# Patient Record
Sex: MALE | Race: BLACK OR AFRICAN AMERICAN | NOT HISPANIC OR LATINO | URBAN - METROPOLITAN AREA
[De-identification: names, ages, dates, MRNs, and addresses within clinical notes are randomized per-mention and may not be internally consistent; named-entity substitution may affect disease eponyms.]

---

## 2017-03-27 ENCOUNTER — INPATIENT (INPATIENT)
Facility: HOSPITAL | Age: 42
LOS: 3 days | Discharge: HOME HEALTH | End: 2017-03-31
Attending: INTERNAL MEDICINE | Admitting: INTERNAL MEDICINE
Payer: MEDICAID

## 2017-03-27 VITALS
HEART RATE: 63 BPM | OXYGEN SATURATION: 100 % | RESPIRATION RATE: 17 BRPM | WEIGHT: 190.04 LBS | HEIGHT: 74 IN | TEMPERATURE: 98 F | DIASTOLIC BLOOD PRESSURE: 85 MMHG | SYSTOLIC BLOOD PRESSURE: 120 MMHG

## 2017-03-27 DIAGNOSIS — S11.90XS UNSPECIFIED OPEN WOUND OF UNSPECIFIED PART OF NECK, SEQUELA: Chronic | ICD-10-CM

## 2017-03-27 DIAGNOSIS — S21.309A UNSPECIFIED OPEN WOUND OF UNSPECIFIED FRONT WALL OF THORAX WITH PENETRATION INTO THORACIC CAVITY, INITIAL ENCOUNTER: ICD-10-CM

## 2017-03-27 DIAGNOSIS — G82.20 PARAPLEGIA, UNSPECIFIED: ICD-10-CM

## 2017-03-27 DIAGNOSIS — N39.0 URINARY TRACT INFECTION, SITE NOT SPECIFIED: ICD-10-CM

## 2017-03-27 LAB
ALBUMIN SERPL ELPH-MCNC: 3.3 G/DL — SIGNIFICANT CHANGE UP (ref 3.3–5)
ALP SERPL-CCNC: 92 U/L — SIGNIFICANT CHANGE UP (ref 40–120)
ALT FLD-CCNC: 14 U/L — SIGNIFICANT CHANGE UP (ref 12–78)
ANION GAP SERPL CALC-SCNC: 9 MMOL/L — SIGNIFICANT CHANGE UP (ref 5–17)
APPEARANCE UR: ABNORMAL
AST SERPL-CCNC: 10 U/L — LOW (ref 15–37)
BACTERIA # UR AUTO: ABNORMAL
BASOPHILS # BLD AUTO: 0.1 K/UL — SIGNIFICANT CHANGE UP (ref 0–0.2)
BASOPHILS NFR BLD AUTO: 1.7 % — SIGNIFICANT CHANGE UP (ref 0–2)
BILIRUB SERPL-MCNC: 0.3 MG/DL — SIGNIFICANT CHANGE UP (ref 0.2–1.2)
BILIRUB UR-MCNC: NEGATIVE — SIGNIFICANT CHANGE UP
BUN SERPL-MCNC: 15 MG/DL — SIGNIFICANT CHANGE UP (ref 7–23)
CALCIUM SERPL-MCNC: 9.8 MG/DL — SIGNIFICANT CHANGE UP (ref 8.5–10.1)
CHLORIDE SERPL-SCNC: 105 MMOL/L — SIGNIFICANT CHANGE UP (ref 96–108)
CO2 SERPL-SCNC: 31 MMOL/L — SIGNIFICANT CHANGE UP (ref 22–31)
COLOR SPEC: ABNORMAL
CREAT SERPL-MCNC: 0.56 MG/DL — SIGNIFICANT CHANGE UP (ref 0.5–1.3)
DIFF PNL FLD: ABNORMAL
EOSINOPHIL # BLD AUTO: 0.1 K/UL — SIGNIFICANT CHANGE UP (ref 0–0.5)
EOSINOPHIL NFR BLD AUTO: 1.9 % — SIGNIFICANT CHANGE UP (ref 0–6)
EPI CELLS # UR: SIGNIFICANT CHANGE UP
GLUCOSE SERPL-MCNC: 95 MG/DL — SIGNIFICANT CHANGE UP (ref 70–99)
GLUCOSE UR QL: NEGATIVE MG/DL — SIGNIFICANT CHANGE UP
HCT VFR BLD CALC: 36 % — LOW (ref 39–50)
HGB BLD-MCNC: 12.2 G/DL — LOW (ref 13–17)
KETONES UR-MCNC: NEGATIVE — SIGNIFICANT CHANGE UP
LACTATE SERPL-SCNC: 0.9 MMOL/L — SIGNIFICANT CHANGE UP (ref 0.7–2)
LEUKOCYTE ESTERASE UR-ACNC: ABNORMAL
LYMPHOCYTES # BLD AUTO: 2.7 K/UL — SIGNIFICANT CHANGE UP (ref 1–3.3)
LYMPHOCYTES # BLD AUTO: 40.5 % — SIGNIFICANT CHANGE UP (ref 13–44)
MCHC RBC-ENTMCNC: 26.7 PG — LOW (ref 27–34)
MCHC RBC-ENTMCNC: 33.8 GM/DL — SIGNIFICANT CHANGE UP (ref 32–36)
MCV RBC AUTO: 78.9 FL — LOW (ref 80–100)
MONOCYTES # BLD AUTO: 0.9 K/UL — SIGNIFICANT CHANGE UP (ref 0–0.9)
MONOCYTES NFR BLD AUTO: 13.4 % — SIGNIFICANT CHANGE UP (ref 2–14)
NEUTROPHILS # BLD AUTO: 2.8 K/UL — SIGNIFICANT CHANGE UP (ref 1.8–7.4)
NEUTROPHILS NFR BLD AUTO: 42.4 % — LOW (ref 43–77)
NITRITE UR-MCNC: NEGATIVE — SIGNIFICANT CHANGE UP
PH UR: 7 — SIGNIFICANT CHANGE UP (ref 4.8–8)
PLATELET # BLD AUTO: 306 K/UL — SIGNIFICANT CHANGE UP (ref 150–400)
POTASSIUM SERPL-MCNC: 3.6 MMOL/L — SIGNIFICANT CHANGE UP (ref 3.5–5.3)
POTASSIUM SERPL-SCNC: 3.6 MMOL/L — SIGNIFICANT CHANGE UP (ref 3.5–5.3)
PROT SERPL-MCNC: 9 GM/DL — HIGH (ref 6–8.3)
PROT UR-MCNC: 500 MG/DL
RBC # BLD: 4.57 M/UL — SIGNIFICANT CHANGE UP (ref 4.2–5.8)
RBC # FLD: 16.7 % — HIGH (ref 11–15)
RBC CASTS # UR COMP ASSIST: >50 /HPF (ref 0–4)
SODIUM SERPL-SCNC: 145 MMOL/L — SIGNIFICANT CHANGE UP (ref 135–145)
SP GR SPEC: 1.01 — SIGNIFICANT CHANGE UP (ref 1.01–1.02)
UROBILINOGEN FLD QL: NEGATIVE MG/DL — SIGNIFICANT CHANGE UP
WBC # BLD: 6.6 K/UL — SIGNIFICANT CHANGE UP (ref 3.8–10.5)
WBC # FLD AUTO: 6.6 K/UL — SIGNIFICANT CHANGE UP (ref 3.8–10.5)
WBC UR QL: ABNORMAL

## 2017-03-27 PROCEDURE — 99285 EMERGENCY DEPT VISIT HI MDM: CPT

## 2017-03-27 PROCEDURE — 99223 1ST HOSP IP/OBS HIGH 75: CPT

## 2017-03-27 RX ORDER — SODIUM CHLORIDE 9 MG/ML
2000 INJECTION INTRAMUSCULAR; INTRAVENOUS; SUBCUTANEOUS ONCE
Qty: 0 | Refills: 0 | Status: COMPLETED | OUTPATIENT
Start: 2017-03-27 | End: 2017-03-27

## 2017-03-27 RX ORDER — TAMSULOSIN HYDROCHLORIDE 0.4 MG/1
0.4 CAPSULE ORAL AT BEDTIME
Qty: 0 | Refills: 0 | Status: DISCONTINUED | OUTPATIENT
Start: 2017-03-27 | End: 2017-03-31

## 2017-03-27 RX ORDER — SODIUM CHLORIDE 9 MG/ML
1000 INJECTION INTRAMUSCULAR; INTRAVENOUS; SUBCUTANEOUS
Qty: 0 | Refills: 0 | Status: DISCONTINUED | OUTPATIENT
Start: 2017-03-27 | End: 2017-03-30

## 2017-03-27 RX ORDER — SENNA PLUS 8.6 MG/1
2 TABLET ORAL AT BEDTIME
Qty: 0 | Refills: 0 | Status: DISCONTINUED | OUTPATIENT
Start: 2017-03-27 | End: 2017-03-31

## 2017-03-27 RX ORDER — ENOXAPARIN SODIUM 100 MG/ML
40 INJECTION SUBCUTANEOUS DAILY
Qty: 0 | Refills: 0 | Status: DISCONTINUED | OUTPATIENT
Start: 2017-03-27 | End: 2017-03-31

## 2017-03-27 RX ORDER — PIPERACILLIN AND TAZOBACTAM 4; .5 G/20ML; G/20ML
3.38 INJECTION, POWDER, LYOPHILIZED, FOR SOLUTION INTRAVENOUS ONCE
Qty: 0 | Refills: 0 | Status: COMPLETED | OUTPATIENT
Start: 2017-03-27 | End: 2017-03-27

## 2017-03-27 RX ORDER — PIPERACILLIN AND TAZOBACTAM 4; .5 G/20ML; G/20ML
3.38 INJECTION, POWDER, LYOPHILIZED, FOR SOLUTION INTRAVENOUS EVERY 8 HOURS
Qty: 0 | Refills: 0 | Status: DISCONTINUED | OUTPATIENT
Start: 2017-03-27 | End: 2017-03-31

## 2017-03-27 RX ORDER — FAMOTIDINE 10 MG/ML
20 INJECTION INTRAVENOUS DAILY
Qty: 0 | Refills: 0 | Status: DISCONTINUED | OUTPATIENT
Start: 2017-03-27 | End: 2017-03-31

## 2017-03-27 RX ADMIN — ENOXAPARIN SODIUM 40 MILLIGRAM(S): 100 INJECTION SUBCUTANEOUS at 20:29

## 2017-03-27 RX ADMIN — SODIUM CHLORIDE 2000 MILLILITER(S): 9 INJECTION INTRAMUSCULAR; INTRAVENOUS; SUBCUTANEOUS at 17:44

## 2017-03-27 RX ADMIN — PIPERACILLIN AND TAZOBACTAM 200 GRAM(S): 4; .5 INJECTION, POWDER, LYOPHILIZED, FOR SOLUTION INTRAVENOUS at 17:44

## 2017-03-27 NOTE — ED ADULT NURSE NOTE - OBJECTIVE STATEMENT
sent by pmd to r/o UTI pt with [place] cath in place sent by pmd to r/o UTI pt with [place] cath in place, nhi for 2 weeks , went to PMD got antibiotic no releif

## 2017-03-27 NOTE — ED PROVIDER NOTE - OBJECTIVE STATEMENT
Pertinent PMH/PSH/FHx/SHx and Review of Systems contained within:  41m hx of quadriplegia complicated by recurrent urinary tract infecitons pw urinary trace infection. patients' symp[toms include chills, diaphoresis and nausea. no pain. he has had symptoms for the past 3 weeks. having completed a course of macrobid since 3/9 for 14 days. Patient has resistant infeciton  Fh and Sh not otherwise contributory  ROS otherwise negative

## 2017-03-27 NOTE — ED ADULT TRIAGE NOTE - CHIEF COMPLAINT QUOTE
as per ems " He is from home and he was treated for UTI and he is still having the symptoms, he is quadriplegic"
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
## 2017-03-27 NOTE — H&P ADULT. - RS GEN PE MLT RESP DETAILS PC
respirations non-labored/no chest wall tenderness/good air movement/airway patent/clear to auscultation bilaterally/breath sounds equal

## 2017-03-27 NOTE — ED PROVIDER NOTE - PHYSICAL EXAMINATION
Gen: Alert, NAD  Head: NC, AT   Eyes: PERRL, EOMI, normal lids/conjunctiva  ENT: normal hearing, patent oropharynx without erythema/exudate, uvula midline  Neck: supple, no tenderness, Trachea midline  Pulm: Bilateral BS, normal resp effort, no wheeze/stridor/retractions  CV: RRR, no M/R/G, 2+ radial and dp pulses bl, no edema  Abd: soft, NT/ND, +BS, no hepatosplenomegaly  Mskel: extremities x4 with normal ROM and no joint effusions. no ctl spine ttp.   Skin: no rash, no bruising   Neuro: AAOx3, quadriplegic. no sensation below lower neck. spasticity of the hands.
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
## 2017-03-27 NOTE — ED PROVIDER NOTE - MEDICAL DECISION MAKING DETAILS
patient pw uti and has failed outpatient therapy x2. will admit for iv abx until culture develops. zosyn. no juana sepsis with hemodynamic instability.

## 2017-03-28 DIAGNOSIS — N30.01 ACUTE CYSTITIS WITH HEMATURIA: ICD-10-CM

## 2017-03-28 DIAGNOSIS — G82.50 QUADRIPLEGIA, UNSPECIFIED: ICD-10-CM

## 2017-03-28 LAB
ANION GAP SERPL CALC-SCNC: 8 MMOL/L — SIGNIFICANT CHANGE UP (ref 5–17)
BASOPHILS # BLD AUTO: 0 K/UL — SIGNIFICANT CHANGE UP (ref 0–0.2)
BASOPHILS NFR BLD AUTO: 0.6 % — SIGNIFICANT CHANGE UP (ref 0–2)
BUN SERPL-MCNC: 13 MG/DL — SIGNIFICANT CHANGE UP (ref 7–23)
CALCIUM SERPL-MCNC: 8.3 MG/DL — LOW (ref 8.5–10.1)
CHLORIDE SERPL-SCNC: 108 MMOL/L — SIGNIFICANT CHANGE UP (ref 96–108)
CO2 SERPL-SCNC: 27 MMOL/L — SIGNIFICANT CHANGE UP (ref 22–31)
CREAT SERPL-MCNC: 0.58 MG/DL — SIGNIFICANT CHANGE UP (ref 0.5–1.3)
CULTURE RESULTS: SIGNIFICANT CHANGE UP
EOSINOPHIL # BLD AUTO: 0.3 K/UL — SIGNIFICANT CHANGE UP (ref 0–0.5)
EOSINOPHIL NFR BLD AUTO: 3.7 % — SIGNIFICANT CHANGE UP (ref 0–6)
GLUCOSE SERPL-MCNC: 126 MG/DL — HIGH (ref 70–99)
HCT VFR BLD CALC: 30.8 % — LOW (ref 39–50)
HGB BLD-MCNC: 10.3 G/DL — LOW (ref 13–17)
LYMPHOCYTES # BLD AUTO: 2.4 K/UL — SIGNIFICANT CHANGE UP (ref 1–3.3)
LYMPHOCYTES # BLD AUTO: 34.3 % — SIGNIFICANT CHANGE UP (ref 13–44)
MCHC RBC-ENTMCNC: 26.3 PG — LOW (ref 27–34)
MCHC RBC-ENTMCNC: 33.3 GM/DL — SIGNIFICANT CHANGE UP (ref 32–36)
MCV RBC AUTO: 79.1 FL — LOW (ref 80–100)
MONOCYTES # BLD AUTO: 0.6 K/UL — SIGNIFICANT CHANGE UP (ref 0–0.9)
MONOCYTES NFR BLD AUTO: 9.1 % — SIGNIFICANT CHANGE UP (ref 2–14)
NEUTROPHILS # BLD AUTO: 3.6 K/UL — SIGNIFICANT CHANGE UP (ref 1.8–7.4)
NEUTROPHILS NFR BLD AUTO: 52.2 % — SIGNIFICANT CHANGE UP (ref 43–77)
PLATELET # BLD AUTO: 252 K/UL — SIGNIFICANT CHANGE UP (ref 150–400)
POTASSIUM SERPL-MCNC: 3.5 MMOL/L — SIGNIFICANT CHANGE UP (ref 3.5–5.3)
POTASSIUM SERPL-SCNC: 3.5 MMOL/L — SIGNIFICANT CHANGE UP (ref 3.5–5.3)
RBC # BLD: 3.9 M/UL — LOW (ref 4.2–5.8)
RBC # FLD: 16.4 % — HIGH (ref 11–15)
SODIUM SERPL-SCNC: 143 MMOL/L — SIGNIFICANT CHANGE UP (ref 135–145)
SPECIMEN SOURCE: SIGNIFICANT CHANGE UP
WBC # BLD: 6.9 K/UL — SIGNIFICANT CHANGE UP (ref 3.8–10.5)
WBC # FLD AUTO: 6.9 K/UL — SIGNIFICANT CHANGE UP (ref 3.8–10.5)

## 2017-03-28 PROCEDURE — 99233 SBSQ HOSP IP/OBS HIGH 50: CPT

## 2017-03-28 PROCEDURE — 99223 1ST HOSP IP/OBS HIGH 75: CPT

## 2017-03-28 RX ORDER — ACETAMINOPHEN 500 MG
650 TABLET ORAL EVERY 6 HOURS
Qty: 0 | Refills: 0 | Status: DISCONTINUED | OUTPATIENT
Start: 2017-03-28 | End: 2017-03-31

## 2017-03-28 RX ADMIN — PIPERACILLIN AND TAZOBACTAM 25 GRAM(S): 4; .5 INJECTION, POWDER, LYOPHILIZED, FOR SOLUTION INTRAVENOUS at 00:09

## 2017-03-28 RX ADMIN — FAMOTIDINE 20 MILLIGRAM(S): 10 INJECTION INTRAVENOUS at 11:03

## 2017-03-28 RX ADMIN — SENNA PLUS 2 TABLET(S): 8.6 TABLET ORAL at 00:09

## 2017-03-28 RX ADMIN — SENNA PLUS 2 TABLET(S): 8.6 TABLET ORAL at 21:53

## 2017-03-28 RX ADMIN — TAMSULOSIN HYDROCHLORIDE 0.4 MILLIGRAM(S): 0.4 CAPSULE ORAL at 21:53

## 2017-03-28 RX ADMIN — PIPERACILLIN AND TAZOBACTAM 25 GRAM(S): 4; .5 INJECTION, POWDER, LYOPHILIZED, FOR SOLUTION INTRAVENOUS at 15:30

## 2017-03-28 RX ADMIN — ENOXAPARIN SODIUM 40 MILLIGRAM(S): 100 INJECTION SUBCUTANEOUS at 11:03

## 2017-03-28 RX ADMIN — PIPERACILLIN AND TAZOBACTAM 25 GRAM(S): 4; .5 INJECTION, POWDER, LYOPHILIZED, FOR SOLUTION INTRAVENOUS at 07:38

## 2017-03-28 RX ADMIN — TAMSULOSIN HYDROCHLORIDE 0.4 MILLIGRAM(S): 0.4 CAPSULE ORAL at 00:09

## 2017-03-28 NOTE — PROGRESS NOTE ADULT - SUBJECTIVE AND OBJECTIVE BOX
Patient is a 41y old  Male who presents with a chief complaint of fever/chills, diaphoresis (27 Mar 2017 18:43)       OVERNIGHT EVENTS:  +chills    MEDICATIONS  (STANDING):  enoxaparin Injectable 40milliGRAM(s) SubCutaneous daily  piperacillin/tazobactam IVPB. 3.375Gram(s) IV Intermittent every 8 hours  sodium chloride 0.9%. 1000milliLiter(s) IV Continuous <Continuous>  tamsulosin 0.4milliGRAM(s) Oral at bedtime  famotidine    Tablet 20milliGRAM(s) Oral daily  senna 2Tablet(s) Oral at bedtime    MEDICATIONS  (PRN):       Vital Signs Last 24 Hrs  T(C): 37, Max: 37 ( @ 13:12)  T(F): 98.6, Max: 98.6 ( @ 13:12)  HR: 66 (66 - 92)  BP: 125/86 (82/50 - 177/87)  BP(mean): --  RR: 18 (18 - 18)  SpO2: 99% (98% - 100%)    PHYSICAL EXAM:  GENERAL: NAD, well-groomed,AAOX3,  +quadriplegic  HEAD:  Atraumatic, Normocephalic  EYES: EOMI, PERRLA, conjunctiva and sclera clear  ENMT: No tonsillar erythema, exudates, or enlargement; Moist mucous membranes   CHEST/LUNG: Clear to percussion bilaterally; No rales, rhonchi, wheezing, or rubs  HEART: Regular rate and rhythm; No murmurs, rubs, or gallops  ABDOMEN: Soft, Nontender, Nondistended; Bowel sounds present  EXTREMITIES:  2+ Peripheral Pulses, No clubbing, cyanosis, or edema    LABS:                        10.3   6.9   )-----------( 252      ( 28 Mar 2017 10:03 )             30.8     28 Mar 2017 10:03    143    |  108    |  13     ----------------------------<  126    3.5     |  27     |  0.58     Ca    8.3        28 Mar 2017 10:03    TPro  9.0    /  Alb  3.3    /  TBili  0.3    /  DBili  x      /  AST  10     /  ALT  14     /  AlkPhos  92     27 Mar 2017 18:03       cardiac markers   Urinalysis Basic - ( 27 Mar 2017 14:52 )    Color: Brown / Appearance: very cloudy / S.010 / pH: x  Gluc: x / Ketone: Negative  / Bili: Negative / Urobili: Negative mg/dL   Blood: x / Protein: 500 mg/dL / Nitrite: Negative   Leuk Esterase: Moderate / RBC: >50 /HPF / WBC 6-10   Sq Epi: x / Non Sq Epi: Few / Bacteria: Few      CAPILLARY BLOOD GLUCOSE    Cultures: pending    RADIOLOGY & ADDITIONAL TESTS:    Imaging Personally Reviewed:  [x ] YES  [ ] NO    Consultant(s) Notes Reviewed:  [ x] YES  [ ] NO    Care Discussed with Consultants/Other Providers [x ] YES  [ ] NO
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
## 2017-03-28 NOTE — CONSULT NOTE ADULT - PROBLEM SELECTOR RECOMMENDATION 9
continue Zoysn  f/u on urine culture  switch to oral soon to finish a 14 day course as ot getting on and off uti
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
## 2017-03-29 PROCEDURE — 99233 SBSQ HOSP IP/OBS HIGH 50: CPT

## 2017-03-29 RX ADMIN — PIPERACILLIN AND TAZOBACTAM 25 GRAM(S): 4; .5 INJECTION, POWDER, LYOPHILIZED, FOR SOLUTION INTRAVENOUS at 18:12

## 2017-03-29 RX ADMIN — FAMOTIDINE 20 MILLIGRAM(S): 10 INJECTION INTRAVENOUS at 12:41

## 2017-03-29 RX ADMIN — TAMSULOSIN HYDROCHLORIDE 0.4 MILLIGRAM(S): 0.4 CAPSULE ORAL at 21:56

## 2017-03-29 RX ADMIN — SODIUM CHLORIDE 100 MILLILITER(S): 9 INJECTION INTRAMUSCULAR; INTRAVENOUS; SUBCUTANEOUS at 12:41

## 2017-03-29 RX ADMIN — ENOXAPARIN SODIUM 40 MILLIGRAM(S): 100 INJECTION SUBCUTANEOUS at 12:41

## 2017-03-29 RX ADMIN — SODIUM CHLORIDE 100 MILLILITER(S): 9 INJECTION INTRAMUSCULAR; INTRAVENOUS; SUBCUTANEOUS at 21:55

## 2017-03-29 RX ADMIN — Medication 650 MILLIGRAM(S): at 00:05

## 2017-03-29 RX ADMIN — Medication 650 MILLIGRAM(S): at 01:05

## 2017-03-29 RX ADMIN — PIPERACILLIN AND TAZOBACTAM 25 GRAM(S): 4; .5 INJECTION, POWDER, LYOPHILIZED, FOR SOLUTION INTRAVENOUS at 09:13

## 2017-03-29 RX ADMIN — PIPERACILLIN AND TAZOBACTAM 25 GRAM(S): 4; .5 INJECTION, POWDER, LYOPHILIZED, FOR SOLUTION INTRAVENOUS at 00:07

## 2017-03-29 RX ADMIN — SENNA PLUS 2 TABLET(S): 8.6 TABLET ORAL at 21:56

## 2017-03-29 NOTE — PROGRESS NOTE ADULT - SUBJECTIVE AND OBJECTIVE BOX
Patient is a 41y old  Male who presents with a chief complaint of fever/chills, diaphoresis (27 Mar 2017 18:43)      INTERVAL HPI / OVERNIGHT EVENTS: no fever    MEDICATIONS  (STANDING):  enoxaparin Injectable 40milliGRAM(s) SubCutaneous daily  piperacillin/tazobactam IVPB. 3.375Gram(s) IV Intermittent every 8 hours  sodium chloride 0.9%. 1000milliLiter(s) IV Continuous <Continuous>  tamsulosin 0.4milliGRAM(s) Oral at bedtime  famotidine    Tablet 20milliGRAM(s) Oral daily  senna 2Tablet(s) Oral at bedtime    MEDICATIONS  (PRN):  acetaminophen   Tablet. 650milliGRAM(s) Oral every 6 hours PRN Mild Pain (1 - 3)      Vital Signs Last 24 Hrs  T(C): 37, Max: 37 (03-29 @ 12:00)  T(F): 98.6, Max: 98.6 (03-29 @ 12:00)  HR: 65 (64 - 68)  BP: 119/78 (103/60 - 136/78)  BP(mean): --  RR: 16 (16 - 17)  SpO2: 98% (98% - 98%)    PHYSICAL EXAM:    Constitutional: NAD, well-groomed, well-developed  Respiratory: CTAB/L  Cardiovascular: S1 and S2, RRR, no M/G/R  Gastrointestinal: BS+, soft, NT/ND  Extremities: paraplegia  Vascular: 2+ peripheral pulses  Skin: No rashes      LABS:                        10.3   6.9   )-----------( 252      ( 28 Mar 2017 10:03 )             30.8     28 Mar 2017 10:03    143    |  108    |  13     ----------------------------<  126    3.5     |  27     |  0.58     Ca    8.3        28 Mar 2017 10:03    TPro  9.0    /  Alb  3.3    /  TBili  0.3    /  DBili  x      /  AST  10     /  ALT  14     /  AlkPhos  92     27 Mar 2017 18:03            MICROBIOLOGY:  RECENT CULTURES:  03-27 .Blood Blood XXXX XXXX   No growth to date.    03-27 .Urine Catheterized XXXX XXXX   Culture grew 3 or more types of organisms which indicate  collection contamination; consider recollection only if clinically  indicated.          RADIOLOGY & ADDITIONAL STUDIES:

## 2017-03-29 NOTE — DISCHARGE NOTE ADULT - PLAN OF CARE
Dysuria is resolving Complete cefdinir  for 2 weeks. Follow up with Urology and ID. Drink lots of fluids. supportive care

## 2017-03-29 NOTE — DISCHARGE NOTE ADULT - PATIENT PORTAL LINK FT
“You can access the [facility] Patient Portal, offered by [facility], by registering with the following website: [email or web address]”
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
## 2017-03-29 NOTE — PROGRESS NOTE ADULT - SUBJECTIVE AND OBJECTIVE BOX
Patient is a 41y old  Male who presents with a chief complaint of fever/chills, diaphoresis (27 Mar 2017 18:43)       OVERNIGHT EVENTS: no reported events    MEDICATIONS  (STANDING):  enoxaparin Injectable 40milliGRAM(s) SubCutaneous daily  piperacillin/tazobactam IVPB. 3.375Gram(s) IV Intermittent every 8 hours  sodium chloride 0.9%. 1000milliLiter(s) IV Continuous <Continuous>  tamsulosin 0.4milliGRAM(s) Oral at bedtime  famotidine    Tablet 20milliGRAM(s) Oral daily  senna 2Tablet(s) Oral at bedtime    MEDICATIONS  (PRN):  acetaminophen   Tablet. 650milliGRAM(s) Oral every 6 hours PRN Mild Pain (1 - 3)         Vital Signs Last 24 Hrs  T(C): 37, Max: 37 (03-29 @ 12:00)  T(F): 98.6, Max: 98.6 (03-29 @ 12:00)  HR: 65 (64 - 68)  BP: 119/78 (103/60 - 136/78)  BP(mean): --  RR: 16 (16 - 17)  SpO2: 98% (98% - 98%)     PHYSICAL EXAM:  GENERAL: NAD, well-groomed,AAOX3,  +quadriplegic  HEAD:  Atraumatic, Normocephalic  EYES: EOMI, PERRLA, conjunctiva and sclera clear  ENMT: No tonsillar erythema, exudates, or enlargement; Moist mucous membranes   CHEST/LUNG: Clear to percussion bilaterally; No rales, rhonchi, wheezing, or rubs  HEART: Regular rate and rhythm; No murmurs, rubs, or gallops  ABDOMEN: Soft, Nontender, Nondistended; Bowel sounds present  EXTREMITIES:  2+ Peripheral Pulses, No clubbing, cyanosis, or edema  LABS:                        10.3   6.9   )-----------( 252      ( 28 Mar 2017 10:03 )             30.8     28 Mar 2017 10:03    143    |  108    |  13     ----------------------------<  126    3.5     |  27     |  0.58     Ca    8.3        28 Mar 2017 10:03    TPro  9.0    /  Alb  3.3    /  TBili  0.3    /  DBili  x      /  AST  10     /  ALT  14     /  AlkPhos  92     27 Mar 2017 18:03       cardiac markers     CAPILLARY BLOOD GLUCOSE    Cultures    RADIOLOGY & ADDITIONAL TESTS:    Imaging Personally Reviewed:  [ x] YES  [ ] NO    Consultant(s) Notes Reviewed:  [x ] YES  [ ] NO    Care Discussed with Consultants/Other Providers [x ] YES  [ ] NO

## 2017-03-29 NOTE — DISCHARGE NOTE ADULT - MEDICATION SUMMARY - MEDICATIONS TO TAKE
I will START or STAY ON the medications listed below when I get home from the hospital:    Flomax 0.4 mg oral capsule  -- 1 cap(s) by mouth once a day  -- Indication: For BPH    famotidine 20 mg oral tablet, disintegrating  --  by mouth   -- Indication: For GERD    senna 8.6 mg oral tablet  -- 1 tab(s) by mouth once a day (at bedtime)  -- Indication: For PPX    tiZANidine 4 mg oral tablet  -- 2 tab(s) by mouth every 8 hours  -- Indication: For Muscle spasm I will START or STAY ON the medications listed below when I get home from the hospital:    Flomax 0.4 mg oral capsule  -- 1 cap(s) by mouth once a day  -- Indication: For BPH    cefdinir 300 mg oral capsule  -- 1 cap(s) by mouth every 12 hours  -- Finish all this medication unless otherwise directed by prescriber.    -- Indication: For ACUTE CYSTITIS WITH HEMATURIA    famotidine 20 mg oral tablet, disintegrating  --  by mouth   -- Indication: For GERD    senna 8.6 mg oral tablet  -- 1 tab(s) by mouth once a day (at bedtime)  -- Indication: For PPX    tiZANidine 4 mg oral tablet  -- 2 tab(s) by mouth every 8 hours  -- Indication: For Muscle spasm

## 2017-03-29 NOTE — DISCHARGE NOTE ADULT - CARE PROVIDER_API CALL
Sruthi Lyons ([facility]), Infectious Disease; Internal Medicine  [address] [place] [address]  Phone: [phone]  Fax: [phone]    James Bates), Urology  [address] [place] [address]  Phone: [phone]  Fax: [phone]
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
## 2017-03-30 LAB
HCT VFR BLD CALC: 29.1 % — LOW (ref 39–50)
HGB BLD-MCNC: 9.7 G/DL — LOW (ref 13–17)
MCHC RBC-ENTMCNC: 26 PG — LOW (ref 27–34)
MCHC RBC-ENTMCNC: 33.2 GM/DL — SIGNIFICANT CHANGE UP (ref 32–36)
MCV RBC AUTO: 78.4 FL — LOW (ref 80–100)
PLATELET # BLD AUTO: 254 K/UL — SIGNIFICANT CHANGE UP (ref 150–400)
RBC # BLD: 3.72 M/UL — LOW (ref 4.2–5.8)
RBC # FLD: 16.2 % — HIGH (ref 11–15)
WBC # BLD: 5.9 K/UL — SIGNIFICANT CHANGE UP (ref 3.8–10.5)
WBC # FLD AUTO: 5.9 K/UL — SIGNIFICANT CHANGE UP (ref 3.8–10.5)

## 2017-03-30 PROCEDURE — 99239 HOSP IP/OBS DSCHRG MGMT >30: CPT

## 2017-03-30 PROCEDURE — 99233 SBSQ HOSP IP/OBS HIGH 50: CPT

## 2017-03-30 RX ORDER — CEFDINIR 250 MG/5ML
1 POWDER, FOR SUSPENSION ORAL
Qty: 28 | Refills: 0 | OUTPATIENT
Start: 2017-03-30 | End: 2017-04-13

## 2017-03-30 RX ADMIN — PIPERACILLIN AND TAZOBACTAM 25 GRAM(S): 4; .5 INJECTION, POWDER, LYOPHILIZED, FOR SOLUTION INTRAVENOUS at 23:32

## 2017-03-30 RX ADMIN — PIPERACILLIN AND TAZOBACTAM 25 GRAM(S): 4; .5 INJECTION, POWDER, LYOPHILIZED, FOR SOLUTION INTRAVENOUS at 17:42

## 2017-03-30 RX ADMIN — ENOXAPARIN SODIUM 40 MILLIGRAM(S): 100 INJECTION SUBCUTANEOUS at 17:43

## 2017-03-30 RX ADMIN — TAMSULOSIN HYDROCHLORIDE 0.4 MILLIGRAM(S): 0.4 CAPSULE ORAL at 22:15

## 2017-03-30 RX ADMIN — SENNA PLUS 2 TABLET(S): 8.6 TABLET ORAL at 22:15

## 2017-03-30 RX ADMIN — FAMOTIDINE 20 MILLIGRAM(S): 10 INJECTION INTRAVENOUS at 17:43

## 2017-03-30 RX ADMIN — PIPERACILLIN AND TAZOBACTAM 25 GRAM(S): 4; .5 INJECTION, POWDER, LYOPHILIZED, FOR SOLUTION INTRAVENOUS at 08:38

## 2017-03-30 RX ADMIN — PIPERACILLIN AND TAZOBACTAM 25 GRAM(S): 4; .5 INJECTION, POWDER, LYOPHILIZED, FOR SOLUTION INTRAVENOUS at 00:31

## 2017-03-30 RX ADMIN — SODIUM CHLORIDE 100 MILLILITER(S): 9 INJECTION INTRAMUSCULAR; INTRAVENOUS; SUBCUTANEOUS at 08:41

## 2017-03-30 NOTE — PROGRESS NOTE ADULT - SUBJECTIVE AND OBJECTIVE BOX
Patient is a 41y old  Male who presents with a chief complaint of fever/chills, diaphoresis (29 Mar 2017 15:57)      OVERNIGHT EVENTS: has some dysuria and feels diaphoretic while urinating.     REVIEW OF SYSTEMS: denies chest pain/SOB, diaphoresis, no F/C, cough, dizziness, headache, blurry vision, nausea, vomiting, abdominal pain. Rest unremarkable     MEDICATIONS  (STANDING):  enoxaparin Injectable 40milliGRAM(s) SubCutaneous daily  piperacillin/tazobactam IVPB. 3.375Gram(s) IV Intermittent every 8 hours  sodium chloride 0.9%. 1000milliLiter(s) IV Continuous <Continuous>  tamsulosin 0.4milliGRAM(s) Oral at bedtime  famotidine    Tablet 20milliGRAM(s) Oral daily  senna 2Tablet(s) Oral at bedtime    MEDICATIONS  (PRN):  acetaminophen   Tablet. 650milliGRAM(s) Oral every 6 hours PRN Mild Pain (1 - 3)      Allergies    No Known Allergies    Intolerances        T(F): 97.4, Max: 99 (03-29 @ 17:15)  HR: 63 (63 - 76)  BP: 127/82 (116/74 - 145/95)  RR: 17 (17 - 20)  SpO2: 98% (97% - 100%)  Wt(kg): --    PHYSICAL EXAM:  GENERAL: NAD, well-groomed, well-developed  HEAD:  Atraumatic, Normocephalic  EYES: EOMI, PERRLA, conjunctiva and sclera clear  ENMT: No tonsillar erythema, exudates, or enlargement; Moist mucous membranes, Good dentition, No lesions  NECK: Supple, No JVD, Normal thyroid  NERVOUS SYSTEM:  Alert & Oriented X3, Good concentration; +quadriplegic  CHEST/LUNG: Clear to percussion bilaterally; No rales, rhonchi, wheezing, or rubs BL  HEART: Regular rate and rhythm; No murmurs, rubs, or gallops  ABDOMEN: Soft, Nontender, Nondistended; Bowel sounds present  EXTREMITIES:  2+ Peripheral Pulses, No clubbing, cyanosis, or edema BL LE  LYMPH: No lymphadenopathy noted      LABS:                        9.7    5.9   )-----------( 254      ( 30 Mar 2017 07:07 )             29.1               Cultures;   CAPILLARY BLOOD GLUCOSE    Lipid panel:           RADIOLOGY & ADDITIONAL TESTS:    Imaging Personally Reviewed:  [x ] YES      Consultant(s) Notes Reviewed:  [x ] YES     Care Discussed with [x ] Consultants [X ] Patient [ ] Family  [x ]    [x ]  Other; RN

## 2017-03-30 NOTE — PROGRESS NOTE ADULT - SUBJECTIVE AND OBJECTIVE BOX
Patient is a 41y old  Male who presents with a chief complaint of fever/chills, diaphoresis (29 Mar 2017 15:57)      INTERVAL HPI / OVERNIGHT EVENTS:c/o sweating and chills, no documented fever or hypothermia  MEDICATIONS  (STANDING):  enoxaparin Injectable 40milliGRAM(s) SubCutaneous daily  piperacillin/tazobactam IVPB. 3.375Gram(s) IV Intermittent every 8 hours  tamsulosin 0.4milliGRAM(s) Oral at bedtime  famotidine    Tablet 20milliGRAM(s) Oral daily  senna 2Tablet(s) Oral at bedtime    MEDICATIONS  (PRN):  acetaminophen   Tablet. 650milliGRAM(s) Oral every 6 hours PRN Mild Pain (1 - 3)      Vital Signs Last 24 Hrs  Tmax: afebrile  HR: 74 (63 - 77)  BP: 126/75 (126/75 - 135/87)  BP(mean): --  RR: 17 (17 - 18)  SpO2: 98% (98% - 99%)    PHYSICAL EXAM:    Constitutional: NAD, well-groomed, well-developed  Respiratory: CTAB/L  Cardiovascular: S1 and S2, RRR, no M/G/R  Gastrointestinal: BS+, soft, NT/ND  Extremities: No peripheral edema  Vascular: 2+ peripheral pulses  Skin: No rashes      LABS:                        9.7    5.9   )-----------( 254      ( 30 Mar 2017 07:07 )             29.1                   MICROBIOLOGY:  RECENT CULTURES:  03-27 .Blood Blood XXXX XXXX   No growth to date.    03-27 .Urine Catheterized XXXX XXXX   Culture grew 3 or more types of organisms which indicate  collection contamination; consider recollection only if clinically  indicated.          RADIOLOGY & ADDITIONAL STUDIES:

## 2017-03-31 VITALS
TEMPERATURE: 98 F | SYSTOLIC BLOOD PRESSURE: 111 MMHG | DIASTOLIC BLOOD PRESSURE: 78 MMHG | HEART RATE: 76 BPM | OXYGEN SATURATION: 98 % | RESPIRATION RATE: 17 BRPM

## 2017-03-31 PROCEDURE — 99239 HOSP IP/OBS DSCHRG MGMT >30: CPT

## 2017-03-31 RX ORDER — CEFDINIR 250 MG/5ML
300 POWDER, FOR SUSPENSION ORAL
Qty: 0 | Refills: 0 | Status: DISCONTINUED | OUTPATIENT
Start: 2017-03-31 | End: 2017-03-31

## 2017-03-31 RX ADMIN — ENOXAPARIN SODIUM 40 MILLIGRAM(S): 100 INJECTION SUBCUTANEOUS at 12:18

## 2017-03-31 RX ADMIN — FAMOTIDINE 20 MILLIGRAM(S): 10 INJECTION INTRAVENOUS at 12:18

## 2017-03-31 RX ADMIN — PIPERACILLIN AND TAZOBACTAM 25 GRAM(S): 4; .5 INJECTION, POWDER, LYOPHILIZED, FOR SOLUTION INTRAVENOUS at 08:56
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
## 2017-03-31 NOTE — PROGRESS NOTE ADULT - PROBLEM SELECTOR PROBLEM 1
Acute cystitis with hematuria
Complicated UTI (urinary tract infection)
Acute cystitis with hematuria

## 2017-03-31 NOTE — PROGRESS NOTE ADULT - PROBLEM SELECTOR PLAN 1
- on Zosyn IV  -  urine cultures>3 organisms, contamination  c/w IV zosyn as pt has recent h/o failed outpt PO abx   - IVF   - ID   f/u
-IV zosyn per ID  -follow up bcx/ucx
c/s grew More than 3 species   ok to d/c on cefdinir to finish a 2 week course (day 5 today )  D/C zosyn in am   no signs of sepsis, fever or leukocytosis
change to oral antibiotics as per infectious disease  stable for discharge
- on Zosyn IV  - f/u urine cultures  - IVF   - ID consulted 
c/s grew More than 3 species   Has been on macrobid and cipro in past   will likely d/c on cefdinir  for 2 weeks

## 2017-04-01 LAB
CULTURE RESULTS: SIGNIFICANT CHANGE UP
CULTURE RESULTS: SIGNIFICANT CHANGE UP
SPECIMEN SOURCE: SIGNIFICANT CHANGE UP
SPECIMEN SOURCE: SIGNIFICANT CHANGE UP

## 2017-04-04 DIAGNOSIS — G82.50 QUADRIPLEGIA, UNSPECIFIED: ICD-10-CM

## 2017-04-04 DIAGNOSIS — N39.0 URINARY TRACT INFECTION, SITE NOT SPECIFIED: ICD-10-CM

## 2017-04-04 DIAGNOSIS — Z87.828 PERSONAL HISTORY OF OTHER (HEALED) PHYSICAL INJURY AND TRAUMA: ICD-10-CM

## 2019-01-07 ENCOUNTER — EMERGENCY (EMERGENCY)
Facility: HOSPITAL | Age: 44
LOS: 0 days | Discharge: HOME | End: 2019-01-07
Attending: STUDENT IN AN ORGANIZED HEALTH CARE EDUCATION/TRAINING PROGRAM
Payer: MEDICAID

## 2019-01-07 VITALS
RESPIRATION RATE: 20 BRPM | OXYGEN SATURATION: 100 % | TEMPERATURE: 98 F | HEART RATE: 78 BPM | SYSTOLIC BLOOD PRESSURE: 124 MMHG | DIASTOLIC BLOOD PRESSURE: 74 MMHG

## 2019-01-07 VITALS
OXYGEN SATURATION: 98 % | HEIGHT: 74 IN | SYSTOLIC BLOOD PRESSURE: 124 MMHG | TEMPERATURE: 98 F | WEIGHT: 175.05 LBS | HEART RATE: 63 BPM | DIASTOLIC BLOOD PRESSURE: 86 MMHG | RESPIRATION RATE: 20 BRPM

## 2019-01-07 DIAGNOSIS — Z88.0 ALLERGY STATUS TO PENICILLIN: ICD-10-CM

## 2019-01-07 DIAGNOSIS — R10.30 LOWER ABDOMINAL PAIN, UNSPECIFIED: ICD-10-CM

## 2019-01-07 DIAGNOSIS — S11.90XS UNSPECIFIED OPEN WOUND OF UNSPECIFIED PART OF NECK, SEQUELA: Chronic | ICD-10-CM

## 2019-01-07 DIAGNOSIS — N39.0 URINARY TRACT INFECTION, SITE NOT SPECIFIED: ICD-10-CM

## 2019-01-07 DIAGNOSIS — G82.20 PARAPLEGIA, UNSPECIFIED: ICD-10-CM

## 2019-01-07 LAB
ALBUMIN SERPL ELPH-MCNC: 3.4 G/DL — SIGNIFICANT CHANGE UP (ref 3.3–5)
ALP SERPL-CCNC: 92 U/L — SIGNIFICANT CHANGE UP (ref 40–120)
ALT FLD-CCNC: 21 U/L — SIGNIFICANT CHANGE UP (ref 12–78)
AMYLASE P1 CFR SERPL: 53 U/L — SIGNIFICANT CHANGE UP (ref 25–115)
ANION GAP SERPL CALC-SCNC: 6 MMOL/L — SIGNIFICANT CHANGE UP (ref 5–17)
APPEARANCE UR: ABNORMAL
AST SERPL-CCNC: 16 U/L — SIGNIFICANT CHANGE UP (ref 15–37)
BACTERIA # UR AUTO: ABNORMAL
BASOPHILS # BLD AUTO: 0.02 K/UL — SIGNIFICANT CHANGE UP (ref 0–0.2)
BASOPHILS NFR BLD AUTO: 0.3 % — SIGNIFICANT CHANGE UP (ref 0–2)
BILIRUB DIRECT SERPL-MCNC: 0.08 MG/DL — SIGNIFICANT CHANGE UP (ref 0.05–0.2)
BILIRUB INDIRECT FLD-MCNC: 0.2 MG/DL — SIGNIFICANT CHANGE UP (ref 0.2–1)
BILIRUB SERPL-MCNC: 0.3 MG/DL — SIGNIFICANT CHANGE UP (ref 0.2–1.2)
BILIRUB UR-MCNC: NEGATIVE — SIGNIFICANT CHANGE UP
BUN SERPL-MCNC: 14 MG/DL — SIGNIFICANT CHANGE UP (ref 7–23)
CALCIUM SERPL-MCNC: 8.9 MG/DL — SIGNIFICANT CHANGE UP (ref 8.5–10.1)
CHLORIDE SERPL-SCNC: 110 MMOL/L — HIGH (ref 96–108)
CO2 SERPL-SCNC: 28 MMOL/L — SIGNIFICANT CHANGE UP (ref 22–31)
COLOR SPEC: YELLOW — SIGNIFICANT CHANGE UP
CREAT SERPL-MCNC: 0.54 MG/DL — SIGNIFICANT CHANGE UP (ref 0.5–1.3)
DIFF PNL FLD: ABNORMAL
EOSINOPHIL # BLD AUTO: 0.23 K/UL — SIGNIFICANT CHANGE UP (ref 0–0.5)
EOSINOPHIL NFR BLD AUTO: 3.9 % — SIGNIFICANT CHANGE UP (ref 0–6)
EPI CELLS # UR: SIGNIFICANT CHANGE UP
GLUCOSE SERPL-MCNC: 92 MG/DL — SIGNIFICANT CHANGE UP (ref 70–99)
GLUCOSE UR QL: NEGATIVE MG/DL — SIGNIFICANT CHANGE UP
HCT VFR BLD CALC: 45.9 % — SIGNIFICANT CHANGE UP (ref 39–50)
HGB BLD-MCNC: 14.7 G/DL — SIGNIFICANT CHANGE UP (ref 13–17)
IMM GRANULOCYTES NFR BLD AUTO: 0.2 % — SIGNIFICANT CHANGE UP (ref 0–1.5)
KETONES UR-MCNC: NEGATIVE — SIGNIFICANT CHANGE UP
LEUKOCYTE ESTERASE UR-ACNC: ABNORMAL
LIDOCAIN IGE QN: 113 U/L — SIGNIFICANT CHANGE UP (ref 73–393)
LYMPHOCYTES # BLD AUTO: 2.37 K/UL — SIGNIFICANT CHANGE UP (ref 1–3.3)
LYMPHOCYTES # BLD AUTO: 39.8 % — SIGNIFICANT CHANGE UP (ref 13–44)
MCHC RBC-ENTMCNC: 27.6 PG — SIGNIFICANT CHANGE UP (ref 27–34)
MCHC RBC-ENTMCNC: 32 GM/DL — SIGNIFICANT CHANGE UP (ref 32–36)
MCV RBC AUTO: 86.1 FL — SIGNIFICANT CHANGE UP (ref 80–100)
MONOCYTES # BLD AUTO: 0.52 K/UL — SIGNIFICANT CHANGE UP (ref 0–0.9)
MONOCYTES NFR BLD AUTO: 8.7 % — SIGNIFICANT CHANGE UP (ref 2–14)
NEUTROPHILS # BLD AUTO: 2.81 K/UL — SIGNIFICANT CHANGE UP (ref 1.8–7.4)
NEUTROPHILS NFR BLD AUTO: 47.1 % — SIGNIFICANT CHANGE UP (ref 43–77)
NITRITE UR-MCNC: POSITIVE
NRBC # BLD: 0 /100 WBCS — SIGNIFICANT CHANGE UP (ref 0–0)
PH UR: 7 — SIGNIFICANT CHANGE UP (ref 5–8)
PLATELET # BLD AUTO: 217 K/UL — SIGNIFICANT CHANGE UP (ref 150–400)
POTASSIUM SERPL-MCNC: 3.8 MMOL/L — SIGNIFICANT CHANGE UP (ref 3.5–5.3)
POTASSIUM SERPL-SCNC: 3.8 MMOL/L — SIGNIFICANT CHANGE UP (ref 3.5–5.3)
PROT SERPL-MCNC: 8.3 GM/DL — SIGNIFICANT CHANGE UP (ref 6–8.3)
PROT UR-MCNC: 100 MG/DL
RBC # BLD: 5.33 M/UL — SIGNIFICANT CHANGE UP (ref 4.2–5.8)
RBC # FLD: 16.2 % — HIGH (ref 10.3–14.5)
RBC CASTS # UR COMP ASSIST: ABNORMAL /HPF (ref 0–4)
SODIUM SERPL-SCNC: 144 MMOL/L — SIGNIFICANT CHANGE UP (ref 135–145)
SP GR SPEC: 1.01 — SIGNIFICANT CHANGE UP (ref 1.01–1.02)
UROBILINOGEN FLD QL: NEGATIVE MG/DL — SIGNIFICANT CHANGE UP
WBC # BLD: 5.96 K/UL — SIGNIFICANT CHANGE UP (ref 3.8–10.5)
WBC # FLD AUTO: 5.96 K/UL — SIGNIFICANT CHANGE UP (ref 3.8–10.5)
WBC UR QL: >50

## 2019-01-07 PROCEDURE — 99284 EMERGENCY DEPT VISIT MOD MDM: CPT

## 2019-01-07 RX ORDER — KETOROLAC TROMETHAMINE 30 MG/ML
30 SYRINGE (ML) INJECTION ONCE
Qty: 0 | Refills: 0 | Status: DISCONTINUED | OUTPATIENT
Start: 2019-01-07 | End: 2019-01-07

## 2019-01-07 RX ORDER — SODIUM CHLORIDE 9 MG/ML
1000 INJECTION INTRAMUSCULAR; INTRAVENOUS; SUBCUTANEOUS ONCE
Qty: 0 | Refills: 0 | Status: COMPLETED | OUTPATIENT
Start: 2019-01-07 | End: 2019-01-07

## 2019-01-07 RX ORDER — CIPROFLOXACIN LACTATE 400MG/40ML
1 VIAL (ML) INTRAVENOUS
Qty: 7 | Refills: 0 | OUTPATIENT
Start: 2019-01-07 | End: 2019-01-13

## 2019-01-07 RX ORDER — SODIUM CHLORIDE 9 MG/ML
3 INJECTION INTRAMUSCULAR; INTRAVENOUS; SUBCUTANEOUS ONCE
Qty: 0 | Refills: 0 | Status: COMPLETED | OUTPATIENT
Start: 2019-01-07 | End: 2019-01-07

## 2019-01-07 RX ORDER — PHENAZOPYRIDINE HCL 100 MG
200 TABLET ORAL ONCE
Qty: 0 | Refills: 0 | Status: COMPLETED | OUTPATIENT
Start: 2019-01-07 | End: 2019-01-07

## 2019-01-07 RX ORDER — ACETAMINOPHEN 500 MG
650 TABLET ORAL ONCE
Qty: 0 | Refills: 0 | Status: COMPLETED | OUTPATIENT
Start: 2019-01-07 | End: 2019-01-07

## 2019-01-07 RX ADMIN — SODIUM CHLORIDE 1000 MILLILITER(S): 9 INJECTION INTRAMUSCULAR; INTRAVENOUS; SUBCUTANEOUS at 11:16

## 2019-01-07 RX ADMIN — Medication 200 MILLIGRAM(S): at 18:07

## 2019-01-07 RX ADMIN — SODIUM CHLORIDE 3 MILLILITER(S): 9 INJECTION INTRAMUSCULAR; INTRAVENOUS; SUBCUTANEOUS at 11:16

## 2019-01-07 RX ADMIN — Medication 650 MILLIGRAM(S): at 18:07

## 2019-01-07 RX ADMIN — Medication 30 MILLIGRAM(S): at 15:42

## 2019-01-07 RX ADMIN — SODIUM CHLORIDE 1000 MILLILITER(S): 9 INJECTION INTRAMUSCULAR; INTRAVENOUS; SUBCUTANEOUS at 15:43

## 2019-01-07 NOTE — ED PROVIDER NOTE - CONSTITUTIONAL, MLM
normal... Well appearing, well nourished, awake, alert, oriented to person, place, time/situation, pt is paraplegic

## 2019-01-07 NOTE — ED PROVIDER NOTE - OBJECTIVE STATEMENT
43 year old male presents today c/o suprapubic tenderness x 2 days,  pt has a h/o uti and feels like he has another one today (-) fevers (-) chills (-) nausea or vomiting

## 2019-01-07 NOTE — ED ADULT NURSE NOTE - NSIMPLEMENTINTERV_GEN_ALL_ED
Implemented All Fall Risk Interventions:  [place] to call system. Call bell, personal items and telephone within reach. Instruct patient to call for assistance. Room bathroom lighting operational. Non-slip footwear when patient is off stretcher. Physically safe environment: no spills, clutter or unnecessary equipment. Stretcher in lowest position, wheels locked, appropriate side rails in place. Provide visual cue, wrist band, yellow gown, etc. Monitor gait and stability. Monitor for mental status changes and reorient to person, place, and time. Review medications for side effects contributing to fall risk. Reinforce activity limits and safety measures with patient and family.

## 2019-01-08 LAB
CULTURE RESULTS: SIGNIFICANT CHANGE UP
SPECIMEN SOURCE: SIGNIFICANT CHANGE UP

## 2019-01-12 ENCOUNTER — INPATIENT (INPATIENT)
Facility: HOSPITAL | Age: 44
LOS: 5 days | Discharge: HOME HEALTH | End: 2019-01-18
Attending: HOSPITALIST | Admitting: HOSPITALIST
Payer: MEDICAID

## 2019-01-12 VITALS
SYSTOLIC BLOOD PRESSURE: 97 MMHG | DIASTOLIC BLOOD PRESSURE: 58 MMHG | RESPIRATION RATE: 28 BRPM | OXYGEN SATURATION: 95 % | HEART RATE: 85 BPM | TEMPERATURE: 98 F

## 2019-01-12 DIAGNOSIS — S11.90XS UNSPECIFIED OPEN WOUND OF UNSPECIFIED PART OF NECK, SEQUELA: Chronic | ICD-10-CM

## 2019-01-12 LAB
ALBUMIN SERPL ELPH-MCNC: 4 G/DL — SIGNIFICANT CHANGE UP (ref 3.3–5)
ALP SERPL-CCNC: 80 U/L — SIGNIFICANT CHANGE UP (ref 40–120)
ALT FLD-CCNC: 17 U/L — SIGNIFICANT CHANGE UP (ref 4–41)
ANION GAP SERPL CALC-SCNC: 13 MEQ/L — SIGNIFICANT CHANGE UP (ref 7–14)
AST SERPL-CCNC: 31 U/L — SIGNIFICANT CHANGE UP (ref 4–40)
BASE EXCESS BLDV CALC-SCNC: 2.6 MMOL/L — SIGNIFICANT CHANGE UP
BASOPHILS # BLD AUTO: 0.03 K/UL — SIGNIFICANT CHANGE UP (ref 0–0.2)
BASOPHILS NFR BLD AUTO: 0.2 % — SIGNIFICANT CHANGE UP (ref 0–2)
BILIRUB SERPL-MCNC: 0.5 MG/DL — SIGNIFICANT CHANGE UP (ref 0.2–1.2)
BLOOD GAS VENOUS - CREATININE: 0.58 MG/DL — SIGNIFICANT CHANGE UP (ref 0.5–1.3)
BUN SERPL-MCNC: 14 MG/DL — SIGNIFICANT CHANGE UP (ref 7–23)
CALCIUM SERPL-MCNC: 9.6 MG/DL — SIGNIFICANT CHANGE UP (ref 8.4–10.5)
CHLORIDE BLDV-SCNC: 106 MMOL/L — SIGNIFICANT CHANGE UP (ref 96–108)
CHLORIDE SERPL-SCNC: 100 MMOL/L — SIGNIFICANT CHANGE UP (ref 98–107)
CO2 SERPL-SCNC: 22 MMOL/L — SIGNIFICANT CHANGE UP (ref 22–31)
CREAT SERPL-MCNC: 0.71 MG/DL — SIGNIFICANT CHANGE UP (ref 0.5–1.3)
EOSINOPHIL # BLD AUTO: 0.13 K/UL — SIGNIFICANT CHANGE UP (ref 0–0.5)
EOSINOPHIL NFR BLD AUTO: 1.1 % — SIGNIFICANT CHANGE UP (ref 0–6)
GAS PNL BLDV: 132 MMOL/L — LOW (ref 136–146)
GLUCOSE BLDV-MCNC: 98 — SIGNIFICANT CHANGE UP (ref 70–99)
GLUCOSE SERPL-MCNC: 96 MG/DL — SIGNIFICANT CHANGE UP (ref 70–99)
HCO3 BLDV-SCNC: 27 MMOL/L — SIGNIFICANT CHANGE UP (ref 20–27)
HCT VFR BLD CALC: 44.3 % — SIGNIFICANT CHANGE UP (ref 39–50)
HCT VFR BLDV CALC: 43 % — SIGNIFICANT CHANGE UP (ref 39–51)
HGB BLD-MCNC: 14.8 G/DL — SIGNIFICANT CHANGE UP (ref 13–17)
HGB BLDV-MCNC: 14 G/DL — SIGNIFICANT CHANGE UP (ref 13–17)
IMM GRANULOCYTES NFR BLD AUTO: 0.3 % — SIGNIFICANT CHANGE UP (ref 0–1.5)
LACTATE BLDV-MCNC: 2.3 MMOL/L — HIGH (ref 0.5–2)
LIDOCAIN IGE QN: 17.5 U/L — SIGNIFICANT CHANGE UP (ref 7–60)
LYMPHOCYTES # BLD AUTO: 2.61 K/UL — SIGNIFICANT CHANGE UP (ref 1–3.3)
LYMPHOCYTES # BLD AUTO: 21.7 % — SIGNIFICANT CHANGE UP (ref 13–44)
MCHC RBC-ENTMCNC: 28.4 PG — SIGNIFICANT CHANGE UP (ref 27–34)
MCHC RBC-ENTMCNC: 33.4 % — SIGNIFICANT CHANGE UP (ref 32–36)
MCV RBC AUTO: 84.9 FL — SIGNIFICANT CHANGE UP (ref 80–100)
MONOCYTES # BLD AUTO: 0.88 K/UL — SIGNIFICANT CHANGE UP (ref 0–0.9)
MONOCYTES NFR BLD AUTO: 7.3 % — SIGNIFICANT CHANGE UP (ref 2–14)
NEUTROPHILS # BLD AUTO: 8.36 K/UL — HIGH (ref 1.8–7.4)
NEUTROPHILS NFR BLD AUTO: 69.4 % — SIGNIFICANT CHANGE UP (ref 43–77)
NRBC # FLD: 0 K/UL — LOW (ref 25–125)
PCO2 BLDV: 37 MMHG — LOW (ref 41–51)
PH BLDV: 7.46 PH — HIGH (ref 7.32–7.43)
PLATELET # BLD AUTO: 242 K/UL — SIGNIFICANT CHANGE UP (ref 150–400)
PMV BLD: 10.3 FL — SIGNIFICANT CHANGE UP (ref 7–13)
PO2 BLDV: 50 MMHG — HIGH (ref 35–40)
POTASSIUM BLDV-SCNC: 4.9 MMOL/L — HIGH (ref 3.4–4.5)
POTASSIUM SERPL-MCNC: 4.9 MMOL/L — SIGNIFICANT CHANGE UP (ref 3.5–5.3)
POTASSIUM SERPL-SCNC: 4.9 MMOL/L — SIGNIFICANT CHANGE UP (ref 3.5–5.3)
PROT SERPL-MCNC: 8.5 G/DL — HIGH (ref 6–8.3)
RBC # BLD: 5.22 M/UL — SIGNIFICANT CHANGE UP (ref 4.2–5.8)
RBC # FLD: 16.3 % — HIGH (ref 10.3–14.5)
SAO2 % BLDV: 86.3 % — HIGH (ref 60–85)
SODIUM SERPL-SCNC: 135 MMOL/L — SIGNIFICANT CHANGE UP (ref 135–145)
WBC # BLD: 12.05 K/UL — HIGH (ref 3.8–10.5)
WBC # FLD AUTO: 12.05 K/UL — HIGH (ref 3.8–10.5)

## 2019-01-12 PROCEDURE — 71045 X-RAY EXAM CHEST 1 VIEW: CPT | Mod: 26

## 2019-01-12 RX ORDER — PHENYLEPHRINE HCL 0.25 %
1 AEROSOL, SPRAY WITH PUMP (ML) NASAL ONCE
Qty: 0 | Refills: 0 | Status: COMPLETED | OUTPATIENT
Start: 2019-01-12 | End: 2019-01-12

## 2019-01-12 RX ORDER — MORPHINE SULFATE 50 MG/1
4 CAPSULE, EXTENDED RELEASE ORAL ONCE
Qty: 0 | Refills: 0 | Status: DISCONTINUED | OUTPATIENT
Start: 2019-01-12 | End: 2019-01-12

## 2019-01-12 RX ORDER — PHENYLEPHRINE HCL 0.25 %
1 AEROSOL, SPRAY WITH PUMP (ML) NASAL ONCE
Qty: 0 | Refills: 0 | Status: DISCONTINUED | OUTPATIENT
Start: 2019-01-12 | End: 2019-01-12

## 2019-01-12 RX ORDER — CEFTRIAXONE 500 MG/1
1 INJECTION, POWDER, FOR SOLUTION INTRAMUSCULAR; INTRAVENOUS ONCE
Qty: 0 | Refills: 0 | Status: COMPLETED | OUTPATIENT
Start: 2019-01-12 | End: 2019-01-12

## 2019-01-12 RX ORDER — SODIUM CHLORIDE 9 MG/ML
1000 INJECTION, SOLUTION INTRAVENOUS ONCE
Qty: 0 | Refills: 0 | Status: COMPLETED | OUTPATIENT
Start: 2019-01-12 | End: 2019-01-12

## 2019-01-12 RX ORDER — ACETAMINOPHEN 500 MG
1000 TABLET ORAL ONCE
Qty: 0 | Refills: 0 | Status: COMPLETED | OUTPATIENT
Start: 2019-01-12 | End: 2019-01-12

## 2019-01-12 RX ADMIN — SODIUM CHLORIDE 1000 MILLILITER(S): 9 INJECTION, SOLUTION INTRAVENOUS at 20:51

## 2019-01-12 RX ADMIN — Medication 400 MILLIGRAM(S): at 21:40

## 2019-01-12 RX ADMIN — MORPHINE SULFATE 4 MILLIGRAM(S): 50 CAPSULE, EXTENDED RELEASE ORAL at 21:58

## 2019-01-12 RX ADMIN — CEFTRIAXONE 100 GRAM(S): 500 INJECTION, POWDER, FOR SOLUTION INTRAMUSCULAR; INTRAVENOUS at 22:00

## 2019-01-12 RX ADMIN — SODIUM CHLORIDE 1000 MILLILITER(S): 9 INJECTION, SOLUTION INTRAVENOUS at 22:51

## 2019-01-12 NOTE — ED ADULT NURSE NOTE - NSIMPLEMENTINTERV_GEN_ALL_ED
Implemented All Fall with Harm Risk Interventions:  [place] to call system. Call bell, personal items and telephone within reach. Instruct patient to call for assistance. Room bathroom lighting operational. Non-slip footwear when patient is off stretcher. Physically safe environment: no spills, clutter or unnecessary equipment. Stretcher in lowest position, wheels locked, appropriate side rails in place. Provide visual cue, wrist band, yellow gown, etc. Monitor gait and stability. Monitor for mental status changes and reorient to person, place, and time. Review medications for side effects contributing to fall risk. Reinforce activity limits and safety measures with patient and family. Provide visual clues: red socks.
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
## 2019-01-12 NOTE — ED PROVIDER NOTE - PHYSICAL EXAMINATION
Gen: Well appearing, thin, A&Ox4, NAD.  ENMT: Airway patent. Moist mucous membranes.  Cardiac: Normal rate, regular rhythm.  Heart sounds S1, S2.  Respiratory: Breath sounds clear and equal bilaterally. No wheezes/rales/rhonchi.  Abdomen: Abdomen soft, moderately distended. Diffusely tender in lower abdomen.  Musculoskeletal: Poor muscle tone with contracted limbs. Atraumatic. No vascular compromise.  Neuro: Alert, follows commands. Speech is clear, fluent, and appropriate. Normal sensation b/l upper/lower extremities. No voluntary movement below neck.  Skin: Skin normal color for race, warm, dry and intact. No evidence of rash. No sacral decubitus.

## 2019-01-12 NOTE — ED PROVIDER NOTE - ATTENDING CONTRIBUTION TO CARE
DR. BLOCH, ATTENDING MD-  I performed a face to face bedside interview with patient regarding history of present illness, review of symptoms and past medical history. I completed an independent physical exam.  I have discussed patient's plan of care with the resident.   Patient examined, congested nares, throat neg, lungs clear, abd distended, tender, lower abd,spastic quadroplegia. sacrum. healed decub. pulses present.
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
## 2019-01-12 NOTE — ED PROVIDER NOTE - MEDICAL DECISION MAKING DETAILS
Pt with abd pain, cough, and malaise. Concern for UTI, PNA, intra-abdominal infection. Plan: labs, UA, cultures, CTAP, CXR, fluids, abx, reassess.

## 2019-01-12 NOTE — ED PROVIDER NOTE - CARE PLAN
Principal Discharge DX:	UTI (urinary tract infection)  Secondary Diagnosis:	URI (upper respiratory infection)  Secondary Diagnosis:	Spastic quadriplegia
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
## 2019-01-13 DIAGNOSIS — N39.0 URINARY TRACT INFECTION, SITE NOT SPECIFIED: ICD-10-CM

## 2019-01-13 DIAGNOSIS — K52.9 NONINFECTIVE GASTROENTERITIS AND COLITIS, UNSPECIFIED: ICD-10-CM

## 2019-01-13 DIAGNOSIS — K59.00 CONSTIPATION, UNSPECIFIED: ICD-10-CM

## 2019-01-13 DIAGNOSIS — B34.1 ENTEROVIRUS INFECTION, UNSPECIFIED: ICD-10-CM

## 2019-01-13 DIAGNOSIS — Z29.9 ENCOUNTER FOR PROPHYLACTIC MEASURES, UNSPECIFIED: ICD-10-CM

## 2019-01-13 DIAGNOSIS — G82.50 QUADRIPLEGIA, UNSPECIFIED: ICD-10-CM

## 2019-01-13 DIAGNOSIS — N10 ACUTE PYELONEPHRITIS: ICD-10-CM

## 2019-01-13 DIAGNOSIS — R52 PAIN, UNSPECIFIED: ICD-10-CM

## 2019-01-13 DIAGNOSIS — N13.2 HYDRONEPHROSIS WITH RENAL AND URETERAL CALCULOUS OBSTRUCTION: ICD-10-CM

## 2019-01-13 LAB
APPEARANCE UR: SIGNIFICANT CHANGE UP
B PERT DNA SPEC QL NAA+PROBE: NOT DETECTED — SIGNIFICANT CHANGE UP
BACTERIA # UR AUTO: HIGH
BASE EXCESS BLDV CALC-SCNC: 3.6 MMOL/L — SIGNIFICANT CHANGE UP
BILIRUB UR-MCNC: NEGATIVE — SIGNIFICANT CHANGE UP
BLOOD GAS VENOUS - CREATININE: 0.64 MG/DL — SIGNIFICANT CHANGE UP (ref 0.5–1.3)
BLOOD UR QL VISUAL: HIGH
C PNEUM DNA SPEC QL NAA+PROBE: NOT DETECTED — SIGNIFICANT CHANGE UP
CHLORIDE BLDV-SCNC: 104 MMOL/L — SIGNIFICANT CHANGE UP (ref 96–108)
COLOR SPEC: SIGNIFICANT CHANGE UP
EPI CELLS # UR: SIGNIFICANT CHANGE UP
FLUAV H1 2009 PAND RNA SPEC QL NAA+PROBE: NOT DETECTED — SIGNIFICANT CHANGE UP
FLUAV H1 RNA SPEC QL NAA+PROBE: NOT DETECTED — SIGNIFICANT CHANGE UP
FLUAV H3 RNA SPEC QL NAA+PROBE: NOT DETECTED — SIGNIFICANT CHANGE UP
FLUAV SUBTYP SPEC NAA+PROBE: NOT DETECTED — SIGNIFICANT CHANGE UP
FLUBV RNA SPEC QL NAA+PROBE: NOT DETECTED — SIGNIFICANT CHANGE UP
GAS PNL BLDV: 135 MMOL/L — LOW (ref 136–146)
GLUCOSE BLDV-MCNC: 82 — SIGNIFICANT CHANGE UP (ref 70–99)
GLUCOSE UR-MCNC: NEGATIVE — SIGNIFICANT CHANGE UP
HADV DNA SPEC QL NAA+PROBE: NOT DETECTED — SIGNIFICANT CHANGE UP
HCO3 BLDV-SCNC: 27 MMOL/L — SIGNIFICANT CHANGE UP (ref 20–27)
HCOV PNL SPEC NAA+PROBE: SIGNIFICANT CHANGE UP
HCT VFR BLDV CALC: 48.4 % — SIGNIFICANT CHANGE UP (ref 39–51)
HGB BLDV-MCNC: 15.8 G/DL — SIGNIFICANT CHANGE UP (ref 13–17)
HMPV RNA SPEC QL NAA+PROBE: NOT DETECTED — SIGNIFICANT CHANGE UP
HPIV1 RNA SPEC QL NAA+PROBE: NOT DETECTED — SIGNIFICANT CHANGE UP
HPIV2 RNA SPEC QL NAA+PROBE: NOT DETECTED — SIGNIFICANT CHANGE UP
HPIV3 RNA SPEC QL NAA+PROBE: NOT DETECTED — SIGNIFICANT CHANGE UP
HPIV4 RNA SPEC QL NAA+PROBE: NOT DETECTED — SIGNIFICANT CHANGE UP
KETONES UR-MCNC: NEGATIVE — SIGNIFICANT CHANGE UP
LACTATE BLDV-MCNC: 1.2 MMOL/L — SIGNIFICANT CHANGE UP (ref 0.5–2)
LEUKOCYTE ESTERASE UR-ACNC: SIGNIFICANT CHANGE UP
NITRITE UR-MCNC: POSITIVE — HIGH
PCO2 BLDV: 43 MMHG — SIGNIFICANT CHANGE UP (ref 41–51)
PH BLDV: 7.42 PH — SIGNIFICANT CHANGE UP (ref 7.32–7.43)
PH UR: 8 — SIGNIFICANT CHANGE UP (ref 5–8)
PO2 BLDV: 53 MMHG — HIGH (ref 35–40)
POTASSIUM BLDV-SCNC: 3.8 MMOL/L — SIGNIFICANT CHANGE UP (ref 3.4–4.5)
PROT UR-MCNC: 70 — SIGNIFICANT CHANGE UP
RBC CASTS # UR COMP ASSIST: SIGNIFICANT CHANGE UP (ref 0–?)
RSV RNA SPEC QL NAA+PROBE: NOT DETECTED — SIGNIFICANT CHANGE UP
RV+EV RNA SPEC QL NAA+PROBE: DETECTED — HIGH
SAO2 % BLDV: 86.7 % — HIGH (ref 60–85)
SP GR SPEC: 1.02 — SIGNIFICANT CHANGE UP (ref 1–1.04)
SPECIMEN SOURCE: SIGNIFICANT CHANGE UP
SPECIMEN SOURCE: SIGNIFICANT CHANGE UP
UROBILINOGEN FLD QL: NORMAL — SIGNIFICANT CHANGE UP
WBC UR QL: >50 — HIGH (ref 0–?)

## 2019-01-13 PROCEDURE — 99223 1ST HOSP IP/OBS HIGH 75: CPT | Mod: AI

## 2019-01-13 PROCEDURE — 74177 CT ABD & PELVIS W/CONTRAST: CPT | Mod: 26

## 2019-01-13 PROCEDURE — 99222 1ST HOSP IP/OBS MODERATE 55: CPT

## 2019-01-13 RX ORDER — HYDROMORPHONE HYDROCHLORIDE 2 MG/ML
0.25 INJECTION INTRAMUSCULAR; INTRAVENOUS; SUBCUTANEOUS EVERY 4 HOURS
Qty: 0 | Refills: 0 | Status: DISCONTINUED | OUTPATIENT
Start: 2019-01-13 | End: 2019-01-15

## 2019-01-13 RX ORDER — GLYCERIN ADULT
1 SUPPOSITORY, RECTAL RECTAL
Qty: 0 | Refills: 0 | Status: DISCONTINUED | OUTPATIENT
Start: 2019-01-13 | End: 2019-01-13

## 2019-01-13 RX ORDER — POLYETHYLENE GLYCOL 3350 17 G/17G
17 POWDER, FOR SOLUTION ORAL
Qty: 0 | Refills: 0 | Status: DISCONTINUED | OUTPATIENT
Start: 2019-01-13 | End: 2019-01-18

## 2019-01-13 RX ORDER — FLUTICASONE PROPIONATE 50 MCG
1 SPRAY, SUSPENSION NASAL DAILY
Qty: 0 | Refills: 0 | Status: DISCONTINUED | OUTPATIENT
Start: 2019-01-13 | End: 2019-01-13

## 2019-01-13 RX ORDER — MORPHINE SULFATE 50 MG/1
2 CAPSULE, EXTENDED RELEASE ORAL EVERY 4 HOURS
Qty: 0 | Refills: 0 | Status: DISCONTINUED | OUTPATIENT
Start: 2019-01-13 | End: 2019-01-13

## 2019-01-13 RX ORDER — SODIUM CHLORIDE 9 MG/ML
1000 INJECTION INTRAMUSCULAR; INTRAVENOUS; SUBCUTANEOUS
Qty: 0 | Refills: 0 | Status: DISCONTINUED | OUTPATIENT
Start: 2019-01-13 | End: 2019-01-16

## 2019-01-13 RX ORDER — HEPARIN SODIUM 5000 [USP'U]/ML
5000 INJECTION INTRAVENOUS; SUBCUTANEOUS EVERY 8 HOURS
Qty: 0 | Refills: 0 | Status: COMPLETED | OUTPATIENT
Start: 2019-01-13 | End: 2019-01-13

## 2019-01-13 RX ORDER — FLUTICASONE PROPIONATE 50 MCG
1 SPRAY, SUSPENSION NASAL DAILY
Qty: 0 | Refills: 0 | Status: COMPLETED | OUTPATIENT
Start: 2019-01-13 | End: 2019-01-17

## 2019-01-13 RX ORDER — MORPHINE SULFATE 50 MG/1
4 CAPSULE, EXTENDED RELEASE ORAL ONCE
Qty: 0 | Refills: 0 | Status: DISCONTINUED | OUTPATIENT
Start: 2019-01-13 | End: 2019-01-13

## 2019-01-13 RX ORDER — SENNA PLUS 8.6 MG/1
2 TABLET ORAL AT BEDTIME
Qty: 0 | Refills: 0 | Status: DISCONTINUED | OUTPATIENT
Start: 2019-01-13 | End: 2019-01-18

## 2019-01-13 RX ORDER — GLYCERIN ADULT
1 SUPPOSITORY, RECTAL RECTAL
Qty: 0 | Refills: 0 | Status: COMPLETED | OUTPATIENT
Start: 2019-01-13 | End: 2019-01-14

## 2019-01-13 RX ORDER — ACETAMINOPHEN 500 MG
650 TABLET ORAL EVERY 6 HOURS
Qty: 0 | Refills: 0 | Status: DISCONTINUED | OUTPATIENT
Start: 2019-01-13 | End: 2019-01-18

## 2019-01-13 RX ORDER — BACLOFEN 100 %
10 POWDER (GRAM) MISCELLANEOUS EVERY 12 HOURS
Qty: 0 | Refills: 0 | Status: DISCONTINUED | OUTPATIENT
Start: 2019-01-13 | End: 2019-01-18

## 2019-01-13 RX ORDER — DOCUSATE SODIUM 100 MG
100 CAPSULE ORAL THREE TIMES A DAY
Qty: 0 | Refills: 0 | Status: DISCONTINUED | OUTPATIENT
Start: 2019-01-13 | End: 2019-01-13

## 2019-01-13 RX ORDER — DOCUSATE SODIUM 100 MG
100 CAPSULE ORAL THREE TIMES A DAY
Qty: 0 | Refills: 0 | Status: DISCONTINUED | OUTPATIENT
Start: 2019-01-13 | End: 2019-01-18

## 2019-01-13 RX ORDER — AZTREONAM 2 G
1000 VIAL (EA) INJECTION EVERY 8 HOURS
Qty: 0 | Refills: 0 | Status: DISCONTINUED | OUTPATIENT
Start: 2019-01-13 | End: 2019-01-17

## 2019-01-13 RX ADMIN — MORPHINE SULFATE 4 MILLIGRAM(S): 50 CAPSULE, EXTENDED RELEASE ORAL at 01:46

## 2019-01-13 RX ADMIN — Medication 1 SUPPOSITORY(S): at 19:00

## 2019-01-13 RX ADMIN — MORPHINE SULFATE 4 MILLIGRAM(S): 50 CAPSULE, EXTENDED RELEASE ORAL at 02:00

## 2019-01-13 RX ADMIN — Medication 100 MILLIGRAM(S): at 22:23

## 2019-01-13 RX ADMIN — Medication 5 MILLIGRAM(S): at 22:23

## 2019-01-13 RX ADMIN — SENNA PLUS 2 TABLET(S): 8.6 TABLET ORAL at 22:25

## 2019-01-13 RX ADMIN — SODIUM CHLORIDE 80 MILLILITER(S): 9 INJECTION INTRAMUSCULAR; INTRAVENOUS; SUBCUTANEOUS at 23:33

## 2019-01-13 RX ADMIN — HEPARIN SODIUM 5000 UNIT(S): 5000 INJECTION INTRAVENOUS; SUBCUTANEOUS at 15:57

## 2019-01-13 RX ADMIN — HEPARIN SODIUM 5000 UNIT(S): 5000 INJECTION INTRAVENOUS; SUBCUTANEOUS at 22:23

## 2019-01-13 RX ADMIN — Medication 50 MILLIGRAM(S): at 22:23

## 2019-01-13 RX ADMIN — HYDROMORPHONE HYDROCHLORIDE 0.25 MILLIGRAM(S): 2 INJECTION INTRAMUSCULAR; INTRAVENOUS; SUBCUTANEOUS at 19:58

## 2019-01-13 RX ADMIN — POLYETHYLENE GLYCOL 3350 17 GRAM(S): 17 POWDER, FOR SOLUTION ORAL at 18:59

## 2019-01-13 RX ADMIN — SODIUM CHLORIDE 80 MILLILITER(S): 9 INJECTION INTRAMUSCULAR; INTRAVENOUS; SUBCUTANEOUS at 17:45

## 2019-01-13 RX ADMIN — HYDROMORPHONE HYDROCHLORIDE 0.25 MILLIGRAM(S): 2 INJECTION INTRAMUSCULAR; INTRAVENOUS; SUBCUTANEOUS at 20:15

## 2019-01-13 RX ADMIN — Medication 600 MILLIGRAM(S): at 00:52

## 2019-01-13 RX ADMIN — MORPHINE SULFATE 4 MILLIGRAM(S): 50 CAPSULE, EXTENDED RELEASE ORAL at 10:15

## 2019-01-13 RX ADMIN — Medication 10 MILLIGRAM(S): at 19:00

## 2019-01-13 RX ADMIN — SODIUM CHLORIDE 80 MILLILITER(S): 9 INJECTION INTRAMUSCULAR; INTRAVENOUS; SUBCUTANEOUS at 12:06

## 2019-01-13 RX ADMIN — MORPHINE SULFATE 4 MILLIGRAM(S): 50 CAPSULE, EXTENDED RELEASE ORAL at 09:53

## 2019-01-13 RX ADMIN — Medication 1 SPRAY(S): at 00:52

## 2019-01-13 RX ADMIN — Medication 1 TABLET(S): at 18:59

## 2019-01-13 RX ADMIN — Medication 50 MILLIGRAM(S): at 15:57

## 2019-01-13 NOTE — H&P ADULT - NSHPPHYSICALEXAM_GEN_ALL_CORE
Vital Signs Last 24 Hrs  T(C): 36.7 (13 Jan 2019 09:18), Max: 37.1 (12 Jan 2019 22:51)  T(F): 98 (13 Jan 2019 09:18), Max: 98.8 (12 Jan 2019 22:51)  HR: 79 (13 Jan 2019 09:49) (78 - 89)  BP: 164/102 (13 Jan 2019 09:49) (97/58 - 164/102)  BP(mean): --  RR: 16 (13 Jan 2019 09:49) (16 - 28)  SpO2: 95% (13 Jan 2019 09:49) (95% - 98%)      PHYSICAL EXAM:  GENERAL: NAD, well-developed  HEAD:  Atraumatic, Normocephalic  EYES: EOMI, PERRLA, conjunctiva and sclera clear  NECK: Supple, no JVD  CHEST/LUNG: Clear to auscultation bilaterally; no wheeze  HEART: Regular rate and rhythm; no murmurs, rubs, or gallops  ABDOMEN: Soft, Bowel sounds present, lower abdominal pain on palpation- No  guarding or rebound.  EXTREMITIES:  warm and well perfused, no clubbing, cyanosis, or edema  PSYCH: AAOx3  NEUROLOGY: contracted hands.  No  strength to arms or legs. 0/5 b/l

## 2019-01-13 NOTE — H&P ADULT - NSHPREVIEWOFSYSTEMS_GEN_ALL_CORE
CONSTITUTIONAL: No fever, weight loss, or fatigue  EYES: No eye pain, visual disturbances, or discharge  ENMT:  No difficulty hearing, tinnitus, vertigo; No sinus or throat pain  NECK: No pain or stiffness  BREASTS: No pain, masses, or nipple discharge  RESPIRATORY: Positive cough,  No wheezing, chills or hemoptysis; No shortness of breath  CARDIOVASCULAR: No chest pain, palpitations, dizziness, or leg swelling  GASTROINTESTINAL: POSITIVE  abdominal to lower abdomen.   No nausea, vomiting, or hematemesis; No diarrhea or constipation. No melena or hematochezia.  GENITOURINARY: No dysuria, frequency, hematuria, or incontinence  NEUROLOGICAL: Not able to move legs or arms.- paralysed from waist down.  SKIN: No itching, burning, rashes, or lesions   LYMPH NODES: No enlarged glands  ENDOCRINE: No heat or cold intolerance; No hair loss  MUSCULOSKELETAL: No muscle or back pain.  PSYCHIATRIC: No depression, anxiety, mood swings, or difficulty sleeping  HEME/LYMPH: No easy bruising, or bleeding gums  ALLERGY AND IMMUNOLOGIC: No hives or eczema

## 2019-01-13 NOTE — H&P ADULT - ATTENDING COMMENTS
Urology consult:-  42 yo male with 3.8 cm left ureteral stone.  Cr is normal.  Suspect chronic hydronephrosis and bacterial colonization.  -Continue abx as per primary team  -Trend WBC and Cr  -F/U Urine culture  -If pt spikes fevers or becomes toxic --> Recommend IR for nephrostomy tube emergently, otherwise would still consult IR for NT placement given large stone burden  Attending rec reff to stone specialist Urology Called contact listed Avani moore at [phone] x3-- disconnected  Pharmacy will help with home meds Called contact listed Avani moore at [phone] [phone]-- disconnected    Addendum 5 pm  Patient seen on medical floor 7 N  Clinical had No success with medications  He stated he took- colace/ MVI/ Dulcolax/ baclofen- the exact dose he is unsure- he will get dose from his AID juliana phone and tell use- patient decline to share telephone number of sister or AID.  Explain to patient who is now comfortable an more cooperative it is important to get the doses of his medications- he expresses understanding- stated he will call from his phone and inform us.

## 2019-01-13 NOTE — CONSULT NOTE ADULT - ATTENDING COMMENTS
Pt seen/examined.  Case discussed with housestaff/PA team.  Agree with above note history, physical and assessment/plan.  IR NT consult for perc drainage  Will require definitive management of stone in future.  Would refer to stone specialists either Dr Hoenig or Dr Chang given stone burden and complexity of case.  Would have pt follow up as an outpt with either Dr Hoenig or Dr Chang

## 2019-01-13 NOTE — H&P ADULT - PROBLEM SELECTOR PLAN 1
Ct of A/p with Iv contrast 1/13/19  - cystitis/ bilateral ureteritis and pyelitis. Moderate L hydronephrosis caused by 3.8x 1.4 cm stone, impacted in ureteropelvic juncture. Chronic severe R hydronephrosis with renal atrophy.  2.3x2.1x2.1 cm soft tiisue focus encasing R ureter- inf vs inflammatory vs neoplastic.  Non Obstructive stone 2x 0.8 cm in R kidney.  Start Aztreonam iv  1 g stat and q8.  F/u urine cultures and trend labs. Clinical pyelo with sepsis  Ct of A/p with Iv contrast 1/13/19  - cystitis/ bilateral ureteritis and pyelitis. Moderate L hydronephrosis caused by 3.8x 1.4 cm stone, impacted in ureteropelvic juncture. Chronic severe R hydronephrosis with renal atrophy.  2.3x2.1x2.1 cm soft tiisue focus encasing R ureter- inf vs inflammatory vs neoplastic.  Non Obstructive stone 2x 0.8 cm in R kidney.  Start Aztreonam iv  1 g stat and q8.  F/u urine cultures and trend labs.

## 2019-01-13 NOTE — CONSULT NOTE ADULT - ASSESSMENT
44 yo male with 3.8 cm left ureteral stone   -Continue abx as per primary team  -Pt does not appear toxic at this time --> No recommended intervention at this time  -Trend WBC   -F/U Urine culture  -If pt spikes fevers or becomes toxic --> Recommend IR for nephrostomy tube 42 yo male with 3.8 cm left ureteral stone.  Cr is normal.  Suspect chronic hydronephrosis and bacterial colonization.  -Continue abx as per primary team  -Trend WBC and Cr  -F/U Urine culture  -If pt spikes fevers or becomes toxic --> Recommend IR for nephrostomy tube emergently, otherwise would still consult IR for NT placement given large stone burden 44 yo male with 3.8 cm left ureteral stone.  Cr is normal.  Suspect chronic hydronephrosis and bacterial colonization.  Given location and extremely large size of stone, recommend IR consultation on Monday for left NT placement during this admission.   -Continue abx as per primary team  -Trend WBC and Cr  -F/U Urine culture

## 2019-01-13 NOTE — CONSULT NOTE ADULT - SUBJECTIVE AND OBJECTIVE BOX
HPI 42 yo quadriplegic p/w SP pain for past week. Pt seen at [place] for possible UTI. Urine culture at that time was contaminated. Pt started on levaquin without any benefit. Pt continued to have abdominal pain and presented today. Pt denies fevers, chills, nausea, vomiting.     While being evaluated in ED, pt noted to have a 3.8 cm left ureteral stone with left hydronephrosis.     PAST MEDICAL & SURGICAL HISTORY:  Spastic quadriplegia      MEDICATIONS  (STANDING):    MEDICATIONS  (PRN):      FAMILY HISTORY:      Allergies    penicillin (Unknown)    Intolerances        SOCIAL HISTORY:    REVIEW OF SYSTEMS: Otherwise negative as stated in HPI    Physical Exam  Vital signs  T(C): 36.5 (19 @ 05:02), Max: 37.1 (19 @ 22:51)  HR: 78 (19 @ 05:02)  BP: 152/100 (19 @ 05:02)  SpO2: 96% (19 @ 05:02)  Wt(kg): --    Output    UOP    Gen:  [x] NAD [] toxic    Pulm:  [x] no resp distress [] no substernal retractions  	  CV:  [x] no JVD  [x] RRR    GI:  [x] Soft [] ND [x] NT    :  Condom cath over penis. No purulent drainage. Urine cloudy.                         	  MSK:  Edema []Y [x]N    LABS:       @ 20:02    WBC 12.05 / Hct 44.3  / SCr 0.71         135  |  100  |  14  ----------------------------<  96  4.9   |  22  |  0.71    Ca    9.6      2019 20:02    TPro  8.5<H>  /  Alb  4.0  /  TBili  0.5  /  DBili  x   /  AST  31  /  ALT  17  /  AlkPhos  80        Urinalysis Basic - ( 2019 03:57 )    Color: LIGHT ORANGE / Appearance: TURBID / S.020 / pH: 8.0  Gluc: NEGATIVE / Ketone: NEGATIVE  / Bili: NEGATIVE / Urobili: NORMAL   Blood: MODERATE / Protein: 70 / Nitrite: POSITIVE   Leuk Esterase: LARGE / RBC: 0-2 / WBC >50   Sq Epi: x / Non Sq Epi: FEW / Bacteria: MODERATE        Urine Cx: pending    RADIOLOGY:    < from: CT Abdomen and Pelvis w/ IV Cont (19 @ 00:28) >  IMPRESSION:      1.  Cystitis, bilateral ureteritis and bilateral pyelitis.  Pyonephrosis   and/or pyelonephritis should be excluded clinically.  2.  Moderate left hydronephrosis caused by a 3.8 x 1.4 cm stone impacted   in the ureteropelvic junction.  Additional nonobstructing renal stones   measure up to 6 mm.  3.  Chronic severe right hydronephrosis with renal atrophy.    Approximately 2.3 x 2.1 x 2.1 cm irregular soft tissue focus encasing the   proximal right ureter, which may be infectious, inflammatory or   neoplastic in etiology.  Suspicion for a distal right ureteral stricture   located approximately 7 cm proximal to uterovesical junction.    Nonobstructing stones measure up to 2 x 0.8 cm in the right kidney and 1   mm in the distal right ureter.  4.  Fecal impaction and stercoral colitis.    < end of copied text > HPI 42 yo quadriplegic p/w SP pain for past week. Pt seen at [place] for possible UTI. Urine culture at that time was contaminated. Pt started on levaquin without any benefit. Pt continued to have abdominal pain and presented today. Pt denies fevers, chills, nausea, vomiting.     While being evaluated in ED, pt noted to have a 3.8 cm left ureteral stone with left hydronephrosis.     PAST MEDICAL & SURGICAL HISTORY:  Spastic quadriplegia      MEDICATIONS  (STANDING):    MEDICATIONS  (PRN):      FAMILY HISTORY: noncontributor	y      Allergies    penicillin (Unknown)    Intolerances        SOCIAL HISTORY:    REVIEW OF SYSTEMS: Otherwise negative as stated in HPI    Physical Exam  Vital signs  T(C): 36.5 (19 @ 05:02), Max: 37.1 (19 @ 22:51)  HR: 78 (19 @ 05:02)  BP: 152/100 (19 @ 05:02)  SpO2: 96% (19 @ 05:02)  Wt(kg): --    Output    UOP    Gen:  [x] NAD [] toxic    Pulm:  [x] no resp distress [] no substernal retractions  	  CV:  [x] no JVD  [x] RRR    GI:  [x] Soft [] ND [x] NT    :  Condom cath over penis. No purulent drainage. Urine cloudy.                         	  MSK:  Edema []Y [x]N    LABS:       @ 20:02    WBC 12.05 / Hct 44.3  / SCr 0.71         135  |  100  |  14  ----------------------------<  96  4.9   |  22  |  0.71    Ca    9.6      2019 20:02    TPro  8.5<H>  /  Alb  4.0  /  TBili  0.5  /  DBili  x   /  AST  31  /  ALT  17  /  AlkPhos  80        Urinalysis Basic - ( 2019 03:57 )    Color: LIGHT ORANGE / Appearance: TURBID / S.020 / pH: 8.0  Gluc: NEGATIVE / Ketone: NEGATIVE  / Bili: NEGATIVE / Urobili: NORMAL   Blood: MODERATE / Protein: 70 / Nitrite: POSITIVE   Leuk Esterase: LARGE / RBC: 0-2 / WBC >50   Sq Epi: x / Non Sq Epi: FEW / Bacteria: MODERATE        Urine Cx: pending    RADIOLOGY:    < from: CT Abdomen and Pelvis w/ IV Cont (19 @ 00:28) >  IMPRESSION:      1.  Cystitis, bilateral ureteritis and bilateral pyelitis.  Pyonephrosis   and/or pyelonephritis should be excluded clinically.  2.  Moderate left hydronephrosis caused by a 3.8 x 1.4 cm stone impacted   in the ureteropelvic junction.  Additional nonobstructing renal stones   measure up to 6 mm.  3.  Chronic severe right hydronephrosis with renal atrophy.    Approximately 2.3 x 2.1 x 2.1 cm irregular soft tissue focus encasing the   proximal right ureter, which may be infectious, inflammatory or   neoplastic in etiology.  Suspicion for a distal right ureteral stricture   located approximately 7 cm proximal to uterovesical junction.    Nonobstructing stones measure up to 2 x 0.8 cm in the right kidney and 1   mm in the distal right ureter.  4.  Fecal impaction and stercoral colitis.    < end of copied text >
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
## 2019-01-13 NOTE — H&P ADULT - PROBLEM SELECTOR PLAN 2
IV abx / and IR eval for PNT placement - sooner if fever.  Urology consult appreciated.  will get IR eval in AM

## 2019-01-13 NOTE — H&P ADULT - PROBLEM SELECTOR PLAN 6
stercoral colitis- sec to costipation.  Bowel regimen- senna/ colace/ miralax.  glycerine sup stercoral colitis- sec to constipation.  Bowel regimen- senna/ colace/ miralax.  glycerine sup

## 2019-01-13 NOTE — H&P ADULT - NSHPLABSRESULTS_GEN_ALL_CORE
LABS:                        14.8   12.05 )-----------( 242      ( 2019 20:02 )             44.3     12    135  |  100  |  14  ----------------------------<  96  4.9   |  22  |  0.71    Ca    9.6      2019 20:02    TPro  8.5<H>  /  Alb  4.0  /  TBili  0.5  /  DBili  x   /  AST  31  /  ALT  17  /  AlkPhos  80        Urinalysis Basic - ( 2019 03:57 )    Color: LIGHT ORANGE / Appearance: TURBID / S.020 / pH: 8.0  Gluc: NEGATIVE / Ketone: NEGATIVE  / Bili: NEGATIVE / Urobili: NORMAL   Blood: MODERATE / Protein: 70 / Nitrite: POSITIVE   Leuk Esterase: LARGE / RBC: 0-2 / WBC >50   Sq Epi: x / Non Sq Epi: FEW / Bacteria: MODERATE        VBG  @ 01:04  pH: 7.42/pCO2: 43 /pO2: 53/HCO3: 27/lactate: 1.2  VBG  @ 20:02  pH: 7.46/pCO2: 37 /pO2: 50/HCO3: 27/lactate: 2.3    Microbiology     EKG:    RADIOLOGY & ADDITIONAL TESTS:  < from: CT Abdomen and Pelvis w/ IV Cont (19 @ 00:28) >    IMPRESSION:      1.  Cystitis, bilateral ureteritis and bilateral pyelitis.  Pyonephrosis   and/or pyelonephritis should be excluded clinically.  2.  Moderate left hydronephrosis caused by a 3.8 x 1.4 cm stone impacted   in the ureteropelvic junction.  Additional nonobstructing renal stones   measure up to 6 mm.  3.  Chronic severe right hydronephrosis with renal atrophy.    Approximately 2.3 x 2.1 x 2.1 cm irregular soft tissue focus encasing the   proximal right ureter, which may be infectious, inflammatory or   neoplastic in etiology.  Suspicion for a distal right ureteral stricture   located approximately 7 cm proximal to uterovesical junction.    Nonobstructing stones measure up to 2 x 0.8 cm in the right kidney and 1   mm in the distal right ureter.  4.  Fecal impaction and stercoral colitis. LABS:                        14.8   12.05 )-----------( 242      ( 2019 20:02 )             44.3         135  |  100  |  14  ----------------------------<  96  4.9   |  22  |  0.71    Ca    9.6      2019 20:02    TPro  8.5<H>  /  Alb  4.0  /  TBili  0.5  /  DBili  x   /  AST  31  /  ALT  17  /  AlkPhos  80        Urinalysis Basic - ( 2019 03:57 )    Color: LIGHT ORANGE / Appearance: TURBID / S.020 / pH: 8.0  Gluc: NEGATIVE / Ketone: NEGATIVE  / Bili: NEGATIVE / Urobili: NORMAL   Blood: MODERATE / Protein: 70 / Nitrite: POSITIVE   Leuk Esterase: LARGE / RBC: 0-2 / WBC >50   Sq Epi: x / Non Sq Epi: FEW / Bacteria: MODERATE        VBG  @ 01:04  pH: 7.42/pCO2: 43 /pO2: 53/HCO3: 27/lactate: 1.2  VBG  @ 20:02  pH: 7.46/pCO2: 37 /pO2: 50/HCO3: 27/lactate: 2.3      RADIOLOGY & ADDITIONAL TESTS:  < from: CT Abdomen and Pelvis w/ IV Cont (19 @ 00:28) >    IMPRESSION:      1.  Cystitis, bilateral ureteritis and bilateral pyelitis.  Pyonephrosis   and/or pyelonephritis should be excluded clinically.  2.  Moderate left hydronephrosis caused by a 3.8 x 1.4 cm stone impacted   in the ureteropelvic junction.  Additional nonobstructive renal stones   measure up to 6 mm.  3.  Chronic severe right hydronephrosis with renal atrophy.    Approximately 2.3 x 2.1 x 2.1 cm irregular soft tissue focus encasing the   proximal right ureter, which may be infectious, inflammatory or   neoplastic in etiology.  Suspicion for a distal right ureteral stricture   located approximately 7 cm proximal to uterovesical junction.    Nonobstructive stones measure up to 2 x 0.8 cm in the right kidney and 1   mm in the distal right ureter.  4.  Fecal impaction and stercoral colitis.

## 2019-01-13 NOTE — H&P ADULT - PROBLEM SELECTOR PLAN 3
resume home meds resume home meds of baclogen 10 mg po q12- patient stated he will call his aid to clarify dose
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
## 2019-01-14 LAB
ALBUMIN SERPL ELPH-MCNC: 3.5 G/DL — SIGNIFICANT CHANGE UP (ref 3.3–5)
ALP SERPL-CCNC: 61 U/L — SIGNIFICANT CHANGE UP (ref 40–120)
ALT FLD-CCNC: 14 U/L — SIGNIFICANT CHANGE UP (ref 4–41)
ANION GAP SERPL CALC-SCNC: 9 MEQ/L — SIGNIFICANT CHANGE UP (ref 7–14)
APTT BLD: 32.7 SEC — SIGNIFICANT CHANGE UP (ref 27.5–36.3)
AST SERPL-CCNC: 16 U/L — SIGNIFICANT CHANGE UP (ref 4–40)
BACTERIA UR CULT: SIGNIFICANT CHANGE UP
BASOPHILS # BLD AUTO: 0.01 K/UL — SIGNIFICANT CHANGE UP (ref 0–0.2)
BASOPHILS NFR BLD AUTO: 0.1 % — SIGNIFICANT CHANGE UP (ref 0–2)
BILIRUB SERPL-MCNC: 0.3 MG/DL — SIGNIFICANT CHANGE UP (ref 0.2–1.2)
BUN SERPL-MCNC: 10 MG/DL — SIGNIFICANT CHANGE UP (ref 7–23)
CALCIUM SERPL-MCNC: 8.6 MG/DL — SIGNIFICANT CHANGE UP (ref 8.4–10.5)
CHLORIDE SERPL-SCNC: 106 MMOL/L — SIGNIFICANT CHANGE UP (ref 98–107)
CO2 SERPL-SCNC: 26 MMOL/L — SIGNIFICANT CHANGE UP (ref 22–31)
CREAT SERPL-MCNC: 0.54 MG/DL — SIGNIFICANT CHANGE UP (ref 0.5–1.3)
EOSINOPHIL # BLD AUTO: 0.21 K/UL — SIGNIFICANT CHANGE UP (ref 0–0.5)
EOSINOPHIL NFR BLD AUTO: 2.9 % — SIGNIFICANT CHANGE UP (ref 0–6)
GLUCOSE SERPL-MCNC: 84 MG/DL — SIGNIFICANT CHANGE UP (ref 70–99)
HCT VFR BLD CALC: 37.3 % — LOW (ref 39–50)
HGB BLD-MCNC: 12.2 G/DL — LOW (ref 13–17)
IMM GRANULOCYTES NFR BLD AUTO: 0.1 % — SIGNIFICANT CHANGE UP (ref 0–1.5)
INR BLD: 1.21 — HIGH (ref 0.88–1.17)
LYMPHOCYTES # BLD AUTO: 2 K/UL — SIGNIFICANT CHANGE UP (ref 1–3.3)
LYMPHOCYTES # BLD AUTO: 27.5 % — SIGNIFICANT CHANGE UP (ref 13–44)
MAGNESIUM SERPL-MCNC: 1.7 MG/DL — SIGNIFICANT CHANGE UP (ref 1.6–2.6)
MCHC RBC-ENTMCNC: 28.4 PG — SIGNIFICANT CHANGE UP (ref 27–34)
MCHC RBC-ENTMCNC: 32.7 % — SIGNIFICANT CHANGE UP (ref 32–36)
MCV RBC AUTO: 86.7 FL — SIGNIFICANT CHANGE UP (ref 80–100)
MONOCYTES # BLD AUTO: 0.84 K/UL — SIGNIFICANT CHANGE UP (ref 0–0.9)
MONOCYTES NFR BLD AUTO: 11.6 % — SIGNIFICANT CHANGE UP (ref 2–14)
NEUTROPHILS # BLD AUTO: 4.2 K/UL — SIGNIFICANT CHANGE UP (ref 1.8–7.4)
NEUTROPHILS NFR BLD AUTO: 57.8 % — SIGNIFICANT CHANGE UP (ref 43–77)
NRBC # FLD: 0 K/UL — LOW (ref 25–125)
PHOSPHATE SERPL-MCNC: 2.9 MG/DL — SIGNIFICANT CHANGE UP (ref 2.5–4.5)
PLATELET # BLD AUTO: 167 K/UL — SIGNIFICANT CHANGE UP (ref 150–400)
PMV BLD: 9.3 FL — SIGNIFICANT CHANGE UP (ref 7–13)
POTASSIUM SERPL-MCNC: 3.8 MMOL/L — SIGNIFICANT CHANGE UP (ref 3.5–5.3)
POTASSIUM SERPL-SCNC: 3.8 MMOL/L — SIGNIFICANT CHANGE UP (ref 3.5–5.3)
PROT SERPL-MCNC: 7.3 G/DL — SIGNIFICANT CHANGE UP (ref 6–8.3)
PROTHROM AB SERPL-ACNC: 13.9 SEC — HIGH (ref 9.8–13.1)
RBC # BLD: 4.3 M/UL — SIGNIFICANT CHANGE UP (ref 4.2–5.8)
RBC # FLD: 16.5 % — HIGH (ref 10.3–14.5)
SODIUM SERPL-SCNC: 141 MMOL/L — SIGNIFICANT CHANGE UP (ref 135–145)
SPECIMEN SOURCE: SIGNIFICANT CHANGE UP
WBC # BLD: 7.27 K/UL — SIGNIFICANT CHANGE UP (ref 3.8–10.5)
WBC # FLD AUTO: 7.27 K/UL — SIGNIFICANT CHANGE UP (ref 3.8–10.5)

## 2019-01-14 PROCEDURE — 99233 SBSQ HOSP IP/OBS HIGH 50: CPT

## 2019-01-14 PROCEDURE — 50432 PLMT NEPHROSTOMY CATHETER: CPT | Mod: 50

## 2019-01-14 RX ORDER — MEPERIDINE HYDROCHLORIDE 50 MG/ML
25 INJECTION INTRAMUSCULAR; INTRAVENOUS; SUBCUTANEOUS ONCE
Qty: 0 | Refills: 0 | Status: DISCONTINUED | OUTPATIENT
Start: 2019-01-14 | End: 2019-01-14

## 2019-01-14 RX ADMIN — Medication 100 MILLIGRAM(S): at 21:07

## 2019-01-14 RX ADMIN — Medication 1 SUPPOSITORY(S): at 06:32

## 2019-01-14 RX ADMIN — HYDROMORPHONE HYDROCHLORIDE 0.25 MILLIGRAM(S): 2 INJECTION INTRAMUSCULAR; INTRAVENOUS; SUBCUTANEOUS at 10:00

## 2019-01-14 RX ADMIN — SENNA PLUS 2 TABLET(S): 8.6 TABLET ORAL at 21:08

## 2019-01-14 RX ADMIN — HYDROMORPHONE HYDROCHLORIDE 0.25 MILLIGRAM(S): 2 INJECTION INTRAMUSCULAR; INTRAVENOUS; SUBCUTANEOUS at 02:22

## 2019-01-14 RX ADMIN — Medication 1 TABLET(S): at 21:08

## 2019-01-14 RX ADMIN — Medication 50 MILLIGRAM(S): at 13:51

## 2019-01-14 RX ADMIN — SODIUM CHLORIDE 80 MILLILITER(S): 9 INJECTION INTRAMUSCULAR; INTRAVENOUS; SUBCUTANEOUS at 09:37

## 2019-01-14 RX ADMIN — HYDROMORPHONE HYDROCHLORIDE 0.25 MILLIGRAM(S): 2 INJECTION INTRAMUSCULAR; INTRAVENOUS; SUBCUTANEOUS at 16:00

## 2019-01-14 RX ADMIN — MEPERIDINE HYDROCHLORIDE 25 MILLIGRAM(S): 50 INJECTION INTRAMUSCULAR; INTRAVENOUS; SUBCUTANEOUS at 20:14

## 2019-01-14 RX ADMIN — HYDROMORPHONE HYDROCHLORIDE 0.25 MILLIGRAM(S): 2 INJECTION INTRAMUSCULAR; INTRAVENOUS; SUBCUTANEOUS at 02:07

## 2019-01-14 RX ADMIN — POLYETHYLENE GLYCOL 3350 17 GRAM(S): 17 POWDER, FOR SOLUTION ORAL at 21:07

## 2019-01-14 RX ADMIN — Medication 50 MILLIGRAM(S): at 21:07

## 2019-01-14 RX ADMIN — Medication 50 MILLIGRAM(S): at 06:30

## 2019-01-14 RX ADMIN — Medication 1 SPRAY(S): at 13:51

## 2019-01-14 RX ADMIN — HYDROMORPHONE HYDROCHLORIDE 0.25 MILLIGRAM(S): 2 INJECTION INTRAMUSCULAR; INTRAVENOUS; SUBCUTANEOUS at 10:15

## 2019-01-14 RX ADMIN — HYDROMORPHONE HYDROCHLORIDE 0.25 MILLIGRAM(S): 2 INJECTION INTRAMUSCULAR; INTRAVENOUS; SUBCUTANEOUS at 15:43

## 2019-01-14 NOTE — PROGRESS NOTE ADULT - SUBJECTIVE AND OBJECTIVE BOX
Vascular & Interventional Radiology Post-Procedure Note    Pre-Procedure Diagnosis: Obstructive Uropathy and Sepsis  Post-Procedure Diagnosis: Same as pre.  Indications for Procedure: Sepsis    Attending: Teodoro RESTREPO  Resident(s): Royal RESTREPO, Stefano RESTREPO    Procedure Details/Findings: Successful placement of bilateral 8.5 [place]ican percutaneous nephrostomy tubes.     Complications: None  Estimated Blood Loss: Minimal  Specimen: Cloudy, White urine from Right kidney and Cloudy brown, yellow urine from Left kidney,   Contrast: 20ml  Sedation: MAC  Patient Condition/Disposition: Stable. Floor    Plan: Record NT tube output. Monitor cr. F/Up with urology

## 2019-01-14 NOTE — PROGRESS NOTE ADULT - SUBJECTIVE AND OBJECTIVE BOX
Patient is a 43y old  Male who presents with a chief complaint of Abdominal pain (2019 12:10)      SUBJECTIVE / OVERNIGHT EVENTS: Pt still has lower abdominal pain/discomfort located in suprapubic region.  Denies fevers/chills, pt then refused to be examined any further as he demanded privacy while his curtains were being changed by maintenance crew.       MEDICATIONS  (STANDING):  aztreonam  IVPB 1000 milliGRAM(s) IV Intermittent every 8 hours  baclofen 10 milliGRAM(s) Oral every 12 hours  bisacodyl 5 milliGRAM(s) Oral at bedtime  docusate sodium 100 milliGRAM(s) Oral three times a day  fluticasone propionate 50 MICROgram(s)/spray Nasal Spray 1 Spray(s) Both Nostrils daily  multivitamin 1 Tablet(s) Oral daily  polyethylene glycol 3350 17 Gram(s) Oral two times a day  senna 2 Tablet(s) Oral at bedtime  sodium chloride 0.9%. 1000 milliLiter(s) (80 mL/Hr) IV Continuous <Continuous>    MEDICATIONS  (PRN):  acetaminophen   Tablet .. 650 milliGRAM(s) Oral every 6 hours PRN Temp greater or equal to 38C (100.4F)  guaiFENesin/dextromethorphan  Syrup 5 milliLiter(s) Oral every 8 hours PRN Cough  HYDROmorphone  Injectable 0.25 milliGRAM(s) IV Push every 4 hours PRN Moderate Pain (4 - 6) and severe pain      Vital Signs Last 24 Hrs  T(C): 37.1 (2019 09:58), Max: 37.1 (2019 09:58)  T(F): 98.8 (2019 09:58), Max: 98.8 (2019 09:58)  HR: 72 (2019 10:15) (65 - 99)  BP: 121/76 (2019 10:15) (109/67 - 131/98)  BP(mean): --  RR: 18 (2019 10:15) (17 - 19)  SpO2: 96% (2019 09:58) (96% - 100%)  CAPILLARY BLOOD GLUCOSE      PHYSICAL EXAM  Refused per pt request     LABS:                        12.2   7.27  )-----------( 167      ( 2019 11:49 )             37.3     01-14    141  |  106  |  10  ----------------------------<  84  3.8   |  26  |  0.54    Ca    8.6      2019 11:49  Phos  2.9       Mg     1.7         TPro  7.3  /  Alb  3.5  /  TBili  0.3  /  DBili  x   /  AST  16  /  ALT  14  /  AlkPhos  61      PT/INR - ( 2019 11:49 )   PT: 13.9 SEC;   INR: 1.21          PTT - ( 2019 11:49 )  PTT:32.7 SEC      Urinalysis Basic - ( 2019 03:57 )    Color: LIGHT ORANGE / Appearance: TURBID / S.020 / pH: 8.0  Gluc: NEGATIVE / Ketone: NEGATIVE  / Bili: NEGATIVE / Urobili: NORMAL   Blood: MODERATE / Protein: 70 / Nitrite: POSITIVE   Leuk Esterase: LARGE / RBC: 0-2 / WBC >50   Sq Epi: x / Non Sq Epi: FEW / Bacteria: MODERATE      Culture - Urine (19 @ 04:48)    Culture - Urine:   Culture grew 3 or more types of organisms which indicate  collection contamination; consider recollection only if  clinically indicated.    Specimen Source: URINE CATHETER      RADIOLOGY & ADDITIONAL TESTS:    Imaging Personally Reviewed:  < from: CT Abdomen and Pelvis w/ IV Cont (19 @ 00:28) >  IMPRESSION:      1.  Cystitis, bilateral ureteritis and bilateral pyelitis.  Pyonephrosis   and/or pyelonephritis should be excluded clinically.  2.  Moderate left hydronephrosis caused by a 3.8 x 1.4 cm stone impacted   in the ureteropelvic junction.  Additional nonobstructing renal stones   measure up to 6 mm.  3.  Chronic severe right hydronephrosis with renal atrophy.    Approximately 2.3 x 2.1 x 2.1 cm irregular soft tissue focus encasing the   proximal right ureter, which may be infectious, inflammatory or   neoplastic in etiology.  Suspicion for a distal right ureteral stricture   located approximately 7 cm proximal to uterovesical junction.    Nonobstructing stones measure up to 2 x 0.8 cm in the right kidney and 1   mm in the distal right ureter.  4.  Fecal impaction and stercoral colitis.    < end of copied text >    Consultant(s) Notes Reviewed: DEVON

## 2019-01-14 NOTE — PROGRESS NOTE ADULT - SUBJECTIVE AND OBJECTIVE BOX
Subjective:  Patient at baseline this AM. Plan for nephrstomy tube placement today.    Objectives:  T(C): 37.1 (19 @ 09:58), Max: 37.1 (19 @ 09:58)  HR: 72 (19 @ 10:15) (65 - 86)  BP: 121/76 (19 @ 10:15) (118/75 - 130/89)  RR: 18 (19 @ 10:15) (17 - 19)  SpO2: 96% (19 @ 09:58) (96% - 98%)  Wt(kg): --      Physcial Exam  GENERAL: NAD, well-developed  ABDOMEN: Soft, Nontender, Nondistended  PSYCH: AAOx3    LABS:                        12.2   7.27  )-----------( 167      ( 2019 11:49 )             37.3     01-12    135  |  100  |  14  ----------------------------<  96  4.9   |  22  |  0.71    Ca    9.6      2019 20:02    TPro  8.5<H>  /  Alb  4.0  /  TBili  0.5  /  DBili  x   /  AST  31  /  ALT  17  /  AlkPhos  80  -12    CAPILLARY BLOOD GLUCOSE          CAPILLARY BLOOD GLUCOSE        Urinalysis Basic - ( 2019 03:57 )    Color: LIGHT ORANGE / Appearance: TURBID / S.020 / pH: 8.0  Gluc: NEGATIVE / Ketone: NEGATIVE  / Bili: NEGATIVE / Urobili: NORMAL   Blood: MODERATE / Protein: 70 / Nitrite: POSITIVE   Leuk Esterase: LARGE / RBC: 0-2 / WBC >50   Sq Epi: x / Non Sq Epi: FEW / Bacteria: MODERATE

## 2019-01-15 PROCEDURE — 99233 SBSQ HOSP IP/OBS HIGH 50: CPT

## 2019-01-15 RX ORDER — ENOXAPARIN SODIUM 100 MG/ML
40 INJECTION SUBCUTANEOUS DAILY
Qty: 0 | Refills: 0 | Status: DISCONTINUED | OUTPATIENT
Start: 2019-01-15 | End: 2019-01-18

## 2019-01-15 RX ORDER — LACTULOSE 10 G/15ML
10 SOLUTION ORAL DAILY
Qty: 0 | Refills: 0 | Status: DISCONTINUED | OUTPATIENT
Start: 2019-01-15 | End: 2019-01-18

## 2019-01-15 RX ORDER — OXYCODONE HYDROCHLORIDE 5 MG/1
5 TABLET ORAL EVERY 4 HOURS
Qty: 0 | Refills: 0 | Status: DISCONTINUED | OUTPATIENT
Start: 2019-01-15 | End: 2019-01-17

## 2019-01-15 RX ADMIN — OXYCODONE HYDROCHLORIDE 5 MILLIGRAM(S): 5 TABLET ORAL at 23:26

## 2019-01-15 RX ADMIN — LACTULOSE 10 GRAM(S): 10 SOLUTION ORAL at 18:54

## 2019-01-15 RX ADMIN — HYDROMORPHONE HYDROCHLORIDE 0.25 MILLIGRAM(S): 2 INJECTION INTRAMUSCULAR; INTRAVENOUS; SUBCUTANEOUS at 08:12

## 2019-01-15 RX ADMIN — POLYETHYLENE GLYCOL 3350 17 GRAM(S): 17 POWDER, FOR SOLUTION ORAL at 18:55

## 2019-01-15 RX ADMIN — HYDROMORPHONE HYDROCHLORIDE 0.25 MILLIGRAM(S): 2 INJECTION INTRAMUSCULAR; INTRAVENOUS; SUBCUTANEOUS at 07:57

## 2019-01-15 RX ADMIN — SODIUM CHLORIDE 80 MILLILITER(S): 9 INJECTION INTRAMUSCULAR; INTRAVENOUS; SUBCUTANEOUS at 07:56

## 2019-01-15 RX ADMIN — ENOXAPARIN SODIUM 40 MILLIGRAM(S): 100 INJECTION SUBCUTANEOUS at 14:26

## 2019-01-15 RX ADMIN — Medication 100 MILLIGRAM(S): at 21:18

## 2019-01-15 RX ADMIN — Medication 1 SPRAY(S): at 14:26

## 2019-01-15 RX ADMIN — SENNA PLUS 2 TABLET(S): 8.6 TABLET ORAL at 21:18

## 2019-01-15 RX ADMIN — OXYCODONE HYDROCHLORIDE 5 MILLIGRAM(S): 5 TABLET ORAL at 23:56

## 2019-01-15 RX ADMIN — Medication 50 MILLIGRAM(S): at 14:25

## 2019-01-15 RX ADMIN — Medication 10 MILLIGRAM(S): at 18:54

## 2019-01-15 RX ADMIN — Medication 1 TABLET(S): at 14:25

## 2019-01-15 RX ADMIN — Medication 30 MILLILITER(S): at 21:18

## 2019-01-15 RX ADMIN — Medication 100 MILLIGRAM(S): at 06:14

## 2019-01-15 RX ADMIN — Medication 10 MILLIGRAM(S): at 06:14

## 2019-01-15 RX ADMIN — POLYETHYLENE GLYCOL 3350 17 GRAM(S): 17 POWDER, FOR SOLUTION ORAL at 06:14

## 2019-01-15 RX ADMIN — Medication 100 MILLIGRAM(S): at 14:25

## 2019-01-15 RX ADMIN — Medication 50 MILLIGRAM(S): at 06:15

## 2019-01-15 RX ADMIN — Medication 50 MILLIGRAM(S): at 21:18

## 2019-01-15 NOTE — PROGRESS NOTE ADULT - SUBJECTIVE AND OBJECTIVE BOX
Subjective:  Patient stable this AM.  Bilateral NT draining clear urine.    Objectives:  T(C): 37.6 (01-15-19 @ 05:46), Max: 37.6 (01-15-19 @ 05:46)  HR: 79 (01-15-19 @ 08:05) (76 - 81)  BP: 100/65 (01-15-19 @ 08:05) (100/65 - 117/85)  RR: 16 (01-15-19 @ 08:05) (16 - 18)  SpO2: 96% (01-15-19 @ 08:05) (96% - 96%)  Wt(kg): --    01-14 @ 07:01  -  01-15 @ 07:00  --------------------------------------------------------  IN:  Total IN: 0 mL    OUT:    Drain: 850 mL  Total OUT: 850 mL    Total NET: -850 mL          Physcial Exam  GENERAL: NAD, well-developed  ABDOMEN: Soft, Nontender, Nondistended;  GENITOURINARY: bilateral NT draining      LABS:                        12.2   7.27  )-----------( 167      ( 14 Jan 2019 11:49 )             37.3     01-14    141  |  106  |  10  ----------------------------<  84  3.8   |  26  |  0.54    Ca    8.6      14 Jan 2019 11:49  Phos  2.9     01-14  Mg     1.7     01-14    TPro  7.3  /  Alb  3.5  /  TBili  0.3  /  DBili  x   /  AST  16  /  ALT  14  /  AlkPhos  61  01-14    CAPILLARY BLOOD GLUCOSE        PT/INR - ( 14 Jan 2019 11:49 )   PT: 13.9 SEC;   INR: 1.21          PTT - ( 14 Jan 2019 11:49 )  PTT:32.7 SEC  CAPILLARY BLOOD GLUCOSE

## 2019-01-15 NOTE — PROGRESS NOTE ADULT - PROBLEM SELECTOR PLAN 6
DVT ppx: HSQ on hold in anticipation of procedure   Restart DVT ppx s/p procedure DVT ppx: restart daily Lovenox  Dispo: awaiting Ucx results and  reevaluation

## 2019-01-15 NOTE — PROGRESS NOTE ADULT - SUBJECTIVE AND OBJECTIVE BOX
Patient is a 43y old  Male who presents with a chief complaint of Abdominal pain (15 Tom 2019 09:15)      SUBJECTIVE / OVERNIGHT EVENTS: Pt is now s/p b/l percutaneous NT placement.  During interview, patient got agitated because he was told that urology would not be intervening on UPJ stone this admission.  He then declined to speak further to me and told me to leave the room.       MEDICATIONS  (STANDING):  aztreonam  IVPB 1000 milliGRAM(s) IV Intermittent every 8 hours  baclofen 10 milliGRAM(s) Oral every 12 hours  bisacodyl 5 milliGRAM(s) Oral at bedtime  bisacodyl Suppository 10 milliGRAM(s) Rectal daily  docusate sodium 100 milliGRAM(s) Oral three times a day  fluticasone propionate 50 MICROgram(s)/spray Nasal Spray 1 Spray(s) Both Nostrils daily  multivitamin 1 Tablet(s) Oral daily  polyethylene glycol 3350 17 Gram(s) Oral two times a day  senna 2 Tablet(s) Oral at bedtime  sodium chloride 0.9%. 1000 milliLiter(s) (80 mL/Hr) IV Continuous <Continuous>    MEDICATIONS  (PRN):  acetaminophen   Tablet .. 650 milliGRAM(s) Oral every 6 hours PRN Temp greater or equal to 38C (100.4F)  guaiFENesin/dextromethorphan  Syrup 5 milliLiter(s) Oral every 8 hours PRN Cough  HYDROmorphone  Injectable 0.25 milliGRAM(s) IV Push every 4 hours PRN Moderate Pain (4 - 6) and severe pain      Vital Signs Last 24 Hrs  T(C): 37.6 (15 Tom 2019 05:46), Max: 37.6 (15 Tom 2019 05:46)  T(F): 99.6 (15 Tom 2019 05:46), Max: 99.6 (15 Tom 2019 05:46)  HR: 79 (15 Tom 2019 08:05) (61 - 81)  BP: 100/65 (15 Tom 2019 08:05) (100/65 - 145/99)  BP(mean): --  RR: 16 (15 Tom 2019 08:05) (16 - 20)  SpO2: 96% (15 Tom 2019 08:05) (96% - 98%)  CAPILLARY BLOOD GLUCOSE        I&O's Summary    14 Jan 2019 07:01  -  15 Tom 2019 07:00  --------------------------------------------------------  IN: 0 mL / OUT: 850 mL / NET: -850 mL          PHYSICAL EXAM  Refused     LABS:                        12.2   7.27  )-----------( 167      ( 14 Jan 2019 11:49 )             37.3     01-14    141  |  106  |  10  ----------------------------<  84  3.8   |  26  |  0.54    Ca    8.6      14 Jan 2019 11:49  Phos  2.9     01-14  Mg     1.7     01-14    TPro  7.3  /  Alb  3.5  /  TBili  0.3  /  DBili  x   /  AST  16  /  ALT  14  /  AlkPhos  61  01-14    PT/INR - ( 14 Jan 2019 11:49 )   PT: 13.9 SEC;   INR: 1.21          PTT - ( 14 Jan 2019 11:49 )  PTT:32.7 SEC    Culture - Urine (01.13.19 @ 04:48)    Culture - Urine:   Culture grew 3 or more types of organisms which indicate  collection contamination; consider recollection only if  clinically indicated.    Specimen Source: URINE CATHETER    Culture - Blood (01.12.19 @ 22:39)    Culture - Blood:   NO ORGANISMS ISOLATED  NO ORGANISMS ISOLATED AT 48 HRS.    Specimen Source: BLOOD VENOUS      RADIOLOGY & ADDITIONAL TESTS:    Imaging Personally Reviewed:  < from: CT Abdomen and Pelvis w/ IV Cont (01.13.19 @ 00:28) >  IMPRESSION:      1.  Cystitis, bilateral ureteritis and bilateral pyelitis.  Pyonephrosis   and/or pyelonephritis should be excluded clinically.  2.  Moderate left hydronephrosis caused by a 3.8 x 1.4 cm stone impacted   in the ureteropelvic junction.  Additional nonobstructing renal stones   measure up to 6 mm.  3.  Chronic severe right hydronephrosis with renal atrophy.    Approximately 2.3 x 2.1 x 2.1 cm irregular soft tissue focus encasing the   proximal right ureter, which may be infectious, inflammatory or   neoplastic in etiology.  Suspicion for a distal right ureteral stricture   located approximately 7 cm proximal to uterovesical junction.    Nonobstructing stones measure up to 2 x 0.8 cm in the right kidney and 1   mm in the distal right ureter.  4.  Fecal impaction and stercoral colitis.    < end of copied text >    Consultant(s) Notes Reviewed:

## 2019-01-15 NOTE — PROGRESS NOTE ADULT - SUBJECTIVE AND OBJECTIVE BOX
ANESTHESIA POSTOP CHECK    43y Male POSTOP DAY 1     Vital Signs Last 24 Hrs  T(C): 37.6 (15 Tom 2019 05:46), Max: 37.6 (15 Tom 2019 05:46)  T(F): 99.6 (15 Tom 2019 05:46), Max: 99.6 (15 Tom 2019 05:46)  HR: 79 (15 Tom 2019 08:05) (61 - 83)  BP: 100/65 (15 Tom 2019 08:05) (100/65 - 145/99)  BP(mean): --  RR: 16 (15 Tom 2019 08:05) (16 - 20)  SpO2: 96% (15 Tom 2019 08:05) (96% - 98%)  I&O's Summary    14 Jan 2019 07:01  -  15 Tom 2019 07:00  --------------------------------------------------------  IN: 0 mL / OUT: 850 mL / NET: -850 mL        NO APPARENT ANESTHESIA COMPLICATIONS

## 2019-01-16 LAB
ANION GAP SERPL CALC-SCNC: 13 MEQ/L — SIGNIFICANT CHANGE UP (ref 7–14)
BUN SERPL-MCNC: 5 MG/DL — LOW (ref 7–23)
CALCIUM SERPL-MCNC: 8.6 MG/DL — SIGNIFICANT CHANGE UP (ref 8.4–10.5)
CHLORIDE SERPL-SCNC: 101 MMOL/L — SIGNIFICANT CHANGE UP (ref 98–107)
CO2 SERPL-SCNC: 25 MMOL/L — SIGNIFICANT CHANGE UP (ref 22–31)
CREAT SERPL-MCNC: 0.5 MG/DL — SIGNIFICANT CHANGE UP (ref 0.5–1.3)
GLUCOSE SERPL-MCNC: 128 MG/DL — HIGH (ref 70–99)
HCT VFR BLD CALC: 40 % — SIGNIFICANT CHANGE UP (ref 39–50)
HGB BLD-MCNC: 12.5 G/DL — LOW (ref 13–17)
MAGNESIUM SERPL-MCNC: 1.8 MG/DL — SIGNIFICANT CHANGE UP (ref 1.6–2.6)
MCHC RBC-ENTMCNC: 27.2 PG — SIGNIFICANT CHANGE UP (ref 27–34)
MCHC RBC-ENTMCNC: 31.3 % — LOW (ref 32–36)
MCV RBC AUTO: 87.1 FL — SIGNIFICANT CHANGE UP (ref 80–100)
NRBC # FLD: 0 K/UL — LOW (ref 25–125)
PHOSPHATE SERPL-MCNC: 2.1 MG/DL — LOW (ref 2.5–4.5)
PLATELET # BLD AUTO: 154 K/UL — SIGNIFICANT CHANGE UP (ref 150–400)
PMV BLD: 9.8 FL — SIGNIFICANT CHANGE UP (ref 7–13)
POTASSIUM SERPL-MCNC: 3.5 MMOL/L — SIGNIFICANT CHANGE UP (ref 3.5–5.3)
POTASSIUM SERPL-SCNC: 3.5 MMOL/L — SIGNIFICANT CHANGE UP (ref 3.5–5.3)
RBC # BLD: 4.59 M/UL — SIGNIFICANT CHANGE UP (ref 4.2–5.8)
RBC # FLD: 16.7 % — HIGH (ref 10.3–14.5)
SODIUM SERPL-SCNC: 139 MMOL/L — SIGNIFICANT CHANGE UP (ref 135–145)
SPECIMEN SOURCE: SIGNIFICANT CHANGE UP
WBC # BLD: 8.25 K/UL — SIGNIFICANT CHANGE UP (ref 3.8–10.5)
WBC # FLD AUTO: 8.25 K/UL — SIGNIFICANT CHANGE UP (ref 3.8–10.5)

## 2019-01-16 PROCEDURE — 99233 SBSQ HOSP IP/OBS HIGH 50: CPT

## 2019-01-16 RX ORDER — POTASSIUM PHOSPHATE, MONOBASIC POTASSIUM PHOSPHATE, DIBASIC 236; 224 MG/ML; MG/ML
15 INJECTION, SOLUTION INTRAVENOUS ONCE
Qty: 0 | Refills: 0 | Status: DISCONTINUED | OUTPATIENT
Start: 2019-01-16 | End: 2019-01-18

## 2019-01-16 RX ADMIN — Medication 50 MILLIGRAM(S): at 14:28

## 2019-01-16 RX ADMIN — Medication 1 TABLET(S): at 14:27

## 2019-01-16 RX ADMIN — LACTULOSE 10 GRAM(S): 10 SOLUTION ORAL at 18:08

## 2019-01-16 RX ADMIN — Medication 10 MILLIGRAM(S): at 18:07

## 2019-01-16 RX ADMIN — ENOXAPARIN SODIUM 40 MILLIGRAM(S): 100 INJECTION SUBCUTANEOUS at 14:28

## 2019-01-16 RX ADMIN — OXYCODONE HYDROCHLORIDE 5 MILLIGRAM(S): 5 TABLET ORAL at 11:29

## 2019-01-16 RX ADMIN — SENNA PLUS 2 TABLET(S): 8.6 TABLET ORAL at 22:57

## 2019-01-16 RX ADMIN — Medication 50 MILLIGRAM(S): at 05:39

## 2019-01-16 RX ADMIN — POLYETHYLENE GLYCOL 3350 17 GRAM(S): 17 POWDER, FOR SOLUTION ORAL at 18:07

## 2019-01-16 RX ADMIN — SODIUM CHLORIDE 80 MILLILITER(S): 9 INJECTION INTRAMUSCULAR; INTRAVENOUS; SUBCUTANEOUS at 08:47

## 2019-01-16 RX ADMIN — Medication 50 MILLIGRAM(S): at 22:56

## 2019-01-16 RX ADMIN — OXYCODONE HYDROCHLORIDE 5 MILLIGRAM(S): 5 TABLET ORAL at 12:00

## 2019-01-16 RX ADMIN — Medication 100 MILLIGRAM(S): at 22:57

## 2019-01-16 NOTE — PROGRESS NOTE ADULT - PROBLEM SELECTOR PLAN 6
DVT ppx: restart daily Lovenox  Dispo: awaiting Ucx results, pt very difficult, refuses to be examined or interact with physicians.  Attempted to call mother at provided number, but number is disconnected.

## 2019-01-16 NOTE — PROGRESS NOTE ADULT - SUBJECTIVE AND OBJECTIVE BOX
Patient is a 43y old  Male who presents with a chief complaint of Abdominal pain (15 Tom 2019 11:59)      SUBJECTIVE / OVERNIGHT EVENTS: Pt again refusing to talk to me today.  After multiple attempts to try to wake him, he refused to acknowledge me.  Remains afebrile, nephrostomy tubes draining clear urine as per RN.       MEDICATIONS  (STANDING):  aztreonam  IVPB 1000 milliGRAM(s) IV Intermittent every 8 hours  baclofen 10 milliGRAM(s) Oral every 12 hours  docusate sodium 100 milliGRAM(s) Oral three times a day  enoxaparin Injectable 40 milliGRAM(s) SubCutaneous daily  fluticasone propionate 50 MICROgram(s)/spray Nasal Spray 1 Spray(s) Both Nostrils daily  lactulose Syrup 10 Gram(s) Oral daily  multivitamin 1 Tablet(s) Oral daily  polyethylene glycol 3350 17 Gram(s) Oral two times a day  potassium phosphate IVPB 15 milliMole(s) IV Intermittent once  senna 2 Tablet(s) Oral at bedtime  sodium chloride 0.9%. 1000 milliLiter(s) (80 mL/Hr) IV Continuous <Continuous>    MEDICATIONS  (PRN):  acetaminophen   Tablet .. 650 milliGRAM(s) Oral every 6 hours PRN Temp greater or equal to 38C (100.4F)  guaiFENesin/dextromethorphan  Syrup 5 milliLiter(s) Oral every 8 hours PRN Cough  oxyCODONE    IR 5 milliGRAM(s) Oral every 4 hours PRN Moderate to severe pain      Vital Signs Last 24 Hrs  T(C): 37.2 (16 Jan 2019 12:24), Max: 37.3 (15 Tom 2019 14:44)  T(F): 99 (16 Jan 2019 12:24), Max: 99.2 (15 Tom 2019 22:00)  HR: 74 (16 Jan 2019 12:24) (71 - 80)  BP: 112/79 (16 Jan 2019 12:24) (112/79 - 135/91)  BP(mean): --  RR: 18 (16 Jan 2019 12:24) (16 - 18)  SpO2: 96% (16 Jan 2019 12:24) (96% - 100%)  CAPILLARY BLOOD GLUCOSE        I&O's Summary    15 Tom 2019 07:01  -  16 Jan 2019 07:00  --------------------------------------------------------  IN: 0 mL / OUT: 3515 mL / NET: -3515 mL    16 Jan 2019 07:01  -  16 Jan 2019 14:18  --------------------------------------------------------  IN: 0 mL / OUT: 1255 mL / NET: -1255 mL      PHYSICAL EXAM  Refused     LABS:                        12.5   8.25  )-----------( 154      ( 16 Jan 2019 12:28 )             40.0     01-16    139  |  101  |  5<L>  ----------------------------<  128<H>  3.5   |  25  |  0.50    Ca    8.6      16 Jan 2019 12:28  Phos  2.1     01-16  Mg     1.8     01-16    Culture - Yeast and Fungus (01.14.19 @ 18:36)    Culture - Yeast and Fungus:   CULTURE NEGATIVE FOR YEASTS AND MOLDS AFTER 1 DAY    Specimen Source: URINE KIDNEY    Culture - Urine (01.14.19 @ 18:36)    Culture - Urine:   CULTURE IN PROGRESS, FURTHER REPORT TO FOLLOW.    Specimen Source: URINE KIDNEY        RADIOLOGY & ADDITIONAL TESTS:    Imaging Personally Reviewed:  < from: CT Abdomen and Pelvis w/ IV Cont (01.13.19 @ 00:28) >  IMPRESSION:      1.  Cystitis, bilateral ureteritis and bilateral pyelitis.  Pyonephrosis   and/or pyelonephritis should be excluded clinically.  2.  Moderate left hydronephrosis caused by a 3.8 x 1.4 cm stone impacted   in the ureteropelvic junction.  Additional nonobstructing renal stones   measure up to 6 mm.  3.  Chronic severe right hydronephrosis with renal atrophy.    Approximately 2.3 x 2.1 x 2.1 cm irregular soft tissue focus encasing the   proximal right ureter, which may be infectious, inflammatory or   neoplastic in etiology.  Suspicion for a distal right ureteral stricture   located approximately 7 cm proximal to uterovesical junction.    Nonobstructing stones measure up to 2 x 0.8 cm in the right kidney and 1   mm in the distal right ureter.  4.  Fecal impaction and stercoral colitis.  .          < end of copied text >    Consultant(s) Notes Reviewed:      Care Discussed with Consultants/Other Providers:

## 2019-01-17 ENCOUNTER — TRANSCRIPTION ENCOUNTER (OUTPATIENT)
Age: 44
End: 2019-01-17

## 2019-01-17 LAB
-  AMIKACIN: SIGNIFICANT CHANGE UP
-  AMPICILLIN/SULBACTAM: SIGNIFICANT CHANGE UP
-  AMPICILLIN: SIGNIFICANT CHANGE UP
-  AZTREONAM: SIGNIFICANT CHANGE UP
-  CEFAZOLIN: SIGNIFICANT CHANGE UP
-  CEFEPIME: SIGNIFICANT CHANGE UP
-  CEFOXITIN: SIGNIFICANT CHANGE UP
-  CEFTAZIDIME: SIGNIFICANT CHANGE UP
-  CEFTRIAXONE: SIGNIFICANT CHANGE UP
-  CIPROFLOXACIN: SIGNIFICANT CHANGE UP
-  ERTAPENEM: SIGNIFICANT CHANGE UP
-  IMIPENEM: SIGNIFICANT CHANGE UP
-  LEVOFLOXACIN: SIGNIFICANT CHANGE UP
-  MEROPENEM: SIGNIFICANT CHANGE UP
-  NITROFURANTOIN: SIGNIFICANT CHANGE UP
-  PIPERACILLIN/TAZOBACTAM: SIGNIFICANT CHANGE UP
-  TIGECYCLINE: SIGNIFICANT CHANGE UP
-  TRIMETHOPRIM/SULFAMETHOXAZOLE: SIGNIFICANT CHANGE UP
ANION GAP SERPL CALC-SCNC: 13 MMO/L — SIGNIFICANT CHANGE UP (ref 7–14)
BACTERIA BLD CULT: SIGNIFICANT CHANGE UP
BACTERIA BLD CULT: SIGNIFICANT CHANGE UP
BACTERIA UR CULT: SIGNIFICANT CHANGE UP
BUN SERPL-MCNC: 5 MG/DL — LOW (ref 7–23)
CALCIUM SERPL-MCNC: 9.3 MG/DL — SIGNIFICANT CHANGE UP (ref 8.4–10.5)
CHLORIDE SERPL-SCNC: 101 MMOL/L — SIGNIFICANT CHANGE UP (ref 98–107)
CO2 SERPL-SCNC: 26 MMOL/L — SIGNIFICANT CHANGE UP (ref 22–31)
CREAT SERPL-MCNC: 0.49 MG/DL — LOW (ref 0.5–1.3)
GLUCOSE SERPL-MCNC: 79 MG/DL — SIGNIFICANT CHANGE UP (ref 70–99)
MAGNESIUM SERPL-MCNC: 1.9 MG/DL — SIGNIFICANT CHANGE UP (ref 1.6–2.6)
METHOD TYPE: SIGNIFICANT CHANGE UP
ORGANISM # SPEC MICROSCOPIC CNT: SIGNIFICANT CHANGE UP
ORGANISM # SPEC MICROSCOPIC CNT: SIGNIFICANT CHANGE UP
PHOSPHATE SERPL-MCNC: 2.8 MG/DL — SIGNIFICANT CHANGE UP (ref 2.5–4.5)
POTASSIUM SERPL-MCNC: 3.9 MMOL/L — SIGNIFICANT CHANGE UP (ref 3.5–5.3)
POTASSIUM SERPL-SCNC: 3.9 MMOL/L — SIGNIFICANT CHANGE UP (ref 3.5–5.3)
SODIUM SERPL-SCNC: 140 MMOL/L — SIGNIFICANT CHANGE UP (ref 135–145)

## 2019-01-17 PROCEDURE — 99233 SBSQ HOSP IP/OBS HIGH 50: CPT

## 2019-01-17 RX ORDER — OXYCODONE HYDROCHLORIDE 5 MG/1
5 TABLET ORAL EVERY 4 HOURS
Qty: 0 | Refills: 0 | Status: DISCONTINUED | OUTPATIENT
Start: 2019-01-17 | End: 2019-01-18

## 2019-01-17 RX ORDER — CEFPODOXIME PROXETIL 100 MG
200 TABLET ORAL EVERY 12 HOURS
Qty: 0 | Refills: 0 | Status: DISCONTINUED | OUTPATIENT
Start: 2019-01-17 | End: 2019-01-18

## 2019-01-17 RX ADMIN — ENOXAPARIN SODIUM 40 MILLIGRAM(S): 100 INJECTION SUBCUTANEOUS at 13:28

## 2019-01-17 RX ADMIN — Medication 100 MILLIGRAM(S): at 13:28

## 2019-01-17 RX ADMIN — Medication 50 MILLIGRAM(S): at 06:54

## 2019-01-17 RX ADMIN — Medication 10 MILLIGRAM(S): at 18:15

## 2019-01-17 RX ADMIN — Medication 1 SPRAY(S): at 13:28

## 2019-01-17 RX ADMIN — Medication 1 TABLET(S): at 13:28

## 2019-01-17 RX ADMIN — Medication 100 MILLIGRAM(S): at 22:28

## 2019-01-17 RX ADMIN — SENNA PLUS 2 TABLET(S): 8.6 TABLET ORAL at 22:27

## 2019-01-17 RX ADMIN — OXYCODONE HYDROCHLORIDE 5 MILLIGRAM(S): 5 TABLET ORAL at 22:28

## 2019-01-17 RX ADMIN — POLYETHYLENE GLYCOL 3350 17 GRAM(S): 17 POWDER, FOR SOLUTION ORAL at 13:28

## 2019-01-17 RX ADMIN — OXYCODONE HYDROCHLORIDE 5 MILLIGRAM(S): 5 TABLET ORAL at 01:53

## 2019-01-17 RX ADMIN — Medication 200 MILLIGRAM(S): at 18:15

## 2019-01-17 RX ADMIN — LACTULOSE 10 GRAM(S): 10 SOLUTION ORAL at 12:31

## 2019-01-17 RX ADMIN — OXYCODONE HYDROCHLORIDE 5 MILLIGRAM(S): 5 TABLET ORAL at 02:48

## 2019-01-17 RX ADMIN — Medication 50 MILLIGRAM(S): at 13:28

## 2019-01-17 RX ADMIN — OXYCODONE HYDROCHLORIDE 5 MILLIGRAM(S): 5 TABLET ORAL at 23:28

## 2019-01-17 NOTE — DISCHARGE NOTE ADULT - ADDITIONAL INSTRUCTIONS
You have an appointment to see Dr. Deep Cabrera urologist located at [address] [place] [address], Room [address] in [place], [address] on Tuesday January 22 at 11:30AM, sumanth [phone] to confirm your appointment. You have an appointment to see Dr Deep Cabrera urologist located at [address] [place] [address], Room [address] in [place], [address] on Tuesday January 22 at 11:30AM, sumanth [phone] to confirm your appointment. You have an appointment to see Dr Deep Cabrera urologist located at [address] [place] [address], Room [address] in [place], [address] on Tuesday January 22 at 11:30AM, call [phone] to confirm your appointment.

## 2019-01-17 NOTE — DISCHARGE NOTE ADULT - SECONDARY DIAGNOSIS.
Colitis Enterovirus infection Hydronephrosis with urinary obstruction due to renal calculus Spastic quadriplegia

## 2019-01-17 NOTE — DISCHARGE NOTE ADULT - PATIENT PORTAL LINK FT
You can access the [facility] Patient Portal, offered by [facility], by registering with the following website: [email or web address]

## 2019-01-17 NOTE — DISCHARGE NOTE ADULT - PLAN OF CARE
Remain free from infection - Clinical pyelo with sepsis on admission, sepsis now resolved  - Urine Culture positive, you have been treated with IV antibiotics in the hospital, switched to Oral Cefpodoxime 200mg BID for total 14 days till 1/26/19  - 2.3x2.1x2.1 cm soft tissue focus encasing R ureter- inf vs inflammatory vs neoplastic  - Follow up with  as outpatient with eventual operative planning for stone and biopsy of R ureteral mass  - Discussed with , no plans for inpatient intervention in setting of acute infection - stercoral colitis- secondary to constipation  - Bowel regimen- senna/ colace/ Miralax, Lactulose  - Follow up with PCP as outpatient for further management - Droplet precautions. Treated with Nasonex nasal spray qd x 5 days. Robitussin cough syrup prn  - Follow up with PCP as outpatient for further management - IR consulted s/p B/L Percutaneous Nephrostomy Tube placement   - Urology consulted  - Follow up with urology as outpatient for left ureteral stone and right proximal ureteral mass with questionable right ureteral stricture -  Resume home meds of Baclofen 10 mg po q12 - Clinical pyelo with sepsis on admission, sepsis now resolved  - Urine Culture mildly positive, you have been treated with IV antibiotics in the hospital, switched to Oral Cefpodoxime 200mg BID for total 14 days till 1/26/19  - CT shows 2.3x2.1x2.1 cm soft tissue focus encasing R ureter- inf vs inflammatory vs neoplastic  - Follow up with  as outpatient with eventual operative planning for stone and biopsy of R ureteral mass  - Discussed with , no plans for inpatient intervention in setting of acute infection - Resume home meds of Baclofen 10 mg po q12 - Clinical pyelo with sepsis on admission, sepsis now resolved  - Urine Culture mildly positive, you have been treated with IV antibiotics in the hospital, switched to Oral Cefdinir 300mg BID for total 14 days till 1/26/19  - CT shows 2.3x2.1x2.1 cm soft tissue focus encasing R ureter- inf vs inflammatory vs neoplastic  - Follow up with  as outpatient with eventual operative planning for stone and biopsy of R ureteral mass  - Discussed with , no plans for inpatient intervention in setting of acute infection

## 2019-01-17 NOTE — DISCHARGE NOTE ADULT - PROVIDER TOKENS
FREE:[LAST:[Adame],FIRST:[Rick],PHONE:[phone]],FAX:[(   )    -],ADDRESS:[[address] [place] [address], Room [address] [place] [address]]]
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
## 2019-01-17 NOTE — DISCHARGE NOTE ADULT - CARE PLAN
Principal Discharge DX:	Acute pyelonephritis  Goal:	Remain free from infection  Assessment and plan of treatment:	- Clinical pyelo with sepsis on admission, sepsis now resolved  - Urine Culture positive, you have been treated with IV antibiotics in the hospital, switched to Oral Cefpodoxime 200mg BID for total 14 days till 1/26/19  - 2.3x2.1x2.1 cm soft tissue focus encasing R ureter- inf vs inflammatory vs neoplastic  - Follow up with  as outpatient with eventual operative planning for stone and biopsy of R ureteral mass  - Discussed with , no plans for inpatient intervention in setting of acute infection  Secondary Diagnosis:	Colitis  Assessment and plan of treatment:	- stercoral colitis- secondary to constipation  - Bowel regimen- senna/ colace/ Miralax, Lactulose  - Follow up with PCP as outpatient for further management  Secondary Diagnosis:	Enterovirus infection  Assessment and plan of treatment:	- Droplet precautions. Treated with Nasonex nasal spray qd x 5 days. Robitussin cough syrup prn  - Follow up with PCP as outpatient for further management  Secondary Diagnosis:	Hydronephrosis with urinary obstruction due to renal calculus  Assessment and plan of treatment:	- IR consulted s/p B/L Percutaneous Nephrostomy Tube placement   - Urology consulted  - Follow up with urology as outpatient for left ureteral stone and right proximal ureteral mass with questionable right ureteral stricture  Secondary Diagnosis:	Spastic quadriplegia  Assessment and plan of treatment:	-  Resume home meds of Baclofen 10 mg po q12 Principal Discharge DX:	Acute pyelonephritis  Goal:	Remain free from infection  Assessment and plan of treatment:	- Clinical pyelo with sepsis on admission, sepsis now resolved  - Urine Culture mildly positive, you have been treated with IV antibiotics in the hospital, switched to Oral Cefpodoxime 200mg BID for total 14 days till 1/26/19  - CT shows 2.3x2.1x2.1 cm soft tissue focus encasing R ureter- inf vs inflammatory vs neoplastic  - Follow up with  as outpatient with eventual operative planning for stone and biopsy of R ureteral mass  - Discussed with , no plans for inpatient intervention in setting of acute infection  Secondary Diagnosis:	Colitis  Assessment and plan of treatment:	- stercoral colitis- secondary to constipation  - Bowel regimen- senna/ colace/ Miralax, Lactulose  - Follow up with PCP as outpatient for further management  Secondary Diagnosis:	Enterovirus infection  Assessment and plan of treatment:	- Droplet precautions. Treated with Nasonex nasal spray qd x 5 days. Robitussin cough syrup prn  - Follow up with PCP as outpatient for further management  Secondary Diagnosis:	Hydronephrosis with urinary obstruction due to renal calculus  Assessment and plan of treatment:	- IR consulted s/p B/L Percutaneous Nephrostomy Tube placement   - Urology consulted  - Follow up with urology as outpatient for left ureteral stone and right proximal ureteral mass with questionable right ureteral stricture  Secondary Diagnosis:	Spastic quadriplegia  Assessment and plan of treatment:	- Resume home meds of Baclofen 10 mg po q12 Principal Discharge DX:	Acute pyelonephritis  Goal:	Remain free from infection  Assessment and plan of treatment:	- Clinical pyelo with sepsis on admission, sepsis now resolved  - Urine Culture mildly positive, you have been treated with IV antibiotics in the hospital, switched to Oral Cefdinir 300mg BID for total 14 days till 1/26/19  - CT shows 2.3x2.1x2.1 cm soft tissue focus encasing R ureter- inf vs inflammatory vs neoplastic  - Follow up with  as outpatient with eventual operative planning for stone and biopsy of R ureteral mass  - Discussed with , no plans for inpatient intervention in setting of acute infection  Secondary Diagnosis:	Colitis  Assessment and plan of treatment:	- stercoral colitis- secondary to constipation  - Bowel regimen- senna/ colace/ Miralax, Lactulose  - Follow up with PCP as outpatient for further management  Secondary Diagnosis:	Enterovirus infection  Assessment and plan of treatment:	- Droplet precautions. Treated with Nasonex nasal spray qd x 5 days. Robitussin cough syrup prn  - Follow up with PCP as outpatient for further management  Secondary Diagnosis:	Hydronephrosis with urinary obstruction due to renal calculus  Assessment and plan of treatment:	- IR consulted s/p B/L Percutaneous Nephrostomy Tube placement   - Urology consulted  - Follow up with urology as outpatient for left ureteral stone and right proximal ureteral mass with questionable right ureteral stricture  Secondary Diagnosis:	Spastic quadriplegia  Assessment and plan of treatment:	- Resume home meds of Baclofen 10 mg po q12

## 2019-01-17 NOTE — DISCHARGE NOTE ADULT - CARE PROVIDER_API CALL
chest pain Rick Adame  [address] [facility], [address]  [place] [address]  Phone: [phone]  Fax: (   )    -

## 2019-01-17 NOTE — PROGRESS NOTE ADULT - SUBJECTIVE AND OBJECTIVE BOX
Patient is a 43y old  Male who presents with a chief complaint of Abdominal pain (16 Jan 2019 14:17)      SUBJECTIVE / OVERNIGHT EVENTS: Remains afebrile, no chills, abdominal pain improving.  Pt a bit more pleasant today, but still refusing physical exam as he states that I come at the wrong times.       MEDICATIONS  (STANDING):  aztreonam  IVPB 1000 milliGRAM(s) IV Intermittent every 8 hours  baclofen 10 milliGRAM(s) Oral every 12 hours  docusate sodium 100 milliGRAM(s) Oral three times a day  enoxaparin Injectable 40 milliGRAM(s) SubCutaneous daily  lactulose Syrup 10 Gram(s) Oral daily  multivitamin 1 Tablet(s) Oral daily  polyethylene glycol 3350 17 Gram(s) Oral two times a day  potassium phosphate IVPB 15 milliMole(s) IV Intermittent once  senna 2 Tablet(s) Oral at bedtime    MEDICATIONS  (PRN):  acetaminophen   Tablet .. 650 milliGRAM(s) Oral every 6 hours PRN Temp greater or equal to 38C (100.4F)  guaiFENesin/dextromethorphan  Syrup 5 milliLiter(s) Oral every 8 hours PRN Cough  oxyCODONE    IR 5 milliGRAM(s) Oral every 4 hours PRN Moderate to severe pain      Vital Signs Last 24 Hrs  T(C): 37.3 (17 Jan 2019 12:25), Max: 37.3 (17 Jan 2019 12:25)  T(F): 99.1 (17 Jan 2019 12:25), Max: 99.1 (17 Jan 2019 12:25)  HR: 56 (17 Jan 2019 12:25) (52 - 70)  BP: 145/101 (17 Jan 2019 12:25) (113/77 - 147/98)  BP(mean): --  RR: 17 (17 Jan 2019 12:25) (17 - 17)  SpO2: 100% (17 Jan 2019 12:25) (98% - 100%)  CAPILLARY BLOOD GLUCOSE        I&O's Summary    16 Jan 2019 07:01  -  17 Jan 2019 07:00  --------------------------------------------------------  IN: 0 mL / OUT: 4305 mL / NET: -4305 mL    17 Jan 2019 07:01  -  17 Jan 2019 15:33  --------------------------------------------------------  IN: 0 mL / OUT: 1000 mL / NET: -1000 mL      PHYSICAL EXAM  GENERAL: NAD, well-developed  HEAD:  Atraumatic, Normocephalic  EYES: EOMI, PERRLA, conjunctiva and sclera clear  NECK: Supple, No JVD  CHEST/LUNG: Clear to auscultation bilaterally; No wheeze  HEART: Regular rate and rhythm; No murmurs, rubs, or gallops  ABDOMEN: Soft, Nontender, Nondistended; Bowel sounds present  EXTREMITIES:  2+ Peripheral Pulses, No clubbing, cyanosis, or edema  PSYCH: AAOx3  SKIN: No rashes or lesions    LABS:                        12.5   8.25  )-----------( 154      ( 16 Jan 2019 12:28 )             40.0     01-17    140  |  101  |  5<L>  ----------------------------<  79  3.9   |  26  |  0.49<L>    Ca    9.3      17 Jan 2019 07:49  Phos  2.8     01-17  Mg     1.9     01-17                RADIOLOGY & ADDITIONAL TESTS:    Imaging Personally Reviewed:  Consultant(s) Notes Reviewed:    Care Discussed with Consultants/Other Providers: Patient is a 43y old  Male who presents with a chief complaint of Abdominal pain (16 Jan 2019 14:17)      SUBJECTIVE / OVERNIGHT EVENTS: Remains afebrile, no chills, abdominal pain improving.  Pt a bit more pleasant today, but still refusing physical exam as he states that I come at the wrong times.       MEDICATIONS  (STANDING):  aztreonam  IVPB 1000 milliGRAM(s) IV Intermittent every 8 hours  baclofen 10 milliGRAM(s) Oral every 12 hours  docusate sodium 100 milliGRAM(s) Oral three times a day  enoxaparin Injectable 40 milliGRAM(s) SubCutaneous daily  lactulose Syrup 10 Gram(s) Oral daily  multivitamin 1 Tablet(s) Oral daily  polyethylene glycol 3350 17 Gram(s) Oral two times a day  potassium phosphate IVPB 15 milliMole(s) IV Intermittent once  senna 2 Tablet(s) Oral at bedtime    MEDICATIONS  (PRN):  acetaminophen   Tablet .. 650 milliGRAM(s) Oral every 6 hours PRN Temp greater or equal to 38C (100.4F)  guaiFENesin/dextromethorphan  Syrup 5 milliLiter(s) Oral every 8 hours PRN Cough  oxyCODONE    IR 5 milliGRAM(s) Oral every 4 hours PRN Moderate to severe pain      Vital Signs Last 24 Hrs  T(C): 37.3 (17 Jan 2019 12:25), Max: 37.3 (17 Jan 2019 12:25)  T(F): 99.1 (17 Jan 2019 12:25), Max: 99.1 (17 Jan 2019 12:25)  HR: 56 (17 Jan 2019 12:25) (52 - 70)  BP: 145/101 (17 Jan 2019 12:25) (113/77 - 147/98)  BP(mean): --  RR: 17 (17 Jan 2019 12:25) (17 - 17)  SpO2: 100% (17 Jan 2019 12:25) (98% - 100%)  CAPILLARY BLOOD GLUCOSE        I&O's Summary    16 Jan 2019 07:01  -  17 Jan 2019 07:00  --------------------------------------------------------  IN: 0 mL / OUT: 4305 mL / NET: -4305 mL    17 Jan 2019 07:01  -  17 Jan 2019 15:33  --------------------------------------------------------  IN: 0 mL / OUT: 1000 mL / NET: -1000 mL      PHYSICAL EXAM  Refused     LABS:                        12.5   8.25  )-----------( 154      ( 16 Jan 2019 12:28 )             40.0     01-17    140  |  101  |  5<L>  ----------------------------<  79  3.9   |  26  |  0.49<L>    Ca    9.3      17 Jan 2019 07:49  Phos  2.8     01-17  Mg     1.9     01-17      Culture - Yeast and Fungus (01.14.19 @ 18:36)    Culture - Yeast and Fungus:   CULTURE NEGATIVE FOR YEASTS AND MOLDS AFTER 1 DAY  CULTURE NEGATIVE FOR YEASTS AND MOLDS AFTER 2 DAYS    Specimen Source: URINE KIDNEY    Culture - Urine (01.14.19 @ 18:36)    Culture - Urine:   CULTURE IN PROGRESS, FURTHER REPORT TO FOLLOW.    Specimen Source: URINE KIDNEY      Culture - Urine (01.14.19 @ 18:34)    -  Amikacin: S <=8 TWAN    -  Ampicillin: R >16 TWAN    -  Ampicillin/Sulbactam: S 8/4 TWAN    -  Aztreonam: S <=4 TWAN    -  Cefazolin: R >16 TWAN    -  Cefepime: S <=2 TWAN    -  Cefoxitin: S <=4 TWAN    -  Ceftazidime: R >16 TWAN    -  Ceftriaxone: S <=1 TWAN    -  Ciprofloxacin: R >2 TWAN    -  Ertapenem: S <=0.5 TWAN    Culture - Urine:   CULTURE IN PROGRESS, FURTHER REPORT TO FOLLOW.    Culture - Urine:   COLONY COUNT: 10,000-49,000 CFU/mL    -  Imipenem: S <=1 TWAN    -  Levofloxacin: R >4 TWAN    -  Meropenem: S <=1 TWAN    -  Nitrofurantoin: R >64 TWAN    -  Piperacillin/Tazobactam: S <=8 TWAN    -  Tigecycline: R <=1 TWAN    -  Trimethoprim/Sulfamethoxazole: S <=0.5/9.5 TWAN    Specimen Source: URINE KIDNEY    Organism Identification: Providencia stuartii    Organism: Providencia stuartii    Method Type: NEGATIVE TWAN 43          Consultant(s) Notes Reviewed:       Care Discussed with Consultants/Other Providers: ID pharmacist

## 2019-01-17 NOTE — DISCHARGE NOTE ADULT - MEDICATION SUMMARY - MEDICATIONS TO TAKE
I will START or STAY ON the medications listed below when I get home from the hospital:    oxyCODONE 5 mg oral tablet  -- 1 tab(s) by mouth every 6 hours, As Needed -Moderate to severe pain MDD:4 tabs /day  -- Indication: For Hydronephrosis with urinary obstruction due to renal calculus    acetaminophen 325 mg oral tablet  -- 2 tab(s) by mouth every 6 hours, As needed, Temp greater or equal to 38C (100.4F)  -- Indication: For Pain / fever    Flomax 0.4 mg oral capsule  -- 1 cap(s) by mouth once a day  -- Indication: For BPH    cefpodoxime 200 mg oral tablet  -- 1 tab(s) by mouth every 12 hours  -- Indication: For Acute pyelonephritis    famotidine 20 mg oral tablet, disintegrating  -- orally once a day  -- Indication: For Prophylactic measure    lactulose 10 g/15 mL oral syrup  -- 15 milliliter(s) by mouth once a day  -- Indication: For Constipation, unspecified constipation type    senna 8.6 mg oral tablet  -- 1 tab(s) by mouth once a day (at bedtime), As Needed   -- Indication: For Constipation, unspecified constipation type    Colace 100 mg oral capsule  -- 1 cap(s) by mouth 2 times a day  -- Indication: For Constipation, unspecified constipation type    Dulcolax Laxative 5 mg oral delayed release tablet  -- 1 tab(s) by mouth once a day  -- Indication: For Colitis    tiZANidine 4 mg oral tablet  -- 2 tab(s) by mouth every 8 hours  -- Indication: For Spastic quadriplegia    baclofen 10 mg oral tablet  -- 1 tab(s) by mouth every 12 hours  -- Indication: For Spastic quadriplegia    guaifenesin-dextromethorphan 100 mg-10 mg/5 mL oral liquid  -- 5 milliliter(s) by mouth every 8 hours, As needed, Cough  -- Indication: For CoUGH    Multiple Vitamins oral capsule  -- 1 cap(s) by mouth once a day  -- Indication: For Prophylactic measure I will START or STAY ON the medications listed below when I get home from the hospital:    acetaminophen 325 mg oral tablet  -- 2 tab(s) by mouth every 6 hours, As needed, Temp greater or equal to 38C (100.4F)  -- Indication: For Pain / fever    oxyCODONE 5 mg oral tablet  -- 1 tab(s) by mouth every 6 hours, As Needed -Moderate to severe pain MDD:4 tabs /day  -- Indication: For Hydronephrosis with urinary obstruction due to renal calculus    Flomax 0.4 mg oral capsule  -- 1 cap(s) by mouth once a day  -- Indication: For BPH    cefdinir 300 mg oral capsule  -- 1 cap(s) by mouth every 12 hours   -- Finish all this medication unless otherwise directed by prescriber.    -- Indication: For Acute pyelonephritis    famotidine 20 mg oral tablet, disintegrating  -- orally once a day  -- Indication: For Prophylactic measure    lactulose 10 g/15 mL oral syrup  -- 15 milliliter(s) by mouth once a day  -- Indication: For Constipation, unspecified constipation type    senna 8.6 mg oral tablet  -- 1 tab(s) by mouth once a day (at bedtime), As Needed   -- Indication: For Constipation, unspecified constipation type    Colace 100 mg oral capsule  -- 1 cap(s) by mouth 2 times a day  -- Indication: For Constipation, unspecified constipation type    Dulcolax Laxative 5 mg oral delayed release tablet  -- 1 tab(s) by mouth once a day  -- Indication: For Colitis    tiZANidine 4 mg oral tablet  -- 2 tab(s) by mouth every 8 hours  -- Indication: For Spastic quadriplegia    baclofen 10 mg oral tablet  -- 1 tab(s) by mouth every 12 hours  -- Indication: For Spastic quadriplegia    guaifenesin-dextromethorphan 100 mg-10 mg/5 mL oral liquid  -- 5 milliliter(s) by mouth every 8 hours, As needed, Cough  -- Indication: For CoUGH    Multiple Vitamins oral capsule  -- 1 cap(s) by mouth once a day  -- Indication: For Prophylactic measure

## 2019-01-17 NOTE — DISCHARGE NOTE ADULT - MEDICATION SUMMARY - MEDICATIONS TO CHANGE
I will SWITCH the dose or number of times a day I take the medications listed below when I get home from the hospital:    baclofen 5 mg oral tablet  -- 1 tab(s) by mouth 3 times a day

## 2019-01-17 NOTE — DISCHARGE NOTE ADULT - HOME CARE AGENCY
[facility] AT Home [phone] for Visiting Nurse  to start care on  1/19/2019.  [facility] [phone] to resume Home Attendant services AT 7:00 pm  1/18/2019

## 2019-01-17 NOTE — CHART NOTE - NSCHARTNOTEFT_GEN_A_CORE
Pt evaluated w/ RN Lyubov at bedside, denies abd pain present, no nausea of vomiting, cp or sob, no abd pain radiating to his back, last BM yesterday and passing flatus.  On exam- abd is distended and firm, + BS, no rebound or guarding-    Pt declined imaging or further evaluation for cause of his abd distention despite informing him of potential risk of intraabdominal bleed and other life threatening condition.

## 2019-01-18 VITALS
DIASTOLIC BLOOD PRESSURE: 80 MMHG | HEART RATE: 70 BPM | OXYGEN SATURATION: 97 % | RESPIRATION RATE: 18 BRPM | SYSTOLIC BLOOD PRESSURE: 117 MMHG | TEMPERATURE: 98 F

## 2019-01-18 PROBLEM — Z00.00 ENCOUNTER FOR PREVENTIVE HEALTH EXAMINATION: Status: ACTIVE | Noted: 2019-01-18

## 2019-01-18 LAB — BACTERIA UR CULT: SIGNIFICANT CHANGE UP

## 2019-01-18 PROCEDURE — 99239 HOSP IP/OBS DSCHRG MGMT >30: CPT

## 2019-01-18 RX ORDER — ACETAMINOPHEN 500 MG
2 TABLET ORAL
Qty: 0 | Refills: 0 | COMMUNITY
Start: 2019-01-18

## 2019-01-18 RX ORDER — SENNA PLUS 8.6 MG/1
1 TABLET ORAL
Qty: 0 | Refills: 0 | COMMUNITY

## 2019-01-18 RX ORDER — LACTULOSE 10 G/15ML
15 SOLUTION ORAL
Qty: 0 | Refills: 0 | COMMUNITY
Start: 2019-01-18

## 2019-01-18 RX ORDER — CEFPODOXIME PROXETIL 100 MG
1 TABLET ORAL
Qty: 16 | Refills: 0 | OUTPATIENT
Start: 2019-01-18 | End: 2019-01-25

## 2019-01-18 RX ORDER — OXYCODONE HYDROCHLORIDE 5 MG/1
1 TABLET ORAL
Qty: 40 | Refills: 0 | OUTPATIENT
Start: 2019-01-18 | End: 2019-01-27

## 2019-01-18 RX ORDER — SENNA PLUS 8.6 MG/1
1 TABLET ORAL
Qty: 30 | Refills: 0
Start: 2019-01-18 | End: 2019-02-16

## 2019-01-18 RX ORDER — CEFPODOXIME PROXETIL 100 MG
1 TABLET ORAL
Qty: 0 | Refills: 0 | COMMUNITY
Start: 2019-01-18

## 2019-01-18 RX ORDER — GUAIFENESIN/DEXTROMETHORPHAN 600MG-30MG
5 TABLET, EXTENDED RELEASE 12 HR ORAL
Qty: 0 | Refills: 0 | COMMUNITY
Start: 2019-01-18

## 2019-01-18 RX ORDER — TAMSULOSIN HYDROCHLORIDE 0.4 MG/1
1 CAPSULE ORAL
Qty: 30 | Refills: 0
Start: 2019-01-18 | End: 2019-02-16

## 2019-01-18 RX ORDER — BACLOFEN 100 %
1 POWDER (GRAM) MISCELLANEOUS
Qty: 0 | Refills: 0 | COMMUNITY

## 2019-01-18 RX ORDER — FAMOTIDINE 10 MG/ML
0 INJECTION INTRAVENOUS
Qty: 0 | Refills: 0 | COMMUNITY

## 2019-01-18 RX ORDER — OXYCODONE HYDROCHLORIDE 5 MG/1
1 TABLET ORAL
Qty: 0 | Refills: 0 | COMMUNITY
Start: 2019-01-18

## 2019-01-18 RX ORDER — BACLOFEN 100 %
1 POWDER (GRAM) MISCELLANEOUS
Qty: 60 | Refills: 0 | OUTPATIENT
Start: 2019-01-18 | End: 2019-02-16

## 2019-01-18 RX ORDER — LACTULOSE 10 G/15ML
15 SOLUTION ORAL
Qty: 225 | Refills: 0 | OUTPATIENT
Start: 2019-01-18 | End: 2019-02-01

## 2019-01-18 RX ORDER — TAMSULOSIN HYDROCHLORIDE 0.4 MG/1
1 CAPSULE ORAL
Qty: 0 | Refills: 0 | COMMUNITY

## 2019-01-18 RX ORDER — CEFDINIR 250 MG/5ML
1 POWDER, FOR SUSPENSION ORAL
Qty: 16 | Refills: 0 | OUTPATIENT
Start: 2019-01-18 | End: 2019-01-25

## 2019-01-18 RX ADMIN — Medication 1 TABLET(S): at 12:07

## 2019-01-18 RX ADMIN — ENOXAPARIN SODIUM 40 MILLIGRAM(S): 100 INJECTION SUBCUTANEOUS at 12:07

## 2019-01-18 RX ADMIN — LACTULOSE 10 GRAM(S): 10 SOLUTION ORAL at 12:08

## 2019-01-18 RX ADMIN — POLYETHYLENE GLYCOL 3350 17 GRAM(S): 17 POWDER, FOR SOLUTION ORAL at 06:41

## 2019-01-18 RX ADMIN — Medication 200 MILLIGRAM(S): at 19:04

## 2019-01-18 RX ADMIN — POLYETHYLENE GLYCOL 3350 17 GRAM(S): 17 POWDER, FOR SOLUTION ORAL at 19:04

## 2019-01-18 RX ADMIN — Medication 10 MILLIGRAM(S): at 06:41

## 2019-01-18 RX ADMIN — Medication 100 MILLIGRAM(S): at 12:07

## 2019-01-18 RX ADMIN — Medication 200 MILLIGRAM(S): at 06:41

## 2019-01-18 RX ADMIN — Medication 100 MILLIGRAM(S): at 06:41

## 2019-01-18 RX ADMIN — Medication 10 MILLIGRAM(S): at 19:04

## 2019-01-18 NOTE — PROGRESS NOTE ADULT - PROBLEM SELECTOR PLAN 6
DVT ppx: daily Lovenox  Dispo: Pt is medically stable for discharge, however patient is refusing to leave.  Pt does not have capacity as he is unable to maintain a reasonable conversation to discuss care.  Discussed with his mother, who defers any discussion of care with his sister Antoinette.  Left message with Antoinette, awaiting call back.

## 2019-01-18 NOTE — PROGRESS NOTE ADULT - PROVIDER SPECIALTY LIST ADULT
Anesthesia
Hospitalist
Intervent Radiology
Urology
Urology
Hospitalist
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
## 2019-01-18 NOTE — PROGRESS NOTE ADULT - SUBJECTIVE AND OBJECTIVE BOX
Patient is a 43y old  Male who presents with a chief complaint of Abdominal pain (17 Jan 2019 19:40)      SUBJECTIVE / OVERNIGHT EVENTS: Pt upset that he's not having his surgery this admission.  States that he's not going anywhere until his surgery is done.  Also refusing any home care services for his nephrostomy tubes.        MEDICATIONS  (STANDING):  baclofen 10 milliGRAM(s) Oral every 12 hours  cefpodoxime 200 milliGRAM(s) Oral every 12 hours  docusate sodium 100 milliGRAM(s) Oral three times a day  enoxaparin Injectable 40 milliGRAM(s) SubCutaneous daily  lactulose Syrup 10 Gram(s) Oral daily  multivitamin 1 Tablet(s) Oral daily  polyethylene glycol 3350 17 Gram(s) Oral two times a day  senna 2 Tablet(s) Oral at bedtime    MEDICATIONS  (PRN):  acetaminophen   Tablet .. 650 milliGRAM(s) Oral every 6 hours PRN Temp greater or equal to 38C (100.4F)  guaiFENesin/dextromethorphan  Syrup 5 milliLiter(s) Oral every 8 hours PRN Cough  oxyCODONE    IR 5 milliGRAM(s) Oral every 4 hours PRN Moderate to severe pain      Vital Signs Last 24 Hrs  T(C): 37.2 (17 Jan 2019 20:50), Max: 37.2 (17 Jan 2019 20:50)  T(F): 98.9 (17 Jan 2019 20:50), Max: 98.9 (17 Jan 2019 20:50)  HR: 59 (17 Jan 2019 20:50) (59 - 59)  BP: 148/96 (17 Jan 2019 21:00) (148/96 - 157/107)  BP(mean): --  RR: 17 (17 Jan 2019 20:50) (17 - 17)  SpO2: 97% (17 Jan 2019 20:50) (97% - 97%)  CAPILLARY BLOOD GLUCOSE        I&O's Summary    17 Jan 2019 07:01  -  18 Jan 2019 07:00  --------------------------------------------------------  IN: 0 mL / OUT: 4000 mL / NET: -4000 mL      PHYSICAL EXAM  Refused    LABS:    01-17    140  |  101  |  5<L>  ----------------------------<  79  3.9   |  26  |  0.49<L>    Ca    9.3      17 Jan 2019 07:49  Phos  2.8     01-17  Mg     1.9     01-17      Culture - Urine (01.14.19 @ 18:36)    Culture - Urine:   NO ORGANISMS ISOLATED AT 72 HRS.    Specimen Source: URINE KIDNEY    Culture - Urine (01.14.19 @ 18:34)    -  Amikacin: S <=8 TWAN    -  Ampicillin: R >16 TWAN    -  Ampicillin/Sulbactam: S 8/4 TWAN    -  Aztreonam: S <=4 TWAN    -  Cefazolin: R >16 TWAN    -  Cefepime: S <=2 TWAN    -  Cefoxitin: S <=4 TWAN    -  Ceftazidime: R >16 TWAN    -  Ceftriaxone: S <=1 TWAN    -  Ciprofloxacin: R >2 TWAN    -  Ertapenem: S <=0.5 TWAN    Culture - Urine:   CULTURE IN PROGRESS, FURTHER REPORT TO FOLLOW.    Culture - Urine:   COLONY COUNT: 10,000-49,000 CFU/mL    -  Imipenem: S <=1 TWAN    -  Levofloxacin: R >4 TWAN    -  Meropenem: S <=1 TWAN    -  Nitrofurantoin: R >64 TWAN    -  Piperacillin/Tazobactam: S <=8 TWAN    -  Tigecycline: R <=1 TWAN    -  Trimethoprim/Sulfamethoxazole: S <=0.5/9.5 TWAN    Specimen Source: URINE KIDNEY    Organism Identification: Providencia stuartii    Organism: Providencia stuartii    Method Type: NEGATIVE TWAN 43      Care Discussed with Consultants/Other Providers: DEVON

## 2019-01-18 NOTE — PROGRESS NOTE ADULT - PROBLEM SELECTOR PROBLEM 5
Enterovirus infection

## 2019-01-18 NOTE — PROGRESS NOTE ADULT - PROBLEM SELECTOR PROBLEM 2
Spastic quadriplegia

## 2019-01-18 NOTE — PROGRESS NOTE ADULT - PROBLEM SELECTOR PLAN 4
- will change IV dilaudid to PO Oxycodone PRN
- Continue PRN Oxycodone
- Continue PRN Oxycodone
- will c/w low dose IV dilaudid for now until procedure complete
- Continue PRN Oxycodone

## 2019-01-18 NOTE — PROGRESS NOTE ADULT - PROBLEM SELECTOR PROBLEM 1
Acute pyelonephritis

## 2019-01-18 NOTE — PROGRESS NOTE ADULT - PROBLEM SELECTOR PLAN 1
- Clinical pyelo with sepsis on admission, sepsis now resolved   - CT showing cystitis/ bilateral ureteritis and pyelitis. Moderate L hydronephrosis caused by 3.8x 1.4 cm stone, impacted in ureteropelvic juncture. Chronic severe R hydronephrosis with renal atrophy.  - 2.3x2.1x2.1 cm soft tissue focus encasing R ureter- inf vs inflammatory vs neoplastic.  following, once discharged,  to continue following as outpatient with eventual operative planning for stone, will have  meet with patient again to discuss outpatient vs inpatient surgical planning.   - Continue IV aztreonam and deescalate based on cultures (intraoperative urine cultures pending)
- Clinical pyelo with sepsis on admission, sepsis now resolved   - CT showing cystitis/ bilateral ureteritis and pyelitis. Moderate L hydronephrosis caused by 3.8x 1.4 cm stone, impacted in ureteropelvic juncture. Chronic severe R hydronephrosis with renal atrophy.  - 2.3x2.1x2.1 cm soft tissue focus encasing R ureter- inf vs inflammatory vs neoplastic.  following, once discharged,  to continue following as outpatient with eventual operative planning for stone   - Continue IV aztreonam and deescalate based on cultures (repeat Ucx pending, cultures also to be sent with IR NT placement)
- Clinical pyelo with sepsis on admission, sepsis now resolved   - CT showing cystitis/ bilateral ureteritis and pyelitis. Moderate L hydronephrosis caused by 3.8x 1.4 cm stone, impacted in ureteropelvic juncture. Chronic severe R hydronephrosis with renal atrophy.  - 2.3x2.1x2.1 cm soft tissue focus encasing R ureter- inf vs inflammatory vs neoplastic.  following, once discharged,  to continue following as outpatient with eventual operative planning for stone and biopsy of R ureteral mass  - Discussed with , no plans for inpatient intervention in setting of acute infection  - Continue IV aztreonam and deescalate based on cultures (intraoperative urine cultures pending)
- Clinical pyelo with sepsis on admission, sepsis now resolved   - Tolerating PO Cefpodoxime 200mg BID for total 14 days (discussed with ID pharmacist)   - 2.3x2.1x2.1 cm soft tissue focus encasing R ureter- inf vs inflammatory vs neoplastic.  following, once discharged,  to continue following as outpatient with eventual operative planning for stone and biopsy of R ureteral mass  -  to meet with patient again today to discuss outpatient intervention as urine needs to be cleared of infection prior to any surgery.
- Clinical pyelo with sepsis on admission, sepsis now resolved   - Ucx reviewed, patient currently on day #5 of IV Aztreonam, however has tolerated Ceftriaxone and Zosyn in the past despite listed PCN allergy.  Will switch to PO Cefpodoxime 200mg BID for total 14 days (discussed with ID pharmacist)   - 2.3x2.1x2.1 cm soft tissue focus encasing R ureter- inf vs inflammatory vs neoplastic.  following, once discharged,  to continue following as outpatient with eventual operative planning for stone and biopsy of R ureteral mass  - Discussed with , no plans for inpatient intervention in setting of acute infection

## 2019-01-18 NOTE — PROGRESS NOTE ADULT - PROBLEM SELECTOR PLAN 3
- stercoral colitis- sec to costipation.  - Bowel regimen- senna/ colace/ miralax  - Dulcolax suppository (pt has been refusing), add Lactulose today monitor for BMs
- stercoral colitis- sec to costipation.  - Bowel regimen- senna/ colace/ miralax  - Dulcolax suppository (pt has been refusing), c/w Lactulose, monitor for BMs
- stercoral colitis- sec to costipation.  - Bowel regimen- senna/ colace/ miralax  - Dulcolax suppository (pt has been refusing), c/w Lactulose, monitor for BMs
- stercoral colitis- sec to costipation.  - Bowel regimen- senna/ colace/ miralax  - Dulcolax suppository, monitor for BMs
- stercoral colitis- sec to costipation.  - Bowel regimen- senna/ colace/ miralax  - Dulcolax suppository (pt has been refusing), c/w Lactulose, monitor for BMs

## 2019-01-18 NOTE — PROGRESS NOTE ADULT - PROBLEM SELECTOR PLAN 2
- Resume home meds of baclofen 10 mg po q12

## 2019-01-18 NOTE — PROGRESS NOTE ADULT - PROBLEM SELECTOR PLAN 5
- Droplet precautions  Nasonex nasal spray qd x 5 days  Robitussin cough syrup prn.

## 2019-01-18 NOTE — PROGRESS NOTE ADULT - REASON FOR ADMISSION
Abdominal pain

## 2019-01-22 ENCOUNTER — APPOINTMENT (OUTPATIENT)
Dept: UROLOGY | Facility: CLINIC | Age: 44
End: 2019-01-22
Payer: MEDICAID

## 2019-01-22 DIAGNOSIS — Z86.69 PERSONAL HISTORY OF OTHER DISEASES OF THE NERVOUS SYSTEM AND SENSE ORGANS: ICD-10-CM
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
## 2019-01-22 DIAGNOSIS — D49.0 NEOPLASM OF UNSPECIFIED BEHAVIOR OF DIGESTIVE SYSTEM: ICD-10-CM

## 2019-01-22 DIAGNOSIS — N26.1 ATROPHY OF KIDNEY (TERMINAL): ICD-10-CM

## 2019-01-22 PROCEDURE — 99214 OFFICE O/P EST MOD 30 MIN: CPT
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
## 2019-01-28 ENCOUNTER — APPOINTMENT (OUTPATIENT)
Dept: UROLOGY | Facility: CLINIC | Age: 44
End: 2019-01-28

## 2019-01-28 ENCOUNTER — FORM ENCOUNTER (OUTPATIENT)
Age: 44
End: 2019-01-28

## 2019-01-29 ENCOUNTER — APPOINTMENT (OUTPATIENT)
Dept: NUCLEAR MEDICINE | Facility: HOSPITAL | Age: 44
End: 2019-01-29
Payer: MEDICAID

## 2019-01-29 ENCOUNTER — OUTPATIENT (OUTPATIENT)
Dept: OUTPATIENT SERVICES | Facility: HOSPITAL | Age: 44
LOS: 1 days | End: 2019-01-29

## 2019-01-29 DIAGNOSIS — S11.90XS UNSPECIFIED OPEN WOUND OF UNSPECIFIED PART OF NECK, SEQUELA: Chronic | ICD-10-CM

## 2019-01-29 DIAGNOSIS — N26.1 ATROPHY OF KIDNEY (TERMINAL): ICD-10-CM

## 2019-01-29 PROCEDURE — 78708 K FLOW/FUNCT IMAGE W/DRUG: CPT | Mod: 26

## 2019-02-06 ENCOUNTER — APPOINTMENT (OUTPATIENT)
Dept: UROLOGY | Facility: CLINIC | Age: 44
End: 2019-02-06
Payer: MEDICAID

## 2019-02-06 VITALS
WEIGHT: 170 LBS | RESPIRATION RATE: 15 BRPM | TEMPERATURE: 98 F | DIASTOLIC BLOOD PRESSURE: 76 MMHG | BODY MASS INDEX: 22.53 KG/M2 | HEART RATE: 67 BPM | HEIGHT: 73 IN | SYSTOLIC BLOOD PRESSURE: 110 MMHG

## 2019-02-06 DIAGNOSIS — N13.30 UNSPECIFIED HYDRONEPHROSIS: ICD-10-CM

## 2019-02-06 DIAGNOSIS — N20.0 CALCULUS OF KIDNEY: ICD-10-CM

## 2019-02-06 PROCEDURE — 99213 OFFICE O/P EST LOW 20 MIN: CPT

## 2019-02-06 NOTE — ASSESSMENT
[FreeTextEntry1] : CT scan reviewed: chronic hydro both sides, with thinned, atrophic appearing right kidney with stone.  Left UPJ stone > 3.5 cm in longest dimension, with lower pole small stones. Right side with minimal stone, but with some periureteral tissue, possible distal stone.  Density of stone high: mean 973 HU, with highest ~ 1090 Hu density.\par \par Renal scan shows 82% function on left, 18% on right.\par \par I had long discussion with patient about their stones, and about options, risks, and benefits of all treatments.  Main options for definitive stone treatment include ESWL, URS, PCNL.  \par \par ESWL success best with smaller, less dense stones, and with short skin to stone distance and favorable location of the stone within the urinary tract, while URS is more successful treatment with multiple stones, more dense stones, or challenging body habitus or stone location.  PCNL is best option for larger, more dense and complex stones, and particularly those involving the lower pole.  Non-definitive stone treatment options for drainage, using either stents or nephrostomy, also reviewed: these are of lower immediate surgical risks, but incur multiple procedures to manage and may have their own complications and effects on quality of life.  Still, nephrostomy or nephroureteral catheter can allow maintenance of urinary system drainage without surgical risks, and management in office with exchanges (avoiding the anesthesia and testing which would be present with bilateral internal stent exchange).\par \par Risks of nontreatment with obstruction can lead to very high rate of renal function loss in stone-bearing kidney over the next months to years.\par \par In this patient's case, I recommend... left PCNL to definitively manage.  Will need metabolic w/u for stone prevention, diet modification as well to reduce future risks.  Right kidney poor function, and given potential for retroperitoneal fibrosis or mass, may best be served by nephrectomy/nephroureterectomy on the right after clearance of left stone/obstruction.\par \par Risks/benefits/success/recovery expectations all reviewed at length, particularly with respect to patient's comorbidities, and inclusive of infection/sepsis, bleeding, need for secondary procedures or secondary stages such as embolization or open surgery, and even risks of death due to acute issues superimposed on comorbidities.\par \par Pt prefers to undergo: left PCNL\par \par Will schedule.\par \par Urine cultures, PST appt to schedule once surgery scheduled.\par

## 2019-02-06 NOTE — PHYSICAL EXAM
[General Appearance - Well Developed] : well developed [General Appearance - Well Nourished] : well nourished [Normal Appearance] : normal appearance [Well Groomed] : well groomed [General Appearance - In No Acute Distress] : no acute distress [FreeTextEntry1] : bilat nephrostomy [] : no respiratory distress [Respiration, Rhythm And Depth] : normal respiratory rhythm and effort [Exaggerated Use Of Accessory Muscles For Inspiration] : no accessory muscle use [Oriented To Time, Place, And Person] : oriented to person, place, and time [Affect] : the affect was normal [Mood] : the mood was normal [Not Anxious] : not anxious
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
## 2019-02-12 LAB
FUNGUS SPEC QL CULT: SIGNIFICANT CHANGE UP
FUNGUS SPEC QL CULT: SIGNIFICANT CHANGE UP

## 2019-02-19 ENCOUNTER — OUTPATIENT (OUTPATIENT)
Dept: OUTPATIENT SERVICES | Facility: HOSPITAL | Age: 44
LOS: 1 days | End: 2019-02-19
Payer: MEDICAID

## 2019-02-19 VITALS
DIASTOLIC BLOOD PRESSURE: 85 MMHG | HEIGHT: 73 IN | RESPIRATION RATE: 15 BRPM | SYSTOLIC BLOOD PRESSURE: 134 MMHG | WEIGHT: 169.98 LBS | TEMPERATURE: 99 F | HEART RATE: 87 BPM | OXYGEN SATURATION: 97 %

## 2019-02-19 DIAGNOSIS — N20.0 CALCULUS OF KIDNEY: ICD-10-CM

## 2019-02-19 DIAGNOSIS — Z01.818 ENCOUNTER FOR OTHER PREPROCEDURAL EXAMINATION: ICD-10-CM

## 2019-02-19 DIAGNOSIS — G82.50 QUADRIPLEGIA, UNSPECIFIED: ICD-10-CM

## 2019-02-19 DIAGNOSIS — S11.90XS UNSPECIFIED OPEN WOUND OF UNSPECIFIED PART OF NECK, SEQUELA: Chronic | ICD-10-CM

## 2019-02-19 DIAGNOSIS — N20.0 CALCULUS OF KIDNEY: Chronic | ICD-10-CM

## 2019-02-19 LAB
ANION GAP SERPL CALC-SCNC: 14 MMOL/L — SIGNIFICANT CHANGE UP (ref 5–17)
BLD GP AB SCN SERPL QL: NEGATIVE — SIGNIFICANT CHANGE UP
BUN SERPL-MCNC: 13 MG/DL — SIGNIFICANT CHANGE UP (ref 7–23)
CALCIUM SERPL-MCNC: 10 MG/DL — SIGNIFICANT CHANGE UP (ref 8.4–10.5)
CHLORIDE SERPL-SCNC: 103 MMOL/L — SIGNIFICANT CHANGE UP (ref 96–108)
CO2 SERPL-SCNC: 25 MMOL/L — SIGNIFICANT CHANGE UP (ref 22–31)
CREAT SERPL-MCNC: 0.42 MG/DL — LOW (ref 0.5–1.3)
GLUCOSE SERPL-MCNC: 68 MG/DL — LOW (ref 70–99)
POTASSIUM SERPL-MCNC: 4 MMOL/L — SIGNIFICANT CHANGE UP (ref 3.5–5.3)
POTASSIUM SERPL-SCNC: 4 MMOL/L — SIGNIFICANT CHANGE UP (ref 3.5–5.3)
RH IG SCN BLD-IMP: POSITIVE — SIGNIFICANT CHANGE UP
SODIUM SERPL-SCNC: 142 MMOL/L — SIGNIFICANT CHANGE UP (ref 135–145)

## 2019-02-19 RX ORDER — LIDOCAINE HCL 20 MG/ML
0.2 VIAL (ML) INJECTION ONCE
Qty: 0 | Refills: 0 | Status: DISCONTINUED | OUTPATIENT
Start: 2019-03-05 | End: 2019-03-05

## 2019-02-19 RX ORDER — CIPROFLOXACIN LACTATE 400MG/40ML
400 VIAL (ML) INTRAVENOUS ONCE
Qty: 0 | Refills: 0 | Status: DISCONTINUED | OUTPATIENT
Start: 2019-03-05 | End: 2019-03-05

## 2019-02-19 RX ORDER — SODIUM CHLORIDE 9 MG/ML
3 INJECTION INTRAMUSCULAR; INTRAVENOUS; SUBCUTANEOUS EVERY 8 HOURS
Qty: 0 | Refills: 0 | Status: DISCONTINUED | OUTPATIENT
Start: 2019-03-05 | End: 2019-03-05
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
## 2019-02-19 NOTE — H&P PST ADULT - PROBLEM SELECTOR PLAN 1
Scheduled cystoscopy, left nephrostolithotomy on 2/22/2019  Pre-op instructions given to pt and HHA, lab work sent at PST

## 2019-02-19 NOTE — H&P PST ADULT - PRIMARY CARE PROVIDER
Dat Real ([facility]) [phone] Dat Real (MARGARET) [phone], last appt 2/13/19

## 2019-02-19 NOTE — H&P PST ADULT - NSANTHOSAYNRD_GEN_A_CORE
No. CORY screening performed.  STOP BANG Legend: 0-2 = LOW Risk; 3-4 = INTERMEDIATE Risk; 5-8 = HIGH Risk
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
## 2019-02-19 NOTE — H&P PST ADULT - ATTENDING COMMENTS
prior kidney culture, and f/u culture, positive for Provdencia stuartii with mult resistances  Kidneys drained.    On cefdinir preop  for left PCNL
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
## 2019-02-20 LAB
HCT VFR BLD CALC: 47.7 % — SIGNIFICANT CHANGE UP (ref 39–50)
HGB BLD-MCNC: 14.9 G/DL — SIGNIFICANT CHANGE UP (ref 13–17)
MCHC RBC-ENTMCNC: 27.4 PG — SIGNIFICANT CHANGE UP (ref 27–34)
MCHC RBC-ENTMCNC: 31.2 GM/DL — LOW (ref 32–36)
MCV RBC AUTO: 87.8 FL — SIGNIFICANT CHANGE UP (ref 80–100)
PLATELET # BLD AUTO: 227 K/UL — SIGNIFICANT CHANGE UP (ref 150–400)
RBC # BLD: 5.43 M/UL — SIGNIFICANT CHANGE UP (ref 4.2–5.8)
RBC # FLD: 16.7 % — HIGH (ref 10.3–14.5)
WBC # BLD: 7.81 K/UL — SIGNIFICANT CHANGE UP (ref 3.8–10.5)
WBC # FLD AUTO: 7.81 K/UL — SIGNIFICANT CHANGE UP (ref 3.8–10.5)

## 2019-02-21 LAB
-  AMIKACIN: SIGNIFICANT CHANGE UP
-  AMPICILLIN/SULBACTAM: SIGNIFICANT CHANGE UP
-  AMPICILLIN: SIGNIFICANT CHANGE UP
-  AZTREONAM: SIGNIFICANT CHANGE UP
-  CEFAZOLIN: SIGNIFICANT CHANGE UP
-  CEFEPIME: SIGNIFICANT CHANGE UP
-  CEFOXITIN: SIGNIFICANT CHANGE UP
-  CEFTRIAXONE: SIGNIFICANT CHANGE UP
-  CIPROFLOXACIN: SIGNIFICANT CHANGE UP
-  ERTAPENEM: SIGNIFICANT CHANGE UP
-  IMIPENEM: SIGNIFICANT CHANGE UP
-  LEVOFLOXACIN: SIGNIFICANT CHANGE UP
-  MEROPENEM: SIGNIFICANT CHANGE UP
-  NITROFURANTOIN: SIGNIFICANT CHANGE UP
-  PIPERACILLIN/TAZOBACTAM: SIGNIFICANT CHANGE UP
-  TIGECYCLINE: SIGNIFICANT CHANGE UP
-  TRIMETHOPRIM/SULFAMETHOXAZOLE: SIGNIFICANT CHANGE UP
CULTURE RESULTS: SIGNIFICANT CHANGE UP
METHOD TYPE: SIGNIFICANT CHANGE UP
ORGANISM # SPEC MICROSCOPIC CNT: SIGNIFICANT CHANGE UP
ORGANISM # SPEC MICROSCOPIC CNT: SIGNIFICANT CHANGE UP
SPECIMEN SOURCE: SIGNIFICANT CHANGE UP

## 2019-02-22 ENCOUNTER — APPOINTMENT (OUTPATIENT)
Dept: UROLOGY | Facility: HOSPITAL | Age: 44
End: 2019-02-22

## 2019-02-25 ENCOUNTER — MEDICATION RENEWAL (OUTPATIENT)
Age: 44
End: 2019-02-25

## 2019-02-25 RX ORDER — CEFDINIR 300 MG/1
300 CAPSULE ORAL
Qty: 20 | Refills: 0 | Status: ACTIVE | COMMUNITY
Start: 1900-01-01 | End: 1900-01-01

## 2019-03-05 ENCOUNTER — APPOINTMENT (OUTPATIENT)
Dept: UROLOGY | Facility: HOSPITAL | Age: 44
End: 2019-03-05

## 2019-03-05 ENCOUNTER — INPATIENT (INPATIENT)
Facility: HOSPITAL | Age: 44
LOS: 14 days | Discharge: HOME | DRG: 659 | End: 2019-03-20
Attending: UROLOGY | Admitting: UROLOGY
Payer: MEDICAID

## 2019-03-05 ENCOUNTER — RESULT REVIEW (OUTPATIENT)
Age: 44
End: 2019-03-05

## 2019-03-05 ENCOUNTER — TRANSCRIPTION ENCOUNTER (OUTPATIENT)
Age: 44
End: 2019-03-05

## 2019-03-05 VITALS
SYSTOLIC BLOOD PRESSURE: 96 MMHG | OXYGEN SATURATION: 98 % | TEMPERATURE: 98 F | HEIGHT: 73 IN | RESPIRATION RATE: 18 BRPM | WEIGHT: 169.98 LBS | DIASTOLIC BLOOD PRESSURE: 67 MMHG | HEART RATE: 58 BPM

## 2019-03-05 DIAGNOSIS — N20.0 CALCULUS OF KIDNEY: ICD-10-CM

## 2019-03-05 DIAGNOSIS — S11.90XS UNSPECIFIED OPEN WOUND OF UNSPECIFIED PART OF NECK, SEQUELA: Chronic | ICD-10-CM

## 2019-03-05 DIAGNOSIS — N20.0 CALCULUS OF KIDNEY: Chronic | ICD-10-CM

## 2019-03-05 DIAGNOSIS — Z01.818 ENCOUNTER FOR OTHER PREPROCEDURAL EXAMINATION: ICD-10-CM

## 2019-03-05 LAB
ANION GAP SERPL CALC-SCNC: 15 MMOL/L — SIGNIFICANT CHANGE UP (ref 5–17)
BUN SERPL-MCNC: 12 MG/DL — SIGNIFICANT CHANGE UP (ref 7–23)
CALCIUM SERPL-MCNC: 9.3 MG/DL — SIGNIFICANT CHANGE UP (ref 8.4–10.5)
CHLORIDE SERPL-SCNC: 103 MMOL/L — SIGNIFICANT CHANGE UP (ref 96–108)
CO2 SERPL-SCNC: 24 MMOL/L — SIGNIFICANT CHANGE UP (ref 22–31)
CREAT SERPL-MCNC: 0.56 MG/DL — SIGNIFICANT CHANGE UP (ref 0.5–1.3)
GLUCOSE SERPL-MCNC: 94 MG/DL — SIGNIFICANT CHANGE UP (ref 70–99)
HCT VFR BLD CALC: 44.7 % — SIGNIFICANT CHANGE UP (ref 39–50)
HGB BLD-MCNC: 15.4 G/DL — SIGNIFICANT CHANGE UP (ref 13–17)
MCHC RBC-ENTMCNC: 30 PG — SIGNIFICANT CHANGE UP (ref 27–34)
MCHC RBC-ENTMCNC: 34.5 GM/DL — SIGNIFICANT CHANGE UP (ref 32–36)
MCV RBC AUTO: 86.8 FL — SIGNIFICANT CHANGE UP (ref 80–100)
PLATELET # BLD AUTO: 214 K/UL — SIGNIFICANT CHANGE UP (ref 150–400)
POTASSIUM SERPL-MCNC: 3.9 MMOL/L — SIGNIFICANT CHANGE UP (ref 3.5–5.3)
POTASSIUM SERPL-SCNC: 3.9 MMOL/L — SIGNIFICANT CHANGE UP (ref 3.5–5.3)
RBC # BLD: 5.15 M/UL — SIGNIFICANT CHANGE UP (ref 4.2–5.8)
RBC # FLD: 15.2 % — HIGH (ref 10.3–14.5)
RH IG SCN BLD-IMP: POSITIVE — SIGNIFICANT CHANGE UP
SODIUM SERPL-SCNC: 142 MMOL/L — SIGNIFICANT CHANGE UP (ref 135–145)
WBC # BLD: 9.4 K/UL — SIGNIFICANT CHANGE UP (ref 3.8–10.5)
WBC # FLD AUTO: 9.4 K/UL — SIGNIFICANT CHANGE UP (ref 3.8–10.5)

## 2019-03-05 PROCEDURE — 86901 BLOOD TYPING SEROLOGIC RH(D): CPT

## 2019-03-05 PROCEDURE — 87186 SC STD MICRODIL/AGAR DIL: CPT

## 2019-03-05 PROCEDURE — 76000 FLUOROSCOPY <1 HR PHYS/QHP: CPT

## 2019-03-05 PROCEDURE — 86900 BLOOD TYPING SEROLOGIC ABO: CPT

## 2019-03-05 PROCEDURE — G0463: CPT

## 2019-03-05 PROCEDURE — 85027 COMPLETE CBC AUTOMATED: CPT

## 2019-03-05 PROCEDURE — 88300 SURGICAL PATH GROSS: CPT | Mod: 26

## 2019-03-05 PROCEDURE — 87086 URINE CULTURE/COLONY COUNT: CPT

## 2019-03-05 PROCEDURE — 80048 BASIC METABOLIC PNL TOTAL CA: CPT

## 2019-03-05 PROCEDURE — 86850 RBC ANTIBODY SCREEN: CPT

## 2019-03-05 RX ORDER — CEFTRIAXONE 500 MG/1
INJECTION, POWDER, FOR SOLUTION INTRAMUSCULAR; INTRAVENOUS
Qty: 0 | Refills: 0 | Status: DISCONTINUED | OUTPATIENT
Start: 2019-03-05 | End: 2019-03-05

## 2019-03-05 RX ORDER — CEFTRIAXONE 500 MG/1
1 INJECTION, POWDER, FOR SOLUTION INTRAMUSCULAR; INTRAVENOUS ONCE
Qty: 0 | Refills: 0 | Status: COMPLETED | OUTPATIENT
Start: 2019-03-05 | End: 2019-03-05

## 2019-03-05 RX ORDER — ONDANSETRON 8 MG/1
4 TABLET, FILM COATED ORAL ONCE
Qty: 0 | Refills: 0 | Status: DISCONTINUED | OUTPATIENT
Start: 2019-03-05 | End: 2019-03-06

## 2019-03-05 RX ORDER — TAMSULOSIN HYDROCHLORIDE 0.4 MG/1
0.4 CAPSULE ORAL AT BEDTIME
Qty: 0 | Refills: 0 | Status: DISCONTINUED | OUTPATIENT
Start: 2019-03-05 | End: 2019-03-11

## 2019-03-05 RX ORDER — SENNA PLUS 8.6 MG/1
1 TABLET ORAL AT BEDTIME
Qty: 0 | Refills: 0 | Status: DISCONTINUED | OUTPATIENT
Start: 2019-03-05 | End: 2019-03-11

## 2019-03-05 RX ORDER — SODIUM CHLORIDE 9 MG/ML
1000 INJECTION, SOLUTION INTRAVENOUS
Qty: 0 | Refills: 0 | Status: DISCONTINUED | OUTPATIENT
Start: 2019-03-05 | End: 2019-03-08

## 2019-03-05 RX ORDER — DOCUSATE SODIUM 100 MG
100 CAPSULE ORAL
Qty: 0 | Refills: 0 | Status: DISCONTINUED | OUTPATIENT
Start: 2019-03-05 | End: 2019-03-11

## 2019-03-05 RX ORDER — TRAMADOL HYDROCHLORIDE 50 MG/1
50 TABLET ORAL EVERY 6 HOURS
Qty: 0 | Refills: 0 | Status: DISCONTINUED | OUTPATIENT
Start: 2019-03-05 | End: 2019-03-11

## 2019-03-05 RX ORDER — CEFTRIAXONE 500 MG/1
1 INJECTION, POWDER, FOR SOLUTION INTRAMUSCULAR; INTRAVENOUS EVERY 24 HOURS
Qty: 0 | Refills: 0 | Status: DISCONTINUED | OUTPATIENT
Start: 2019-03-06 | End: 2019-03-11

## 2019-03-05 RX ORDER — ONDANSETRON 8 MG/1
4 TABLET, FILM COATED ORAL ONCE
Qty: 0 | Refills: 0 | Status: DISCONTINUED | OUTPATIENT
Start: 2019-03-05 | End: 2019-03-11

## 2019-03-05 RX ORDER — TIZANIDINE 4 MG/1
8 TABLET ORAL EVERY 8 HOURS
Qty: 0 | Refills: 0 | Status: DISCONTINUED | OUTPATIENT
Start: 2019-03-05 | End: 2019-03-11

## 2019-03-05 RX ORDER — CEFTRIAXONE 500 MG/1
1 INJECTION, POWDER, FOR SOLUTION INTRAMUSCULAR; INTRAVENOUS EVERY 24 HOURS
Qty: 0 | Refills: 0 | Status: CANCELLED | OUTPATIENT
Start: 2019-03-06 | End: 2019-03-05

## 2019-03-05 RX ORDER — ACETAMINOPHEN 500 MG
650 TABLET ORAL EVERY 6 HOURS
Qty: 0 | Refills: 0 | Status: DISCONTINUED | OUTPATIENT
Start: 2019-03-05 | End: 2019-03-11

## 2019-03-05 RX ORDER — HYDROMORPHONE HYDROCHLORIDE 2 MG/ML
0.5 INJECTION INTRAMUSCULAR; INTRAVENOUS; SUBCUTANEOUS
Qty: 0 | Refills: 0 | Status: DISCONTINUED | OUTPATIENT
Start: 2019-03-05 | End: 2019-03-06

## 2019-03-05 RX ORDER — FAMOTIDINE 10 MG/ML
20 INJECTION INTRAVENOUS DAILY
Qty: 0 | Refills: 0 | Status: DISCONTINUED | OUTPATIENT
Start: 2019-03-05 | End: 2019-03-11

## 2019-03-05 RX ORDER — BACLOFEN 100 %
10 POWDER (GRAM) MISCELLANEOUS EVERY 12 HOURS
Qty: 0 | Refills: 0 | Status: DISCONTINUED | OUTPATIENT
Start: 2019-03-05 | End: 2019-03-11

## 2019-03-05 RX ORDER — HEPARIN SODIUM 5000 [USP'U]/ML
5000 INJECTION INTRAVENOUS; SUBCUTANEOUS EVERY 8 HOURS
Qty: 0 | Refills: 0 | Status: DISCONTINUED | OUTPATIENT
Start: 2019-03-05 | End: 2019-03-11

## 2019-03-05 RX ADMIN — HEPARIN SODIUM 5000 UNIT(S): 5000 INJECTION INTRAVENOUS; SUBCUTANEOUS at 21:49

## 2019-03-05 RX ADMIN — SODIUM CHLORIDE 3 MILLILITER(S): 9 INJECTION INTRAMUSCULAR; INTRAVENOUS; SUBCUTANEOUS at 15:48

## 2019-03-05 RX ADMIN — SODIUM CHLORIDE 75 MILLILITER(S): 9 INJECTION, SOLUTION INTRAVENOUS at 20:53

## 2019-03-05 RX ADMIN — TAMSULOSIN HYDROCHLORIDE 0.4 MILLIGRAM(S): 0.4 CAPSULE ORAL at 21:49

## 2019-03-05 RX ADMIN — CEFTRIAXONE 100 GRAM(S): 500 INJECTION, POWDER, FOR SOLUTION INTRAMUSCULAR; INTRAVENOUS at 15:01

## 2019-03-05 NOTE — BRIEF OPERATIVE NOTE - PROCEDURE
<<-----Click on this checkbox to enter Procedure Percutaneous nephrolithotomy (PCNL) with ureteroscopy  03/05/2019  Left  Active  DANDREAS

## 2019-03-05 NOTE — PROGRESS NOTE ADULT - ASSESSMENT
A/P: 43y Male s/p L PCNL. post op lab stable    plan  DVT prophylaxis/OOB  Incentive spirometry  Strict I&O's  Analgesia and antiemetics as needed  regular Diet  AM labs

## 2019-03-05 NOTE — PROGRESS NOTE ADULT - SUBJECTIVE AND OBJECTIVE BOX
Post op Check    Pt 42y/o M hx of kidney stone s/p L PCNl seen and examined without complaints. Pain is controlled. Denies SOB/CP/N/V.     Vital Signs Last 24 Hrs  T(C): 36.3 (05 Mar 2019 19:20), Max: 36.9 (05 Mar 2019 13:08)  T(F): 97.3 (05 Mar 2019 19:20), Max: 98.4 (05 Mar 2019 13:08)  HR: 70 (05 Mar 2019 20:45) (58 - 97)  BP: 125/79 (05 Mar 2019 20:45) (96/67 - 180/109)  BP(mean): 97 (05 Mar 2019 20:45) (88 - 139)  RR: 19 (05 Mar 2019 20:45) (18 - 22)  SpO2: 94% (05 Mar 2019 20:45) (94% - 100%)    I&O's Summary    05 Mar 2019 07:01  -  05 Mar 2019 21:26  --------------------------------------------------------  IN: 75 mL / OUT: 400 mL / NET: -325 mL        Physical Exam  Gen: NAD, A&Ox3  Pulm: No respiratory distress, no subcostal retractions  CV: RRR, no JVD  Abd: Soft, NT, ND  Back: L nephrostomy tube in place, draining red urine. old R nephrostomy tube in place draining yellow urine  : Olvera in place, draining red urine                          15.4   9.4   )-----------( 214      ( 05 Mar 2019 20:50 )             44.7       03-05    142  |  103  |  12  ----------------------------<  94  3.9   |  24  |  0.56    Ca    9.3      05 Mar 2019 20:50
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
## 2019-03-06 LAB
ANION GAP SERPL CALC-SCNC: 15 MMOL/L — SIGNIFICANT CHANGE UP (ref 5–17)
BUN SERPL-MCNC: 13 MG/DL — SIGNIFICANT CHANGE UP (ref 7–23)
CALCIUM SERPL-MCNC: 9.6 MG/DL — SIGNIFICANT CHANGE UP (ref 8.4–10.5)
CHLORIDE SERPL-SCNC: 101 MMOL/L — SIGNIFICANT CHANGE UP (ref 96–108)
CO2 SERPL-SCNC: 22 MMOL/L — SIGNIFICANT CHANGE UP (ref 22–31)
CREAT SERPL-MCNC: 0.64 MG/DL — SIGNIFICANT CHANGE UP (ref 0.5–1.3)
GLUCOSE SERPL-MCNC: 136 MG/DL — HIGH (ref 70–99)
HCT VFR BLD CALC: 44.1 % — SIGNIFICANT CHANGE UP (ref 39–50)
HGB BLD-MCNC: 15.2 G/DL — SIGNIFICANT CHANGE UP (ref 13–17)
MCHC RBC-ENTMCNC: 30 PG — SIGNIFICANT CHANGE UP (ref 27–34)
MCHC RBC-ENTMCNC: 34.4 GM/DL — SIGNIFICANT CHANGE UP (ref 32–36)
MCV RBC AUTO: 87.3 FL — SIGNIFICANT CHANGE UP (ref 80–100)
PLATELET # BLD AUTO: 226 K/UL — SIGNIFICANT CHANGE UP (ref 150–400)
POTASSIUM SERPL-MCNC: 4.3 MMOL/L — SIGNIFICANT CHANGE UP (ref 3.5–5.3)
POTASSIUM SERPL-SCNC: 4.3 MMOL/L — SIGNIFICANT CHANGE UP (ref 3.5–5.3)
RBC # BLD: 5.06 M/UL — SIGNIFICANT CHANGE UP (ref 4.2–5.8)
RBC # FLD: 15.1 % — HIGH (ref 10.3–14.5)
SODIUM SERPL-SCNC: 138 MMOL/L — SIGNIFICANT CHANGE UP (ref 135–145)
WBC # BLD: 11 K/UL — HIGH (ref 3.8–10.5)
WBC # FLD AUTO: 11 K/UL — HIGH (ref 3.8–10.5)

## 2019-03-06 PROCEDURE — 99024 POSTOP FOLLOW-UP VISIT: CPT

## 2019-03-06 RX ORDER — POLYETHYLENE GLYCOL 3350 17 G/17G
17 POWDER, FOR SOLUTION ORAL DAILY
Qty: 0 | Refills: 0 | Status: DISCONTINUED | OUTPATIENT
Start: 2019-03-06 | End: 2019-03-11

## 2019-03-06 RX ADMIN — Medication 10 MILLIGRAM(S): at 06:10

## 2019-03-06 RX ADMIN — TRAMADOL HYDROCHLORIDE 50 MILLIGRAM(S): 50 TABLET ORAL at 00:45

## 2019-03-06 RX ADMIN — TIZANIDINE 8 MILLIGRAM(S): 4 TABLET ORAL at 15:01

## 2019-03-06 RX ADMIN — CEFTRIAXONE 100 GRAM(S): 500 INJECTION, POWDER, FOR SOLUTION INTRAMUSCULAR; INTRAVENOUS at 12:29

## 2019-03-06 RX ADMIN — Medication 5 MILLIGRAM(S): at 12:29

## 2019-03-06 RX ADMIN — Medication 100 MILLIGRAM(S): at 06:10

## 2019-03-06 RX ADMIN — Medication 100 MILLIGRAM(S): at 19:01

## 2019-03-06 RX ADMIN — HEPARIN SODIUM 5000 UNIT(S): 5000 INJECTION INTRAVENOUS; SUBCUTANEOUS at 15:01

## 2019-03-06 RX ADMIN — Medication 1 TABLET(S): at 12:29

## 2019-03-06 RX ADMIN — HEPARIN SODIUM 5000 UNIT(S): 5000 INJECTION INTRAVENOUS; SUBCUTANEOUS at 06:09

## 2019-03-06 RX ADMIN — TRAMADOL HYDROCHLORIDE 50 MILLIGRAM(S): 50 TABLET ORAL at 21:33

## 2019-03-06 RX ADMIN — TIZANIDINE 8 MILLIGRAM(S): 4 TABLET ORAL at 06:10

## 2019-03-06 RX ADMIN — HEPARIN SODIUM 5000 UNIT(S): 5000 INJECTION INTRAVENOUS; SUBCUTANEOUS at 23:09

## 2019-03-06 RX ADMIN — TRAMADOL HYDROCHLORIDE 50 MILLIGRAM(S): 50 TABLET ORAL at 22:03

## 2019-03-06 RX ADMIN — FAMOTIDINE 20 MILLIGRAM(S): 10 INJECTION INTRAVENOUS at 12:29

## 2019-03-06 RX ADMIN — TRAMADOL HYDROCHLORIDE 50 MILLIGRAM(S): 50 TABLET ORAL at 01:45

## 2019-03-06 RX ADMIN — Medication 10 MILLIGRAM(S): at 19:01

## 2019-03-07 PROCEDURE — 99024 POSTOP FOLLOW-UP VISIT: CPT

## 2019-03-07 RX ORDER — SOD SULF/SODIUM/NAHCO3/KCL/PEG
4000 SOLUTION, RECONSTITUTED, ORAL ORAL ONCE
Qty: 0 | Refills: 0 | Status: COMPLETED | OUTPATIENT
Start: 2019-03-07 | End: 2019-03-07

## 2019-03-07 RX ADMIN — Medication 100 MILLIGRAM(S): at 06:01

## 2019-03-07 RX ADMIN — POLYETHYLENE GLYCOL 3350 17 GRAM(S): 17 POWDER, FOR SOLUTION ORAL at 12:05

## 2019-03-07 RX ADMIN — TIZANIDINE 8 MILLIGRAM(S): 4 TABLET ORAL at 22:18

## 2019-03-07 RX ADMIN — FAMOTIDINE 20 MILLIGRAM(S): 10 INJECTION INTRAVENOUS at 12:03

## 2019-03-07 RX ADMIN — TRAMADOL HYDROCHLORIDE 50 MILLIGRAM(S): 50 TABLET ORAL at 22:17

## 2019-03-07 RX ADMIN — CEFTRIAXONE 100 GRAM(S): 500 INJECTION, POWDER, FOR SOLUTION INTRAMUSCULAR; INTRAVENOUS at 13:10

## 2019-03-07 RX ADMIN — Medication 10 MILLIGRAM(S): at 18:53

## 2019-03-07 RX ADMIN — TIZANIDINE 8 MILLIGRAM(S): 4 TABLET ORAL at 06:01

## 2019-03-07 RX ADMIN — HEPARIN SODIUM 5000 UNIT(S): 5000 INJECTION INTRAVENOUS; SUBCUTANEOUS at 14:26

## 2019-03-07 RX ADMIN — HEPARIN SODIUM 5000 UNIT(S): 5000 INJECTION INTRAVENOUS; SUBCUTANEOUS at 06:01

## 2019-03-07 RX ADMIN — TIZANIDINE 8 MILLIGRAM(S): 4 TABLET ORAL at 14:28

## 2019-03-07 RX ADMIN — Medication 100 MILLIGRAM(S): at 18:53

## 2019-03-07 RX ADMIN — TAMSULOSIN HYDROCHLORIDE 0.4 MILLIGRAM(S): 0.4 CAPSULE ORAL at 22:17

## 2019-03-07 RX ADMIN — Medication 10 MILLIGRAM(S): at 06:02

## 2019-03-07 RX ADMIN — HEPARIN SODIUM 5000 UNIT(S): 5000 INJECTION INTRAVENOUS; SUBCUTANEOUS at 22:18

## 2019-03-07 RX ADMIN — Medication 1 TABLET(S): at 12:03

## 2019-03-07 RX ADMIN — TRAMADOL HYDROCHLORIDE 50 MILLIGRAM(S): 50 TABLET ORAL at 23:20

## 2019-03-07 RX ADMIN — Medication 5 MILLIGRAM(S): at 12:04

## 2019-03-07 RX ADMIN — Medication 4000 MILLILITER(S): at 12:58

## 2019-03-07 NOTE — PROGRESS NOTE ADULT - SUBJECTIVE AND OBJECTIVE BOX
Subjective  feeling well. had bm overnight with enema   Objective    Vital signs  T(F): , Max: 99.5 (03-07-19 @ 06:17)  HR: 95 (03-07-19 @ 06:17)  BP: 154/95 (03-07-19 @ 06:17)  SpO2: 96% (03-07-19 @ 06:17)  Wt(kg): --    Output     03-06 @ 07:01  -  03-07 @ 07:00  --------------------------------------------------------  IN: 1895 mL / OUT: 3625 mL / NET: -1730 mL        Gen awake alert nad axox3  Abd soft ntnd   Back bl nt in place no hematoma or ecchymosis. left with scant drainage    urine yellow     Labs      03-06 @ 02:41    WBC 11.0  / Hct 44.1  / SCr 0.64     03-05 @ 20:50    WBC 9.4   / Hct 44.7  / SCr 0.56       Urine Cx: Culture - Urine (02.20.19 @ 00:33)    -  Amikacin: S <=16    -  Ampicillin: R >16 These ampicillin results predict results for amoxicillin    -  Ampicillin/Sulbactam: I 16/8    -  Aztreonam: S <=4    -  Cefazolin: R >16    -  Cefepime: S <=4    -  Cefoxitin: I 16    -  Ceftriaxone: S <=1 Enterobacter, Citrobacter, and Serratia may develop resistance during prolonged therapy    -  Ciprofloxacin: R >2    -  Ertapenem: S <=1    -  Imipenem: S <=1    -  Levofloxacin: R >4    -  Meropenem: S <=1    -  Nitrofurantoin: R >64 Should not be used to treat pyelonephritis    -  Piperacillin/Tazobactam: S <=16    -  Tigecycline: S <=2    -  Trimethoprim/Sulfamethoxazole: R >2/38    Specimen Source: .Urine Nephrostomy - Right    Culture Results:   50,000 - 99,000 CFU/mL Providencia stuartii    Organism Identification: Providencia stuartii    Organism: Providencia stuartii    Method Type: TWAN

## 2019-03-07 NOTE — PROGRESS NOTE ADULT - ASSESSMENT
42 yo m pod 2 left pcnl; planning right nephrectomy for 3/11    tov today   preop   book or for monday

## 2019-03-08 LAB
COMPN STONE: SIGNIFICANT CHANGE UP
SURGICAL PATHOLOGY STUDY: SIGNIFICANT CHANGE UP

## 2019-03-08 RX ORDER — NITROGLYCERIN 6.5 MG
0.5 CAPSULE, EXTENDED RELEASE ORAL ONCE
Qty: 0 | Refills: 0 | Status: DISCONTINUED | OUTPATIENT
Start: 2019-03-08 | End: 2019-03-11

## 2019-03-08 RX ADMIN — Medication 5 MILLIGRAM(S): at 13:11

## 2019-03-08 RX ADMIN — SODIUM CHLORIDE 75 MILLILITER(S): 9 INJECTION, SOLUTION INTRAVENOUS at 06:03

## 2019-03-08 RX ADMIN — TIZANIDINE 8 MILLIGRAM(S): 4 TABLET ORAL at 13:11

## 2019-03-08 RX ADMIN — Medication 10 MILLIGRAM(S): at 17:29

## 2019-03-08 RX ADMIN — Medication 1 TABLET(S): at 13:11

## 2019-03-08 RX ADMIN — TAMSULOSIN HYDROCHLORIDE 0.4 MILLIGRAM(S): 0.4 CAPSULE ORAL at 22:22

## 2019-03-08 RX ADMIN — HEPARIN SODIUM 5000 UNIT(S): 5000 INJECTION INTRAVENOUS; SUBCUTANEOUS at 22:22

## 2019-03-08 RX ADMIN — POLYETHYLENE GLYCOL 3350 17 GRAM(S): 17 POWDER, FOR SOLUTION ORAL at 13:13

## 2019-03-08 RX ADMIN — TIZANIDINE 8 MILLIGRAM(S): 4 TABLET ORAL at 06:03

## 2019-03-08 RX ADMIN — FAMOTIDINE 20 MILLIGRAM(S): 10 INJECTION INTRAVENOUS at 13:11

## 2019-03-08 RX ADMIN — CEFTRIAXONE 100 GRAM(S): 500 INJECTION, POWDER, FOR SOLUTION INTRAMUSCULAR; INTRAVENOUS at 13:18

## 2019-03-08 RX ADMIN — TIZANIDINE 8 MILLIGRAM(S): 4 TABLET ORAL at 22:22

## 2019-03-08 RX ADMIN — Medication 100 MILLIGRAM(S): at 06:03

## 2019-03-08 RX ADMIN — HEPARIN SODIUM 5000 UNIT(S): 5000 INJECTION INTRAVENOUS; SUBCUTANEOUS at 06:03

## 2019-03-08 RX ADMIN — HEPARIN SODIUM 5000 UNIT(S): 5000 INJECTION INTRAVENOUS; SUBCUTANEOUS at 13:11

## 2019-03-08 RX ADMIN — Medication 100 MILLIGRAM(S): at 17:30

## 2019-03-08 RX ADMIN — Medication 10 MILLIGRAM(S): at 06:03

## 2019-03-08 NOTE — PROVIDER CONTACT NOTE (OTHER) - REASON
Pt refusing abdominal xray, refusing T&P at this time Pt refusing abdominal xray, refusing T&P at this time as well as PAS & [place]

## 2019-03-08 NOTE — PROGRESS NOTE ADULT - SUBJECTIVE AND OBJECTIVE BOX
UROLOGY PA PROGRESS NOTE:     Subjective:  no acute events overnight    Objective:  Vital signs  T(F): , Max: 98.4 (03-07-19 @ 23:47)  HR: 60 (03-08-19 @ 06:08)  BP: 147/91 (03-08-19 @ 06:08)  SpO2: 98% (03-08-19 @ 06:08)  Wt(kg): --    Output     condom cath 1800cc  R NT- 45cc  L NT- 175cc      Physical Exam:  Gen: NAD  Abd: soft, NT/ND, b/l NTs with scant output   : +lindsey draining clear    Labs:                  Urine Cx:    Culture - Other (collected 06 Mar 2019 09:16)  Source: Skin left renal stone  Preliminary Report (07 Mar 2019 17:04):    No growth

## 2019-03-08 NOTE — PROVIDER CONTACT NOTE (OTHER) - ASSESSMENT
Pt is A&Ox4, pt refusing abdominal xray at this time " I don't need it now they can do it in the morning" after explanation of need for stat diagnositic testing, pt verbalized understanding and continues to refuse Pt is A&Ox4, pt refusing abdominal xray at this time " I don't need it now they can do it in the morning" after explanation of need for stat diagnositic testing, pt verbalized understanding and continues to refuse. Pt additionally refusing T&P, PAS and [place]

## 2019-03-08 NOTE — CHART NOTE - NSCHARTNOTEFT_GEN_A_CORE
RN called states patient's abdominal distended and elevated BP   last VS /95 HR 92, RR: 32, T: 98.2   Seen and examined patient at bed side. pt states his abdominal distended for past 2 days, given bowel function, Had BM today, denies N/V.   physical Exam:   Abd: + bowel sounds, abdominal distended , no tenderness.   back: B/L NT in place, draining well  : condom cath in place, draining pink urine  PVR: 41cc  offer rectal exam, patient refuse    Repeat VS  /94  HR 99    plan :  Abdominal xray ordered  Monitor BP, give Hydralazine if needed   patient sit up , feels better, will monitor, give Enema if needed RN called states patient's abdominal distended and elevated BP   last VS /95 HR 92, RR: 32, T: 98.2   Seen and examined patient at bed side. pt states his abdominal distended for past 2 days, given bowel function, Had BM today, denies N/V.   physical Exam:   Abd: + bowel sounds, abdominal distended , no tenderness.   back: B/L NT in place, draining well  : condom cath in place, draining pink urine  PVR: 41cc  offer rectal exam, patient refuse    Repeat VS  /94  HR 99    plan :  Abdominal xray ordered  Monitor BP, give Hydralazine if needed   patient sit up , feels better, will monitor, give Enema if needed      patient refuse abdominal Xray, will cont. monitor

## 2019-03-08 NOTE — PROVIDER CONTACT NOTE (OTHER) - ASSESSMENT
Pt A&Ox4, abd severely distended, taught, hard, bowel sounds present, pt reports feeling "uncomfortable" Pt one hour previously hypertensive 170/103,  at 2247, RN was in RRT with another patient at the time and not made aware of Vitals Signs, pt was reported to be in pain, another RN administered pain medication for relief. Upon relief from RRT, RN assessed evening vitals and immediately repeated. BP now 212/95 HR 86, pt still with significant abd distention

## 2019-03-08 NOTE — PROGRESS NOTE ADULT - ASSESSMENT
44 yo m pod 3 left pcnl; planning right nephrectomy for 3/11  - am labs  - PVR  - IVL  - cont abx  - OR monday

## 2019-03-09 LAB
-  AMIKACIN: SIGNIFICANT CHANGE UP
-  AMPICILLIN/SULBACTAM: SIGNIFICANT CHANGE UP
-  AMPICILLIN: SIGNIFICANT CHANGE UP
-  AZTREONAM: SIGNIFICANT CHANGE UP
-  CEFAZOLIN: SIGNIFICANT CHANGE UP
-  CEFEPIME: SIGNIFICANT CHANGE UP
-  CEFOXITIN: SIGNIFICANT CHANGE UP
-  CEFTRIAXONE: SIGNIFICANT CHANGE UP
-  CIPROFLOXACIN: SIGNIFICANT CHANGE UP
-  ERTAPENEM: SIGNIFICANT CHANGE UP
-  IMIPENEM: SIGNIFICANT CHANGE UP
-  LEVOFLOXACIN: SIGNIFICANT CHANGE UP
-  MEROPENEM: SIGNIFICANT CHANGE UP
-  PIPERACILLIN/TAZOBACTAM: SIGNIFICANT CHANGE UP
-  TRIMETHOPRIM/SULFAMETHOXAZOLE: SIGNIFICANT CHANGE UP
ANION GAP SERPL CALC-SCNC: 14 MMOL/L — SIGNIFICANT CHANGE UP (ref 5–17)
BUN SERPL-MCNC: 10 MG/DL — SIGNIFICANT CHANGE UP (ref 7–23)
CALCIUM SERPL-MCNC: 9.7 MG/DL — SIGNIFICANT CHANGE UP (ref 8.4–10.5)
CHLORIDE SERPL-SCNC: 97 MMOL/L — SIGNIFICANT CHANGE UP (ref 96–108)
CO2 SERPL-SCNC: 24 MMOL/L — SIGNIFICANT CHANGE UP (ref 22–31)
CREAT SERPL-MCNC: 0.54 MG/DL — SIGNIFICANT CHANGE UP (ref 0.5–1.3)
CULTURE RESULTS: SIGNIFICANT CHANGE UP
GLUCOSE SERPL-MCNC: 114 MG/DL — HIGH (ref 70–99)
MAGNESIUM SERPL-MCNC: 2 MG/DL — SIGNIFICANT CHANGE UP (ref 1.6–2.6)
METHOD TYPE: SIGNIFICANT CHANGE UP
ORGANISM # SPEC MICROSCOPIC CNT: SIGNIFICANT CHANGE UP
ORGANISM # SPEC MICROSCOPIC CNT: SIGNIFICANT CHANGE UP
PHOSPHATE SERPL-MCNC: 2.9 MG/DL — SIGNIFICANT CHANGE UP (ref 2.5–4.5)
POTASSIUM SERPL-MCNC: 3.8 MMOL/L — SIGNIFICANT CHANGE UP (ref 3.5–5.3)
POTASSIUM SERPL-SCNC: 3.8 MMOL/L — SIGNIFICANT CHANGE UP (ref 3.5–5.3)
SODIUM SERPL-SCNC: 135 MMOL/L — SIGNIFICANT CHANGE UP (ref 135–145)
SPECIMEN SOURCE: SIGNIFICANT CHANGE UP

## 2019-03-09 PROCEDURE — 74018 RADEX ABDOMEN 1 VIEW: CPT | Mod: 26

## 2019-03-09 PROCEDURE — 93010 ELECTROCARDIOGRAM REPORT: CPT

## 2019-03-09 PROCEDURE — 71045 X-RAY EXAM CHEST 1 VIEW: CPT | Mod: 26

## 2019-03-09 RX ORDER — MINERAL OIL
133 OIL (ML) MISCELLANEOUS ONCE
Qty: 0 | Refills: 0 | Status: COMPLETED | OUTPATIENT
Start: 2019-03-09 | End: 2019-03-09

## 2019-03-09 RX ADMIN — Medication 1 TABLET(S): at 12:38

## 2019-03-09 RX ADMIN — TAMSULOSIN HYDROCHLORIDE 0.4 MILLIGRAM(S): 0.4 CAPSULE ORAL at 22:44

## 2019-03-09 RX ADMIN — Medication 10 MILLIGRAM(S): at 06:40

## 2019-03-09 RX ADMIN — HEPARIN SODIUM 5000 UNIT(S): 5000 INJECTION INTRAVENOUS; SUBCUTANEOUS at 15:59

## 2019-03-09 RX ADMIN — Medication 133 MILLILITER(S): at 22:44

## 2019-03-09 RX ADMIN — Medication 100 MILLIGRAM(S): at 17:53

## 2019-03-09 RX ADMIN — TRAMADOL HYDROCHLORIDE 50 MILLIGRAM(S): 50 TABLET ORAL at 03:20

## 2019-03-09 RX ADMIN — TIZANIDINE 8 MILLIGRAM(S): 4 TABLET ORAL at 16:00

## 2019-03-09 RX ADMIN — FAMOTIDINE 20 MILLIGRAM(S): 10 INJECTION INTRAVENOUS at 12:38

## 2019-03-09 RX ADMIN — HEPARIN SODIUM 5000 UNIT(S): 5000 INJECTION INTRAVENOUS; SUBCUTANEOUS at 06:39

## 2019-03-09 RX ADMIN — POLYETHYLENE GLYCOL 3350 17 GRAM(S): 17 POWDER, FOR SOLUTION ORAL at 12:38

## 2019-03-09 RX ADMIN — HEPARIN SODIUM 5000 UNIT(S): 5000 INJECTION INTRAVENOUS; SUBCUTANEOUS at 22:44

## 2019-03-09 RX ADMIN — Medication 100 MILLIGRAM(S): at 06:40

## 2019-03-09 RX ADMIN — TRAMADOL HYDROCHLORIDE 50 MILLIGRAM(S): 50 TABLET ORAL at 02:20

## 2019-03-09 RX ADMIN — TIZANIDINE 8 MILLIGRAM(S): 4 TABLET ORAL at 22:43

## 2019-03-09 RX ADMIN — Medication 10 MILLIGRAM(S): at 17:53

## 2019-03-09 RX ADMIN — Medication 30 MILLILITER(S): at 04:09

## 2019-03-09 RX ADMIN — CEFTRIAXONE 100 GRAM(S): 500 INJECTION, POWDER, FOR SOLUTION INTRAMUSCULAR; INTRAVENOUS at 12:37

## 2019-03-09 RX ADMIN — TIZANIDINE 8 MILLIGRAM(S): 4 TABLET ORAL at 06:40

## 2019-03-09 NOTE — PROGRESS NOTE ADULT - SUBJECTIVE AND OBJECTIVE BOX
The patient was seen and examined at bedside.  Denies complaints of chest pain, shortness of breath, nausea, acute pain.    T(C): 36.6 (03-09-19 @ 09:17), Max: 36.6 (03-09-19 @ 09:17)  HR: 50 (03-09-19 @ 09:17) (50 - 70)  BP: 147/90 (03-09-19 @ 09:17) (122/82 - 155/84)  RR: 18 (03-09-19 @ 09:17) (18 - 18)  SpO2: 98% (03-09-19 @ 09:17) (96% - 98%)  Wt(kg): --    Physical Exam:    General: NAD, A+Ox3  Abdomen: soft, non-tender, +distention  Back: dressing clean/dry/intact, bilateral nephrostomy tubes in place and draining      03-08 @ 07:01  -  03-09 @ 07:00  --------------------------------------------------------  IN: 530 mL / OUT: 4450 mL / NET: -3920 mL    R NT - 0cc    L NT - 0cc    condom catheter - 3000cc The patient was seen and examined at bedside.  Denies complaints of chest pain, shortness of breath, nausea, acute pain.    T(C): 36.6 (03-09-19 @ 09:17), Max: 36.6 (03-09-19 @ 09:17)  HR: 50 (03-09-19 @ 09:17) (50 - 70)  BP: 147/90 (03-09-19 @ 09:17) (122/82 - 155/84)  RR: 18 (03-09-19 @ 09:17) (18 - 18)  SpO2: 98% (03-09-19 @ 09:17) (96% - 98%)  Wt(kg): --    Physical Exam:    General: NAD, A+Ox3  Abdomen: soft, non-tender, +distention  Back: dressing clean/dry/intact, bilateral nephrostomy tubes in place      03-08 @ 07:01  -  03-09 @ 07:00  --------------------------------------------------------  IN: 530 mL / OUT: 4450 mL / NET: -3920 mL    R NT - 0cc    L NT - 0cc    condom catheter - 3000cc clear The patient was seen and examined at bedside.  Denies complaints of chest pain, shortness of breath, nausea, acute pain.    T(C): 36.6 (03-09-19 @ 09:17), Max: 36.6 (03-09-19 @ 09:17)  HR: 50 (03-09-19 @ 09:17) (50 - 70)  BP: 147/90 (03-09-19 @ 09:17) (122/82 - 155/84)  RR: 18 (03-09-19 @ 09:17) (18 - 18)  SpO2: 98% (03-09-19 @ 09:17) (96% - 98%)  Wt(kg): --    Physical Exam:    General: NAD, A+Ox3  Abdomen: soft, non-tender, +distention  Back: dressing clean/dry/intact, bilateral nephrostomy tubes in place, no hematoma      03-08 @ 07:01  -  03-09 @ 07:00  --------------------------------------------------------  IN: 530 mL / OUT: 4450 mL / NET: -3920 mL    R NT - 0cc    L NT - 0cc    condom catheter - 3000cc clear

## 2019-03-09 NOTE — PROGRESS NOTE ADULT - ASSESSMENT
43 year old male s/p L PCNL POD#4    - monitor I's and O's  - AM labs  - preop EKG today  - continue bowel regimen  - KUB today to assess stool burden  - monitor blood pressures closely  - f/u stone culture 43 year old male s/p L PCNL POD#4    - monitor I's and O's  - AM labs  - preop and consent for right nephrectomy tomorrow  - continue bowel regimen  - KUB today to assess stool burden  - monitor blood pressures closely  - f/u stone culture

## 2019-03-10 ENCOUNTER — TRANSCRIPTION ENCOUNTER (OUTPATIENT)
Age: 44
End: 2019-03-10

## 2019-03-10 LAB
ANION GAP SERPL CALC-SCNC: 13 MMOL/L — SIGNIFICANT CHANGE UP (ref 5–17)
APTT BLD: 32.5 SEC — SIGNIFICANT CHANGE UP (ref 27.5–36.3)
BLD GP AB SCN SERPL QL: NEGATIVE — SIGNIFICANT CHANGE UP
BUN SERPL-MCNC: 13 MG/DL — SIGNIFICANT CHANGE UP (ref 7–23)
CALCIUM SERPL-MCNC: 9.7 MG/DL — SIGNIFICANT CHANGE UP (ref 8.4–10.5)
CHLORIDE SERPL-SCNC: 99 MMOL/L — SIGNIFICANT CHANGE UP (ref 96–108)
CO2 SERPL-SCNC: 24 MMOL/L — SIGNIFICANT CHANGE UP (ref 22–31)
CREAT SERPL-MCNC: 0.57 MG/DL — SIGNIFICANT CHANGE UP (ref 0.5–1.3)
GLUCOSE SERPL-MCNC: 118 MG/DL — HIGH (ref 70–99)
HCT VFR BLD CALC: 40.5 % — SIGNIFICANT CHANGE UP (ref 39–50)
HGB BLD-MCNC: 13.8 G/DL — SIGNIFICANT CHANGE UP (ref 13–17)
INR BLD: 1.18 RATIO — HIGH (ref 0.88–1.16)
MAGNESIUM SERPL-MCNC: 2 MG/DL — SIGNIFICANT CHANGE UP (ref 1.6–2.6)
MCHC RBC-ENTMCNC: 28.7 PG — SIGNIFICANT CHANGE UP (ref 27–34)
MCHC RBC-ENTMCNC: 33.9 GM/DL — SIGNIFICANT CHANGE UP (ref 32–36)
MCV RBC AUTO: 84.5 FL — SIGNIFICANT CHANGE UP (ref 80–100)
PHOSPHATE SERPL-MCNC: 3.8 MG/DL — SIGNIFICANT CHANGE UP (ref 2.5–4.5)
PLATELET # BLD AUTO: 209 K/UL — SIGNIFICANT CHANGE UP (ref 150–400)
POTASSIUM SERPL-MCNC: 4.2 MMOL/L — SIGNIFICANT CHANGE UP (ref 3.5–5.3)
POTASSIUM SERPL-SCNC: 4.2 MMOL/L — SIGNIFICANT CHANGE UP (ref 3.5–5.3)
PROTHROM AB SERPL-ACNC: 13.6 SEC — HIGH (ref 10–12.9)
RBC # BLD: 4.8 M/UL — SIGNIFICANT CHANGE UP (ref 4.2–5.8)
RBC # FLD: 15.3 % — HIGH (ref 10.3–14.5)
RH IG SCN BLD-IMP: POSITIVE — SIGNIFICANT CHANGE UP
SODIUM SERPL-SCNC: 136 MMOL/L — SIGNIFICANT CHANGE UP (ref 135–145)
WBC # BLD: 5 K/UL — SIGNIFICANT CHANGE UP (ref 3.8–10.5)
WBC # FLD AUTO: 5 K/UL — SIGNIFICANT CHANGE UP (ref 3.8–10.5)

## 2019-03-10 RX ORDER — LACTULOSE 10 G/15ML
20 SOLUTION ORAL ONCE
Qty: 0 | Refills: 0 | Status: DISCONTINUED | OUTPATIENT
Start: 2019-03-10 | End: 2019-03-11

## 2019-03-10 RX ORDER — NIFEDIPINE 30 MG
10 TABLET, EXTENDED RELEASE 24 HR ORAL ONCE
Qty: 0 | Refills: 0 | Status: DISCONTINUED | OUTPATIENT
Start: 2019-03-10 | End: 2019-03-11

## 2019-03-10 RX ORDER — MULTIVIT WITH MIN/MFOLATE/K2 340-15/3 G
1 POWDER (GRAM) ORAL ONCE
Qty: 0 | Refills: 0 | Status: COMPLETED | OUTPATIENT
Start: 2019-03-10 | End: 2019-03-10

## 2019-03-10 RX ORDER — SODIUM CHLORIDE 9 MG/ML
1000 INJECTION, SOLUTION INTRAVENOUS
Qty: 0 | Refills: 0 | Status: DISCONTINUED | OUTPATIENT
Start: 2019-03-10 | End: 2019-03-11

## 2019-03-10 RX ORDER — NITROGLYCERIN 6.5 MG
0.4 CAPSULE, EXTENDED RELEASE ORAL ONCE
Qty: 0 | Refills: 0 | Status: DISCONTINUED | OUTPATIENT
Start: 2019-03-10 | End: 2019-03-11

## 2019-03-10 RX ADMIN — SODIUM CHLORIDE 100 MILLILITER(S): 9 INJECTION, SOLUTION INTRAVENOUS at 09:19

## 2019-03-10 RX ADMIN — HEPARIN SODIUM 5000 UNIT(S): 5000 INJECTION INTRAVENOUS; SUBCUTANEOUS at 22:31

## 2019-03-10 RX ADMIN — TIZANIDINE 8 MILLIGRAM(S): 4 TABLET ORAL at 08:27

## 2019-03-10 RX ADMIN — TRAMADOL HYDROCHLORIDE 50 MILLIGRAM(S): 50 TABLET ORAL at 09:19

## 2019-03-10 RX ADMIN — Medication 1 TABLET(S): at 11:53

## 2019-03-10 RX ADMIN — Medication 10 MILLIGRAM(S): at 08:27

## 2019-03-10 RX ADMIN — CEFTRIAXONE 100 GRAM(S): 500 INJECTION, POWDER, FOR SOLUTION INTRAMUSCULAR; INTRAVENOUS at 11:58

## 2019-03-10 RX ADMIN — TAMSULOSIN HYDROCHLORIDE 0.4 MILLIGRAM(S): 0.4 CAPSULE ORAL at 22:31

## 2019-03-10 RX ADMIN — TIZANIDINE 8 MILLIGRAM(S): 4 TABLET ORAL at 17:04

## 2019-03-10 RX ADMIN — HEPARIN SODIUM 5000 UNIT(S): 5000 INJECTION INTRAVENOUS; SUBCUTANEOUS at 08:27

## 2019-03-10 RX ADMIN — Medication 5 MILLIGRAM(S): at 11:53

## 2019-03-10 RX ADMIN — Medication 100 MILLIGRAM(S): at 08:27

## 2019-03-10 RX ADMIN — FAMOTIDINE 20 MILLIGRAM(S): 10 INJECTION INTRAVENOUS at 11:53

## 2019-03-10 RX ADMIN — TRAMADOL HYDROCHLORIDE 50 MILLIGRAM(S): 50 TABLET ORAL at 10:30

## 2019-03-10 RX ADMIN — TIZANIDINE 8 MILLIGRAM(S): 4 TABLET ORAL at 22:31

## 2019-03-10 RX ADMIN — HEPARIN SODIUM 5000 UNIT(S): 5000 INJECTION INTRAVENOUS; SUBCUTANEOUS at 17:04

## 2019-03-10 RX ADMIN — POLYETHYLENE GLYCOL 3350 17 GRAM(S): 17 POWDER, FOR SOLUTION ORAL at 11:54

## 2019-03-10 RX ADMIN — Medication 100 MILLIGRAM(S): at 17:04

## 2019-03-10 RX ADMIN — Medication 10 MILLIGRAM(S): at 17:04

## 2019-03-10 NOTE — PROGRESS NOTE ADULT - SUBJECTIVE AND OBJECTIVE BOX
The patient was seen and examined at bedside.  Pt had a hypertensive episode overnight that resolved on its own.  States he felt a sensation of fullness and pressure in his stomach during that time.  Denies complaints of chest pain, shortness of breath, nausea, acute pain.    T(C): 36.5 (03-10-19 @ 05:50), Max: 37.6 (03-10-19 @ 00:01)  HR: 75 (03-10-19 @ 05:50) (50 - 82)  BP: 93/55 (03-10-19 @ 05:50) (93/55 - 190/96)  RR: 18 (03-10-19 @ 05:50) (16 - 18)  SpO2: 96% (03-10-19 @ 05:50) (95% - 98%)  Wt(kg): --    Physical Exam:    General: NAD, A+Ox3  Abdomen: soft, non-tender, non-distended        03-09 @ 06:01  -  03-10 @ 07:00  --------------------------------------------------------  IN: 1300 mL / OUT: 3700 mL / NET: -2400 mL    condom catheter - 1200cc clear The patient was seen and examined at bedside.  Pt had a hypertensive episode overnight that resolved on its own.  States he felt a sensation of fullness and pressure in his stomach during that time.  Denies complaints of chest pain, shortness of breath, nausea, acute pain.    T(C): 36.5 (03-10-19 @ 05:50), Max: 37.6 (03-10-19 @ 00:01)  HR: 75 (03-10-19 @ 05:50) (50 - 82)  BP: 93/55 (03-10-19 @ 05:50) (93/55 - 190/96)  RR: 18 (03-10-19 @ 05:50) (16 - 18)  SpO2: 96% (03-10-19 @ 05:50) (95% - 98%)  Wt(kg): --    Physical Exam:    General: NAD, A+Ox3  Abdomen: soft, non-tender, +distention        03-09 @ 06:01  -  03-10 @ 07:00  --------------------------------------------------------  IN: 1300 mL / OUT: 3700 mL / NET: -2400 mL    condom catheter - 1200cc clear

## 2019-03-10 NOTE — PROGRESS NOTE ADULT - SUBJECTIVE AND OBJECTIVE BOX
Urology Preop Note    Patient is a 43y old  Male with nonfunctioning right kidney    Diagnosis: nonfunctioning right kidney  Procedure: laparoscopic right nephroureterectomy  Surgeon: Dr Cheko Pelayo                              13.8   5.0   )-----------( 209      ( 10 Mar 2019 10:53 )             40.5       03-10    136  |  99  |  13  ----------------------------<  118<H>  4.2   |  24  |  0.57    Ca    9.7      10 Mar 2019 10:53  Phos  3.8     03-10  Mg     2.0     03-10        PT/INR - ( 10 Mar 2019 10:53 )   PT: 13.6 sec;   INR: 1.18 ratio         PTT - ( 10 Mar 2019 10:53 )  PTT:32.5 sec      Chest X-ray: clear lungs    EKG:     Orders: NPO/IVF, 2U PRBCs on hold    Consent: in patient's chart pending attending signature

## 2019-03-10 NOTE — PROGRESS NOTE ADULT - ASSESSMENT
43 year old male s/p L PCNL POD#5    - monitor I's and O's  - AM labs  - continue bowel regimen, including mag citrate  - pt requires manual disimpaction today with BP monitoring  - f/u official KUB read  - monitor blood pressures closely

## 2019-03-11 ENCOUNTER — RESULT REVIEW (OUTPATIENT)
Age: 44
End: 2019-03-11

## 2019-03-11 LAB
ANION GAP SERPL CALC-SCNC: 17 MMOL/L — SIGNIFICANT CHANGE UP (ref 5–17)
APTT BLD: 28.9 SEC — SIGNIFICANT CHANGE UP (ref 27.5–36.3)
BUN SERPL-MCNC: 7 MG/DL — SIGNIFICANT CHANGE UP (ref 7–23)
CALCIUM SERPL-MCNC: 8.5 MG/DL — SIGNIFICANT CHANGE UP (ref 8.4–10.5)
CHLORIDE SERPL-SCNC: 104 MMOL/L — SIGNIFICANT CHANGE UP (ref 96–108)
CO2 SERPL-SCNC: 20 MMOL/L — LOW (ref 22–31)
CREAT SERPL-MCNC: 0.53 MG/DL — SIGNIFICANT CHANGE UP (ref 0.5–1.3)
GAS PNL BLDA: SIGNIFICANT CHANGE UP
GLUCOSE SERPL-MCNC: 96 MG/DL — SIGNIFICANT CHANGE UP (ref 70–99)
HCT VFR BLD CALC: 34.3 % — LOW (ref 39–50)
HGB BLD-MCNC: 11.6 G/DL — LOW (ref 13–17)
INR BLD: 1.22 RATIO — HIGH (ref 0.88–1.16)
MAGNESIUM SERPL-MCNC: 2.4 MG/DL — SIGNIFICANT CHANGE UP (ref 1.6–2.6)
MCHC RBC-ENTMCNC: 29.2 PG — SIGNIFICANT CHANGE UP (ref 27–34)
MCHC RBC-ENTMCNC: 33.9 GM/DL — SIGNIFICANT CHANGE UP (ref 32–36)
MCV RBC AUTO: 86.2 FL — SIGNIFICANT CHANGE UP (ref 80–100)
PHOSPHATE SERPL-MCNC: 3.4 MG/DL — SIGNIFICANT CHANGE UP (ref 2.5–4.5)
PLATELET # BLD AUTO: 173 K/UL — SIGNIFICANT CHANGE UP (ref 150–400)
POTASSIUM SERPL-MCNC: 3.6 MMOL/L — SIGNIFICANT CHANGE UP (ref 3.5–5.3)
POTASSIUM SERPL-SCNC: 3.6 MMOL/L — SIGNIFICANT CHANGE UP (ref 3.5–5.3)
PROTHROM AB SERPL-ACNC: 14 SEC — HIGH (ref 10–12.9)
RBC # BLD: 3.98 M/UL — LOW (ref 4.2–5.8)
RBC # FLD: 15.2 % — HIGH (ref 10.3–14.5)
SODIUM SERPL-SCNC: 141 MMOL/L — SIGNIFICANT CHANGE UP (ref 135–145)
WBC # BLD: 5.9 K/UL — SIGNIFICANT CHANGE UP (ref 3.8–10.5)
WBC # FLD AUTO: 5.9 K/UL — SIGNIFICANT CHANGE UP (ref 3.8–10.5)

## 2019-03-11 PROCEDURE — 99291 CRITICAL CARE FIRST HOUR: CPT

## 2019-03-11 PROCEDURE — 88307 TISSUE EXAM BY PATHOLOGIST: CPT | Mod: 26

## 2019-03-11 PROCEDURE — 50225 REMOVAL KIDNEY OPEN COMPLEX: CPT | Mod: RT,78

## 2019-03-11 RX ORDER — CEFTRIAXONE 500 MG/1
1 INJECTION, POWDER, FOR SOLUTION INTRAMUSCULAR; INTRAVENOUS EVERY 24 HOURS
Qty: 0 | Refills: 0 | Status: DISCONTINUED | OUTPATIENT
Start: 2019-03-12 | End: 2019-03-13

## 2019-03-11 RX ORDER — CHLORHEXIDINE GLUCONATE 213 G/1000ML
1 SOLUTION TOPICAL
Qty: 0 | Refills: 0 | Status: DISCONTINUED | OUTPATIENT
Start: 2019-03-11 | End: 2019-03-12

## 2019-03-11 RX ORDER — VANCOMYCIN HCL 1 G
1000 VIAL (EA) INTRAVENOUS EVERY 12 HOURS
Qty: 0 | Refills: 0 | Status: DISCONTINUED | OUTPATIENT
Start: 2019-03-11 | End: 2019-03-12

## 2019-03-11 RX ORDER — SODIUM CHLORIDE 9 MG/ML
1000 INJECTION, SOLUTION INTRAVENOUS ONCE
Qty: 0 | Refills: 0 | Status: COMPLETED | OUTPATIENT
Start: 2019-03-11 | End: 2019-03-11

## 2019-03-11 RX ORDER — CEFTRIAXONE 500 MG/1
INJECTION, POWDER, FOR SOLUTION INTRAMUSCULAR; INTRAVENOUS
Qty: 0 | Refills: 0 | Status: DISCONTINUED | OUTPATIENT
Start: 2019-03-11 | End: 2019-03-13

## 2019-03-11 RX ORDER — PROPOFOL 10 MG/ML
10 INJECTION, EMULSION INTRAVENOUS
Qty: 1000 | Refills: 0 | Status: DISCONTINUED | OUTPATIENT
Start: 2019-03-11 | End: 2019-03-12

## 2019-03-11 RX ORDER — SODIUM CHLORIDE 9 MG/ML
1000 INJECTION, SOLUTION INTRAVENOUS
Qty: 0 | Refills: 0 | Status: DISCONTINUED | OUTPATIENT
Start: 2019-03-11 | End: 2019-03-12

## 2019-03-11 RX ORDER — PHENYLEPHRINE HYDROCHLORIDE 10 MG/ML
0.2 INJECTION INTRAVENOUS
Qty: 40 | Refills: 0 | Status: DISCONTINUED | OUTPATIENT
Start: 2019-03-11 | End: 2019-03-12

## 2019-03-11 RX ORDER — CEFTRIAXONE 500 MG/1
1 INJECTION, POWDER, FOR SOLUTION INTRAMUSCULAR; INTRAVENOUS ONCE
Qty: 0 | Refills: 0 | Status: COMPLETED | OUTPATIENT
Start: 2019-03-11 | End: 2019-03-11

## 2019-03-11 RX ADMIN — HEPARIN SODIUM 5000 UNIT(S): 5000 INJECTION INTRAVENOUS; SUBCUTANEOUS at 09:18

## 2019-03-11 RX ADMIN — PHENYLEPHRINE HYDROCHLORIDE 5.78 MICROGRAM(S)/KG/MIN: 10 INJECTION INTRAVENOUS at 21:54

## 2019-03-11 RX ADMIN — Medication 10 MILLIGRAM(S): at 06:43

## 2019-03-11 RX ADMIN — TIZANIDINE 8 MILLIGRAM(S): 4 TABLET ORAL at 09:18

## 2019-03-11 RX ADMIN — PROPOFOL 4.63 MICROGRAM(S)/KG/MIN: 10 INJECTION, EMULSION INTRAVENOUS at 21:55

## 2019-03-11 RX ADMIN — Medication 100 MILLIGRAM(S): at 06:43

## 2019-03-11 RX ADMIN — SODIUM CHLORIDE 3000 MILLILITER(S): 9 INJECTION, SOLUTION INTRAVENOUS at 23:23

## 2019-03-11 RX ADMIN — SODIUM CHLORIDE 100 MILLILITER(S): 9 INJECTION, SOLUTION INTRAVENOUS at 09:18

## 2019-03-11 NOTE — PROGRESS NOTE ADULT - ASSESSMENT
A/P: 43y Male s/p right lap converted to open simple nephrectomy, initially requiring pressors post op, kept intubated and transferred to SICU. Currently intubated and alert despite propofol, no longer requiring pressure support, per RT, plan to extubate soon  - DVT prophylaxis/OOB  - Strict I&O's  - lindsey and left NT to gravity drainage   - ELIAS to bulb suction  - Analgesia and antiemetics as needed  - NPO  - AM labs  - f/u urine and blood cultures  - cont vanco/ctx  - SICU care appreciated

## 2019-03-11 NOTE — PROGRESS NOTE ADULT - SUBJECTIVE AND OBJECTIVE BOX
UROLOGY DAILY PROGRESS NOTE:     Subjective: Patient seen and examined at bedside. No acute issues overnight  Disimpaction attempted little yield       Objective:  Vital signs  T(F): , Max: 99.6 (03-11-19 @ 06:46)  HR: 115 (03-11-19 @ 06:46)  BP: 106/64 (03-11-19 @ 06:46)  SpO2: 94% (03-11-19 @ 06:46)  Wt(kg): --    Output     I&O's Detail    10 Mar 2019 07:01  -  11 Mar 2019 07:00  --------------------------------------------------------  IN:    lactated ringers.: 2100 mL    Oral Fluid: 340 mL  Total IN: 2440 mL    OUT:    Drain: 25 mL    Indwelling Catheter - Urethral: 2775 mL  Total OUT: 2800 mL    Total NET: -360 mL          Physical Exam:  Gen: NAD  Abd: soft NTND  Back: B/l nephrostomy in place no output    : lindsey clear yellow     Labs:  03-10  5.0   / 40.5  /0.57   03-09  x     / x     /0.54                           13.8   5.0   )-----------( 209      ( 10 Mar 2019 10:53 )             40.5     03-10    136  |  99  |  13  ----------------------------<  118<H>  4.2   |  24  |  0.57    Ca    9.7      10 Mar 2019 10:53  Phos  3.8     03-10  Mg     2.0     03-10    LABS/RADIOLOGY RESULTS:                          13.8   5.0   )-----------( 209      ( 10 Mar 2019 10:53 )             40.5   03-10    136  |  99  |  13  ----------------------------<  118<H>  4.2   |  24  |  0.57    Ca    9.7      10 Mar 2019 10:53  Phos  3.8     03-10  Mg     2.0     03-10    PT/INR - ( 10 Mar 2019 10:53 )   PT: 13.6 sec;   INR: 1.18 ratio         PTT - ( 10 Mar 2019 10:53 )  PTT:32.5 secBlood Cultures      PT/INR - ( 10 Mar 2019 10:53 )   PT: 13.6 sec;   INR: 1.18 ratio         PTT - ( 10 Mar 2019 10:53 )  PTT:32.5 sec          Urine Cx:
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
## 2019-03-11 NOTE — BRIEF OPERATIVE NOTE - POST-OP DX
Nephrolithiasis  03/05/2019    Active  Shola Gee
Atrophic kidney  03/11/2019    Active  Wiliam Bolton  Nephrolithiasis  03/05/2019    Active  Interfaces, RTIF

## 2019-03-11 NOTE — PRE-ANESTHESIA EVALUATION ADULT - NSANTHOSAYNRD_GEN_A_CORE
No. CORY screening performed.  STOP BANG Legend: 0-2 = LOW Risk; 3-4 = INTERMEDIATE Risk; 5-8 = HIGH Risk
No. CORY screening performed.  STOP BANG Legend: 0-2 = LOW Risk; 3-4 = INTERMEDIATE Risk; 5-8 = HIGH Risk

## 2019-03-11 NOTE — PROGRESS NOTE ADULT - SUBJECTIVE AND OBJECTIVE BOX
Post op Check    Pt seen and examined in SICU. Pt is intubated but alert despite propofol. No longer requiring pressure support. Denies any pain.     Vital Signs Last 24 Hrs  T(C): 36.5 (11 Mar 2019 19:45), Max: 37.6 (11 Mar 2019 06:46)  T(F): 97.7 (11 Mar 2019 19:45), Max: 99.6 (11 Mar 2019 06:46)  HR: 101 (11 Mar 2019 20:50) (99 - 115)  BP: 137/84 (11 Mar 2019 19:45) (106/64 - 137/84)  BP(mean): 107 (11 Mar 2019 19:45) (107 - 107)  RR: 11 (11 Mar 2019 20:00) (11 - 34)  SpO2: 100% (11 Mar 2019 20:50) (94% - 100%)    I&O's Summary    10 Mar 2019 07:01  -  11 Mar 2019 07:00  --------------------------------------------------------  IN: 2440 mL / OUT: 2800 mL / NET: -360 mL    11 Mar 2019 07:01  -  11 Mar 2019 21:23  --------------------------------------------------------  IN: 300 mL / OUT: 250 mL / NET: 50 mL        Physical Exam  Gen: NAD, intubated, alert  Pulm: No respiratory distress, no subcostal retractions  CV: RRR, no JVD  Abd: distended abdomen, no TTP, incisions CDI, +ELIAS draining sanguinous   Back: left NT draining clear urine  :  +lindsey draining clear                           11.6   5.9   )-----------( 173      ( 11 Mar 2019 20:49 )             34.3       03-11    141  |  104  |  7   ----------------------------<  96  3.6   |  20<L>  |  0.53    Ca    8.5      11 Mar 2019 20:49  Phos  3.4     03-11  Mg     2.4     03-11

## 2019-03-11 NOTE — CONSULT NOTE ADULT - ATTENDING COMMENTS
arrived postoperative nephrectomy  intubated for acute respiratory failure associated recent surgery  patient is baseline quadriplegia which is a contributory factor  on Phenylphrine for hypotension but anticipate being able to wean off with resuscitation as noted  45 minutes of critical care management applied

## 2019-03-11 NOTE — PROGRESS NOTE ADULT - ASSESSMENT
43 year old male s/p L PCNL POD#6  Prop R nephrectomy today  NPO except meds  PT consult   DVT prophylaxis  Pain management

## 2019-03-11 NOTE — CONSULT NOTE ADULT - ASSESSMENT
43 year old gentleman s/p right lap converted to open simple nephrectomy with for infected non-functioning kidney. SICU post-operatively for hemodynamic resuscitation and ventilatory management.     PLAN    Neuro: Sedation while intubated  - Propofol for sedation    Respiratory: Left intubated post-operatively due to hemodynamic instability  - SBT in am     Cardiovascular: Hypotension on phenylephrine  - S/p resuscitation with 1500ml of fluid, re-asess volume status   - Wean phenylephrine as tolerated    GI: No active issues  - NPO for now    Renal: No active issues  - LR @ 100ml/hr    Heme: No active issues  - Lovenox for VTE prophylaxis    Endo: No active issues  - Monitor serum glucose on BMP    Dispo: SICU full code    - Bryant Rose PA-C

## 2019-03-12 LAB
ANION GAP SERPL CALC-SCNC: 19 MMOL/L — HIGH (ref 5–17)
BUN SERPL-MCNC: 7 MG/DL — SIGNIFICANT CHANGE UP (ref 7–23)
CALCIUM SERPL-MCNC: 8.8 MG/DL — SIGNIFICANT CHANGE UP (ref 8.4–10.5)
CHLORIDE SERPL-SCNC: 104 MMOL/L — SIGNIFICANT CHANGE UP (ref 96–108)
CO2 SERPL-SCNC: 20 MMOL/L — LOW (ref 22–31)
CREAT SERPL-MCNC: 0.54 MG/DL — SIGNIFICANT CHANGE UP (ref 0.5–1.3)
CULTURE RESULTS: SIGNIFICANT CHANGE UP
GAS PNL BLDA: SIGNIFICANT CHANGE UP
GLUCOSE SERPL-MCNC: 125 MG/DL — HIGH (ref 70–99)
HCT VFR BLD CALC: 36.1 % — LOW (ref 39–50)
HGB BLD-MCNC: 12.1 G/DL — LOW (ref 13–17)
MAGNESIUM SERPL-MCNC: 2.3 MG/DL — SIGNIFICANT CHANGE UP (ref 1.6–2.6)
MCHC RBC-ENTMCNC: 29.1 PG — SIGNIFICANT CHANGE UP (ref 27–34)
MCHC RBC-ENTMCNC: 33.6 GM/DL — SIGNIFICANT CHANGE UP (ref 32–36)
MCV RBC AUTO: 86.6 FL — SIGNIFICANT CHANGE UP (ref 80–100)
PHOSPHATE SERPL-MCNC: 4.1 MG/DL — SIGNIFICANT CHANGE UP (ref 2.5–4.5)
PLATELET # BLD AUTO: 220 K/UL — SIGNIFICANT CHANGE UP (ref 150–400)
POTASSIUM SERPL-MCNC: 4.1 MMOL/L — SIGNIFICANT CHANGE UP (ref 3.5–5.3)
POTASSIUM SERPL-SCNC: 4.1 MMOL/L — SIGNIFICANT CHANGE UP (ref 3.5–5.3)
RBC # BLD: 4.16 M/UL — LOW (ref 4.2–5.8)
RBC # FLD: 15.1 % — HIGH (ref 10.3–14.5)
SODIUM SERPL-SCNC: 143 MMOL/L — SIGNIFICANT CHANGE UP (ref 135–145)
SPECIMEN SOURCE: SIGNIFICANT CHANGE UP
WBC # BLD: 9.6 K/UL — SIGNIFICANT CHANGE UP (ref 3.8–10.5)
WBC # FLD AUTO: 9.6 K/UL — SIGNIFICANT CHANGE UP (ref 3.8–10.5)

## 2019-03-12 PROCEDURE — 71045 X-RAY EXAM CHEST 1 VIEW: CPT | Mod: 26

## 2019-03-12 PROCEDURE — 99291 CRITICAL CARE FIRST HOUR: CPT

## 2019-03-12 RX ORDER — DOCUSATE SODIUM 100 MG
100 CAPSULE ORAL THREE TIMES A DAY
Qty: 0 | Refills: 0 | Status: DISCONTINUED | OUTPATIENT
Start: 2019-03-12 | End: 2019-03-20

## 2019-03-12 RX ORDER — ACETAMINOPHEN 500 MG
975 TABLET ORAL EVERY 6 HOURS
Qty: 0 | Refills: 0 | Status: DISCONTINUED | OUTPATIENT
Start: 2019-03-12 | End: 2019-03-20

## 2019-03-12 RX ORDER — HYDROMORPHONE HYDROCHLORIDE 2 MG/ML
0.5 INJECTION INTRAMUSCULAR; INTRAVENOUS; SUBCUTANEOUS
Qty: 0 | Refills: 0 | Status: DISCONTINUED | OUTPATIENT
Start: 2019-03-12 | End: 2019-03-12

## 2019-03-12 RX ORDER — OXYCODONE HYDROCHLORIDE 5 MG/1
10 TABLET ORAL EVERY 6 HOURS
Qty: 0 | Refills: 0 | Status: DISCONTINUED | OUTPATIENT
Start: 2019-03-12 | End: 2019-03-19

## 2019-03-12 RX ORDER — SODIUM CHLORIDE 9 MG/ML
500 INJECTION, SOLUTION INTRAVENOUS ONCE
Qty: 0 | Refills: 0 | Status: COMPLETED | OUTPATIENT
Start: 2019-03-12 | End: 2019-03-12

## 2019-03-12 RX ORDER — SENNA PLUS 8.6 MG/1
2 TABLET ORAL AT BEDTIME
Qty: 0 | Refills: 0 | Status: DISCONTINUED | OUTPATIENT
Start: 2019-03-12 | End: 2019-03-20

## 2019-03-12 RX ORDER — ENOXAPARIN SODIUM 100 MG/ML
40 INJECTION SUBCUTANEOUS DAILY
Qty: 0 | Refills: 0 | Status: DISCONTINUED | OUTPATIENT
Start: 2019-03-12 | End: 2019-03-20

## 2019-03-12 RX ORDER — OXYCODONE HYDROCHLORIDE 5 MG/1
5 TABLET ORAL EVERY 4 HOURS
Qty: 0 | Refills: 0 | Status: DISCONTINUED | OUTPATIENT
Start: 2019-03-12 | End: 2019-03-19

## 2019-03-12 RX ORDER — BACLOFEN 100 %
10 POWDER (GRAM) MISCELLANEOUS EVERY 12 HOURS
Qty: 0 | Refills: 0 | Status: DISCONTINUED | OUTPATIENT
Start: 2019-03-12 | End: 2019-03-20

## 2019-03-12 RX ORDER — TAMSULOSIN HYDROCHLORIDE 0.4 MG/1
0.4 CAPSULE ORAL AT BEDTIME
Qty: 0 | Refills: 0 | Status: DISCONTINUED | OUTPATIENT
Start: 2019-03-12 | End: 2019-03-20

## 2019-03-12 RX ADMIN — Medication 100 MILLIGRAM(S): at 13:30

## 2019-03-12 RX ADMIN — Medication 100 MILLIGRAM(S): at 22:49

## 2019-03-12 RX ADMIN — CEFTRIAXONE 100 GRAM(S): 500 INJECTION, POWDER, FOR SOLUTION INTRAMUSCULAR; INTRAVENOUS at 00:37

## 2019-03-12 RX ADMIN — ENOXAPARIN SODIUM 40 MILLIGRAM(S): 100 INJECTION SUBCUTANEOUS at 13:29

## 2019-03-12 RX ADMIN — Medication 10 MILLIGRAM(S): at 17:42

## 2019-03-12 RX ADMIN — SODIUM CHLORIDE 2000 MILLILITER(S): 9 INJECTION, SOLUTION INTRAVENOUS at 04:15

## 2019-03-12 RX ADMIN — Medication 250 MILLIGRAM(S): at 05:04

## 2019-03-12 RX ADMIN — CEFTRIAXONE 100 GRAM(S): 500 INJECTION, POWDER, FOR SOLUTION INTRAMUSCULAR; INTRAVENOUS at 22:49

## 2019-03-12 RX ADMIN — HYDROMORPHONE HYDROCHLORIDE 0.5 MILLIGRAM(S): 2 INJECTION INTRAMUSCULAR; INTRAVENOUS; SUBCUTANEOUS at 10:14

## 2019-03-12 RX ADMIN — OXYCODONE HYDROCHLORIDE 5 MILLIGRAM(S): 5 TABLET ORAL at 23:01

## 2019-03-12 RX ADMIN — SENNA PLUS 2 TABLET(S): 8.6 TABLET ORAL at 22:49

## 2019-03-12 RX ADMIN — OXYCODONE HYDROCHLORIDE 5 MILLIGRAM(S): 5 TABLET ORAL at 14:00

## 2019-03-12 RX ADMIN — CHLORHEXIDINE GLUCONATE 1 APPLICATION(S): 213 SOLUTION TOPICAL at 05:06

## 2019-03-12 RX ADMIN — HYDROMORPHONE HYDROCHLORIDE 0.5 MILLIGRAM(S): 2 INJECTION INTRAMUSCULAR; INTRAVENOUS; SUBCUTANEOUS at 10:30

## 2019-03-12 RX ADMIN — OXYCODONE HYDROCHLORIDE 5 MILLIGRAM(S): 5 TABLET ORAL at 13:29

## 2019-03-12 RX ADMIN — TAMSULOSIN HYDROCHLORIDE 0.4 MILLIGRAM(S): 0.4 CAPSULE ORAL at 22:49

## 2019-03-12 NOTE — PROGRESS NOTE ADULT - ASSESSMENT
43 year old gentleman s/p right lap converted to open simple nephrectomy with for infected non-functioning kidney. SICU post-operatively for hemodynamic resuscitation and ventilatory management.     PLAN    Neuro: Sedation while intubated  - Propofol for sedation    Respiratory: Left intubated post-operatively due to hemodynamic instability  - SBT in am     Cardiovascular: Hypotension on phenylephrine  - S/p resuscitation with 1500ml of fluid, re-asess volume status, patient may be hypotensive due to sedation as patient has not responded to fluid resuscitation despite evidnece of hypovolemia on Ultrasound.   - Wean phenylephrine as tolerated    GI: No active issues  - NPO for now    Renal: No active issues  - LR @ 100ml/hr    Heme: No active issues  - Lovenox for VTE prophylaxis    Endo: No active issues  - Monitor serum glucose on BMP    Dispo: SICU full code    - Bryant Rose PA-C

## 2019-03-12 NOTE — AIRWAY REMOVAL NOTE  ADULT & PEDS - ARTIFICAL AIRWAY REMOVAL COMMENTS
[place] order for extubation verified.  The patient was identified by full name and birth day compared to the identification band. Present during the procedure was  Berta VEE. Pt successfully extubated to room air sat 97^ no distress noted.
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
## 2019-03-12 NOTE — PROGRESS NOTE ADULT - ASSESSMENT
A/P: 43y Male s/p right lap converted to open simple nephrectomy, initially requiring pressors post op, kept intubated and transferred to SICU. Currently intubated and alert despite propofol, on pressors, being weaned  - DVT prophylaxis/OOB  - wean vent and pressors  - serial abdominal exams   - Strict I&O's  - lindsey and left NT to gravity drainage   - ELIAS to bulb suction  - Analgesia and antiemetics as needed  - NPO  - AM labs  - f/u urine and blood cultures  - cont vanco/ctx  - SICU care appreciated

## 2019-03-12 NOTE — PROGRESS NOTE ADULT - SUBJECTIVE AND OBJECTIVE BOX
UROLOGY DAILY PROGRESS NOTE:     Subjective: Patient seen and examined at bedside. Intubated and on pressors.  Alert and responsive.       Objective:  Vital signs  T(F): , Max: 98.3 (03-11-19 @ 09:39)  HR: 85 (03-12-19 @ 06:45)  BP: 102/60 (03-12-19 @ 00:45)  SpO2: 97% (03-12-19 @ 06:45)  Wt(kg): --    I&O's Summary    11 Mar 2019 07:01  -  12 Mar 2019 07:00  --------------------------------------------------------  IN: 1679.8 mL / OUT: 1355 mL / NET: 324.8 mL    I&O's Detail    11 Mar 2019 07:01  -  12 Mar 2019 07:00  --------------------------------------------------------  IN:    IV PiggyBack: 300 mL    lactated ringers.: 300 mL    lactated ringers.: 800 mL    phenylephrine   Infusion: 85.9 mL    propofol Infusion: 193.9 mL  Total IN: 1679.8 mL    OUT:    Bulb: 140 mL    Drain: 200 mL    Drain: 50 mL    Indwelling Catheter - Urethral: 965 mL  Total OUT: 1355 mL    Total NET: 324.8 mL          Gen: NAD  Pulm: No respiratory distress, no subcostal retractions  CV: RRR, no JVD  Abd: distended, + tenderness, dressing CDI, ELIAS drain-sanguinous fluid   Back: L NT x2- clear yellow urine  : lindsey- clear urine     Labs:  03-12  9.6   / 36.1  /0.54   03-11  5.9   / 34.3  /0.53                           12.1   9.6   )-----------( 220      ( 12 Mar 2019 03:45 )             36.1     03-12    143  |  104  |  7   ----------------------------<  125<H>  4.1   |  20<L>  |  0.54    Ca    8.8      12 Mar 2019 03:45  Phos  4.1     03-12  Mg     2.3     03-12      PT/INR - ( 11 Mar 2019 20:49 )   PT: 14.0 sec;   INR: 1.22 ratio         PTT - ( 11 Mar 2019 20:49 )  PTT:28.9 sec

## 2019-03-13 LAB
-  COAGULASE NEGATIVE STAPHYLOCOCCUS: SIGNIFICANT CHANGE UP
GRAM STN FLD: SIGNIFICANT CHANGE UP
METHOD TYPE: SIGNIFICANT CHANGE UP

## 2019-03-13 PROCEDURE — 71045 X-RAY EXAM CHEST 1 VIEW: CPT | Mod: 26

## 2019-03-13 RX ORDER — SODIUM CHLORIDE 9 MG/ML
1000 INJECTION INTRAMUSCULAR; INTRAVENOUS; SUBCUTANEOUS ONCE
Qty: 0 | Refills: 0 | Status: COMPLETED | OUTPATIENT
Start: 2019-03-13 | End: 2019-03-13

## 2019-03-13 RX ORDER — CEFTRIAXONE 500 MG/1
1 INJECTION, POWDER, FOR SOLUTION INTRAMUSCULAR; INTRAVENOUS ONCE
Qty: 0 | Refills: 0 | Status: COMPLETED | OUTPATIENT
Start: 2019-03-13 | End: 2019-03-13

## 2019-03-13 RX ORDER — CEFTRIAXONE 500 MG/1
1 INJECTION, POWDER, FOR SOLUTION INTRAMUSCULAR; INTRAVENOUS EVERY 24 HOURS
Qty: 0 | Refills: 0 | Status: DISCONTINUED | OUTPATIENT
Start: 2019-03-14 | End: 2019-03-20

## 2019-03-13 RX ORDER — CEFTRIAXONE 500 MG/1
INJECTION, POWDER, FOR SOLUTION INTRAMUSCULAR; INTRAVENOUS
Qty: 0 | Refills: 0 | Status: DISCONTINUED | OUTPATIENT
Start: 2019-03-13 | End: 2019-03-20

## 2019-03-13 RX ORDER — ACETAMINOPHEN 500 MG
650 TABLET ORAL ONCE
Qty: 0 | Refills: 0 | Status: COMPLETED | OUTPATIENT
Start: 2019-03-13 | End: 2019-03-13

## 2019-03-13 RX ORDER — SODIUM CHLORIDE 9 MG/ML
1000 INJECTION, SOLUTION INTRAVENOUS
Qty: 0 | Refills: 0 | Status: DISCONTINUED | OUTPATIENT
Start: 2019-03-13 | End: 2019-03-16

## 2019-03-13 RX ADMIN — Medication 100 MILLIGRAM(S): at 06:58

## 2019-03-13 RX ADMIN — TAMSULOSIN HYDROCHLORIDE 0.4 MILLIGRAM(S): 0.4 CAPSULE ORAL at 21:13

## 2019-03-13 RX ADMIN — Medication 10 MILLIGRAM(S): at 17:10

## 2019-03-13 RX ADMIN — Medication 100 MILLIGRAM(S): at 15:18

## 2019-03-13 RX ADMIN — OXYCODONE HYDROCHLORIDE 10 MILLIGRAM(S): 5 TABLET ORAL at 15:50

## 2019-03-13 RX ADMIN — SENNA PLUS 2 TABLET(S): 8.6 TABLET ORAL at 21:13

## 2019-03-13 RX ADMIN — OXYCODONE HYDROCHLORIDE 10 MILLIGRAM(S): 5 TABLET ORAL at 15:20

## 2019-03-13 RX ADMIN — Medication 10 MILLIGRAM(S): at 06:58

## 2019-03-13 RX ADMIN — SODIUM CHLORIDE 2000 MILLILITER(S): 9 INJECTION INTRAMUSCULAR; INTRAVENOUS; SUBCUTANEOUS at 17:09

## 2019-03-13 RX ADMIN — Medication 650 MILLIGRAM(S): at 06:58

## 2019-03-13 RX ADMIN — OXYCODONE HYDROCHLORIDE 10 MILLIGRAM(S): 5 TABLET ORAL at 21:45

## 2019-03-13 RX ADMIN — ENOXAPARIN SODIUM 40 MILLIGRAM(S): 100 INJECTION SUBCUTANEOUS at 15:17

## 2019-03-13 RX ADMIN — Medication 100 MILLIGRAM(S): at 21:13

## 2019-03-13 RX ADMIN — OXYCODONE HYDROCHLORIDE 10 MILLIGRAM(S): 5 TABLET ORAL at 21:15

## 2019-03-13 NOTE — PROGRESS NOTE ADULT - SUBJECTIVE AND OBJECTIVE BOX
Urology PA Progress Note    Subjective:  Patient seen and examined at bedside. No acute events overnight. Denies flatus/BM. Admit to belching     Objective:  Vital signs  T(F): , Max: 100.6 (03-13-19 @ 06:24)  HR: 91 (03-13-19 @ 06:24)  BP: 136/80 (03-13-19 @ 06:24)  SpO2: 98% (03-13-19 @ 06:24)  Wt(kg): --    Output     03-12 @ 07:01  -  03-13 @ 07:00  --------------------------------------------------------  IN: 1250 mL / OUT: 840 mL / NET: 410 mL        Physical Exam:  Gen: NAD. AAOx3  Pulm: nonlabored  Abd: softly distended, NT. surgical incisions c/d/i    Labs:  03-12  9.6   / 36.1  /0.54   03-11  5.9   / 34.3  /0.53                           12.1   9.6   )-----------( 220      ( 12 Mar 2019 03:45 )             36.1     03-12    143  |  104  |  7   ----------------------------<  125<H>  4.1   |  20<L>  |  0.54    Ca    8.8      12 Mar 2019 03:45  Phos  4.1     03-12  Mg     2.3     03-12      PT/INR - ( 11 Mar 2019 20:49 )   PT: 14.0 sec;   INR: 1.22 ratio         PTT - ( 11 Mar 2019 20:49 )  PTT:28.9 sec      Cx: .Blood Blood  03-12 @ 03:29   No growth to date.  --  --      .Urine Catheterized  03-12 @ 03:19   <10,000 CFU/mL Normal Urogenital Sirisha  --  --      Skin left renal stone  03-06 @ 09:16   Moderate Providencia stuartii  --  Providencia stuartii      .Urine Nephrostomy - Right  02-20 @ 00:33   50,000 - 99,000 CFU/mL Providencia stuartii  --  Providencia stuartii      URINE KIDNEY  01-14 @ 18:36 --  --  --      URINE KIDNEY  01-14 @ 18:34 --  --  Providencia stuartii      URINE CATHETER  01-13 @ 04:48 --  --  --      BLOOD PERIPHERAL  01-12 @ 22:39 --    NO ORGANISMS ISOLATED  --      .Urine Clean Catch (Midstream)  01-07 @ 16:50   Culture grew 3 or more types of organisms which indicate  collection contamination; consider recollection only if clinically  indicated.  --  --

## 2019-03-13 NOTE — PROGRESS NOTE ADULT - ASSESSMENT
43M s/p right lap converted to open simple nephrectomy, initially requiring pressors post op, kept intubated and transferred to SICU. Extubated 3/12, off pressors and now on the floor    - Clears/IVF  - pain control prn  - check GI fxn  - lindsey removed. TOV, PVR  - OOB/ambulate  - CXR  - ELIAS drain care. Monitor output
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
## 2019-03-13 NOTE — DIETITIAN INITIAL EVALUATION ADULT. - PERTINENT MEDS FT
MEDICATIONS  (STANDING):  baclofen 10 milliGRAM(s) Oral every 12 hours  docusate sodium 100 milliGRAM(s) Oral three times a day  enoxaparin Injectable 40 milliGRAM(s) SubCutaneous daily  lactated ringers. 1000 milliLiter(s) (75 mL/Hr) IV Continuous <Continuous>  senna 2 Tablet(s) Oral at bedtime  tamsulosin 0.4 milliGRAM(s) Oral at bedtime    MEDICATIONS  (PRN):  acetaminophen   Tablet .. 975 milliGRAM(s) Oral every 6 hours PRN Mild Pain (1 - 3)  HYDROmorphone  Injectable 0.5 milliGRAM(s) IV Push every 3 hours PRN breakthrough pain  oxyCODONE    IR 5 milliGRAM(s) Oral every 4 hours PRN Moderate Pain (4 - 6)  oxyCODONE    IR 10 milliGRAM(s) Oral every 6 hours PRN Severe Pain (7 - 10)

## 2019-03-13 NOTE — DIETITIAN INITIAL EVALUATION ADULT. - ENERGY NEEDS
HT 73 inches,  pounds and stable.   pounds.  BMI 22.4  Dxd with L PCNL, s/p R Nephrectomy POD 2.  Skin intact but at risk due to paraplegia.  Unable to assess muscle due to being wrapped up.

## 2019-03-14 LAB
CULTURE RESULTS: SIGNIFICANT CHANGE UP
ORGANISM # SPEC MICROSCOPIC CNT: SIGNIFICANT CHANGE UP
ORGANISM # SPEC MICROSCOPIC CNT: SIGNIFICANT CHANGE UP
SPECIMEN SOURCE: SIGNIFICANT CHANGE UP

## 2019-03-14 RX ORDER — VANCOMYCIN HCL 1 G
1000 VIAL (EA) INTRAVENOUS EVERY 12 HOURS
Qty: 0 | Refills: 0 | Status: DISCONTINUED | OUTPATIENT
Start: 2019-03-14 | End: 2019-03-15

## 2019-03-14 RX ORDER — ACETAMINOPHEN 500 MG
650 TABLET ORAL EVERY 6 HOURS
Qty: 0 | Refills: 0 | Status: DISCONTINUED | OUTPATIENT
Start: 2019-03-14 | End: 2019-03-20

## 2019-03-14 RX ORDER — POLYETHYLENE GLYCOL 3350 17 G/17G
17 POWDER, FOR SOLUTION ORAL DAILY
Qty: 0 | Refills: 0 | Status: DISCONTINUED | OUTPATIENT
Start: 2019-03-14 | End: 2019-03-20

## 2019-03-14 RX ADMIN — Medication 650 MILLIGRAM(S): at 01:34

## 2019-03-14 RX ADMIN — POLYETHYLENE GLYCOL 3350 17 GRAM(S): 17 POWDER, FOR SOLUTION ORAL at 10:42

## 2019-03-14 RX ADMIN — CEFTRIAXONE 100 GRAM(S): 500 INJECTION, POWDER, FOR SOLUTION INTRAMUSCULAR; INTRAVENOUS at 00:36

## 2019-03-14 RX ADMIN — OXYCODONE HYDROCHLORIDE 5 MILLIGRAM(S): 5 TABLET ORAL at 23:29

## 2019-03-14 RX ADMIN — OXYCODONE HYDROCHLORIDE 10 MILLIGRAM(S): 5 TABLET ORAL at 09:34

## 2019-03-14 RX ADMIN — OXYCODONE HYDROCHLORIDE 10 MILLIGRAM(S): 5 TABLET ORAL at 10:25

## 2019-03-14 RX ADMIN — Medication 250 MILLIGRAM(S): at 06:30

## 2019-03-14 RX ADMIN — ENOXAPARIN SODIUM 40 MILLIGRAM(S): 100 INJECTION SUBCUTANEOUS at 13:39

## 2019-03-14 RX ADMIN — TAMSULOSIN HYDROCHLORIDE 0.4 MILLIGRAM(S): 0.4 CAPSULE ORAL at 22:59

## 2019-03-14 RX ADMIN — Medication 250 MILLIGRAM(S): at 18:45

## 2019-03-14 RX ADMIN — Medication 10 MILLIGRAM(S): at 06:30

## 2019-03-14 RX ADMIN — SODIUM CHLORIDE 75 MILLILITER(S): 9 INJECTION, SOLUTION INTRAVENOUS at 18:50

## 2019-03-14 RX ADMIN — Medication 10 MILLIGRAM(S): at 18:50

## 2019-03-14 RX ADMIN — Medication 650 MILLIGRAM(S): at 22:59

## 2019-03-14 RX ADMIN — Medication 100 MILLIGRAM(S): at 06:30

## 2019-03-14 RX ADMIN — CEFTRIAXONE 100 GRAM(S): 500 INJECTION, POWDER, FOR SOLUTION INTRAMUSCULAR; INTRAVENOUS at 23:03

## 2019-03-14 RX ADMIN — Medication 100 MILLIGRAM(S): at 13:38

## 2019-03-14 RX ADMIN — OXYCODONE HYDROCHLORIDE 5 MILLIGRAM(S): 5 TABLET ORAL at 22:59

## 2019-03-14 NOTE — PROVIDER CONTACT NOTE (OTHER) - REASON
pt refused to check vitals, refused to feed, clean, turn and position change, refused to physical assessment.

## 2019-03-14 NOTE — PROVIDER CONTACT NOTE (OTHER) - ASSESSMENT
pt is A&ox4, able to verbalize understanding. pt has no complaints of chest pain, SOB, lightheadedness, dizziness, n/v. pt is refusing for RN to complete full head to toe assessment. ELIAS drain & condom cath to lindsey bag c/d/i. febrile 100.5, pt refusing for rest of vitals to be taken. 2 sets of blood cx ordered, only 1 set obtained during evening, pt refused for 2nd set to be drawn. will attempt to obtain 2nd set of blood cx in the AM w/ AM labs. IV abx rocephin given, IVF maintained.

## 2019-03-14 NOTE — PROGRESS NOTE ADULT - SUBJECTIVE AND OBJECTIVE BOX
Doing well, low grade fever, minimal appetite due to distention. No more belching but no flatus/bm    T(F): 98.4, Max: 100.5 (03-14-19 @ 01:00)  HR: 78  BP: 108/68  SpO2: 100%    Gen NAD  Abd soft, distended,   texas catheter clear yellow      .Blood Blood  03-12Blood Culture PCR      .Urine Catheterized  03-12  <10,000 CFU/mL Normal Urogenital Sirisha

## 2019-03-15 LAB — VANCOMYCIN TROUGH SERPL-MCNC: 5.5 UG/ML — LOW (ref 10–20)

## 2019-03-15 RX ORDER — MINERAL OIL
133 OIL (ML) MISCELLANEOUS ONCE
Qty: 0 | Refills: 0 | Status: COMPLETED | OUTPATIENT
Start: 2019-03-15 | End: 2019-03-15

## 2019-03-15 RX ADMIN — Medication 100 MILLIGRAM(S): at 06:14

## 2019-03-15 RX ADMIN — ENOXAPARIN SODIUM 40 MILLIGRAM(S): 100 INJECTION SUBCUTANEOUS at 14:19

## 2019-03-15 RX ADMIN — Medication 100 MILLIGRAM(S): at 21:31

## 2019-03-15 RX ADMIN — OXYCODONE HYDROCHLORIDE 10 MILLIGRAM(S): 5 TABLET ORAL at 10:40

## 2019-03-15 RX ADMIN — SENNA PLUS 2 TABLET(S): 8.6 TABLET ORAL at 21:31

## 2019-03-15 RX ADMIN — Medication 10 MILLIGRAM(S): at 06:14

## 2019-03-15 RX ADMIN — Medication 300 MILLIGRAM(S): at 21:31

## 2019-03-15 RX ADMIN — Medication 100 MILLIGRAM(S): at 14:20

## 2019-03-15 RX ADMIN — OXYCODONE HYDROCHLORIDE 10 MILLIGRAM(S): 5 TABLET ORAL at 20:42

## 2019-03-15 RX ADMIN — TAMSULOSIN HYDROCHLORIDE 0.4 MILLIGRAM(S): 0.4 CAPSULE ORAL at 21:31

## 2019-03-15 RX ADMIN — OXYCODONE HYDROCHLORIDE 10 MILLIGRAM(S): 5 TABLET ORAL at 21:12

## 2019-03-15 RX ADMIN — Medication 250 MILLIGRAM(S): at 06:14

## 2019-03-15 RX ADMIN — CEFTRIAXONE 100 GRAM(S): 500 INJECTION, POWDER, FOR SOLUTION INTRAMUSCULAR; INTRAVENOUS at 21:32

## 2019-03-15 RX ADMIN — Medication 10 MILLIGRAM(S): at 18:49

## 2019-03-15 RX ADMIN — OXYCODONE HYDROCHLORIDE 10 MILLIGRAM(S): 5 TABLET ORAL at 10:18

## 2019-03-15 RX ADMIN — Medication 5 MILLIGRAM(S): at 21:31

## 2019-03-15 RX ADMIN — Medication 250 MILLIGRAM(S): at 18:49

## 2019-03-15 NOTE — PROGRESS NOTE ADULT - SUBJECTIVE AND OBJECTIVE BOX
UROLOGY PA PROGRESS NOTE:     Subjective:  no acute events overnight. large BM overnight     Objective:  Vital signs  T(F): , Max: 100.9 (03-14-19 @ 22:56)  HR: 91 (03-15-19 @ 06:00)  BP: 124/82 (03-15-19 @ 06:00)  SpO2: 98% (03-15-19 @ 06:00)  Wt(kg): --    Output     03-14 @ 07:01  -  03-15 @ 07:00  --------------------------------------------------------  IN: 2645 mL / OUT: 905 mL / NET: 1740 mL        Physical Exam:  Gen: NAD  Abd: moderately distended, non tender, incisions CDI  : + condom cath draining clear and L NT draining clear     Labs:        Urine Cx:    Culture - Blood (collected 14 Mar 2019 01:31)  Source: .Blood Blood  Preliminary Report (15 Mar 2019 02:00):    No growth to date.

## 2019-03-15 NOTE — PROGRESS NOTE ADULT - ASSESSMENT
43M s/p right lap converted to open simple nephrectomy, initially requiring pressors post op, kept intubated and transferred to SICU. Extubated 3/12, off pressors and now on the floor    - OOB  - monitor GI function  - enema  - continue ceftriaxone

## 2019-03-15 NOTE — PROVIDER CONTACT NOTE (OTHER) - ASSESSMENT
Pt at rest in bed, a/ox4, VSS. Pt abdomen very distended and red creamy drainage noted draining from RUQ transverse incision site. Pt does report pain in abdomen.

## 2019-03-15 NOTE — PROVIDER CONTACT NOTE (OTHER) - REASON
Pt with rigid distended abdomen with cream/red thick fluid draining from RUQ transverse incision site

## 2019-03-16 RX ADMIN — OXYCODONE HYDROCHLORIDE 10 MILLIGRAM(S): 5 TABLET ORAL at 11:31

## 2019-03-16 RX ADMIN — Medication 300 MILLIGRAM(S): at 06:11

## 2019-03-16 RX ADMIN — SENNA PLUS 2 TABLET(S): 8.6 TABLET ORAL at 21:00

## 2019-03-16 RX ADMIN — Medication 100 MILLIGRAM(S): at 21:00

## 2019-03-16 RX ADMIN — OXYCODONE HYDROCHLORIDE 5 MILLIGRAM(S): 5 TABLET ORAL at 16:32

## 2019-03-16 RX ADMIN — OXYCODONE HYDROCHLORIDE 10 MILLIGRAM(S): 5 TABLET ORAL at 12:35

## 2019-03-16 RX ADMIN — Medication 10 MILLIGRAM(S): at 06:11

## 2019-03-16 RX ADMIN — OXYCODONE HYDROCHLORIDE 10 MILLIGRAM(S): 5 TABLET ORAL at 22:17

## 2019-03-16 RX ADMIN — POLYETHYLENE GLYCOL 3350 17 GRAM(S): 17 POWDER, FOR SOLUTION ORAL at 16:31

## 2019-03-16 RX ADMIN — Medication 975 MILLIGRAM(S): at 16:37

## 2019-03-16 RX ADMIN — Medication 10 MILLIGRAM(S): at 18:35

## 2019-03-16 RX ADMIN — Medication 100 MILLIGRAM(S): at 06:11

## 2019-03-16 RX ADMIN — CEFTRIAXONE 100 GRAM(S): 500 INJECTION, POWDER, FOR SOLUTION INTRAMUSCULAR; INTRAVENOUS at 21:09

## 2019-03-16 RX ADMIN — Medication 100 MILLIGRAM(S): at 16:31

## 2019-03-16 RX ADMIN — TAMSULOSIN HYDROCHLORIDE 0.4 MILLIGRAM(S): 0.4 CAPSULE ORAL at 21:00

## 2019-03-16 RX ADMIN — OXYCODONE HYDROCHLORIDE 10 MILLIGRAM(S): 5 TABLET ORAL at 22:47

## 2019-03-16 RX ADMIN — Medication 5 MILLIGRAM(S): at 21:00

## 2019-03-16 RX ADMIN — ENOXAPARIN SODIUM 40 MILLIGRAM(S): 100 INJECTION SUBCUTANEOUS at 16:32

## 2019-03-16 RX ADMIN — Medication 300 MILLIGRAM(S): at 18:35

## 2019-03-16 NOTE — PROGRESS NOTE ADULT - SUBJECTIVE AND OBJECTIVE BOX
Subjective  wound packed overnight   Objective    Vital signs  T(F): , Max: 99.9 (03-15-19 @ 21:46)  HR: 97 (03-16-19 @ 10:00)  BP: 134/88 (03-16-19 @ 10:00)  SpO2: 95% (03-16-19 @ 10:00)  Wt(kg): --    Output     03-15 @ 07:01  -  03-16 @ 07:00  --------------------------------------------------------  IN: 3415 mL / OUT: 2115 mL / NET: 1300 mL        Gen awake alert nad axox3  Abd stable abd distension, nontender. flank incision with staples intact and packing in place, erythema around flank incision stable in size   Back tube in place no hematoma/ ecchymosis  urine yellow       Labs        Urine Cx: Culture - Blood (03.14.19 @ 01:31)    Specimen Source: .Blood Blood    Culture Results:   No growth to date.    Culture - Blood (03.12.19 @ 03:29)    Gram Stain:   Growth in anaerobic bottle: Gram Positive Cocci in Clusters    -  Coagulase negative Staphylococcus: Detec    Specimen Source: .Blood Blood    Organism: Blood Culture PCR    Culture Results:   Growth in anaerobic bottle: Coag Negative Staphylococcus  Single set isolate, possible contaminant. Contact  Microbiology if susceptibility testing clinically  indicated.  "Due to technical problems, Proteus sp. will Not be reported as part of  theBCID panel until further notice"  ***Blood Panel PCR results on this specimen are available  approximately 3 hours after the Gram stain result.***  Gram stain, PCR, and/or culture results may not always  correspond due to difference in methodologies.  ************************************************************  This PCR assay was performed using [employer].  The following targets are tested for: Enterococcus,  vancomycin resistant enterococci, Listeria monocytogenes,  coagulase negative staphylococci, S. aureus,  methicillin resistant S. aureus, Streptococcus agalactiae  (Group B), S. pneumoniae, S. pyogenes (Group A),  Acinetobacter baumannii, Enterobacter cloacae, E. coli,  Klebsiella oxytoca, K. pneumoniae, Proteus sp.,  Serratia marcescens, Haemophilus influenzae,  Neisseria meningitidis, Pseudomonas aeruginosa, Candida  albicans, C. glabrata, C krusei, C parapsilosis,  C. tropicalis and the KPC resistance gene.    Organism Identification: Blood Culture PCR    Method Type: PCR

## 2019-03-17 RX ADMIN — Medication 100 MILLIGRAM(S): at 21:41

## 2019-03-17 RX ADMIN — Medication 300 MILLIGRAM(S): at 06:31

## 2019-03-17 RX ADMIN — SENNA PLUS 2 TABLET(S): 8.6 TABLET ORAL at 21:41

## 2019-03-17 RX ADMIN — TAMSULOSIN HYDROCHLORIDE 0.4 MILLIGRAM(S): 0.4 CAPSULE ORAL at 21:41

## 2019-03-17 RX ADMIN — Medication 100 MILLIGRAM(S): at 06:31

## 2019-03-17 RX ADMIN — OXYCODONE HYDROCHLORIDE 5 MILLIGRAM(S): 5 TABLET ORAL at 12:21

## 2019-03-17 RX ADMIN — OXYCODONE HYDROCHLORIDE 10 MILLIGRAM(S): 5 TABLET ORAL at 08:46

## 2019-03-17 RX ADMIN — Medication 5 MILLIGRAM(S): at 21:41

## 2019-03-17 RX ADMIN — Medication 10 MILLIGRAM(S): at 06:31

## 2019-03-17 RX ADMIN — POLYETHYLENE GLYCOL 3350 17 GRAM(S): 17 POWDER, FOR SOLUTION ORAL at 14:18

## 2019-03-17 RX ADMIN — CEFTRIAXONE 100 GRAM(S): 500 INJECTION, POWDER, FOR SOLUTION INTRAMUSCULAR; INTRAVENOUS at 21:40

## 2019-03-17 RX ADMIN — OXYCODONE HYDROCHLORIDE 5 MILLIGRAM(S): 5 TABLET ORAL at 12:51

## 2019-03-17 RX ADMIN — ENOXAPARIN SODIUM 40 MILLIGRAM(S): 100 INJECTION SUBCUTANEOUS at 14:22

## 2019-03-17 RX ADMIN — OXYCODONE HYDROCHLORIDE 10 MILLIGRAM(S): 5 TABLET ORAL at 15:25

## 2019-03-17 RX ADMIN — Medication 10 MILLIGRAM(S): at 17:56

## 2019-03-17 RX ADMIN — OXYCODONE HYDROCHLORIDE 10 MILLIGRAM(S): 5 TABLET ORAL at 09:16

## 2019-03-17 RX ADMIN — OXYCODONE HYDROCHLORIDE 10 MILLIGRAM(S): 5 TABLET ORAL at 14:55

## 2019-03-17 RX ADMIN — Medication 100 MILLIGRAM(S): at 14:18

## 2019-03-17 RX ADMIN — Medication 300 MILLIGRAM(S): at 17:56

## 2019-03-17 NOTE — PROGRESS NOTE ADULT - SUBJECTIVE AND OBJECTIVE BOX
Subjective  feeling well without complaints   Objective    Vital signs  T(F): , Max: 98.9 (03-16-19 @ 16:42)  HR: 80 (03-17-19 @ 06:33)  BP: 109/73 (03-17-19 @ 06:33)  SpO2: 99% (03-17-19 @ 06:33)  Wt(kg): --    Output     03-16 @ 07:01  -  03-17 @ 07:00  --------------------------------------------------------  IN: 1060 mL / OUT: 1805 mL / NET: -745 mL        Gen awake alert nad axox3  Abd: moderately distended, non tender, incisions CDI  : + condom cath draining clear and L NT draining clear

## 2019-03-17 NOTE — PROGRESS NOTE ADULT - ASSESSMENT
43M s/p right lap converted to open simple nephrectomy, initially requiring pressors post op, kept intubated and transferred to SICU. Extubated 3/12, off pressors and now on the floor  has achieved gi function  complicated by incisional cellulitis and requiring packing. no evidence of dehiscence  packing changed   likely removing franny and nt when discharging  dc planning in am to resume pts home services. will likely also need vns for wound care   abx changed to clinda and ceftriaxone

## 2019-03-18 ENCOUNTER — TRANSCRIPTION ENCOUNTER (OUTPATIENT)
Age: 44
End: 2019-03-18

## 2019-03-18 LAB — COMPN STONE: SIGNIFICANT CHANGE UP

## 2019-03-18 RX ADMIN — OXYCODONE HYDROCHLORIDE 10 MILLIGRAM(S): 5 TABLET ORAL at 00:17

## 2019-03-18 RX ADMIN — CEFTRIAXONE 100 GRAM(S): 500 INJECTION, POWDER, FOR SOLUTION INTRAMUSCULAR; INTRAVENOUS at 22:41

## 2019-03-18 RX ADMIN — POLYETHYLENE GLYCOL 3350 17 GRAM(S): 17 POWDER, FOR SOLUTION ORAL at 12:04

## 2019-03-18 RX ADMIN — Medication 100 MILLIGRAM(S): at 22:25

## 2019-03-18 RX ADMIN — Medication 5 MILLIGRAM(S): at 22:25

## 2019-03-18 RX ADMIN — Medication 300 MILLIGRAM(S): at 06:41

## 2019-03-18 RX ADMIN — Medication 10 MILLIGRAM(S): at 17:57

## 2019-03-18 RX ADMIN — Medication 300 MILLIGRAM(S): at 17:57

## 2019-03-18 RX ADMIN — OXYCODONE HYDROCHLORIDE 10 MILLIGRAM(S): 5 TABLET ORAL at 12:34

## 2019-03-18 RX ADMIN — Medication 10 MILLIGRAM(S): at 06:40

## 2019-03-18 RX ADMIN — ENOXAPARIN SODIUM 40 MILLIGRAM(S): 100 INJECTION SUBCUTANEOUS at 17:57

## 2019-03-18 RX ADMIN — Medication 100 MILLIGRAM(S): at 06:40

## 2019-03-18 RX ADMIN — OXYCODONE HYDROCHLORIDE 10 MILLIGRAM(S): 5 TABLET ORAL at 20:45

## 2019-03-18 RX ADMIN — OXYCODONE HYDROCHLORIDE 10 MILLIGRAM(S): 5 TABLET ORAL at 12:04

## 2019-03-18 RX ADMIN — OXYCODONE HYDROCHLORIDE 10 MILLIGRAM(S): 5 TABLET ORAL at 00:47

## 2019-03-18 RX ADMIN — TAMSULOSIN HYDROCHLORIDE 0.4 MILLIGRAM(S): 0.4 CAPSULE ORAL at 22:25

## 2019-03-18 RX ADMIN — SENNA PLUS 2 TABLET(S): 8.6 TABLET ORAL at 22:25

## 2019-03-18 RX ADMIN — OXYCODONE HYDROCHLORIDE 10 MILLIGRAM(S): 5 TABLET ORAL at 20:00

## 2019-03-18 NOTE — PROGRESS NOTE ADULT - ASSESSMENT
44 y/o M s/p right lap converted to open simple nephrectomy, initially requiring pressors post op, kept intubated and transferred to SICU. Extubated 3/12, off pressors and now on the floor  has achieved GI function complicated by incisional cellulitis and requiring packing  - D/C planning with Olvera  - VNS for daily wound packing  - Clindamycin x 7 days for wound   - F/U In afternoon to remove nephrostomy tube 42 y/o M s/p right lap converted to open simple nephrectomy, initially requiring pressors post op, kept intubated and transferred to SICU. Extubated 3/12, off pressors and now on the floor  has achieved GI function complicated by incisional cellulitis and requiring packing  - D/C planning with Olvera  - VNS for daily wound packing  - Clindamycin x 7 days for wound   - F/U In afternoon to remove nephrostomy tube & ELIAS 44 y/o M s/pL PCNL 3/5 POd # 7 s/p  right lap converted to open simple nephrectomy, initially requiring pressors post op, kept intubated and transferred to SICU. Extubated 3/12,  on the floor  has achieved GI function   incisional cellulitis and requiring packing  - VNS for daily wound packing  - Clindamycin x 7 days for wound   - F/U In afternoon to remove nephrostomy tube & ELIAS  DVt prophylaxis

## 2019-03-18 NOTE — DISCHARGE NOTE PROVIDER - HOSPITAL COURSE
42 yo male admitted 3/5 s/p scheduled L PCNL. passed tov POD#2. Enemas given for distended abdomen and constipation. mg citrate given as bowel prep for plan for upcoming surgery    3/11- underwent right lap converted to open simple nephrectomy for strophic kidney and nephrolithiasis. Post op was in SICU, intubated and requiring pressure support overnight.     extubated 3/12. kept on clear diet until passing gas/BMs. 3/15 advanced to regular diet. 3/16 complicated by incisional cellulitis and requiring packing. no evidence of dehiscence. abx changed to clindamycin and ceftriaxone     3/18 ELIAS and NT d/c'd prior to discharge. Home with clinda x5 additional days and VNS for wound packing. 44 yo male admitted 3/5 s/p scheduled L PCNL. passed tov POD#2. Enemas given for distended abdomen and constipation. mg citrate given as bowel prep for plan for upcoming surgery    3/11- underwent right lap converted to open simple nephrectomy for strophic kidney and nephrolithiasis. Post op was in SICU, intubated and requiring pressure support overnight.     extubated 3/12. kept on clear diet until passing gas/BMs. 3/15 advanced to regular diet. 3/16 complicated by incisional cellulitis and requiring packing. no evidence of dehiscence. abx changed to clindamycin and ceftriaxone     3/18 NT d/c'd. Home with ELIAS and clinda x6 additional days and VNS for wound packing. 44 yo male admitted 3/5 s/p scheduled L PCNL. passed tov POD#2. Enemas given for distended abdomen and constipation. mg citrate given as bowel prep for plan for upcoming surgery    3/11- underwent right lap converted to open simple nephrectomy for strophic kidney and nephrolithiasis. Post op was in SICU, intubated and requiring pressure support overnight.     extubated 3/12. kept on clear diet until passing gas/BMs. 3/15 advanced to regular diet. 3/16 complicated by incisional cellulitis and requiring packing. no evidence of dehiscence. abx changed to clindamycin and ceftriaxone     3/18 NT d/c'd. Home 3/19 with ELIAS and clinda x6 additional days and VNS for wound packing.

## 2019-03-18 NOTE — PROGRESS NOTE ADULT - SUBJECTIVE AND OBJECTIVE BOX
Pt seen at bedside this morning; denied any acute overnight events     Objective    Vital signs  T(F): , Max: 100 (03-17-19 @ 21:45)  HR: 82 (03-18-19 @ 06:44)  BP: 118/72 (03-18-19 @ 06:44)  SpO2: 98% (03-18-19 @ 06:44)  Wt(kg): --    Output     03-17 @ 07:01  -  03-18 @ 07:00  --------------------------------------------------------  IN: 1720 mL / OUT: 3260 mL / NET: -1540 mL        Gen: NAD A&O x 3   Abd: Soft: NT; moderated distention; No suprapubic tenderness; mildly erythematic Incision from nephrectomy appreciated -- Draining purulent fluid; packing changed x 4; ELIAS draining blood tinged purulent fluid  Back -- No CVA tenderness   -- Condom catheter appreciated draining clear yellow urine    Labs        Urine Cx: ?  Blood Cx: ?    Imaging Pt seen at bedside this morning; denied any acute overnight events     Objective    Vital signs  T(F): , Max: 100 (03-17-19 @ 21:45)  HR: 82 (03-18-19 @ 06:44)  BP: 118/72 (03-18-19 @ 06:44)  SpO2: 98% (03-18-19 @ 06:44)  Wt(kg): --    Output     03-17 @ 07:01  -  03-18 @ 07:00  --------------------------------------------------------  IN: 1720 mL / OUT: 3260 mL / NET: -1540 mL        Gen: NAD A&O x 3   Abd: Soft: NT; moderated distention; No suprapubic tenderness; mildly erythematic Incision from nephrectomy appreciated; packing changed x 4; ELIAS draining blood tinged purulent fluid  Back -- No CVA tenderness   -- Condom catheter appreciated draining clear yellow urine    Labs        Urine Cx: ?  Blood Cx: ?    Imaging

## 2019-03-18 NOTE — DISCHARGE NOTE PROVIDER - CARE PROVIDERS DIRECT ADDRESSES
[email or web address]

## 2019-03-18 NOTE — DISCHARGE NOTE PROVIDER - CARE PROVIDER_API CALL
Hoenig, David M (MD)  Urology  [facility] Dept of Urology, [address] [place] [address]  Phone: [phone]  Fax: [phone]  Follow Up Time:

## 2019-03-18 NOTE — DISCHARGE NOTE PROVIDER - NSDCCPCAREPLAN_GEN_ALL_CORE_FT
PRINCIPAL DISCHARGE DIAGNOSIS  Diagnosis: Nephrolithiasis  Assessment and Plan of Treatment: Call the office if you have fever greater than 101, difficulty urinating, pain not relieved with pain medication, nausea/vomiting.      SECONDARY DISCHARGE DIAGNOSES  Diagnosis: Wound cellulitis  Assessment and Plan of Treatment: complete entire course of antibiotics    Diagnosis: Quadriplegia  Assessment and Plan of Treatment: follow up with your PMD as usual. PRINCIPAL DISCHARGE DIAGNOSIS  Diagnosis: Nephrolithiasis  Assessment and Plan of Treatment: Call the office if you have fever greater than 101, difficulty urinating, pain not relieved with pain medication, nausea/vomiting.  Do not submerge your self in water until the wound is fully healed      SECONDARY DISCHARGE DIAGNOSES  Diagnosis: Wound cellulitis  Assessment and Plan of Treatment: complete entire course of antibiotics  the wound should be packed daily until it heals from the inside out.    Diagnosis: Quadriplegia  Assessment and Plan of Treatment: follow up with your PMD as usual.

## 2019-03-18 NOTE — DISCHARGE NOTE PROVIDER - NSDCFUADDINST_GEN_ALL_CORE_FT
ELIAS to drainage, empty daily or more frequently as needed  daily wound packing with 1/4 inch iodoform packing right abdominal ELIAS to drainage, empty daily or more frequently as needed.  daily wound packing with 1/4 inch iodoform packing:  there are 5 openings along the right abdominal incision requiring packing.   two of which are about 2cm, other 3 are 1.5cm long.   They are about 1cm in depth.   Hopefully they will only require packing for an additional 7-10 days.

## 2019-03-19 LAB
CULTURE RESULTS: SIGNIFICANT CHANGE UP
SPECIMEN SOURCE: SIGNIFICANT CHANGE UP

## 2019-03-19 RX ADMIN — Medication 10 MILLIGRAM(S): at 18:37

## 2019-03-19 RX ADMIN — Medication 100 MILLIGRAM(S): at 22:19

## 2019-03-19 RX ADMIN — OXYCODONE HYDROCHLORIDE 5 MILLIGRAM(S): 5 TABLET ORAL at 20:45

## 2019-03-19 RX ADMIN — Medication 300 MILLIGRAM(S): at 06:55

## 2019-03-19 RX ADMIN — Medication 10 MILLIGRAM(S): at 06:55

## 2019-03-19 RX ADMIN — Medication 5 MILLIGRAM(S): at 22:19

## 2019-03-19 RX ADMIN — Medication 300 MILLIGRAM(S): at 18:37

## 2019-03-19 RX ADMIN — ENOXAPARIN SODIUM 40 MILLIGRAM(S): 100 INJECTION SUBCUTANEOUS at 11:57

## 2019-03-19 RX ADMIN — TAMSULOSIN HYDROCHLORIDE 0.4 MILLIGRAM(S): 0.4 CAPSULE ORAL at 22:19

## 2019-03-19 RX ADMIN — OXYCODONE HYDROCHLORIDE 5 MILLIGRAM(S): 5 TABLET ORAL at 19:55

## 2019-03-19 RX ADMIN — OXYCODONE HYDROCHLORIDE 5 MILLIGRAM(S): 5 TABLET ORAL at 11:55

## 2019-03-19 RX ADMIN — POLYETHYLENE GLYCOL 3350 17 GRAM(S): 17 POWDER, FOR SOLUTION ORAL at 11:57

## 2019-03-19 RX ADMIN — CEFTRIAXONE 100 GRAM(S): 500 INJECTION, POWDER, FOR SOLUTION INTRAMUSCULAR; INTRAVENOUS at 22:19

## 2019-03-19 RX ADMIN — Medication 100 MILLIGRAM(S): at 06:55

## 2019-03-19 RX ADMIN — Medication 100 MILLIGRAM(S): at 11:58

## 2019-03-19 RX ADMIN — OXYCODONE HYDROCHLORIDE 5 MILLIGRAM(S): 5 TABLET ORAL at 12:30

## 2019-03-19 RX ADMIN — SENNA PLUS 2 TABLET(S): 8.6 TABLET ORAL at 22:19

## 2019-03-19 NOTE — PROVIDER CONTACT NOTE (OTHER) - NAME OF MD/NP/PA/DO NOTIFIED:
MD Suero - URo
NANDA Alvarado
NANDA Barnett
NANDA Dawson
NANDA Oshea
NANDA Oshea
NANDA Parikh
NANDA Parikh
swetha from urology
urology pa alejandra and ronnie
NANDA Oshea
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
## 2019-03-19 NOTE — PROGRESS NOTE ADULT - ATTENDING COMMENTS
Agree with plan.  wound care, f/u with Dr. Pelayo  F/u with Dr. Hoenig for stone prevention f/u
Dr. Lechuga (Attending Physician)  Septic Shock from complicated UTI with Providencia sensitive to ceftriaxone.  Acute Resp Failure extubated this morning  Start diet  Follow up urology    This patient was critically ill with one or more vital organ systems at a high probability of imminent or life threatening deterioration. Complex decision making was required to assess and treat single or multiple vital organ system failure and/or prevent further life threatening deterioration of the patient’s condition.  All recent labwork and imaging was reviewed.  Total Critical Care Time 35
Preop for OR tomorrow for lap nephrectomy.  Continue bowel regimen
Preop planning for nephrectomy.  Continue bowel regimen.

## 2019-03-19 NOTE — PROVIDER CONTACT NOTE (OTHER) - ASSESSMENT
Pt A&Ox4, refusing head to to assessment, T&P at this time and PAS for overnight, states "just leave me alone okay bro?" RN attempted education in regards to skin, pt was not open to discussion

## 2019-03-19 NOTE — PROVIDER CONTACT NOTE (OTHER) - SITUATION
Patient's BP 88/54, HR 86
Pt at rest in bed, a/ox4, VSS. Pt abdomen very distended and red creamy drainage noted draining from RUQ transverse incision site. Pt does report pain in abdomen.
Pt is Hypertensive
Pt refusing AM care, feeding and turn and position
Pt refusing abdominal xray
Pt refusing head to toe assessment, T&P, PAS
Pt refusing vitals, parts of physical assessment as well as turning and positioning for skin safety
oral temp 100.5
ot refusing am labs
pt refused to check vitals, refused to eat and drink except with meds,refused clean, turn and position change, refused to physical assessment, refused to empty and look at the jpdrain and lindsey.
PT hypertensive, abd severely distended

## 2019-03-19 NOTE — PROVIDER CONTACT NOTE (OTHER) - RECOMMENDATIONS
Lactulose x 1 dose
Notify provider of refusals
Notify provider, continue w/ hourly rounding, offer T&P Q2H
continue to monitor
notify PA.
notify covering team of refusals, continue to hourly round and offer pt assistance
pt requested not to be bothered and ask any questions. pt was getting angry upon ask for help. pt stated get out off the room and keep curtain closed and do not enter his room and to be bothered.
re educate on importance of having labs drawn
Reposition patient, notify covering MD, bedside eval, abdominal xray

## 2019-03-19 NOTE — PROGRESS NOTE ADULT - ASSESSMENT
42 y/o M s/pL PCNL 3/5 POd # 7 s/p  right lap converted to open simple nephrectomy, initially requiring pressors post op, kept intubated and transferred to SICU. Extubated 3/12,  on the floor  has achieved GI function   incisional cellulitis and requiring packing  - VNS for daily wound packing  - Clindamycin x 7 days for wound   - Keep ELIAS drain   DVt prophylaxis  - DC home today

## 2019-03-19 NOTE — PROVIDER CONTACT NOTE (OTHER) - DATE AND TIME:
08-Mar-2019 01:00
10-Mar-2019 00:10
13-Mar-2019
14-Mar-2019 01:16
14-Mar-2019 18:00
15-Mar-2019 22:50
16-Mar-2019 08:33
18-Mar-2019 14:00
18-Mar-2019 23:00
19-Mar-2019 20:15
07-Mar-2019 23:48

## 2019-03-19 NOTE — PROVIDER CONTACT NOTE (OTHER) - ASSESSMENT
Pt is A&Ox4, verbally aggressive and abusive to staff, pt refusing vitals, parts of physical assessment as well as turning and positioning for safety. Pt also refusing chin tap bell and yelling for staff to help him when needed. Pt rude and dismissive of staff during hourly rounding

## 2019-03-19 NOTE — PROGRESS NOTE ADULT - SUBJECTIVE AND OBJECTIVE BOX
UROLOGY DAILY PROGRESS NOTE:     Subjective: Patient seen and examined at bedside. No overnight events.       Objective:  Vital signs  T(F): , Max: 99.6 (03-18-19 @ 10:23)  HR: 84 (03-19-19 @ 06:12)  BP: 129/82 (03-19-19 @ 06:12)  SpO2: 99% (03-19-19 @ 06:12)  Wt(kg): --    I&O's Summary    17 Mar 2019 07:01  -  18 Mar 2019 07:00  --------------------------------------------------------  IN: 1720 mL / OUT: 3260 mL / NET: -1540 mL    18 Mar 2019 07:01  -  19 Mar 2019 06:59  --------------------------------------------------------  IN: 650 mL / OUT: 1945 mL / NET: -1295 mL        Gen: NAD  Pulm: No respiratory distress, no subcostal retractions  CV: RRR, no JVD  Abd: Soft, NT, ND, incision CDI, Abd: Soft: NT; moderated distention; No suprapubic tenderness; mildly erythematic Incision from nephrectomy appreciated; packing changed; ELIAS draining blood tinged purulent fluid  Back -- No CVA tenderness   -- Condom catheter appreciated draining clear yellow urine

## 2019-03-19 NOTE — PROVIDER CONTACT NOTE (OTHER) - ACTION/TREATMENT ORDERED:
Covering PA made aware, pt repositioned and vs reassessed, pt abd became slightly less distended on reposition, BP stable now 140/94, Abd Xray ordered
Bolus ordered and will reassess
NANDA Traylor at bedside to assess, express excess drainage from incision site and redress site, no further interventions ordered at this time, will continue to monitor.
As per NANDA Oshea, will order PO tylenol 650mg for fever. no further intervention necessary. will continue to monitor.
MD aware no action at this time
NANDA Oshea aware of refusal, continue to monitor patient, if he remain hypertensive possible IVP antihypertensives, document refusal for T&P
PA aware, agrees w/ RN recs
Pt refused, Pt stated to NANDA Barnett to get out of his face. She is disturbing him  from trying to shit.
continue to monitor
no new orders

## 2019-03-20 ENCOUNTER — TRANSCRIPTION ENCOUNTER (OUTPATIENT)
Age: 44
End: 2019-03-20

## 2019-03-20 VITALS
RESPIRATION RATE: 18 BRPM | HEART RATE: 84 BPM | SYSTOLIC BLOOD PRESSURE: 112 MMHG | TEMPERATURE: 99 F | OXYGEN SATURATION: 99 % | DIASTOLIC BLOOD PRESSURE: 72 MMHG

## 2019-03-20 PROCEDURE — C1726: CPT

## 2019-03-20 PROCEDURE — 87150 DNA/RNA AMPLIFIED PROBE: CPT

## 2019-03-20 PROCEDURE — 82435 ASSAY OF BLOOD CHLORIDE: CPT

## 2019-03-20 PROCEDURE — 88307 TISSUE EXAM BY PATHOLOGIST: CPT

## 2019-03-20 PROCEDURE — 82803 BLOOD GASES ANY COMBINATION: CPT

## 2019-03-20 PROCEDURE — 84132 ASSAY OF SERUM POTASSIUM: CPT

## 2019-03-20 PROCEDURE — 83605 ASSAY OF LACTIC ACID: CPT

## 2019-03-20 PROCEDURE — 82365 CALCULUS SPECTROSCOPY: CPT

## 2019-03-20 PROCEDURE — C1889: CPT

## 2019-03-20 PROCEDURE — 93005 ELECTROCARDIOGRAM TRACING: CPT

## 2019-03-20 PROCEDURE — 74018 RADEX ABDOMEN 1 VIEW: CPT

## 2019-03-20 PROCEDURE — 83735 ASSAY OF MAGNESIUM: CPT

## 2019-03-20 PROCEDURE — 80048 BASIC METABOLIC PNL TOTAL CA: CPT

## 2019-03-20 PROCEDURE — C1769: CPT

## 2019-03-20 PROCEDURE — 94002 VENT MGMT INPAT INIT DAY: CPT

## 2019-03-20 PROCEDURE — 87070 CULTURE OTHR SPECIMN AEROBIC: CPT

## 2019-03-20 PROCEDURE — C2617: CPT

## 2019-03-20 PROCEDURE — C1758: CPT

## 2019-03-20 PROCEDURE — 87086 URINE CULTURE/COLONY COUNT: CPT

## 2019-03-20 PROCEDURE — 71045 X-RAY EXAM CHEST 1 VIEW: CPT

## 2019-03-20 PROCEDURE — 87186 SC STD MICRODIL/AGAR DIL: CPT

## 2019-03-20 PROCEDURE — 82565 ASSAY OF CREATININE: CPT

## 2019-03-20 PROCEDURE — 84295 ASSAY OF SERUM SODIUM: CPT

## 2019-03-20 PROCEDURE — 80202 ASSAY OF VANCOMYCIN: CPT

## 2019-03-20 PROCEDURE — 87040 BLOOD CULTURE FOR BACTERIA: CPT

## 2019-03-20 PROCEDURE — 82330 ASSAY OF CALCIUM: CPT

## 2019-03-20 PROCEDURE — 86901 BLOOD TYPING SEROLOGIC RH(D): CPT

## 2019-03-20 PROCEDURE — 85730 THROMBOPLASTIN TIME PARTIAL: CPT

## 2019-03-20 PROCEDURE — 88300 SURGICAL PATH GROSS: CPT

## 2019-03-20 PROCEDURE — C1887: CPT

## 2019-03-20 PROCEDURE — 84100 ASSAY OF PHOSPHORUS: CPT

## 2019-03-20 PROCEDURE — 82947 ASSAY GLUCOSE BLOOD QUANT: CPT

## 2019-03-20 PROCEDURE — 85610 PROTHROMBIN TIME: CPT

## 2019-03-20 PROCEDURE — 86900 BLOOD TYPING SEROLOGIC ABO: CPT

## 2019-03-20 PROCEDURE — 85027 COMPLETE CBC AUTOMATED: CPT

## 2019-03-20 PROCEDURE — 85014 HEMATOCRIT: CPT

## 2019-03-20 RX ORDER — TRAMADOL HYDROCHLORIDE 50 MG/1
25 TABLET ORAL EVERY 6 HOURS
Qty: 0 | Refills: 0 | Status: DISCONTINUED | OUTPATIENT
Start: 2019-03-20 | End: 2019-03-20

## 2019-03-20 RX ORDER — TRAMADOL HYDROCHLORIDE 50 MG/1
0.5 TABLET ORAL
Qty: 4 | Refills: 0 | OUTPATIENT
Start: 2019-03-20

## 2019-03-20 RX ORDER — TRAMADOL HYDROCHLORIDE 50 MG/1
0.5 TABLET ORAL
Qty: 10 | Refills: 0 | OUTPATIENT
Start: 2019-03-20

## 2019-03-20 RX ADMIN — TRAMADOL HYDROCHLORIDE 25 MILLIGRAM(S): 50 TABLET ORAL at 12:15

## 2019-03-20 RX ADMIN — Medication 100 MILLIGRAM(S): at 05:55

## 2019-03-20 RX ADMIN — Medication 100 MILLIGRAM(S): at 15:03

## 2019-03-20 RX ADMIN — Medication 10 MILLIGRAM(S): at 05:55

## 2019-03-20 RX ADMIN — POLYETHYLENE GLYCOL 3350 17 GRAM(S): 17 POWDER, FOR SOLUTION ORAL at 11:28

## 2019-03-20 RX ADMIN — ENOXAPARIN SODIUM 40 MILLIGRAM(S): 100 INJECTION SUBCUTANEOUS at 15:03

## 2019-03-20 RX ADMIN — Medication 300 MILLIGRAM(S): at 11:28

## 2019-03-20 RX ADMIN — TRAMADOL HYDROCHLORIDE 25 MILLIGRAM(S): 50 TABLET ORAL at 11:28

## 2019-03-20 NOTE — PROGRESS NOTE ADULT - ASSESSMENT
42 y/o M s/p L PCNL 3/5 POD # 9 s/p  right lap converted to open simple nephrectomy, initially requiring pressors post op, kept intubated and transferred to SICU. Extubated 3/12,  on the floor  has achieved GI function, incisional cellulitis and requiring packing  - Daily wound packing   - C/W clindamycin for incisional cellulitis   - Keep ELIAS drain  - DVT prophylaxis   - DC planning

## 2019-03-20 NOTE — DISCHARGE NOTE NURSING/CASE MANAGEMENT/SOCIAL WORK - NSDCPEEMAIL_GEN_ALL_CORE
[facility] email [email or web address]

## 2019-03-20 NOTE — PROGRESS NOTE ADULT - SUBJECTIVE AND OBJECTIVE BOX
Pt seen and examined at bedside; No acute overnight events.     Objective    Vital signs  T(F): , Max: 99.7 (03-19-19 @ 14:48)  HR: 88 (03-20-19 @ 05:54)  BP: 129/74 (03-20-19 @ 05:54)  SpO2: 99% (03-20-19 @ 05:54)  Wt(kg): --    Output     03-19 @ 07:01  -  03-20 @ 07:00  --------------------------------------------------------  IN: 740 mL / OUT: 1265 mL / NET: -525 mL        Gen NAD; A&O x 3   Abd: Soft NT; moderately distended; mildly erythematic incision from nephrectomy appreciated -- packing changed; ELIAS draining blood tinged fluid.  Back: No CVAT  : Condom catheter appreciated draining clear yellow urine.

## 2019-03-20 NOTE — DISCHARGE NOTE NURSING/CASE MANAGEMENT/SOCIAL WORK - NSDCDPATPORTLINK_GEN_ALL_CORE
You can access the [facility] Portal, offered by [facility], by registering with the following website: [email or web address]

## 2019-03-20 NOTE — PROGRESS NOTE ADULT - PROVIDER SPECIALTY LIST ADULT
SICU
Urology

## 2019-03-20 NOTE — DISCHARGE NOTE NURSING/CASE MANAGEMENT/SOCIAL WORK - NSDCPEWEB_GEN_ALL_CORE
[place] website --- [email or web address] for [email or web address] website --- [email or web address]

## 2019-03-25 LAB — SURGICAL PATHOLOGY STUDY: SIGNIFICANT CHANGE UP

## 2019-04-01 PROBLEM — K21.9 GASTRO-ESOPHAGEAL REFLUX DISEASE WITHOUT ESOPHAGITIS: Chronic | Status: ACTIVE | Noted: 2019-02-19

## 2019-04-01 PROBLEM — N20.0 CALCULUS OF KIDNEY: Chronic | Status: ACTIVE | Noted: 2019-02-19

## 2019-04-01 PROBLEM — N40.0 BENIGN PROSTATIC HYPERPLASIA WITHOUT LOWER URINARY TRACT SYMPTOMS: Chronic | Status: ACTIVE | Noted: 2019-02-19

## 2019-04-01 PROBLEM — K59.00 CONSTIPATION, UNSPECIFIED: Chronic | Status: ACTIVE | Noted: 2019-02-19

## 2019-04-02 ENCOUNTER — INPATIENT (INPATIENT)
Facility: HOSPITAL | Age: 44
LOS: 23 days | Discharge: HOME | DRG: 853 | End: 2019-04-26
Attending: HOSPITALIST | Admitting: HOSPITALIST
Payer: MEDICAID

## 2019-04-02 VITALS
OXYGEN SATURATION: 95 % | SYSTOLIC BLOOD PRESSURE: 131 MMHG | TEMPERATURE: 100 F | HEART RATE: 89 BPM | WEIGHT: 169.98 LBS | RESPIRATION RATE: 18 BRPM | DIASTOLIC BLOOD PRESSURE: 82 MMHG

## 2019-04-02 DIAGNOSIS — E87.4 MIXED DISORDER OF ACID-BASE BALANCE: ICD-10-CM

## 2019-04-02 DIAGNOSIS — D64.9 ANEMIA, UNSPECIFIED: ICD-10-CM

## 2019-04-02 DIAGNOSIS — S11.90XS UNSPECIFIED OPEN WOUND OF UNSPECIFIED PART OF NECK, SEQUELA: Chronic | ICD-10-CM

## 2019-04-02 DIAGNOSIS — K52.9 NONINFECTIVE GASTROENTERITIS AND COLITIS, UNSPECIFIED: ICD-10-CM

## 2019-04-02 DIAGNOSIS — N20.0 CALCULUS OF KIDNEY: Chronic | ICD-10-CM

## 2019-04-02 DIAGNOSIS — Z90.5 ACQUIRED ABSENCE OF KIDNEY: Chronic | ICD-10-CM

## 2019-04-02 DIAGNOSIS — A41.9 SEPSIS, UNSPECIFIED ORGANISM: ICD-10-CM

## 2019-04-02 DIAGNOSIS — J47.9 BRONCHIECTASIS, UNCOMPLICATED: ICD-10-CM

## 2019-04-02 DIAGNOSIS — E87.6 HYPOKALEMIA: ICD-10-CM

## 2019-04-02 DIAGNOSIS — G82.50 QUADRIPLEGIA, UNSPECIFIED: ICD-10-CM

## 2019-04-02 DIAGNOSIS — J90 PLEURAL EFFUSION, NOT ELSEWHERE CLASSIFIED: ICD-10-CM

## 2019-04-02 DIAGNOSIS — Z29.9 ENCOUNTER FOR PROPHYLACTIC MEASURES, UNSPECIFIED: ICD-10-CM

## 2019-04-02 LAB
ALBUMIN SERPL ELPH-MCNC: 3.4 G/DL — SIGNIFICANT CHANGE UP (ref 3.3–5)
ALP SERPL-CCNC: 68 U/L — SIGNIFICANT CHANGE UP (ref 40–120)
ALT FLD-CCNC: 11 U/L — SIGNIFICANT CHANGE UP (ref 10–45)
ANION GAP SERPL CALC-SCNC: 11 MMOL/L — SIGNIFICANT CHANGE UP (ref 5–17)
ANION GAP SERPL CALC-SCNC: 15 MMOL/L — SIGNIFICANT CHANGE UP (ref 5–17)
ANION GAP SERPL CALC-SCNC: 8 MMOL/L — SIGNIFICANT CHANGE UP (ref 5–17)
APPEARANCE UR: CLEAR — SIGNIFICANT CHANGE UP
AST SERPL-CCNC: 12 U/L — SIGNIFICANT CHANGE UP (ref 10–40)
BACTERIA # UR AUTO: ABNORMAL
BASOPHILS # BLD AUTO: 0.1 K/UL — SIGNIFICANT CHANGE UP (ref 0–0.2)
BASOPHILS NFR BLD AUTO: 0.9 % — SIGNIFICANT CHANGE UP (ref 0–2)
BILIRUB SERPL-MCNC: 0.2 MG/DL — SIGNIFICANT CHANGE UP (ref 0.2–1.2)
BILIRUB UR-MCNC: NEGATIVE — SIGNIFICANT CHANGE UP
BLD GP AB SCN SERPL QL: NEGATIVE — SIGNIFICANT CHANGE UP
BUN SERPL-MCNC: 6 MG/DL — LOW (ref 7–23)
BUN SERPL-MCNC: 7 MG/DL — SIGNIFICANT CHANGE UP (ref 7–23)
BUN SERPL-MCNC: 7 MG/DL — SIGNIFICANT CHANGE UP (ref 7–23)
C DIFF GDH STL QL: SIGNIFICANT CHANGE UP
C DIFF GDH STL QL: SIGNIFICANT CHANGE UP
CALCIUM SERPL-MCNC: 8.3 MG/DL — LOW (ref 8.4–10.5)
CALCIUM SERPL-MCNC: 8.5 MG/DL — SIGNIFICANT CHANGE UP (ref 8.4–10.5)
CALCIUM SERPL-MCNC: 8.9 MG/DL — SIGNIFICANT CHANGE UP (ref 8.4–10.5)
CHLORIDE SERPL-SCNC: 105 MMOL/L — SIGNIFICANT CHANGE UP (ref 96–108)
CHLORIDE SERPL-SCNC: 106 MMOL/L — SIGNIFICANT CHANGE UP (ref 96–108)
CHLORIDE SERPL-SCNC: 99 MMOL/L — SIGNIFICANT CHANGE UP (ref 96–108)
CO2 SERPL-SCNC: 28 MMOL/L — SIGNIFICANT CHANGE UP (ref 22–31)
CO2 SERPL-SCNC: 28 MMOL/L — SIGNIFICANT CHANGE UP (ref 22–31)
CO2 SERPL-SCNC: 29 MMOL/L — SIGNIFICANT CHANGE UP (ref 22–31)
COLOR SPEC: SIGNIFICANT CHANGE UP
CREAT SERPL-MCNC: 0.51 MG/DL — SIGNIFICANT CHANGE UP (ref 0.5–1.3)
CREAT SERPL-MCNC: 0.53 MG/DL — SIGNIFICANT CHANGE UP (ref 0.5–1.3)
CREAT SERPL-MCNC: 0.58 MG/DL — SIGNIFICANT CHANGE UP (ref 0.5–1.3)
DIFF PNL FLD: NEGATIVE — SIGNIFICANT CHANGE UP
EOSINOPHIL # BLD AUTO: 0 K/UL — SIGNIFICANT CHANGE UP (ref 0–0.5)
EOSINOPHIL NFR BLD AUTO: 0.3 % — SIGNIFICANT CHANGE UP (ref 0–6)
EPI CELLS # UR: 1 /HPF — SIGNIFICANT CHANGE UP
FERRITIN SERPL-MCNC: 6 NG/ML — LOW (ref 30–400)
GAS PNL BLDV: SIGNIFICANT CHANGE UP
GAS PNL BLDV: SIGNIFICANT CHANGE UP
GLUCOSE SERPL-MCNC: 100 MG/DL — HIGH (ref 70–99)
GLUCOSE SERPL-MCNC: 142 MG/DL — HIGH (ref 70–99)
GLUCOSE SERPL-MCNC: 99 MG/DL — SIGNIFICANT CHANGE UP (ref 70–99)
GLUCOSE UR QL: NEGATIVE — SIGNIFICANT CHANGE UP
HBA1C BLD-MCNC: 5.3 % — SIGNIFICANT CHANGE UP (ref 4–5.6)
HCT VFR BLD CALC: 26.3 % — LOW (ref 39–50)
HCT VFR BLD CALC: 27.1 % — LOW (ref 39–50)
HCT VFR BLD CALC: 29.6 % — LOW (ref 39–50)
HGB BLD-MCNC: 8.5 G/DL — LOW (ref 13–17)
HGB BLD-MCNC: 8.8 G/DL — LOW (ref 13–17)
HGB BLD-MCNC: 9.4 G/DL — LOW (ref 13–17)
HYALINE CASTS # UR AUTO: 1 /LPF — SIGNIFICANT CHANGE UP (ref 0–2)
INR BLD: 1.32 RATIO — HIGH (ref 0.88–1.16)
IRON SATN MFR SERPL: 16 UG/DL — LOW (ref 45–165)
IRON SATN MFR SERPL: 7 % — LOW (ref 16–55)
KETONES UR-MCNC: NEGATIVE — SIGNIFICANT CHANGE UP
LEUKOCYTE ESTERASE UR-ACNC: ABNORMAL
LYMPHOCYTES # BLD AUTO: 1.8 K/UL — SIGNIFICANT CHANGE UP (ref 1–3.3)
LYMPHOCYTES # BLD AUTO: 31.4 % — SIGNIFICANT CHANGE UP (ref 13–44)
MAGNESIUM SERPL-MCNC: 2.1 MG/DL — SIGNIFICANT CHANGE UP (ref 1.6–2.6)
MCHC RBC-ENTMCNC: 26.9 PG — LOW (ref 27–34)
MCHC RBC-ENTMCNC: 27.4 PG — SIGNIFICANT CHANGE UP (ref 27–34)
MCHC RBC-ENTMCNC: 27.8 PG — SIGNIFICANT CHANGE UP (ref 27–34)
MCHC RBC-ENTMCNC: 31.6 GM/DL — LOW (ref 32–36)
MCHC RBC-ENTMCNC: 32.2 GM/DL — SIGNIFICANT CHANGE UP (ref 32–36)
MCHC RBC-ENTMCNC: 32.6 GM/DL — SIGNIFICANT CHANGE UP (ref 32–36)
MCV RBC AUTO: 85.1 FL — SIGNIFICANT CHANGE UP (ref 80–100)
MCV RBC AUTO: 85.1 FL — SIGNIFICANT CHANGE UP (ref 80–100)
MCV RBC AUTO: 85.3 FL — SIGNIFICANT CHANGE UP (ref 80–100)
MONOCYTES # BLD AUTO: 0.8 K/UL — SIGNIFICANT CHANGE UP (ref 0–0.9)
MONOCYTES NFR BLD AUTO: 13 % — SIGNIFICANT CHANGE UP (ref 2–14)
NEUTROPHILS # BLD AUTO: 3.2 K/UL — SIGNIFICANT CHANGE UP (ref 1.8–7.4)
NEUTROPHILS NFR BLD AUTO: 54.4 % — SIGNIFICANT CHANGE UP (ref 43–77)
NITRITE UR-MCNC: NEGATIVE — SIGNIFICANT CHANGE UP
PH UR: 7 — SIGNIFICANT CHANGE UP (ref 5–8)
PHOSPHATE SERPL-MCNC: 2.2 MG/DL — LOW (ref 2.5–4.5)
PLATELET # BLD AUTO: 289 K/UL — SIGNIFICANT CHANGE UP (ref 150–400)
PLATELET # BLD AUTO: 292 K/UL — SIGNIFICANT CHANGE UP (ref 150–400)
PLATELET # BLD AUTO: 319 K/UL — SIGNIFICANT CHANGE UP (ref 150–400)
POTASSIUM SERPL-MCNC: 2.2 MMOL/L — CRITICAL LOW (ref 3.5–5.3)
POTASSIUM SERPL-MCNC: 2.8 MMOL/L — CRITICAL LOW (ref 3.5–5.3)
POTASSIUM SERPL-MCNC: 2.9 MMOL/L — CRITICAL LOW (ref 3.5–5.3)
POTASSIUM SERPL-MCNC: 3.1 MMOL/L — LOW (ref 3.5–5.3)
POTASSIUM SERPL-SCNC: 2.2 MMOL/L — CRITICAL LOW (ref 3.5–5.3)
POTASSIUM SERPL-SCNC: 2.8 MMOL/L — CRITICAL LOW (ref 3.5–5.3)
POTASSIUM SERPL-SCNC: 2.9 MMOL/L — CRITICAL LOW (ref 3.5–5.3)
POTASSIUM SERPL-SCNC: 3.1 MMOL/L — LOW (ref 3.5–5.3)
PROT SERPL-MCNC: 7.4 G/DL — SIGNIFICANT CHANGE UP (ref 6–8.3)
PROT UR-MCNC: ABNORMAL
PROTHROM AB SERPL-ACNC: 15.3 SEC — HIGH (ref 10–12.9)
RBC # BLD: 3.1 M/UL — LOW (ref 4.2–5.8)
RBC # BLD: 3.17 M/UL — LOW (ref 4.2–5.8)
RBC # BLD: 3.17 M/UL — LOW (ref 4.2–5.8)
RBC # BLD: 3.48 M/UL — LOW (ref 4.2–5.8)
RBC # FLD: 15.6 % — HIGH (ref 10.3–14.5)
RBC # FLD: 15.9 % — HIGH (ref 10.3–14.5)
RBC # FLD: 15.9 % — HIGH (ref 10.3–14.5)
RBC CASTS # UR COMP ASSIST: 7 /HPF — HIGH (ref 0–4)
RETICS #: 81.4 K/UL — SIGNIFICANT CHANGE UP (ref 25–125)
RETICS/RBC NFR: 2.6 % — HIGH (ref 0.5–2.5)
RH IG SCN BLD-IMP: POSITIVE — SIGNIFICANT CHANGE UP
SODIUM SERPL-SCNC: 142 MMOL/L — SIGNIFICANT CHANGE UP (ref 135–145)
SODIUM SERPL-SCNC: 142 MMOL/L — SIGNIFICANT CHANGE UP (ref 135–145)
SODIUM SERPL-SCNC: 145 MMOL/L — SIGNIFICANT CHANGE UP (ref 135–145)
SP GR SPEC: 1.01 — LOW (ref 1.01–1.02)
TIBC SERPL-MCNC: 215 UG/DL — LOW (ref 220–430)
TRANSFERRIN SERPL-MCNC: 162 MG/DL — LOW (ref 200–360)
UIBC SERPL-MCNC: 199 UG/DL — SIGNIFICANT CHANGE UP (ref 110–370)
UROBILINOGEN FLD QL: NEGATIVE — SIGNIFICANT CHANGE UP
WBC # BLD: 4.5 K/UL — SIGNIFICANT CHANGE UP (ref 3.8–10.5)
WBC # BLD: 5.1 K/UL — SIGNIFICANT CHANGE UP (ref 3.8–10.5)
WBC # BLD: 5.9 K/UL — SIGNIFICANT CHANGE UP (ref 3.8–10.5)
WBC # FLD AUTO: 4.5 K/UL — SIGNIFICANT CHANGE UP (ref 3.8–10.5)
WBC # FLD AUTO: 5.1 K/UL — SIGNIFICANT CHANGE UP (ref 3.8–10.5)
WBC # FLD AUTO: 5.9 K/UL — SIGNIFICANT CHANGE UP (ref 3.8–10.5)
WBC UR QL: 65 /HPF — HIGH (ref 0–5)

## 2019-04-02 PROCEDURE — 99285 EMERGENCY DEPT VISIT HI MDM: CPT | Mod: 25

## 2019-04-02 PROCEDURE — 74176 CT ABD & PELVIS W/O CONTRAST: CPT | Mod: 26

## 2019-04-02 PROCEDURE — 99222 1ST HOSP IP/OBS MODERATE 55: CPT

## 2019-04-02 PROCEDURE — 99223 1ST HOSP IP/OBS HIGH 75: CPT | Mod: GC

## 2019-04-02 PROCEDURE — 93010 ELECTROCARDIOGRAM REPORT: CPT

## 2019-04-02 RX ORDER — SODIUM CHLORIDE 9 MG/ML
1000 INJECTION, SOLUTION INTRAVENOUS
Qty: 0 | Refills: 0 | Status: DISCONTINUED | OUTPATIENT
Start: 2019-04-02 | End: 2019-04-03

## 2019-04-02 RX ORDER — CEFEPIME 1 G/1
INJECTION, POWDER, FOR SOLUTION INTRAMUSCULAR; INTRAVENOUS
Qty: 0 | Refills: 0 | Status: DISCONTINUED | OUTPATIENT
Start: 2019-04-02 | End: 2019-04-09

## 2019-04-02 RX ORDER — CEFEPIME 1 G/1
2000 INJECTION, POWDER, FOR SOLUTION INTRAMUSCULAR; INTRAVENOUS EVERY 8 HOURS
Qty: 0 | Refills: 0 | Status: DISCONTINUED | OUTPATIENT
Start: 2019-04-02 | End: 2019-04-09

## 2019-04-02 RX ORDER — POTASSIUM CHLORIDE 20 MEQ
40 PACKET (EA) ORAL EVERY 4 HOURS
Qty: 0 | Refills: 0 | Status: COMPLETED | OUTPATIENT
Start: 2019-04-02 | End: 2019-04-02

## 2019-04-02 RX ORDER — ACETAMINOPHEN 500 MG
1000 TABLET ORAL ONCE
Qty: 0 | Refills: 0 | Status: COMPLETED | OUTPATIENT
Start: 2019-04-02 | End: 2019-04-02

## 2019-04-02 RX ORDER — FAMOTIDINE 10 MG/ML
0 INJECTION INTRAVENOUS
Qty: 0 | Refills: 0 | COMMUNITY

## 2019-04-02 RX ORDER — POTASSIUM PHOSPHATE, MONOBASIC POTASSIUM PHOSPHATE, DIBASIC 236; 224 MG/ML; MG/ML
15 INJECTION, SOLUTION INTRAVENOUS ONCE
Qty: 0 | Refills: 0 | Status: COMPLETED | OUTPATIENT
Start: 2019-04-02 | End: 2019-04-02

## 2019-04-02 RX ORDER — POTASSIUM CHLORIDE 20 MEQ
10 PACKET (EA) ORAL
Qty: 0 | Refills: 0 | Status: DISCONTINUED | OUTPATIENT
Start: 2019-04-02 | End: 2019-04-02

## 2019-04-02 RX ORDER — PIPERACILLIN AND TAZOBACTAM 4; .5 G/20ML; G/20ML
3.38 INJECTION, POWDER, LYOPHILIZED, FOR SOLUTION INTRAVENOUS ONCE
Qty: 0 | Refills: 0 | Status: DISCONTINUED | OUTPATIENT
Start: 2019-04-02 | End: 2019-04-02

## 2019-04-02 RX ORDER — SODIUM CHLORIDE 9 MG/ML
1000 INJECTION INTRAMUSCULAR; INTRAVENOUS; SUBCUTANEOUS ONCE
Qty: 0 | Refills: 0 | Status: COMPLETED | OUTPATIENT
Start: 2019-04-02 | End: 2019-04-02

## 2019-04-02 RX ORDER — CEFEPIME 1 G/1
2000 INJECTION, POWDER, FOR SOLUTION INTRAMUSCULAR; INTRAVENOUS ONCE
Qty: 0 | Refills: 0 | Status: COMPLETED | OUTPATIENT
Start: 2019-04-02 | End: 2019-04-02

## 2019-04-02 RX ORDER — TAMSULOSIN HYDROCHLORIDE 0.4 MG/1
0.4 CAPSULE ORAL AT BEDTIME
Qty: 0 | Refills: 0 | Status: DISCONTINUED | OUTPATIENT
Start: 2019-04-02 | End: 2019-04-12

## 2019-04-02 RX ORDER — ACETAMINOPHEN 500 MG
975 TABLET ORAL ONCE
Qty: 0 | Refills: 0 | Status: COMPLETED | OUTPATIENT
Start: 2019-04-02 | End: 2019-04-02

## 2019-04-02 RX ORDER — TIZANIDINE 4 MG/1
2 TABLET ORAL
Qty: 0 | Refills: 0 | COMMUNITY

## 2019-04-02 RX ORDER — TRAMADOL HYDROCHLORIDE 50 MG/1
50 TABLET ORAL EVERY 8 HOURS
Qty: 0 | Refills: 0 | Status: DISCONTINUED | OUTPATIENT
Start: 2019-04-02 | End: 2019-04-05

## 2019-04-02 RX ORDER — POTASSIUM CHLORIDE 20 MEQ
10 PACKET (EA) ORAL
Qty: 0 | Refills: 0 | Status: COMPLETED | OUTPATIENT
Start: 2019-04-02 | End: 2019-04-02

## 2019-04-02 RX ORDER — VANCOMYCIN HCL 1 G
1000 VIAL (EA) INTRAVENOUS ONCE
Qty: 0 | Refills: 0 | Status: COMPLETED | OUTPATIENT
Start: 2019-04-02 | End: 2019-04-02

## 2019-04-02 RX ORDER — VANCOMYCIN HCL 1 G
1000 VIAL (EA) INTRAVENOUS EVERY 12 HOURS
Qty: 0 | Refills: 0 | Status: DISCONTINUED | OUTPATIENT
Start: 2019-04-03 | End: 2019-04-03

## 2019-04-02 RX ORDER — ACETAMINOPHEN 500 MG
650 TABLET ORAL EVERY 6 HOURS
Qty: 0 | Refills: 0 | Status: DISCONTINUED | OUTPATIENT
Start: 2019-04-02 | End: 2019-04-12

## 2019-04-02 RX ORDER — VANCOMYCIN HCL 1 G
VIAL (EA) INTRAVENOUS
Qty: 0 | Refills: 0 | Status: DISCONTINUED | OUTPATIENT
Start: 2019-04-02 | End: 2019-04-03

## 2019-04-02 RX ORDER — VANCOMYCIN HCL 1 G
125 VIAL (EA) INTRAVENOUS EVERY 6 HOURS
Qty: 0 | Refills: 0 | Status: DISCONTINUED | OUTPATIENT
Start: 2019-04-02 | End: 2019-04-02

## 2019-04-02 RX ORDER — PIPERACILLIN AND TAZOBACTAM 4; .5 G/20ML; G/20ML
3.38 INJECTION, POWDER, LYOPHILIZED, FOR SOLUTION INTRAVENOUS EVERY 8 HOURS
Qty: 0 | Refills: 0 | Status: DISCONTINUED | OUTPATIENT
Start: 2019-04-02 | End: 2019-04-02

## 2019-04-02 RX ADMIN — Medication 40 MILLIEQUIVALENT(S): at 22:21

## 2019-04-02 RX ADMIN — Medication 40 MILLIEQUIVALENT(S): at 03:10

## 2019-04-02 RX ADMIN — CEFEPIME 100 MILLIGRAM(S): 1 INJECTION, POWDER, FOR SOLUTION INTRAMUSCULAR; INTRAVENOUS at 15:17

## 2019-04-02 RX ADMIN — Medication 100 MILLIEQUIVALENT(S): at 05:31

## 2019-04-02 RX ADMIN — Medication 650 MILLIGRAM(S): at 22:30

## 2019-04-02 RX ADMIN — SODIUM CHLORIDE 1000 MILLILITER(S): 9 INJECTION INTRAMUSCULAR; INTRAVENOUS; SUBCUTANEOUS at 01:40

## 2019-04-02 RX ADMIN — SODIUM CHLORIDE 1000 MILLILITER(S): 9 INJECTION INTRAMUSCULAR; INTRAVENOUS; SUBCUTANEOUS at 02:56

## 2019-04-02 RX ADMIN — Medication 975 MILLIGRAM(S): at 03:12

## 2019-04-02 RX ADMIN — SODIUM CHLORIDE 150 MILLILITER(S): 9 INJECTION, SOLUTION INTRAVENOUS at 18:24

## 2019-04-02 RX ADMIN — Medication 1000 MILLIGRAM(S): at 14:37

## 2019-04-02 RX ADMIN — Medication 400 MILLIGRAM(S): at 10:30

## 2019-04-02 RX ADMIN — Medication 125 MILLIGRAM(S): at 15:45

## 2019-04-02 RX ADMIN — Medication 650 MILLIGRAM(S): at 22:21

## 2019-04-02 RX ADMIN — Medication 100 MILLIEQUIVALENT(S): at 03:10

## 2019-04-02 RX ADMIN — Medication 40 MILLIEQUIVALENT(S): at 11:52

## 2019-04-02 RX ADMIN — Medication 40 MILLIEQUIVALENT(S): at 19:20

## 2019-04-02 RX ADMIN — Medication 40 MILLIEQUIVALENT(S): at 07:32

## 2019-04-02 RX ADMIN — Medication 975 MILLIGRAM(S): at 01:41

## 2019-04-02 RX ADMIN — Medication 100 MILLIEQUIVALENT(S): at 04:23

## 2019-04-02 RX ADMIN — TRAMADOL HYDROCHLORIDE 50 MILLIGRAM(S): 50 TABLET ORAL at 18:23

## 2019-04-02 RX ADMIN — TAMSULOSIN HYDROCHLORIDE 0.4 MILLIGRAM(S): 0.4 CAPSULE ORAL at 22:21

## 2019-04-02 RX ADMIN — CEFEPIME 100 MILLIGRAM(S): 1 INJECTION, POWDER, FOR SOLUTION INTRAMUSCULAR; INTRAVENOUS at 22:22

## 2019-04-02 RX ADMIN — Medication 250 MILLIGRAM(S): at 15:45

## 2019-04-02 RX ADMIN — POTASSIUM PHOSPHATE, MONOBASIC POTASSIUM PHOSPHATE, DIBASIC 62.5 MILLIMOLE(S): 236; 224 INJECTION, SOLUTION INTRAVENOUS at 18:27
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
## 2019-04-02 NOTE — CONSULT NOTE ADULT - ASSESSMENT
A/P: 43M s/p L PCNL (3/5/19) followed by lap converted to open simple right nephrectomy for infected atrophic kidney (3/11/19) post-op transferred to SICU and extubated POD#2 c/b incisional wound infection a/w colitis, possibly C.Diff given recent abx use    - Daily dressing changes with 1/4 inch packing, gauze/abd pad A/P: 43M s/p L PCNL (3/5/19) followed by lap converted to open simple right nephrectomy for infected atrophic kidney (3/11/19) post-op transferred to SICU and extubated POD#2 c/b incisional wound infection a/w colitis, possibly C.Diff given recent abx use    - Patient did not follow up for post-op care and drain removal/did not allow nursing to change his wound  - Daily dressing changes with 1/2 inch packing, gauze/abd pad  - will remove ELIAS drain, no fluid collection on CT scan  - appreciate medicine and ID care  - will follow  - discussed with Dr. Pelayo

## 2019-04-02 NOTE — H&P ADULT - NSICDXPASTSURGICALHX_GEN_ALL_CORE_FT
PAST SURGICAL HISTORY:  Calculus of kidney bilateral nephrostomy tubes placed 1/2019 @[facility]    H/O right nephrectomy     Open wound of neck, sequela

## 2019-04-02 NOTE — ED PROVIDER NOTE - PROGRESS NOTE DETAILS
Spoke to urology concerning pt's symptoms and dirty urine from L nephrostomy tube, they confirmed that they will follow pt as consult in the hospital but they do not plan to admit pt to their service and do not have specific recommendations in terms of management at this time -Iliana
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
## 2019-04-02 NOTE — H&P ADULT - NSHPREVIEWOFSYSTEMS_GEN_ALL_CORE
REVIEW OF SYSTEMS:  Constitutional: [ ] negative [ X] fevers [ ] chills [ ] weight loss [ ] weight gain  HEENT: [X ] negative [ ] dry eyes [ ] eye irritation [ ] postnasal drip [ ] nasal congestion  CV: [ X] negative  [ ] chest pain [ ] orthopnea [ ] palpitations [ ] murmur  Resp: [ X] negative [ ] cough [ ] shortness of breath [ ] dyspnea [ ] wheezing [ ] sputum [ ] hemoptysis  GI: [ ] negative [ ] nausea [ ] vomiting [X ] diarrhea [ ] constipation [ ] abd pain [ ] dysphagia   : [X ] negative [ ] dysuria [ ] nocturia [ ] hematuria [ ] increased urinary frequency  Musculoskeletal: [ ] negative [ X] back pain [ ] myalgias [ ] arthralgias [ ] fracture  Skin: [X ] negative [ ] rash [ ] itch  Neurological: [ X] negative [ ] headache [ ] dizziness [ ] syncope [ ] weakness [ ] numbness  Psychiatric: [X ] negative [ ] anxiety [ ] depression  Endocrine: [ X] negative [ ] diabetes [ ] thyroid problem  Hematologic/Lymphatic: [X ] negative [ ] anemia [ ] bleeding problem  Allergic/Immunologic: [ X] negative [ ] itchy eyes [ ] nasal discharge [ ] hives [ ] angioedema  [ ] All other systems negative  [ ] Unable to assess ROS because ________ REVIEW OF SYSTEMS:  Constitutional: [ ] negative [ X] fevers [ ] chills [ ] weight loss [ ] weight gain  HEENT: [X ] negative [ ] dry eyes [ ] eye irritation [ ] postnasal drip [ ] nasal congestion  CV: [ X] negative  [ ] chest pain [ ] orthopnea [ ] palpitations [ ] murmur  Resp: [ X] negative [ ] cough [ ] shortness of breath [ ] dyspnea [ ] wheezing [ ] sputum [ ] hemoptysis  GI: [ ] negative [ ] nausea [ ] vomiting [X ] diarrhea [ ] constipation [ ] abd pain [ ] dysphagia   : [X ] negative [ ] dysuria [ ] nocturia [ ] hematuria [ ] increased urinary frequency  Musculoskeletal: [ ] negative [ X] back pain [ ] myalgias [ ] arthralgias [ ] fracture  Skin: [X ] negative [ ] rash [ ] itch  Neurological: [ X] negative [ ] headache [ ] dizziness [ ] syncope [ ] weakness [ ] numbness  Psychiatric: [X ] negative [ ] anxiety [ ] depression  Endocrine: [ X] negative [ ] diabetes [ ] thyroid problem  Hematologic/Lymphatic: [X ] negative [ ] anemia [ ] bleeding problem  Allergic/Immunologic: [ X] negative [ ] itchy eyes [ ] nasal discharge [ ] hives [ ] angioedema  [ ] All other systems negative

## 2019-04-02 NOTE — H&P ADULT - PROBLEM SELECTOR PLAN 1
Patient presenting w/ fever and back pain w/ CT showing descending colitis s/p clindamycin and recent hospitalization. No leukocytosis or excessive diarrhea.  -will empirically treat for c. dif despite indeterminant result given high clinical suspicion  -monitor vital q4, IVF to prevent volume depletion  Also w/ recent nephrectomy with some discharge at right surgical incision, however does not appear cellulitic w/ ELIAS drain could be source. Less likely UTI  -f/u blood cultures, UCx  -will cover w/ broad spectrum pip-tazo and vanc and if negative cultures + C.dif will dc.  -wound care consult  -urology consult to eval possible post op infection Patient presenting w/ fever and back pain w/ CT showing descending colitis s/p clindamycin and recent hospitalization. No leukocytosis or excessive diarrhea.  -will empirically treat for c. dif despite indeterminant result given high clinical suspicion  -monitor vital q4, IVF to prevent volume depletion  Also w/ recent nephrectomy with some discharge at right surgical incision, however does not appear cellulitic w/ ELIAS drain could be source. Less likely UTI  -f/u blood cultures, UCx  -will cover w/ broad spectrum ceefepime and vanc and if negative cultures + C.dif will dc.  -wound care consult  -urology consult to eval possible post op infection Concern for possible C. diff colitis w/ recent ABX use and symptoms.  - Started empiric vancomycin 125 mg q6h PO  - f/u C. diff PCR
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
## 2019-04-02 NOTE — H&P ADULT - PROBLEM SELECTOR PLAN 5
Likely secondary to GI losses s/p 30mEq IV  -repeat bmp s/p repletion  -EKG  -continue PO repletion Primary metabolic acidosis w/ mixed picture given lactate and high norm AG. May be met alkalosis secondary to hypokalemia.  -repeat vbg  -plan as above
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
## 2019-04-02 NOTE — H&P ADULT - PROBLEM SELECTOR PLAN 8
DVT: SCDs until bleed r/o  Diet: regular  Dispo: infectious source Found on CT scan, no respiratory distress

## 2019-04-02 NOTE — H&P ADULT - PROBLEM SELECTOR PLAN 2
plan as above Patient presenting w/ fever and back pain w/ CT showing descending colitis s/p clindamycin and recent hospitalization. No leukocytosis or excessive diarrhea.  -will empirically treat for c. dif despite indeterminant result given high clinical suspicion  -monitor vital q4, IVF to prevent volume depletion  Also w/ recent nephrectomy with some discharge at right surgical incision, however does not appear cellulitic w/ ELIAS drain could be source. Less likely UTI  -f/u blood cultures, UCx  -will cover w/ broad spectrum ceefepime and vanc and if negative cultures + C.dif will dc.  -wound care consult  -urology consult to eval possible post op infection  - ID consult

## 2019-04-02 NOTE — H&P ADULT - NSHPLABSRESULTS_GEN_ALL_CORE
LABS:                        9.4    5.9   )-----------( 319      ( 2019 02:01 )             29.6     04-    142  |  99  |  7   ----------------------------<  142<H>  2.2<LL>   |  28  |  0.58    Ca    8.9      2019 02:01    TPro  7.4  /  Alb  3.4  /  TBili  0.2  /  DBili  x   /  AST  12  /  ALT  11  /  AlkPhos  68  04-02          Urinalysis Basic - ( 2019 02:04 )    Color: Light Yellow / Appearance: Clear / S.006 / pH: x  Gluc: x / Ketone: Negative  / Bili: Negative / Urobili: Negative   Blood: x / Protein: Trace / Nitrite: Negative   Leuk Esterase: Large / RBC: 7 /hpf / WBC 65 /HPF   Sq Epi: x / Non Sq Epi: 1 /hpf / Bacteria: Few    < from: CT Abdomen and Pelvis No Cont (19 @ 02:04) >    IMPRESSION:     1. Status post interval right nephrectomy. Apparent interval resection of   a previously visualized proximal right ureteral soft tissue mass;   however, full evaluation is limited without intravenous contrast.   2. Interval removal of a left ureteropelvic junction calculus with   moderate urothelial thickening at the site of the calculus which could be   due to postsurgical change versus infectious or inflammatory etiologies.   Improved left hydronephrosis, now mild.  3. Colitis involving the rectum, sigmoid colon and descending colon which   is infectious or inflammatory in etiology.  4. Interval development of bilateral small pleural effusions with   adjacent compressive atelectasis.      < end of copied text > LABS:                        9.4    5.9   )-----------( 319      ( 2019 02:01 )             29.6     04-    142  |  99  |  7   ----------------------------<  142<H>  2.2<LL>   |  28  |  0.58    Ca    8.9      2019 02:01    TPro  7.4  /  Alb  3.4  /  TBili  0.2  /  DBili  x   /  AST  12  /  ALT  11  /  AlkPhos  68  04-02          Urinalysis Basic - ( 2019 02:04 )    Color: Light Yellow / Appearance: Clear / S.006 / pH: x  Gluc: x / Ketone: Negative  / Bili: Negative / Urobili: Negative   Blood: x / Protein: Trace / Nitrite: Negative   Leuk Esterase: Large / RBC: 7 /hpf / WBC 65 /HPF   Sq Epi: x / Non Sq Epi: 1 /hpf / Bacteria: Few    Labs personally reviewed: Marked hypokalemia w/o evidence of hypomagnesemia    Radiology personally reviewed:  < from: CT Abdomen and Pelvis No Cont (19 @ 02:04) >    IMPRESSION:     1. Status post interval right nephrectomy. Apparent interval resection of   a previously visualized proximal right ureteral soft tissue mass;   however, full evaluation is limited without intravenous contrast.   2. Interval removal of a left ureteropelvic junction calculus with   moderate urothelial thickening at the site of the calculus which could be   due to postsurgical change versus infectious or inflammatory etiologies.   Improved left hydronephrosis, now mild.  3. Colitis involving the rectum, sigmoid colon and descending colon which   is infectious or inflammatory in etiology.  4. Interval development of bilateral small pleural effusions with   adjacent compressive atelectasis.      < end of copied text >
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
## 2019-04-02 NOTE — ED PROVIDER NOTE - ATTENDING CONTRIBUTION TO CARE
I performed a history and physical exam of the patient and discussed their management with the resident. I reviewed the resident's note and agree with the documented findings and plan of care, except if noted below. My medical decision making and observations can be found be found below.    42 yo male presents with ab pain with chills associated with nbnb diarrhea. On exam, diffuse tenderness, no rebound or guarding. Labs, CT ordered from triage, pending results. Will check labs, ct, analgesia, reevaluate.
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
## 2019-04-02 NOTE — ED ADULT NURSE NOTE - OBJECTIVE STATEMENT
pt c/o "pain to body all over and to rt nephrostomy tube site x 2 days. No fevers/cough." presented with condom catheter in place to leg bag with clear yellow urine.  + healing decubiti to left sacrum, coccyx, perineum. pt cleansed and found to have yellow mucous stool with odor- Mds made aware and specimen sent for r/o c-diff. Condom cath changed and attached to BSDB. pt c/o "pain to body all over and to rt nephrostomy tube site x 2 days. No fevers/cough." presented with condom catheter in place to leg bag with clear yellow urine.  + healing decubiti to left sacrum, coccyx, perineum. pt cleansed and found to have yellow mucous stool with odor- Mds made aware and specimen sent for r/o c-diff. Condom cath changed and attached to BSDB. rt nephrostomy sx site with staples in place, + dry yellow drainage to dsg, staples x 3 areas to abd c/d/i

## 2019-04-02 NOTE — CONSULT NOTE ADULT - ASSESSMENT
43 harish old angry and not cooperative  exam limited/ recent nephrectomy on right and history Pt with recurrent kidney stones.  presents with diarrhea and colitis on ct scan and indeTER C diff test.  PCR pending    stent on left that has been removed/   WD DOES NOT LOOK INFECTED AND CT SCAN WITHOUT OBV COLLECTION    AGREE WITH EMPIRIC ANTIBIOTICS AND PO VANCO PENDING RESULTS.

## 2019-04-02 NOTE — H&P ADULT - PROBLEM SELECTOR PLAN 7
Found on CT scan, no respiratory distress Incidentally found on CT scan no SOB  -continue to monitor
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
## 2019-04-02 NOTE — H&P ADULT - PROBLEM SELECTOR PLAN 9
DVT: SCDs until bleed r/o  Diet: regular  Dispo: infectious source DVT: SCDs until bleed r/o  Diet: regular

## 2019-04-02 NOTE — ED ADULT NURSE NOTE - CHPI ED NUR SYMPTOMS NEG
no rash/no shortness of breath/no headache/no cough/no chills/no decreased eating/drinking/no vomiting/no diarrhea/no abdominal pain

## 2019-04-02 NOTE — H&P ADULT - PROBLEM SELECTOR PLAN 4
Anemia s/p infection w/o clear signs of blood loss, has back pain with fluid in abdomen  -retic, and iron study  -if continued drop in h/h will get CT w/ contrast for possible post op bleed. Likely secondary to GI losses s/p 30mEq IV. Normal magnesium  -repeat bmp s/p repletion  -EKG  -continue PO/IV repletion  -monitor on Tele while hypokalemic

## 2019-04-02 NOTE — ED ADULT NURSE REASSESSMENT NOTE - NS ED NURSE REASSESS COMMENT FT1
MD Johansen assessed pt.  pt was able to express his concerns R/T his tx plan.  will continue to monitor.  safety precautions in place.
pt c/o increased back pain.  MAR called but he states that he is in the middle of a rapid response, and asked for a call back.  will comply within 20 min in an effort to keep pt comfortable.  will continue to monitor.
pt placed on cardiac monitor for repeat K=2.9.  Md Johansen aware.  pericare provided to pt with t&p.  2nd [place] cath fell off.  will reapply, once supply is replenished.  safety precautions in place.  will continue to monitor.
spoke with MAR, who will order pain med for pt.  will administer.  will continue to monitor.
recvd pt awake, alert and responsive to all stimuli.  no sob or respiratory distress noted.  pt is uncooperative; he states that he does not want any men to help care for him, although he is a complete paraplegic.  pt advised that his safety is priority for whom will help to care for him.  pt medicated for hypokalemia with K=2.2, per md's orders.  safety and contact precautions in place.  pt dx with colitis and r/o c. diff infection.  pt awaiting admitting medicine bed.  will continue to monitor.
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
## 2019-04-03 DIAGNOSIS — J18.9 PNEUMONIA, UNSPECIFIED ORGANISM: ICD-10-CM

## 2019-04-03 PROBLEM — S21.309A: Chronic | Status: ACTIVE | Noted: 2017-03-27

## 2019-04-03 LAB
ALBUMIN SERPL ELPH-MCNC: 3.2 G/DL — LOW (ref 3.3–5)
ALP SERPL-CCNC: 56 U/L — SIGNIFICANT CHANGE UP (ref 40–120)
ALT FLD-CCNC: 9 U/L — LOW (ref 10–45)
ANION GAP SERPL CALC-SCNC: 11 MMOL/L — SIGNIFICANT CHANGE UP (ref 5–17)
AST SERPL-CCNC: 17 U/L — SIGNIFICANT CHANGE UP (ref 10–40)
BASOPHILS # BLD AUTO: 0 K/UL — SIGNIFICANT CHANGE UP (ref 0–0.2)
BASOPHILS NFR BLD AUTO: 0.3 % — SIGNIFICANT CHANGE UP (ref 0–2)
BILIRUB SERPL-MCNC: 0.2 MG/DL — SIGNIFICANT CHANGE UP (ref 0.2–1.2)
BUN SERPL-MCNC: 5 MG/DL — LOW (ref 7–23)
CALCIUM SERPL-MCNC: 8.3 MG/DL — LOW (ref 8.4–10.5)
CHLORIDE SERPL-SCNC: 104 MMOL/L — SIGNIFICANT CHANGE UP (ref 96–108)
CO2 SERPL-SCNC: 27 MMOL/L — SIGNIFICANT CHANGE UP (ref 22–31)
CREAT SERPL-MCNC: 0.53 MG/DL — SIGNIFICANT CHANGE UP (ref 0.5–1.3)
CULTURE RESULTS: SIGNIFICANT CHANGE UP
EOSINOPHIL # BLD AUTO: 0.1 K/UL — SIGNIFICANT CHANGE UP (ref 0–0.5)
EOSINOPHIL NFR BLD AUTO: 1.4 % — SIGNIFICANT CHANGE UP (ref 0–6)
GLUCOSE SERPL-MCNC: 81 MG/DL — SIGNIFICANT CHANGE UP (ref 70–99)
HCT VFR BLD CALC: 26.7 % — LOW (ref 39–50)
HGB BLD-MCNC: 8.7 G/DL — LOW (ref 13–17)
LYMPHOCYTES # BLD AUTO: 1.1 K/UL — SIGNIFICANT CHANGE UP (ref 1–3.3)
LYMPHOCYTES # BLD AUTO: 25.7 % — SIGNIFICANT CHANGE UP (ref 13–44)
MAGNESIUM SERPL-MCNC: 2 MG/DL — SIGNIFICANT CHANGE UP (ref 1.6–2.6)
MCHC RBC-ENTMCNC: 28.1 PG — SIGNIFICANT CHANGE UP (ref 27–34)
MCHC RBC-ENTMCNC: 32.7 GM/DL — SIGNIFICANT CHANGE UP (ref 32–36)
MCV RBC AUTO: 85.8 FL — SIGNIFICANT CHANGE UP (ref 80–100)
MONOCYTES # BLD AUTO: 0.5 K/UL — SIGNIFICANT CHANGE UP (ref 0–0.9)
MONOCYTES NFR BLD AUTO: 10.6 % — SIGNIFICANT CHANGE UP (ref 2–14)
NEUTROPHILS # BLD AUTO: 2.7 K/UL — SIGNIFICANT CHANGE UP (ref 1.8–7.4)
NEUTROPHILS NFR BLD AUTO: 61.9 % — SIGNIFICANT CHANGE UP (ref 43–77)
PHOSPHATE SERPL-MCNC: 2.8 MG/DL — SIGNIFICANT CHANGE UP (ref 2.5–4.5)
PLATELET # BLD AUTO: 242 K/UL — SIGNIFICANT CHANGE UP (ref 150–400)
POTASSIUM SERPL-MCNC: 3.3 MMOL/L — LOW (ref 3.5–5.3)
POTASSIUM SERPL-SCNC: 3.3 MMOL/L — LOW (ref 3.5–5.3)
PROT SERPL-MCNC: 6.5 G/DL — SIGNIFICANT CHANGE UP (ref 6–8.3)
RAPID RVP RESULT: SIGNIFICANT CHANGE UP
RBC # BLD: 3.1 M/UL — LOW (ref 4.2–5.8)
RBC # FLD: 15.8 % — HIGH (ref 10.3–14.5)
SODIUM SERPL-SCNC: 142 MMOL/L — SIGNIFICANT CHANGE UP (ref 135–145)
SPECIMEN SOURCE: SIGNIFICANT CHANGE UP
WBC # BLD: 4.3 K/UL — SIGNIFICANT CHANGE UP (ref 3.8–10.5)
WBC # FLD AUTO: 4.3 K/UL — SIGNIFICANT CHANGE UP (ref 3.8–10.5)

## 2019-04-03 PROCEDURE — 99233 SBSQ HOSP IP/OBS HIGH 50: CPT | Mod: GC

## 2019-04-03 PROCEDURE — 99232 SBSQ HOSP IP/OBS MODERATE 35: CPT

## 2019-04-03 PROCEDURE — 71045 X-RAY EXAM CHEST 1 VIEW: CPT | Mod: 26

## 2019-04-03 RX ORDER — VANCOMYCIN HCL 1 G
1000 VIAL (EA) INTRAVENOUS EVERY 12 HOURS
Qty: 0 | Refills: 0 | Status: DISCONTINUED | OUTPATIENT
Start: 2019-04-04 | End: 2019-04-05

## 2019-04-03 RX ORDER — VANCOMYCIN HCL 1 G
1000 VIAL (EA) INTRAVENOUS ONCE
Qty: 0 | Refills: 0 | Status: COMPLETED | OUTPATIENT
Start: 2019-04-03 | End: 2019-04-03

## 2019-04-03 RX ORDER — TIZANIDINE 4 MG/1
4 TABLET ORAL
Qty: 0 | Refills: 0 | Status: DISCONTINUED | OUTPATIENT
Start: 2019-04-03 | End: 2019-04-10

## 2019-04-03 RX ORDER — ACETAMINOPHEN 500 MG
650 TABLET ORAL ONCE
Qty: 0 | Refills: 0 | Status: COMPLETED | OUTPATIENT
Start: 2019-04-03 | End: 2019-04-06

## 2019-04-03 RX ORDER — VANCOMYCIN HCL 1 G
VIAL (EA) INTRAVENOUS
Qty: 0 | Refills: 0 | Status: DISCONTINUED | OUTPATIENT
Start: 2019-04-03 | End: 2019-04-05

## 2019-04-03 RX ORDER — ENOXAPARIN SODIUM 100 MG/ML
40 INJECTION SUBCUTANEOUS EVERY 24 HOURS
Qty: 0 | Refills: 0 | Status: DISCONTINUED | OUTPATIENT
Start: 2019-04-03 | End: 2019-04-04

## 2019-04-03 RX ORDER — POTASSIUM CHLORIDE 20 MEQ
10 PACKET (EA) ORAL
Qty: 0 | Refills: 0 | Status: COMPLETED | OUTPATIENT
Start: 2019-04-03 | End: 2019-04-03

## 2019-04-03 RX ORDER — FERROUS SULFATE 325(65) MG
325 TABLET ORAL DAILY
Qty: 0 | Refills: 0 | Status: DISCONTINUED | OUTPATIENT
Start: 2019-04-03 | End: 2019-04-03

## 2019-04-03 RX ADMIN — Medication 100 MILLIEQUIVALENT(S): at 11:34

## 2019-04-03 RX ADMIN — Medication 250 MILLIGRAM(S): at 19:49

## 2019-04-03 RX ADMIN — TRAMADOL HYDROCHLORIDE 50 MILLIGRAM(S): 50 TABLET ORAL at 09:20

## 2019-04-03 RX ADMIN — TRAMADOL HYDROCHLORIDE 50 MILLIGRAM(S): 50 TABLET ORAL at 14:56

## 2019-04-03 RX ADMIN — TRAMADOL HYDROCHLORIDE 50 MILLIGRAM(S): 50 TABLET ORAL at 08:54

## 2019-04-03 RX ADMIN — CEFEPIME 100 MILLIGRAM(S): 1 INJECTION, POWDER, FOR SOLUTION INTRAMUSCULAR; INTRAVENOUS at 22:39

## 2019-04-03 RX ADMIN — CEFEPIME 100 MILLIGRAM(S): 1 INJECTION, POWDER, FOR SOLUTION INTRAMUSCULAR; INTRAVENOUS at 14:55

## 2019-04-03 RX ADMIN — Medication 650 MILLIGRAM(S): at 18:57

## 2019-04-03 RX ADMIN — Medication 1 TABLET(S): at 11:35

## 2019-04-03 RX ADMIN — TIZANIDINE 4 MILLIGRAM(S): 4 TABLET ORAL at 18:44

## 2019-04-03 RX ADMIN — CEFEPIME 100 MILLIGRAM(S): 1 INJECTION, POWDER, FOR SOLUTION INTRAMUSCULAR; INTRAVENOUS at 06:02

## 2019-04-03 RX ADMIN — TAMSULOSIN HYDROCHLORIDE 0.4 MILLIGRAM(S): 0.4 CAPSULE ORAL at 22:40

## 2019-04-03 RX ADMIN — Medication 600 MILLIGRAM(S): at 17:28

## 2019-04-03 RX ADMIN — TRAMADOL HYDROCHLORIDE 50 MILLIGRAM(S): 50 TABLET ORAL at 15:40

## 2019-04-03 RX ADMIN — Medication 100 MILLIEQUIVALENT(S): at 09:55

## 2019-04-03 RX ADMIN — ENOXAPARIN SODIUM 40 MILLIGRAM(S): 100 INJECTION SUBCUTANEOUS at 22:39

## 2019-04-03 RX ADMIN — Medication 650 MILLIGRAM(S): at 04:46

## 2019-04-03 RX ADMIN — Medication 100 MILLIEQUIVALENT(S): at 08:54

## 2019-04-03 RX ADMIN — Medication 250 MILLIGRAM(S): at 06:03
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
## 2019-04-03 NOTE — PROGRESS NOTE ADULT - SUBJECTIVE AND OBJECTIVE BOX
infectious diseases progress note:    Patient is a 43y old  Male who presents with a chief complaint of sepsis (2019 06:52)        Noninfectious gastroenteritis        ROS:  CONSTITUTIONAL:  Negative fever or chills, feels well, good appetite  EYES:  Negative  blurry vision or double vision  CARDIOVASCULAR:  Negative for chest pain or palpitations  RESPIRATORY:  Negative for cough, wheezing, or SOB   GASTROINTESTINAL:  Negative for nausea, vomiting, diarrhea, constipation, or abdominal pain  GENITOURINARY:  Negative frequency, urgency or dysuria  NEUROLOGIC:  No headache, confusion, dizziness, lightheadedness    Allergies    penicillin (Rash)    Intolerances        ANTIBIOTICS/RELEVANT:  antimicrobials  cefepime   IVPB 2000 milliGRAM(s) IV Intermittent every 8 hours  cefepime   IVPB      vancomycin  IVPB      vancomycin  IVPB 1000 milliGRAM(s) IV Intermittent every 12 hours    immunologic:    OTHER:  acetaminophen   Tablet .. 650 milliGRAM(s) Oral every 6 hours PRN  ferrous    sulfate 325 milliGRAM(s) Oral daily  lactated ringers. 1000 milliLiter(s) IV Continuous <Continuous>  multivitamin 1 Tablet(s) Oral daily  potassium chloride  10 mEq/100 mL IVPB 10 milliEquivalent(s) IV Intermittent every 1 hour  tamsulosin 0.4 milliGRAM(s) Oral at bedtime  traMADol 50 milliGRAM(s) Oral every 8 hours PRN      Objective:  Vital Signs Last 24 Hrs  T(C): 37.1 (2019 05:04), Max: 38.2 (2019 20:03)  T(F): 98.8 (2019 05:04), Max: 100.8 (2019 20:03)  HR: 79 (2019 05:04) (72 - 83)  BP: 142/98 (2019 05:04) (137/92 - 170/90)  BP(mean): --  RR: 20 (2019 05:04) (16 - 24)  SpO2: 98% (2019 05:04) (98% - 100%)    PHYSICAL EXAM:     Eyes:ASHLEY, EOMI  Ear/Nose/Throat: no oral lesion, no sinus tenderness on percussion	  Neck:no JVD, no lymphadenopathy, supple  Respiratory: CTA sujatha  Cardiovascular: S1S2 RRR, no murmurs  Gastrointestinal:soft, (+) BS, no HSM  Extremities:no e/e/c        LABS:                        8.7    4.3   )-----------( 242      ( 2019 06:55 )             26.7     -    142  |  104  |  5<L>  ----------------------------<  81  3.3<L>   |  27  |  0.53    Ca    8.3<L>      2019 06:54  Phos  2.8     -  Mg     2.0     -    TPro  6.5  /  Alb  3.2<L>  /  TBili  0.2  /  DBili  x   /  AST  17  /  ALT  9<L>  /  AlkPhos  56  -    PT/INR - ( 2019 13:05 )   PT: 15.3 sec;   INR: 1.32 ratio           Urinalysis Basic - ( 2019 02:04 )    Color: Light Yellow / Appearance: Clear / S.006 / pH: x  Gluc: x / Ketone: Negative  / Bili: Negative / Urobili: Negative   Blood: x / Protein: Trace / Nitrite: Negative   Leuk Esterase: Large / RBC: 7 /hpf / WBC 65 /HPF   Sq Epi: x / Non Sq Epi: 1 /hpf / Bacteria: Few          MICROBIOLOGY:    RECENT CULTURES:   @ 06:45 .Urine Clean Catch (Midstream)                >100,000 CFU/ml Gram Negative Rods     @ 06:16 .Blood Blood                No growth to date.          RESPIRATORY CULTURES:      C Diff by PCR Result: NotDetec ( @ 09:35)  Clostridium difficile GDH Toxins A&amp;B, EIA:   Indeterminate ( @ 02:00)          RADIOLOGY & ADDITIONAL STUDIES:        Pager [phone]  After 5 pm/weekends or if no response :[phone]

## 2019-04-03 NOTE — PROGRESS NOTE ADULT - PROBLEM SELECTOR PLAN 1
Patient presenting w/ fever, generalized body aches and fever, CXR with evidence of multifocal opacities,  and CT Abd showing left sided colitis   - c/w broad spectrum coverage with vanc and cefepime, f/u bcx, ucx   - monitor vital q4   - recent surgical site appears clean, ELIAS drain removed, no evidence of IntraAbd collection on CT   -f/u blood cultures, UCx  -wound care consult  -urology consulted, appreciate reccs,   - ID consult Patient presenting w/ fever, generalized body aches and fever, CXR with evidence of multifocal opacities,  and CT Abd showing left sided colitis   - c/w broad spectrum coverage with vanc and cefepime, f/u bcx, ucx   - monitor vital q4   - recent surgical site appears clean, ELIAS drain removed, no evidence of IntraAbd collection on CT   -f/u blood cultures, UCx  -wound care consulted   -urology consulted, appreciate reccs,   - ID consulted, appreciate reccs, c/w current abx regimen pending cultures

## 2019-04-03 NOTE — PROGRESS NOTE ADULT - PROBLEM SELECTOR PLAN 6
Primary metabolic acidosis w/ mixed picture given lactate and high norm AG. May be met alkalosis secondary to hypokalemia.  -repeat vbg  -plan as above
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
## 2019-04-03 NOTE — PROGRESS NOTE ADULT - PROBLEM SELECTOR PLAN 4
Anemia s/p infection w/o clear signs of blood loss, has back pain with fluid in abdomen  -retic, and iron study  -if continued drop in h/h will get CT w/ contrast for possible post op bleed.

## 2019-04-03 NOTE — PROGRESS NOTE ADULT - SUBJECTIVE AND OBJECTIVE BOX
Patient is a 43y old  Male who presents with a chief complaint of sepsis (2019 07:47)      SUBJECTIVE / OVERNIGHT EVENTS: No acute events overnight. Admitted to floors yesterday. Febrile overnight but fever curve downtrending. This AM endorsing cough. Also endorsing some loose BM and back pain but currently controlled. No other complaints this AM.     MEDICATIONS  (STANDING):  cefepime   IVPB 2000 milliGRAM(s) IV Intermittent every 8 hours  cefepime   IVPB      guaiFENesin  milliGRAM(s) Oral every 12 hours  multivitamin 1 Tablet(s) Oral daily  tamsulosin 0.4 milliGRAM(s) Oral at bedtime    MEDICATIONS  (PRN):  acetaminophen   Tablet .. 650 milliGRAM(s) Oral every 6 hours PRN Moderate Pain (4 - 6)  tiZANidine 4 milliGRAM(s) Oral <User Schedule> PRN Muscle Spasm  traMADol 50 milliGRAM(s) Oral every 8 hours PRN Severe Pain (7 - 10)      Vital Signs Last 24 Hrs  T(C): 37.3 (2019 12:13), Max: 38.2 (2019 20:03)  T(F): 99.2 (2019 12:13), Max: 100.8 (2019 20:03)  HR: 68 (2019 12:13) (68 - 83)  BP: 117/79 (2019 12:13) (117/79 - 170/90)  BP(mean): --  RR: 20 (2019 12:13) (16 - 20)  SpO2: 98% (2019 12:13) (98% - 100%)  CAPILLARY BLOOD GLUCOSE        I&O's Summary    2019 07:01  -  2019 07:00  --------------------------------------------------------  IN: 1150 mL / OUT: 400 mL / NET: 750 mL        PHYSICAL EXAM:  	GENERAL: NAD  	HEAD:  Atraumatic, Normocephalic  	EYES:, conjunctiva and sclera clear  	ENT: no pharyngeal erythema or cervical lymphadenopathy  	NECK: old trach scar, Supple, No JVD  	CHEST/LUNG: Clear to auscultation bilaterally; No wheeze, decreased breath sounds bilateral bases  	HEART: Regular rate and rhythm; No murmurs, rubs, or gallops  	ABDOMEN: distended abdomen without associated TTP, rebound tenderness. Surgical sutures in abdomen with lap scar without erythema of skin. previous LORRIE drain site c/d/i this AM   	EXTREMITIES: Contracted upper and lower extremities with associated muscle loss.    	PSYCH: AAOx3, flat affect  	NEUROLOGY: CN II-XII grossly intact, minimal movement upper extremities, gross sensation intact, 0/5 strength in b/l UE and LE   SKIN: Healing stage 3 wounds on sacrum.    LABS:                        8.7    4.3   )-----------( 242      ( 2019 06:55 )             26.7     04-03    142  |  104  |  5<L>  ----------------------------<  81  3.3<L>   |  27  |  0.53    Ca    8.3<L>      2019 06:54  Phos  2.8     04-03  Mg     2.0     04-03    TPro  6.5  /  Alb  3.2<L>  /  TBili  0.2  /  DBili  x   /  AST  17  /  ALT  9<L>  /  AlkPhos  56  04-03    PT/INR - ( 2019 13:05 )   PT: 15.3 sec;   INR: 1.32 ratio               Urinalysis Basic - ( 2019 02:04 )    Color: Light Yellow / Appearance: Clear / S.006 / pH: x  Gluc: x / Ketone: Negative  / Bili: Negative / Urobili: Negative   Blood: x / Protein: Trace / Nitrite: Negative   Leuk Esterase: Large / RBC: 7 /hpf / WBC 65 /HPF   Sq Epi: x / Non Sq Epi: 1 /hpf / Bacteria: Few        RADIOLOGY & ADDITIONAL TESTS:    Imaging Personally Reviewed:    Consultant(s) Notes Reviewed:      Care Discussed with Consultants/Other Providers:

## 2019-04-03 NOTE — PROGRESS NOTE ADULT - PROBLEM SELECTOR PLAN 2
- multifocal airspace disease, predominently in left lobe  - also with mild bronchiectasis noted in lower lobe on CT abd, as well as pleural effusion  -

## 2019-04-03 NOTE — PROGRESS NOTE ADULT - PROBLEM SELECTOR PLAN 5
Likely secondary to GI losses s/p 30mEq IV. Normal magnesium  -repeat bmp s/p repletion  -EKG  -continue PO/IV repletion  -monitor on Tele while hypokalemic

## 2019-04-03 NOTE — PROGRESS NOTE ADULT - SUBJECTIVE AND OBJECTIVE BOX
Tmax 100.8 overnight.  C Diff negative, on Vanc/Cefepime    T(F): 98.8, Max: 100.8 (04-02-19 @ 20:03)  HR: 79  BP: 142/98  SpO2: 98%    OUT:    Incontinent per Condom Catheter: 400 mL  Total OUT: 400 mL    Gen   Abd      04-02 @ 15:31  WBC 4.5   / Hct 26.3  / SCr 0.51     04-02 @ 10:47  WBC 5.1   / Hct 27.1  / SCr 0.53 Tmax 100.8 overnight.  C Diff negative.  GNR in Urine, BCx NGTD, on Vanc/Cefepime    T(F): 98.8, Max: 100.8 (04-02-19 @ 20:03)  HR: 79  BP: 142/98  SpO2: 98%    OUT:    Incontinent per Condom Catheter: 400 mL  Total OUT: 400 mL    Gen NAD  Abd Soft, NT   Clear yellow urine    04-02 @ 15:31  WBC 4.5   / Hct 26.3  / SCr 0.51     04-02 @ 10:47  WBC 5.1   / Hct 27.1  / SCr 0.53

## 2019-04-03 NOTE — PROGRESS NOTE ADULT - ASSESSMENT
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  Patient did not follow up for post-op care and drain removal/did not allow nursing to change his wound    - Daily dressing changes with 1/2 inch packing, gauze/abd pad  - Dressing changed by urology this morning, subsequent changes may be done by nursing/primary team  - ELIAS removed, may have some leakage from site for the next few days, will close on its own  - Staples removed  - Appreciate medicine and ID care  - C Diff negative, on Vanc/Cefepime  - BCx NGTD, UCx GNR, follow up speciation/sensitivities

## 2019-04-03 NOTE — PROGRESS NOTE ADULT - ATTENDING COMMENTS
I personally performed the E/M service provided and was physically present during key portions of the service furnished by the resident/fellow. I agree with the history, physical and assessment/plan as stated above, with the following additional remarks:  - pt with fevers overnight, today reporting generalized body aches- suspect 2/2 colitis vs. possible PNA given patchy opacity seen on CXR and lingering cough vs. must consider viral illness vs. ?recurrent infection from recent nephrectomy. Continue zosyn, f/u blood and urine cultures, check RVP. If fevers recur, would add back vancomycin and re-image abdomen with IV contrast as first scan was equivocal non contrast study. Continue tramadol for pain.

## 2019-04-03 NOTE — PROGRESS NOTE ADULT - ASSESSMENT
43 harish old angry and not cooperative  exam limited/ recent nephrectomy on right and history Pt with recurrent kidney stones.  presents with diarrhea and colitis on ct scan and indeTER C diff test.  PCR pending    stent on left that has been removed/   WD DOES NOT LOOK INFECTED AND CT SCAN WITHOUT OBV COLLECTION    AGREE WITH EMPIRIC ANTIBIOTICS AND PO VANCO PENDING RESULTS.     urine culture is growing gram negative rods    wd packed by urology    can DC  IV vanco  await PCR of cdiff     await urine sensitivities

## 2019-04-04 DIAGNOSIS — N39.0 URINARY TRACT INFECTION, SITE NOT SPECIFIED: ICD-10-CM

## 2019-04-04 LAB
-  AMIKACIN: SIGNIFICANT CHANGE UP
-  AMPICILLIN/SULBACTAM: SIGNIFICANT CHANGE UP
-  AMPICILLIN: SIGNIFICANT CHANGE UP
-  AZTREONAM: SIGNIFICANT CHANGE UP
-  CEFAZOLIN: SIGNIFICANT CHANGE UP
-  CEFEPIME: SIGNIFICANT CHANGE UP
-  CEFOXITIN: SIGNIFICANT CHANGE UP
-  CEFTRIAXONE: SIGNIFICANT CHANGE UP
-  CIPROFLOXACIN: SIGNIFICANT CHANGE UP
-  ERTAPENEM: SIGNIFICANT CHANGE UP
-  IMIPENEM: SIGNIFICANT CHANGE UP
-  LEVOFLOXACIN: SIGNIFICANT CHANGE UP
-  MEROPENEM: SIGNIFICANT CHANGE UP
-  NITROFURANTOIN: SIGNIFICANT CHANGE UP
-  PIPERACILLIN/TAZOBACTAM: SIGNIFICANT CHANGE UP
-  TIGECYCLINE: SIGNIFICANT CHANGE UP
-  TRIMETHOPRIM/SULFAMETHOXAZOLE: SIGNIFICANT CHANGE UP
ALBUMIN SERPL ELPH-MCNC: 2.9 G/DL — LOW (ref 3.3–5)
ALP SERPL-CCNC: 54 U/L — SIGNIFICANT CHANGE UP (ref 40–120)
ALT FLD-CCNC: 9 U/L — LOW (ref 10–45)
ANION GAP SERPL CALC-SCNC: 9 MMOL/L — SIGNIFICANT CHANGE UP (ref 5–17)
ANISOCYTOSIS BLD QL: SLIGHT — SIGNIFICANT CHANGE UP
APTT BLD: 32.2 SEC — SIGNIFICANT CHANGE UP (ref 27.5–36.3)
APTT BLD: 76 SEC — HIGH (ref 27.5–36.3)
AST SERPL-CCNC: 17 U/L — SIGNIFICANT CHANGE UP (ref 10–40)
BASOPHILS # BLD AUTO: 0 K/UL — SIGNIFICANT CHANGE UP (ref 0–0.2)
BASOPHILS NFR BLD AUTO: 0 % — SIGNIFICANT CHANGE UP (ref 0–2)
BILIRUB SERPL-MCNC: 0.2 MG/DL — SIGNIFICANT CHANGE UP (ref 0.2–1.2)
BUN SERPL-MCNC: 5 MG/DL — LOW (ref 7–23)
CALCIUM SERPL-MCNC: 8.1 MG/DL — LOW (ref 8.4–10.5)
CHLORIDE SERPL-SCNC: 101 MMOL/L — SIGNIFICANT CHANGE UP (ref 96–108)
CO2 SERPL-SCNC: 29 MMOL/L — SIGNIFICANT CHANGE UP (ref 22–31)
CREAT SERPL-MCNC: 0.49 MG/DL — LOW (ref 0.5–1.3)
CULTURE RESULTS: SIGNIFICANT CHANGE UP
D DIMER BLD IA.RAPID-MCNC: 527 NG/ML DDU — HIGH
EOSINOPHIL # BLD AUTO: 0 K/UL — SIGNIFICANT CHANGE UP (ref 0–0.5)
EOSINOPHIL NFR BLD AUTO: 1.2 % — SIGNIFICANT CHANGE UP (ref 0–6)
GLUCOSE SERPL-MCNC: 108 MG/DL — HIGH (ref 70–99)
HCT VFR BLD CALC: 26.9 % — LOW (ref 39–50)
HCT VFR BLD CALC: 30.3 % — LOW (ref 39–50)
HGB BLD-MCNC: 8.5 G/DL — LOW (ref 13–17)
HGB BLD-MCNC: 9.3 G/DL — LOW (ref 13–17)
HYPOCHROMIA BLD QL: SLIGHT — SIGNIFICANT CHANGE UP
INR BLD: 1.33 RATIO — HIGH (ref 0.88–1.16)
LEGIONELLA AG UR QL: NEGATIVE — SIGNIFICANT CHANGE UP
LYMPHOCYTES # BLD AUTO: 0.9 K/UL — LOW (ref 1–3.3)
LYMPHOCYTES # BLD AUTO: 27.9 % — SIGNIFICANT CHANGE UP (ref 13–44)
MAGNESIUM SERPL-MCNC: 2.1 MG/DL — SIGNIFICANT CHANGE UP (ref 1.6–2.6)
MCHC RBC-ENTMCNC: 26 PG — LOW (ref 27–34)
MCHC RBC-ENTMCNC: 26.8 PG — LOW (ref 27–34)
MCHC RBC-ENTMCNC: 30.7 GM/DL — LOW (ref 32–36)
MCHC RBC-ENTMCNC: 31.7 GM/DL — LOW (ref 32–36)
MCV RBC AUTO: 84.3 FL — SIGNIFICANT CHANGE UP (ref 80–100)
MCV RBC AUTO: 84.6 FL — SIGNIFICANT CHANGE UP (ref 80–100)
METHOD TYPE: SIGNIFICANT CHANGE UP
MONOCYTES # BLD AUTO: 0.4 K/UL — SIGNIFICANT CHANGE UP (ref 0–0.9)
MONOCYTES NFR BLD AUTO: 12.2 % — SIGNIFICANT CHANGE UP (ref 2–14)
NEUTROPHILS # BLD AUTO: 1.8 K/UL — SIGNIFICANT CHANGE UP (ref 1.8–7.4)
NEUTROPHILS NFR BLD AUTO: 58.6 % — SIGNIFICANT CHANGE UP (ref 43–77)
NT-PROBNP SERPL-SCNC: 1045 PG/ML — HIGH (ref 0–300)
ORGANISM # SPEC MICROSCOPIC CNT: SIGNIFICANT CHANGE UP
ORGANISM # SPEC MICROSCOPIC CNT: SIGNIFICANT CHANGE UP
PHOSPHATE SERPL-MCNC: 3 MG/DL — SIGNIFICANT CHANGE UP (ref 2.5–4.5)
PLAT MORPH BLD: NORMAL — SIGNIFICANT CHANGE UP
PLATELET # BLD AUTO: 208 K/UL — SIGNIFICANT CHANGE UP (ref 150–400)
PLATELET # BLD AUTO: 242 K/UL — SIGNIFICANT CHANGE UP (ref 150–400)
POIKILOCYTOSIS BLD QL AUTO: SLIGHT — SIGNIFICANT CHANGE UP
POTASSIUM SERPL-MCNC: 2.9 MMOL/L — CRITICAL LOW (ref 3.5–5.3)
POTASSIUM SERPL-SCNC: 2.9 MMOL/L — CRITICAL LOW (ref 3.5–5.3)
PROT SERPL-MCNC: 6.3 G/DL — SIGNIFICANT CHANGE UP (ref 6–8.3)
PROTHROM AB SERPL-ACNC: 15.4 SEC — HIGH (ref 10–12.9)
RBC # BLD: 3.19 M/UL — LOW (ref 4.2–5.8)
RBC # BLD: 3.58 M/UL — LOW (ref 4.2–5.8)
RBC # FLD: 15.7 % — HIGH (ref 10.3–14.5)
RBC # FLD: 15.9 % — HIGH (ref 10.3–14.5)
RBC BLD AUTO: ABNORMAL
SODIUM SERPL-SCNC: 139 MMOL/L — SIGNIFICANT CHANGE UP (ref 135–145)
SPECIMEN SOURCE: SIGNIFICANT CHANGE UP
TROPONIN T, HIGH SENSITIVITY RESULT: 50 NG/L — SIGNIFICANT CHANGE UP (ref 0–51)
TROPONIN T, HIGH SENSITIVITY RESULT: 54 NG/L — HIGH (ref 0–51)
VANCOMYCIN TROUGH SERPL-MCNC: 13.5 UG/ML — SIGNIFICANT CHANGE UP (ref 10–20)
WBC # BLD: 3 K/UL — LOW (ref 3.8–10.5)
WBC # BLD: 4.2 K/UL — SIGNIFICANT CHANGE UP (ref 3.8–10.5)
WBC # FLD AUTO: 3 K/UL — LOW (ref 3.8–10.5)
WBC # FLD AUTO: 4.2 K/UL — SIGNIFICANT CHANGE UP (ref 3.8–10.5)

## 2019-04-04 PROCEDURE — 71260 CT THORAX DX C+: CPT | Mod: 26

## 2019-04-04 PROCEDURE — 99232 SBSQ HOSP IP/OBS MODERATE 35: CPT

## 2019-04-04 PROCEDURE — 74177 CT ABD & PELVIS W/CONTRAST: CPT | Mod: 26

## 2019-04-04 PROCEDURE — 93306 TTE W/DOPPLER COMPLETE: CPT | Mod: 26

## 2019-04-04 PROCEDURE — 99233 SBSQ HOSP IP/OBS HIGH 50: CPT

## 2019-04-04 RX ORDER — HEPARIN SODIUM 5000 [USP'U]/ML
INJECTION INTRAVENOUS; SUBCUTANEOUS
Qty: 25000 | Refills: 0 | Status: DISCONTINUED | OUTPATIENT
Start: 2019-04-04 | End: 2019-04-05

## 2019-04-04 RX ORDER — POLYETHYLENE GLYCOL 3350 17 G/17G
17 POWDER, FOR SOLUTION ORAL DAILY
Qty: 0 | Refills: 0 | Status: DISCONTINUED | OUTPATIENT
Start: 2019-04-04 | End: 2019-04-04

## 2019-04-04 RX ORDER — AZITHROMYCIN 500 MG/1
TABLET, FILM COATED ORAL
Qty: 0 | Refills: 0 | Status: DISCONTINUED | OUTPATIENT
Start: 2019-04-04 | End: 2019-04-05

## 2019-04-04 RX ORDER — POTASSIUM CHLORIDE 20 MEQ
20 PACKET (EA) ORAL
Qty: 0 | Refills: 0 | Status: COMPLETED | OUTPATIENT
Start: 2019-04-04 | End: 2019-04-04

## 2019-04-04 RX ORDER — HEPARIN SODIUM 5000 [USP'U]/ML
3000 INJECTION INTRAVENOUS; SUBCUTANEOUS EVERY 6 HOURS
Qty: 0 | Refills: 0 | Status: DISCONTINUED | OUTPATIENT
Start: 2019-04-04 | End: 2019-04-05

## 2019-04-04 RX ORDER — POTASSIUM CHLORIDE 20 MEQ
10 PACKET (EA) ORAL
Qty: 0 | Refills: 0 | Status: COMPLETED | OUTPATIENT
Start: 2019-04-04 | End: 2019-04-04

## 2019-04-04 RX ORDER — SODIUM CHLORIDE 9 MG/ML
1000 INJECTION INTRAMUSCULAR; INTRAVENOUS; SUBCUTANEOUS
Qty: 0 | Refills: 0 | Status: DISCONTINUED | OUTPATIENT
Start: 2019-04-04 | End: 2019-04-05

## 2019-04-04 RX ORDER — AZITHROMYCIN 500 MG/1
500 TABLET, FILM COATED ORAL ONCE
Qty: 0 | Refills: 0 | Status: COMPLETED | OUTPATIENT
Start: 2019-04-04 | End: 2019-04-04

## 2019-04-04 RX ORDER — AZITHROMYCIN 500 MG/1
500 TABLET, FILM COATED ORAL EVERY 24 HOURS
Qty: 0 | Refills: 0 | Status: DISCONTINUED | OUTPATIENT
Start: 2019-04-05 | End: 2019-04-05

## 2019-04-04 RX ORDER — HEPARIN SODIUM 5000 [USP'U]/ML
6500 INJECTION INTRAVENOUS; SUBCUTANEOUS EVERY 6 HOURS
Qty: 0 | Refills: 0 | Status: DISCONTINUED | OUTPATIENT
Start: 2019-04-04 | End: 2019-04-05

## 2019-04-04 RX ADMIN — Medication 650 MILLIGRAM(S): at 08:16

## 2019-04-04 RX ADMIN — Medication 650 MILLIGRAM(S): at 04:42

## 2019-04-04 RX ADMIN — TRAMADOL HYDROCHLORIDE 50 MILLIGRAM(S): 50 TABLET ORAL at 06:10

## 2019-04-04 RX ADMIN — HEPARIN SODIUM 1400 UNIT(S)/HR: 5000 INJECTION INTRAVENOUS; SUBCUTANEOUS at 16:02

## 2019-04-04 RX ADMIN — TRAMADOL HYDROCHLORIDE 50 MILLIGRAM(S): 50 TABLET ORAL at 15:53

## 2019-04-04 RX ADMIN — TIZANIDINE 4 MILLIGRAM(S): 4 TABLET ORAL at 10:23

## 2019-04-04 RX ADMIN — TRAMADOL HYDROCHLORIDE 50 MILLIGRAM(S): 50 TABLET ORAL at 16:40

## 2019-04-04 RX ADMIN — CEFEPIME 100 MILLIGRAM(S): 1 INJECTION, POWDER, FOR SOLUTION INTRAMUSCULAR; INTRAVENOUS at 05:27

## 2019-04-04 RX ADMIN — Medication 600 MILLIGRAM(S): at 18:20

## 2019-04-04 RX ADMIN — TRAMADOL HYDROCHLORIDE 50 MILLIGRAM(S): 50 TABLET ORAL at 05:10

## 2019-04-04 RX ADMIN — Medication 650 MILLIGRAM(S): at 09:00

## 2019-04-04 RX ADMIN — Medication 20 MILLIEQUIVALENT(S): at 20:41

## 2019-04-04 RX ADMIN — Medication 650 MILLIGRAM(S): at 03:42

## 2019-04-04 RX ADMIN — Medication 100 MILLIEQUIVALENT(S): at 20:41

## 2019-04-04 RX ADMIN — TRAMADOL HYDROCHLORIDE 50 MILLIGRAM(S): 50 TABLET ORAL at 00:24

## 2019-04-04 RX ADMIN — AZITHROMYCIN 250 MILLIGRAM(S): 500 TABLET, FILM COATED ORAL at 12:43

## 2019-04-04 RX ADMIN — CEFEPIME 100 MILLIGRAM(S): 1 INJECTION, POWDER, FOR SOLUTION INTRAMUSCULAR; INTRAVENOUS at 15:16

## 2019-04-04 RX ADMIN — Medication 250 MILLIGRAM(S): at 05:27

## 2019-04-04 RX ADMIN — Medication 20 MILLIEQUIVALENT(S): at 15:42

## 2019-04-04 RX ADMIN — Medication 20 MILLIEQUIVALENT(S): at 18:20

## 2019-04-04 RX ADMIN — Medication 650 MILLIGRAM(S): at 20:41

## 2019-04-04 RX ADMIN — Medication 100 MILLIEQUIVALENT(S): at 21:52

## 2019-04-04 RX ADMIN — CEFEPIME 100 MILLIGRAM(S): 1 INJECTION, POWDER, FOR SOLUTION INTRAMUSCULAR; INTRAVENOUS at 21:43

## 2019-04-04 RX ADMIN — Medication 600 MILLIGRAM(S): at 05:26

## 2019-04-04 RX ADMIN — Medication 1 TABLET(S): at 15:42

## 2019-04-04 RX ADMIN — TRAMADOL HYDROCHLORIDE 50 MILLIGRAM(S): 50 TABLET ORAL at 01:25

## 2019-04-04 RX ADMIN — Medication 650 MILLIGRAM(S): at 20:29

## 2019-04-04 RX ADMIN — Medication 600 MILLIGRAM(S): at 21:43

## 2019-04-04 RX ADMIN — Medication 250 MILLIGRAM(S): at 18:48

## 2019-04-04 RX ADMIN — Medication 100 MILLIEQUIVALENT(S): at 23:53

## 2019-04-04 RX ADMIN — TAMSULOSIN HYDROCHLORIDE 0.4 MILLIGRAM(S): 0.4 CAPSULE ORAL at 21:43

## 2019-04-04 RX ADMIN — Medication 30 MILLILITER(S): at 18:19

## 2019-04-04 NOTE — PROGRESS NOTE ADULT - ASSESSMENT
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  Patient did not follow up for post-op care and drain removal/did not allow nursing to change his wound    - Daily dressing changes with 1/2 inch packing, gauze/abd pad, may be done by nursing  - Appreciate medicine and ID care  - C Diff negative, on Vanc/Cefepime  - BCx NGTD, UCx GNR, follow up speciation/sensitivities  - No inpatient  needs, will need continued follow up with Dr. Pelayo after discharge

## 2019-04-04 NOTE — PROGRESS NOTE ADULT - SUBJECTIVE AND OBJECTIVE BOX
Patient is a 43y old  Male who presents with a chief complaint of sepsis (04 Apr 2019 07:51)      SUBJECTIVE / OVERNIGHT EVENTS: No acute events overnight. Febrile yesterday afternoon, restarted on vancomycin, continues to have cough. Endorsing back pain and discomfort.      MEDICATIONS  (STANDING):  cefepime   IVPB 2000 milliGRAM(s) IV Intermittent every 8 hours  cefepime   IVPB      enoxaparin Injectable 40 milliGRAM(s) SubCutaneous every 24 hours  guaiFENesin  milliGRAM(s) Oral every 12 hours  multivitamin 1 Tablet(s) Oral daily  tamsulosin 0.4 milliGRAM(s) Oral at bedtime  vancomycin  IVPB      vancomycin  IVPB 1000 milliGRAM(s) IV Intermittent every 12 hours    MEDICATIONS  (PRN):  acetaminophen   Tablet .. 650 milliGRAM(s) Oral every 6 hours PRN Moderate Pain (4 - 6)  acetaminophen   Tablet .. 650 milliGRAM(s) Oral once PRN Temp greater or equal to 38C (100.4F)  tiZANidine 4 milliGRAM(s) Oral <User Schedule> PRN Muscle Spasm  traMADol 50 milliGRAM(s) Oral every 8 hours PRN Severe Pain (7 - 10)      Vital Signs Last 24 Hrs  T(C): 37.2 (04 Apr 2019 06:06), Max: 39.2 (03 Apr 2019 18:55)  T(F): 98.9 (04 Apr 2019 06:06), Max: 102.6 (03 Apr 2019 18:55)  HR: 56 (04 Apr 2019 06:06) (56 - 74)  BP: 124/68 (04 Apr 2019 06:06) (105/66 - 124/80)  BP(mean): --  RR: 18 (04 Apr 2019 06:06) (18 - 20)  SpO2: 92% (04 Apr 2019 06:06) (91% - 98%)  CAPILLARY BLOOD GLUCOSE        I&O's Summary    03 Apr 2019 07:01  -  04 Apr 2019 07:00  --------------------------------------------------------  IN: 4500 mL / OUT: 3700 mL / NET: 800 mL        PHYSICAL EXAM:  GENERAL: NAD, well-developed  HEAD:  Atraumatic, Normocephalic  EYES: EOMI, PERRLA, conjunctiva and sclera clear  NECK: Supple, No JVD  CHEST/LUNG: Clear to auscultation bilaterally; No wheeze  HEART: Regular rate and rhythm; No murmurs, rubs, or gallops  ABDOMEN: Soft, Nontender, Nondistended; Bowel sounds present  EXTREMITIES:  2+ Peripheral Pulses, No clubbing, cyanosis, or edema  PSYCH: AAOx3  NEUROLOGY: non-focal  SKIN: No rashes or lesions    LABS:                        8.7    4.3   )-----------( 242      ( 03 Apr 2019 06:55 )             26.7     04-03    142  |  104  |  5<L>  ----------------------------<  81  3.3<L>   |  27  |  0.53    Ca    8.3<L>      03 Apr 2019 06:54  Phos  2.8     04-03  Mg     2.0     04-03    TPro  6.5  /  Alb  3.2<L>  /  TBili  0.2  /  DBili  x   /  AST  17  /  ALT  9<L>  /  AlkPhos  56  04-03    PT/INR - ( 02 Apr 2019 13:05 )   PT: 15.3 sec;   INR: 1.32 ratio                   RADIOLOGY & ADDITIONAL TESTS:    Imaging Personally Reviewed:    Consultant(s) Notes Reviewed:      Care Discussed with Consultants/Other Providers: Patient is a 43y old  Male who presents with a chief complaint of sepsis (04 Apr 2019 07:51)      SUBJECTIVE / OVERNIGHT EVENTS: No acute events overnight. Febrile yesterday afternoon, restarted on vancomycin, continues to have cough. Endorsing back pain and discomfort.      MEDICATIONS  (STANDING):  cefepime   IVPB 2000 milliGRAM(s) IV Intermittent every 8 hours  cefepime   IVPB      enoxaparin Injectable 40 milliGRAM(s) SubCutaneous every 24 hours  guaiFENesin  milliGRAM(s) Oral every 12 hours  multivitamin 1 Tablet(s) Oral daily  tamsulosin 0.4 milliGRAM(s) Oral at bedtime  vancomycin  IVPB      vancomycin  IVPB 1000 milliGRAM(s) IV Intermittent every 12 hours    MEDICATIONS  (PRN):  acetaminophen   Tablet .. 650 milliGRAM(s) Oral every 6 hours PRN Moderate Pain (4 - 6)  acetaminophen   Tablet .. 650 milliGRAM(s) Oral once PRN Temp greater or equal to 38C (100.4F)  tiZANidine 4 milliGRAM(s) Oral <User Schedule> PRN Muscle Spasm  traMADol 50 milliGRAM(s) Oral every 8 hours PRN Severe Pain (7 - 10)      Vital Signs Last 24 Hrs  T(C): 37.2 (04 Apr 2019 06:06), Max: 39.2 (03 Apr 2019 18:55)  T(F): 98.9 (04 Apr 2019 06:06), Max: 102.6 (03 Apr 2019 18:55)  HR: 56 (04 Apr 2019 06:06) (56 - 74)  BP: 124/68 (04 Apr 2019 06:06) (105/66 - 124/80)  BP(mean): --  RR: 18 (04 Apr 2019 06:06) (18 - 20)  SpO2: 92% (04 Apr 2019 06:06) (91% - 98%)  CAPILLARY BLOOD GLUCOSE        I&O's Summary    03 Apr 2019 07:01  -  04 Apr 2019 07:00  --------------------------------------------------------  IN: 4500 mL / OUT: 3700 mL / NET: 800 mL        PHYSICAL EXAM:  	GENERAL: NAD  	HEAD:  Atraumatic, Normocephalic  	EYES:, conjunctiva and sclera clear  	ENT: no pharyngeal erythema or cervical lymphadenopathy  	NECK: old trach scar, Supple, No JVD  	CHEST/LUNG: Clear to auscultation bilaterally; No wheeze, decreased breath sounds bilateral bases  	HEART: Regular rate and rhythm; No murmurs, rubs, or gallops  	ABDOMEN: distended abdomen without associated TTP, rebound tenderness. Surgical sutures in abdomen with lap scar without erythema of skin. previous LORRIE drain site c/d/i this AM   	EXTREMITIES: Contracted upper and lower extremities with associated muscle loss.    	PSYCH: AAOx3, flat affect  	NEUROLOGY: CN II-XII grossly intact, minimal movement upper extremities, gross sensation intact, 0/5 strength in b/l UE and LE   SKIN: Healing stage 3 wounds on sacrum.    LABS:                        8.7    4.3   )-----------( 242      ( 03 Apr 2019 06:55 )             26.7     04-03    142  |  104  |  5<L>  ----------------------------<  81  3.3<L>   |  27  |  0.53    Ca    8.3<L>      03 Apr 2019 06:54  Phos  2.8     04-03  Mg     2.0     04-03    TPro  6.5  /  Alb  3.2<L>  /  TBili  0.2  /  DBili  x   /  AST  17  /  ALT  9<L>  /  AlkPhos  56  04-03    PT/INR - ( 02 Apr 2019 13:05 )   PT: 15.3 sec;   INR: 1.32 ratio                   RADIOLOGY & ADDITIONAL TESTS:    Imaging Personally Reviewed:    Consultant(s) Notes Reviewed:      Care Discussed with Consultants/Other Providers:

## 2019-04-04 NOTE — PROGRESS NOTE ADULT - PROBLEM SELECTOR PLAN 9
Found on CT scan, no respiratory distress - Found on CT scan, no respiratory distress  - will further evaluate with CT chest

## 2019-04-04 NOTE — PROGRESS NOTE ADULT - ATTENDING COMMENTS
Hospitalist- Naman Bates MD  Pager: [phone]  If no response or off-hours, page [phone]  -------------------------------------  I personally performed the E/M service provided and was physically present during key portions of the service furnished by the resident/fellow. I agree with the history, physical and assessment/plan as stated above, with the following additional remarks:  - pt with sepsis 2/2 multifocal HCAP 2/2 presumed MRSA and/or pseudomonal PNA and/or atypical organism such as legionella. f/u MRSA swab, continue empiric vanc + cefepime, add azithro for possible atypical coverage, f/u urine legionella. ID following.  - IV CT chest showing possible filling defect- will order CTA with IV contrast as per radiology recommendations. Given high risk of clot, will empirically start heparin gtt for now, monitor PTTs.
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
## 2019-04-04 NOTE — PROGRESS NOTE ADULT - PROBLEM SELECTOR PLAN 2
patient w/ hx of pyelo, s/p left perc neph, right nephrectomy   Has [place] cath  Urine with GNR>100k, awaiting speciation  c/w broad spectrum coverage with zosyn for now   urology following, no intraabdominal collection, nephrostomy drain d/c'ed

## 2019-04-04 NOTE — PROGRESS NOTE ADULT - ASSESSMENT
43 harish old angry and not cooperative  exam limited/ recent nephrectomy on right and history Pt with recurrent kidney stones.  presents with diarrhea and colitis on ct scan and indeTER C diff test.  PCR pending    stent on left that has been removed/   WD DOES NOT LOOK INFECTED AND CT SCAN WITHOUT OBV COLLECTION       urine culture is growing gram negative rods    wd packed by urology not obv infected  wd seen .    can DC  IV vanco       await urine sensitivities seems like UTI is likely source

## 2019-04-04 NOTE — PROGRESS NOTE ADULT - PROBLEM SELECTOR PLAN 5
Anemia s/p infection w/o clear signs of blood loss, has back pain with fluid in abdomen  Iron studies with LORRIE  may benefit from IV iron when not acutely septic

## 2019-04-04 NOTE — PROGRESS NOTE ADULT - PROBLEM SELECTOR PLAN 1
Patient presenting w/ fever, generalized body aches and fever, CXR with evidence of multifocal opacities, urine with LE And WBC, and CT Abd showing left sided colitis   - c/w broad spectrum coverage with vanc and cefepime, f/u bcx, ucx with GNR>100k   - monitor vital q4   - recent surgical site appears clean, ELIAS drain for perc nephrostomy removed, no evidence of Intra Abd collection on CT   -f/u blood cultures, UCx  -wound care consulted   -urology consulted, appreciate reccs,   - ID consulted, appreciate reccs, c/w current abx regimen pending cultures

## 2019-04-04 NOTE — PROGRESS NOTE ADULT - SUBJECTIVE AND OBJECTIVE BOX
Febrile to 102.6 overnight.  C/o feeling hot & cold.  Dressing changed this AM, was previously changed by nursing, well packed, well-healing.    T(F): 98.9, Max: 102.6 (04-03-19 @ 18:55)  HR: 56  BP: 124/68  SpO2: 92%    OUT:    Incontinent per Condom Catheter: 1850 mL    Voided: 1850 mL  Total OUT: 3700 mL    Gen NAD  Abd incision opening with some granulation tissue, no purulence, dressing replaced    04-03 @ 06:55  WBC 4.3   / Hct 26.7  / SCr 0.53       C Diff neg    .Urine Clean Catch (Midstream)  04-02  >100,000 CFU/ml Gram Negative Rods    .Blood Blood  04-02  No growth to date.

## 2019-04-04 NOTE — PROGRESS NOTE ADULT - PROBLEM SELECTOR PLAN 3
- multifocal airspace disease, predominantly in left lobe  - also with mild bronchiectasis noted in lower lobe on CT abd, as well as pleural effusion  - RVP negative   - will get CT chest  - likely d/c vanc if bcx remain negative - multifocal airspace disease, predominantly in left lobe. Suspect HCAP 2/2 MRSA and  presumed pseudomonal PNA  - also with mild bronchiectasis noted in lower lobe on CT abd, as well as pleural effusion  - RVP negative   - will get CT chest  - likely d/c vanc if bcx remain negative

## 2019-04-04 NOTE — PROGRESS NOTE ADULT - SUBJECTIVE AND OBJECTIVE BOX
infectious diseases progress note:    Patient is a 43y old  Male who presents with a chief complaint of sepsis (04 Apr 2019 06:28)        Noninfectious gastroenteritis        ROS:  CONSTITUTIONAL:  Negative fever or chills, feels well, good appetite  EYES:  Negative  blurry vision or double vision  CARDIOVASCULAR:  Negative for chest pain or palpitations  RESPIRATORY:  Negative for cough, wheezing, or SOB   GASTROINTESTINAL:  Negative for nausea, vomiting, diarrhea, constipation, or abdominal pain  GENITOURINARY:  Negative frequency, urgency or dysuria  NEUROLOGIC:  No headache, confusion, dizziness, lightheadedness    Allergies    penicillin (Rash)    Intolerances        ANTIBIOTICS/RELEVANT:  antimicrobials  cefepime   IVPB 2000 milliGRAM(s) IV Intermittent every 8 hours  cefepime   IVPB      vancomycin  IVPB      vancomycin  IVPB 1000 milliGRAM(s) IV Intermittent every 12 hours    immunologic:    OTHER:  acetaminophen   Tablet .. 650 milliGRAM(s) Oral every 6 hours PRN  acetaminophen   Tablet .. 650 milliGRAM(s) Oral once PRN  enoxaparin Injectable 40 milliGRAM(s) SubCutaneous every 24 hours  guaiFENesin  milliGRAM(s) Oral every 12 hours  multivitamin 1 Tablet(s) Oral daily  tamsulosin 0.4 milliGRAM(s) Oral at bedtime  tiZANidine 4 milliGRAM(s) Oral <User Schedule> PRN  traMADol 50 milliGRAM(s) Oral every 8 hours PRN      Objective:  Vital Signs Last 24 Hrs  T(C): 37.2 (04 Apr 2019 06:06), Max: 39.2 (03 Apr 2019 18:55)  T(F): 98.9 (04 Apr 2019 06:06), Max: 102.6 (03 Apr 2019 18:55)  HR: 56 (04 Apr 2019 06:06) (56 - 74)  BP: 124/68 (04 Apr 2019 06:06) (105/66 - 124/80)  BP(mean): --  RR: 18 (04 Apr 2019 06:06) (18 - 20)  SpO2: 92% (04 Apr 2019 06:06) (91% - 98%)       Eyes:ASHLEY, EOMI  Ear/Nose/Throat: no oral lesion, no sinus tenderness on percussion	  Neck:no JVD, no lymphadenopathy, supple  Respiratory: CTA sujatha  Cardiovascular: S1S2 RRR, no murmurs  Gastrointestinal:soft, (+) BS, no HSM  Extremities:no e/e/c        LABS:                        8.7    4.3   )-----------( 242      ( 03 Apr 2019 06:55 )             26.7     04-03    142  |  104  |  5<L>  ----------------------------<  81  3.3<L>   |  27  |  0.53    Ca    8.3<L>      03 Apr 2019 06:54  Phos  2.8     04-03  Mg     2.0     04-03    TPro  6.5  /  Alb  3.2<L>  /  TBili  0.2  /  DBili  x   /  AST  17  /  ALT  9<L>  /  AlkPhos  56  04-03    PT/INR - ( 02 Apr 2019 13:05 )   PT: 15.3 sec;   INR: 1.32 ratio                 MICROBIOLOGY:    RECENT CULTURES:  04-03 @ 09:05 .Stool Feces                GI PCR Results: NOT detected  *******Please Note:*******  GI panel PCR evaluates for:  Campylobacter, Plesiomonas shigelloides, Salmonella,  Vibrio, Yersinia enterocolitica, Enteroaggregative  Escherichia coli (EAEC), Enteropathogenic E.coli (EPEC),  Enterotoxigenic E. coli (ETEC) lt/st, Shiga-like  toxin-producing E. coli (STEC) stx1/stx2,  Shigella/ Enteroinvasive E. coli (EIEC), Cryptosporidium,  Cyclospora cayetanensis, Entamoeba histolytica,  Giardia lamblia, Adenovirus F 40/41, Astrovirus,  Norovirus GI/GII, Rotavirus A, Sapovirus    04-02 @ 06:45 .Urine Clean Catch (Midstream)                >100,000 CFU/ml Gram Negative Rods    04-02 @ 06:16 .Blood Blood                No growth to date.          RESPIRATORY CULTURES:      C Diff by PCR Result: NotDetec (04-02 @ 09:35)  Clostridium difficile GDH Toxins A&amp;B, EIA:   Indeterminate (04-02 @ 02:00)          RADIOLOGY & ADDITIONAL STUDIES:        Pager [phone]  After 5 pm/weekends or if no response :[phone]

## 2019-04-05 LAB
ANION GAP SERPL CALC-SCNC: 10 MMOL/L — SIGNIFICANT CHANGE UP (ref 5–17)
APTT BLD: 101 SEC — HIGH (ref 27.5–36.3)
APTT BLD: 58.8 SEC — HIGH (ref 27.5–36.3)
BASOPHILS # BLD AUTO: 0 K/UL — SIGNIFICANT CHANGE UP (ref 0–0.2)
BASOPHILS NFR BLD AUTO: 0.6 % — SIGNIFICANT CHANGE UP (ref 0–2)
BLD GP AB SCN SERPL QL: NEGATIVE — SIGNIFICANT CHANGE UP
BUN SERPL-MCNC: <4 MG/DL — LOW (ref 7–23)
CALCIUM SERPL-MCNC: 8.3 MG/DL — LOW (ref 8.4–10.5)
CHLORIDE SERPL-SCNC: 101 MMOL/L — SIGNIFICANT CHANGE UP (ref 96–108)
CO2 SERPL-SCNC: 27 MMOL/L — SIGNIFICANT CHANGE UP (ref 22–31)
CREAT SERPL-MCNC: 0.45 MG/DL — LOW (ref 0.5–1.3)
EOSINOPHIL # BLD AUTO: 0.1 K/UL — SIGNIFICANT CHANGE UP (ref 0–0.5)
EOSINOPHIL NFR BLD AUTO: 2.4 % — SIGNIFICANT CHANGE UP (ref 0–6)
GLUCOSE SERPL-MCNC: 89 MG/DL — SIGNIFICANT CHANGE UP (ref 70–99)
HCT VFR BLD CALC: 27.3 % — LOW (ref 39–50)
HGB BLD-MCNC: 8.6 G/DL — LOW (ref 13–17)
LYMPHOCYTES # BLD AUTO: 1.2 K/UL — SIGNIFICANT CHANGE UP (ref 1–3.3)
LYMPHOCYTES # BLD AUTO: 35.3 % — SIGNIFICANT CHANGE UP (ref 13–44)
MAGNESIUM SERPL-MCNC: 2.1 MG/DL — SIGNIFICANT CHANGE UP (ref 1.6–2.6)
MCHC RBC-ENTMCNC: 26.3 PG — LOW (ref 27–34)
MCHC RBC-ENTMCNC: 31.4 GM/DL — LOW (ref 32–36)
MCV RBC AUTO: 83.8 FL — SIGNIFICANT CHANGE UP (ref 80–100)
MONOCYTES # BLD AUTO: 0.4 K/UL — SIGNIFICANT CHANGE UP (ref 0–0.9)
MONOCYTES NFR BLD AUTO: 11.5 % — SIGNIFICANT CHANGE UP (ref 2–14)
MRSA PCR RESULT.: SIGNIFICANT CHANGE UP
NEUTROPHILS # BLD AUTO: 1.7 K/UL — LOW (ref 1.8–7.4)
NEUTROPHILS NFR BLD AUTO: 50.2 % — SIGNIFICANT CHANGE UP (ref 43–77)
PHOSPHATE SERPL-MCNC: 2.4 MG/DL — LOW (ref 2.5–4.5)
PLAT MORPH BLD: NORMAL — SIGNIFICANT CHANGE UP
PLATELET # BLD AUTO: 224 K/UL — SIGNIFICANT CHANGE UP (ref 150–400)
POTASSIUM SERPL-MCNC: 3.4 MMOL/L — LOW (ref 3.5–5.3)
POTASSIUM SERPL-SCNC: 3.4 MMOL/L — LOW (ref 3.5–5.3)
RBC # BLD: 3.26 M/UL — LOW (ref 4.2–5.8)
RBC # FLD: 16 % — HIGH (ref 10.3–14.5)
RBC BLD AUTO: SIGNIFICANT CHANGE UP
RH IG SCN BLD-IMP: POSITIVE — SIGNIFICANT CHANGE UP
S AUREUS DNA NOSE QL NAA+PROBE: SIGNIFICANT CHANGE UP
SODIUM SERPL-SCNC: 138 MMOL/L — SIGNIFICANT CHANGE UP (ref 135–145)
TROPONIN T, HIGH SENSITIVITY RESULT: 45 NG/L — SIGNIFICANT CHANGE UP (ref 0–51)
WBC # BLD: 3.3 K/UL — LOW (ref 3.8–10.5)
WBC # FLD AUTO: 3.3 K/UL — LOW (ref 3.8–10.5)

## 2019-04-05 PROCEDURE — 99233 SBSQ HOSP IP/OBS HIGH 50: CPT

## 2019-04-05 PROCEDURE — 99232 SBSQ HOSP IP/OBS MODERATE 35: CPT

## 2019-04-05 PROCEDURE — 71275 CT ANGIOGRAPHY CHEST: CPT | Mod: 26

## 2019-04-05 RX ORDER — POTASSIUM CHLORIDE 20 MEQ
20 PACKET (EA) ORAL ONCE
Qty: 0 | Refills: 0 | Status: COMPLETED | OUTPATIENT
Start: 2019-04-05 | End: 2019-04-05

## 2019-04-05 RX ORDER — SODIUM CHLORIDE 9 MG/ML
1000 INJECTION INTRAMUSCULAR; INTRAVENOUS; SUBCUTANEOUS
Qty: 0 | Refills: 0 | Status: DISCONTINUED | OUTPATIENT
Start: 2019-04-05 | End: 2019-04-06

## 2019-04-05 RX ORDER — ENOXAPARIN SODIUM 100 MG/ML
40 INJECTION SUBCUTANEOUS DAILY
Qty: 0 | Refills: 0 | Status: DISCONTINUED | OUTPATIENT
Start: 2019-04-05 | End: 2019-04-17

## 2019-04-05 RX ORDER — TRAMADOL HYDROCHLORIDE 50 MG/1
50 TABLET ORAL EVERY 6 HOURS
Qty: 0 | Refills: 0 | Status: DISCONTINUED | OUTPATIENT
Start: 2019-04-05 | End: 2019-04-12

## 2019-04-05 RX ADMIN — CEFEPIME 100 MILLIGRAM(S): 1 INJECTION, POWDER, FOR SOLUTION INTRAMUSCULAR; INTRAVENOUS at 06:22

## 2019-04-05 RX ADMIN — TRAMADOL HYDROCHLORIDE 50 MILLIGRAM(S): 50 TABLET ORAL at 23:14

## 2019-04-05 RX ADMIN — TRAMADOL HYDROCHLORIDE 50 MILLIGRAM(S): 50 TABLET ORAL at 08:30

## 2019-04-05 RX ADMIN — CEFEPIME 100 MILLIGRAM(S): 1 INJECTION, POWDER, FOR SOLUTION INTRAMUSCULAR; INTRAVENOUS at 22:03

## 2019-04-05 RX ADMIN — TRAMADOL HYDROCHLORIDE 50 MILLIGRAM(S): 50 TABLET ORAL at 00:38

## 2019-04-05 RX ADMIN — HEPARIN SODIUM 1200 UNIT(S)/HR: 5000 INJECTION INTRAVENOUS; SUBCUTANEOUS at 07:25

## 2019-04-05 RX ADMIN — Medication 1 TABLET(S): at 15:15

## 2019-04-05 RX ADMIN — Medication 250 MILLIGRAM(S): at 06:42

## 2019-04-05 RX ADMIN — Medication 650 MILLIGRAM(S): at 20:12

## 2019-04-05 RX ADMIN — Medication 600 MILLIGRAM(S): at 06:23

## 2019-04-05 RX ADMIN — TRAMADOL HYDROCHLORIDE 50 MILLIGRAM(S): 50 TABLET ORAL at 15:15

## 2019-04-05 RX ADMIN — ENOXAPARIN SODIUM 40 MILLIGRAM(S): 100 INJECTION SUBCUTANEOUS at 17:34

## 2019-04-05 RX ADMIN — HEPARIN SODIUM 1400 UNIT(S)/HR: 5000 INJECTION INTRAVENOUS; SUBCUTANEOUS at 00:07

## 2019-04-05 RX ADMIN — Medication 20 MILLIEQUIVALENT(S): at 08:31

## 2019-04-05 RX ADMIN — Medication 650 MILLIGRAM(S): at 06:42

## 2019-04-05 RX ADMIN — SODIUM CHLORIDE 65 MILLILITER(S): 9 INJECTION INTRAMUSCULAR; INTRAVENOUS; SUBCUTANEOUS at 01:34

## 2019-04-05 RX ADMIN — TRAMADOL HYDROCHLORIDE 50 MILLIGRAM(S): 50 TABLET ORAL at 23:44

## 2019-04-05 RX ADMIN — SODIUM CHLORIDE 75 MILLILITER(S): 9 INJECTION INTRAMUSCULAR; INTRAVENOUS; SUBCUTANEOUS at 15:16

## 2019-04-05 RX ADMIN — Medication 650 MILLIGRAM(S): at 06:23

## 2019-04-05 RX ADMIN — Medication 600 MILLIGRAM(S): at 17:34

## 2019-04-05 RX ADMIN — CEFEPIME 100 MILLIGRAM(S): 1 INJECTION, POWDER, FOR SOLUTION INTRAMUSCULAR; INTRAVENOUS at 15:15

## 2019-04-05 RX ADMIN — TRAMADOL HYDROCHLORIDE 50 MILLIGRAM(S): 50 TABLET ORAL at 09:00

## 2019-04-05 RX ADMIN — TAMSULOSIN HYDROCHLORIDE 0.4 MILLIGRAM(S): 0.4 CAPSULE ORAL at 22:03

## 2019-04-05 RX ADMIN — Medication 650 MILLIGRAM(S): at 20:42

## 2019-04-05 NOTE — PROGRESS NOTE ADULT - PROBLEM SELECTOR PLAN 1
- Patient presenting w/ fever, generalized body aches and fever, CXR with evidence of multifocal opacities, urine with LE And WBC, and CT Abd showing left sided colitis   - c/w broad spectrum coverage with vanc and cefepime, f/u bcx, ucx with GNR>100k   - monitor vital q4   - recent surgical site appears clean, ELIAS drain for perc nephrostomy removed, no evidence of Intra Abd collection on CT   -f/u blood cultures, UCx  -wound care consulted   -urology consulted, appreciate reccs,   - ID consulted, appreciate reccs, c/w current abx regimen pending cultures - Patient presenting w/ fever, generalized body aches and fever, CXR with evidence of multifocal opacities, urine with LE And WBC, and CT Abd showing left sided colitis   - c/w broad spectrum coverage w/ cefepime, f/u bcx ngtd , ucx w/ provedentia, plan for tx for 7 days for CAP coverage   - monitor vital q4, will d/c vanc   - recent surgical site appears clean, ELIAS drain for perc nephrostomy removed, no evidence of Intra Abd collection on CT   -f/u blood cultures, UCx  -urology consulted, appreciate reccs,   - ID consulted, appreciate reccs

## 2019-04-05 NOTE — CHART NOTE - NSCHARTNOTEFT_GEN_A_CORE
~midnight high sensitivity troponin T resulted, 50 --> 54 over 9 hours. Per signout from primary team, troponin was felt to be elevated 2/2 demand ischemia in the setting of sepsis. Plan from primary team was to continue to trend until peaks, consult cardiology only if significantly rises and/or pt is symptomatic.    Pt currently denies any chest pain, only endorses L sided flank/back pain when lying on that side. Vital signs stable. Will repeat high sensitivity troponin T along with BMP/Mg/Phos (hypokalemic earlier in the day) and lactate with next PTT.     Paula Fowler MD  Internal medicine resident- night float  [id number] ~midnight high sensitivity troponin T resulted, 50 --> 54 over 9 hours. Per signout from primary team, troponin was felt to be elevated 2/2 demand ischemia in the setting of sepsis. Plan from primary team was to continue to trend until peaks, consult cardiology if trop significantly rises and/or pt is symptomatic. Upon chart review noted pt also with possible PE on heparin gtt.    Pt currently denies any chest pain, only endorses L sided flank/back pain when lying on that side similar to his presenting symptom. Vital signs stable. Will repeat high sensitivity troponin T along with BMP/Mg/Phos (hypokalemic earlier in the day) and lactate with next PTT.     Paula Fowler MD  Internal medicine resident- night float  [phone]

## 2019-04-05 NOTE — PROGRESS NOTE ADULT - ASSESSMENT
43 harish old angry and not cooperative  exam limited/ recent nephrectomy on right and history Pt with recurrent kidney stones.  presents with diarrhea and colitis on ct scan and indeTER C diff test.  PCR pending    stent on left that has been removed/           urine culture is growing gram negative rods senstive to cefepime    ct reviewed with ongoing colitis  no obv abscess in surgical bed   and several areas of consolidation    fever down for last 36 hrs     wd packed by urology not obv infected  wd seen .      UTI plus PNA ( HCAP)  This is not an atypical pna     dc Zithromax  continue cefepime and vanco for now   I still dont think this is mrsa pna, but it can be continued if you want. \  still no explantation for colitis and distension.
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
## 2019-04-05 NOTE — PROGRESS NOTE ADULT - PROBLEM SELECTOR PLAN 10
DVT: SCDs until bleed r/o  Diet: regular

## 2019-04-05 NOTE — PROGRESS NOTE ADULT - PROBLEM SELECTOR PLAN 8
Incidentally found on CT scan no SOB  -continue to monitor - Found on CT scan, no respiratory distress  - will further evaluate with CT chest DVT: restart lovenox   Diet: regular

## 2019-04-05 NOTE — PROGRESS NOTE ADULT - SUBJECTIVE AND OBJECTIVE BOX
infectious diseases progress note:    Patient is a 43y old  Male who presents with a chief complaint of sepsis (04 Apr 2019 08:54)        Noninfectious gastroenteritis        ROS:  CONSTITUTIONAL:  Negative fever or chills, feels well, good appetite       penicillin (Rash)    Intolerances        ANTIBIOTICS/RELEVANT:  antimicrobials  cefepime   IVPB 2000 milliGRAM(s) IV Intermittent every 8 hours  cefepime   IVPB      vancomycin  IVPB      vancomycin  IVPB 1000 milliGRAM(s) IV Intermittent every 12 hours    immunologic:    OTHER:  acetaminophen   Tablet .. 650 milliGRAM(s) Oral every 6 hours PRN  acetaminophen   Tablet .. 650 milliGRAM(s) Oral once PRN  aluminum hydroxide/magnesium hydroxide/simethicone Suspension 30 milliLiter(s) Oral every 6 hours PRN  guaiFENesin  milliGRAM(s) Oral every 12 hours  heparin  Infusion.  Unit(s)/Hr IV Continuous <Continuous>  heparin  Injectable 6500 Unit(s) IV Push every 6 hours PRN  heparin  Injectable 3000 Unit(s) IV Push every 6 hours PRN  multivitamin 1 Tablet(s) Oral daily  sodium chloride 0.9%. 1000 milliLiter(s) IV Continuous <Continuous>  tamsulosin 0.4 milliGRAM(s) Oral at bedtime  tiZANidine 4 milliGRAM(s) Oral <User Schedule> PRN  traMADol 50 milliGRAM(s) Oral every 8 hours PRN      Objective:  Vital Signs Last 24 Hrs  T(C): 36.9 (05 Apr 2019 06:25), Max: 37.4 (04 Apr 2019 09:59)  T(F): 98.5 (05 Apr 2019 06:25), Max: 99.3 (04 Apr 2019 09:59)  HR: 75 (05 Apr 2019 06:25) (74 - 78)  BP: 155/94 (05 Apr 2019 06:25) (128/84 - 155/94)  BP(mean): --  RR: 18 (05 Apr 2019 06:25) (18 - 18)  SpO2: 99% (05 Apr 2019 06:25) (94% - 99%)    PHYSICAL EXAM:  Constitutional:Well-developed, well nourished--no acute distress  Eyes:ASHLEY, EOMI  Ear/Nose/Throat: no oral lesion, no sinus tenderness on percussion	  Neck:no JVD, no lymphadenopathy, supple  Respiratory:  [id number]  Cardiovascular: S1S2 RRR, no murmurs  Gastrointestinal:soft, (+) BS, no HSM  Extremities:no e/e/c        LABS:                        9.3    4.2   )-----------( 242      ( 04 Apr 2019 22:28 )             30.3     04-05    138  |  101  |  <4<L>  ----------------------------<  89  3.4<L>   |  27  |  0.45<L>    Ca    8.3<L>      05 Apr 2019 06:34  Phos  2.4     04-05  Mg     2.1     04-05    TPro  6.3  /  Alb  2.9<L>  /  TBili  0.2  /  DBili  x   /  AST  17  /  ALT  9<L>  /  AlkPhos  54  04-04    PT/INR - ( 04 Apr 2019 14:11 )   PT: 15.4 sec;   INR: 1.33 ratio         PTT - ( 05 Apr 2019 06:34 )  PTT:101.0 sec        MICROBIOLOGY:    RECENT CULTURES:  04-03 @ 09:05 .Stool Feces                GI PCR Results: NOT detected  *******Please Note:*******  GI panel PCR evaluates for:  Campylobacter, Plesiomonas shigelloides, Salmonella,  Vibrio, Yersinia enterocolitica, Enteroaggregative  Escherichia coli (EAEC), Enteropathogenic E.coli (EPEC),  Enterotoxigenic E. coli (ETEC) lt/st, Shiga-like  toxin-producing E. coli (STEC) stx1/stx2,  Shigella/ Enteroinvasive E. coli (EIEC), Cryptosporidium,  Cyclospora cayetanensis, Entamoeba histolytica,  Giardia lamblia, Adenovirus F 40/41, Astrovirus,  Norovirus GI/GII, Rotavirus A, Sapovirus    04-02 @ 06:45 .Urine Clean Catch (Midstream)   TWAN      Providencia stuartii  Providencia stuartii     >100,000 CFU/ml Providencia stuartii    04-02 @ 06:16 .Blood Blood                No growth to date.          RESPIRATORY CULTURES:      C Diff by PCR Result: NotDetec (04-02 @ 09:35)  Clostridium difficile GDH Toxins A&amp;B, EIA:   Indeterminate (04-02 @ 02:00)          RADIOLOGY & ADDITIONAL STUDIES:        Pager [phone]  After 5 pm/weekends or if no response :[phone]
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
## 2019-04-05 NOTE — PROGRESS NOTE ADULT - PROBLEM SELECTOR PLAN 9
- Found on CT scan, no respiratory distress  - will further evaluate with CT chest DVT: restart lovenox   Diet: regular

## 2019-04-05 NOTE — PROGRESS NOTE ADULT - ATTENDING COMMENTS
Hospitalist- Naman Bates MD  Pager: [phone]  If no response or off-hours, page [phone]  -------------------------------------  I personally performed the E/M service provided and was physically present during key portions of the service furnished by the resident/fellow. I agree with the history, physical and assessment/plan as stated above.

## 2019-04-06 LAB
ANION GAP SERPL CALC-SCNC: 12 MMOL/L — SIGNIFICANT CHANGE UP (ref 5–17)
BASOPHILS # BLD AUTO: 0 K/UL — SIGNIFICANT CHANGE UP (ref 0–0.2)
BASOPHILS NFR BLD AUTO: 0.6 % — SIGNIFICANT CHANGE UP (ref 0–2)
BUN SERPL-MCNC: <4 MG/DL — LOW (ref 7–23)
CALCIUM SERPL-MCNC: 8.1 MG/DL — LOW (ref 8.4–10.5)
CHLORIDE SERPL-SCNC: 102 MMOL/L — SIGNIFICANT CHANGE UP (ref 96–108)
CO2 SERPL-SCNC: 25 MMOL/L — SIGNIFICANT CHANGE UP (ref 22–31)
CREAT SERPL-MCNC: 0.45 MG/DL — LOW (ref 0.5–1.3)
EOSINOPHIL # BLD AUTO: 0.1 K/UL — SIGNIFICANT CHANGE UP (ref 0–0.5)
EOSINOPHIL NFR BLD AUTO: 1.3 % — SIGNIFICANT CHANGE UP (ref 0–6)
GLUCOSE SERPL-MCNC: 90 MG/DL — SIGNIFICANT CHANGE UP (ref 70–99)
HCT VFR BLD CALC: 27.8 % — LOW (ref 39–50)
HCT VFR BLD CALC: 30.8 % — LOW (ref 39–50)
HGB BLD-MCNC: 8.6 G/DL — LOW (ref 13–17)
HGB BLD-MCNC: 9.5 G/DL — LOW (ref 13–17)
LYMPHOCYTES # BLD AUTO: 1.4 K/UL — SIGNIFICANT CHANGE UP (ref 1–3.3)
LYMPHOCYTES # BLD AUTO: 34.3 % — SIGNIFICANT CHANGE UP (ref 13–44)
MAGNESIUM SERPL-MCNC: 2 MG/DL — SIGNIFICANT CHANGE UP (ref 1.6–2.6)
MCHC RBC-ENTMCNC: 25.8 PG — LOW (ref 27–34)
MCHC RBC-ENTMCNC: 25.9 PG — LOW (ref 27–34)
MCHC RBC-ENTMCNC: 30.9 GM/DL — LOW (ref 32–36)
MCHC RBC-ENTMCNC: 30.9 GM/DL — LOW (ref 32–36)
MCV RBC AUTO: 83.5 FL — SIGNIFICANT CHANGE UP (ref 80–100)
MCV RBC AUTO: 83.8 FL — SIGNIFICANT CHANGE UP (ref 80–100)
MONOCYTES # BLD AUTO: 0.4 K/UL — SIGNIFICANT CHANGE UP (ref 0–0.9)
MONOCYTES NFR BLD AUTO: 11 % — SIGNIFICANT CHANGE UP (ref 2–14)
NEUTROPHILS # BLD AUTO: 2.1 K/UL — SIGNIFICANT CHANGE UP (ref 1.8–7.4)
NEUTROPHILS NFR BLD AUTO: 52.9 % — SIGNIFICANT CHANGE UP (ref 43–77)
PHOSPHATE SERPL-MCNC: 2 MG/DL — LOW (ref 2.5–4.5)
PLATELET # BLD AUTO: 221 K/UL — SIGNIFICANT CHANGE UP (ref 150–400)
PLATELET # BLD AUTO: 245 K/UL — SIGNIFICANT CHANGE UP (ref 150–400)
POTASSIUM SERPL-MCNC: 3 MMOL/L — LOW (ref 3.5–5.3)
POTASSIUM SERPL-SCNC: 3 MMOL/L — LOW (ref 3.5–5.3)
RBC # BLD: 3.31 M/UL — LOW (ref 4.2–5.8)
RBC # BLD: 3.69 M/UL — LOW (ref 4.2–5.8)
RBC # FLD: 15.8 % — HIGH (ref 10.3–14.5)
RBC # FLD: 16.2 % — HIGH (ref 10.3–14.5)
SODIUM SERPL-SCNC: 139 MMOL/L — SIGNIFICANT CHANGE UP (ref 135–145)
WBC # BLD: 4.1 K/UL — SIGNIFICANT CHANGE UP (ref 3.8–10.5)
WBC # BLD: 5 K/UL — SIGNIFICANT CHANGE UP (ref 3.8–10.5)
WBC # FLD AUTO: 4.1 K/UL — SIGNIFICANT CHANGE UP (ref 3.8–10.5)
WBC # FLD AUTO: 5 K/UL — SIGNIFICANT CHANGE UP (ref 3.8–10.5)

## 2019-04-06 PROCEDURE — 99233 SBSQ HOSP IP/OBS HIGH 50: CPT

## 2019-04-06 PROCEDURE — 99231 SBSQ HOSP IP/OBS SF/LOW 25: CPT

## 2019-04-06 RX ORDER — POTASSIUM PHOSPHATE, MONOBASIC POTASSIUM PHOSPHATE, DIBASIC 236; 224 MG/ML; MG/ML
15 INJECTION, SOLUTION INTRAVENOUS ONCE
Qty: 0 | Refills: 0 | Status: COMPLETED | OUTPATIENT
Start: 2019-04-06 | End: 2019-04-06

## 2019-04-06 RX ORDER — SODIUM,POTASSIUM PHOSPHATES 278-250MG
1 POWDER IN PACKET (EA) ORAL THREE TIMES A DAY
Qty: 0 | Refills: 0 | Status: DISCONTINUED | OUTPATIENT
Start: 2019-04-06 | End: 2019-04-08

## 2019-04-06 RX ORDER — POTASSIUM CHLORIDE 20 MEQ
20 PACKET (EA) ORAL
Qty: 0 | Refills: 0 | Status: COMPLETED | OUTPATIENT
Start: 2019-04-06 | End: 2019-04-06

## 2019-04-06 RX ORDER — METRONIDAZOLE 500 MG
500 TABLET ORAL ONCE
Qty: 0 | Refills: 0 | Status: COMPLETED | OUTPATIENT
Start: 2019-04-06 | End: 2019-04-06

## 2019-04-06 RX ORDER — METRONIDAZOLE 500 MG
500 TABLET ORAL EVERY 8 HOURS
Qty: 0 | Refills: 0 | Status: DISCONTINUED | OUTPATIENT
Start: 2019-04-06 | End: 2019-04-09

## 2019-04-06 RX ORDER — SODIUM CHLORIDE 9 MG/ML
1000 INJECTION, SOLUTION INTRAVENOUS
Qty: 0 | Refills: 0 | Status: DISCONTINUED | OUTPATIENT
Start: 2019-04-06 | End: 2019-04-07

## 2019-04-06 RX ORDER — METRONIDAZOLE 500 MG
TABLET ORAL
Qty: 0 | Refills: 0 | Status: DISCONTINUED | OUTPATIENT
Start: 2019-04-06 | End: 2019-04-09

## 2019-04-06 RX ADMIN — Medication 1 PACKET(S): at 13:20

## 2019-04-06 RX ADMIN — TRAMADOL HYDROCHLORIDE 50 MILLIGRAM(S): 50 TABLET ORAL at 20:40

## 2019-04-06 RX ADMIN — Medication 650 MILLIGRAM(S): at 15:09

## 2019-04-06 RX ADMIN — Medication 650 MILLIGRAM(S): at 23:27

## 2019-04-06 RX ADMIN — Medication 20 MILLIEQUIVALENT(S): at 11:56

## 2019-04-06 RX ADMIN — Medication 20 MILLIEQUIVALENT(S): at 13:20

## 2019-04-06 RX ADMIN — SODIUM CHLORIDE 100 MILLILITER(S): 9 INJECTION, SOLUTION INTRAVENOUS at 17:36

## 2019-04-06 RX ADMIN — Medication 600 MILLIGRAM(S): at 17:33

## 2019-04-06 RX ADMIN — TRAMADOL HYDROCHLORIDE 50 MILLIGRAM(S): 50 TABLET ORAL at 20:15

## 2019-04-06 RX ADMIN — Medication 100 MILLIGRAM(S): at 22:20

## 2019-04-06 RX ADMIN — CEFEPIME 100 MILLIGRAM(S): 1 INJECTION, POWDER, FOR SOLUTION INTRAMUSCULAR; INTRAVENOUS at 13:20

## 2019-04-06 RX ADMIN — Medication 1 TABLET(S): at 11:56

## 2019-04-06 RX ADMIN — CEFEPIME 100 MILLIGRAM(S): 1 INJECTION, POWDER, FOR SOLUTION INTRAMUSCULAR; INTRAVENOUS at 22:19

## 2019-04-06 RX ADMIN — Medication 1 PACKET(S): at 22:19

## 2019-04-06 RX ADMIN — Medication 20 MILLIEQUIVALENT(S): at 10:25

## 2019-04-06 RX ADMIN — Medication 100 MILLIGRAM(S): at 17:33

## 2019-04-06 RX ADMIN — ENOXAPARIN SODIUM 40 MILLIGRAM(S): 100 INJECTION SUBCUTANEOUS at 11:56

## 2019-04-06 RX ADMIN — Medication 1 PACKET(S): at 10:30

## 2019-04-06 RX ADMIN — TRAMADOL HYDROCHLORIDE 50 MILLIGRAM(S): 50 TABLET ORAL at 13:19

## 2019-04-06 RX ADMIN — TAMSULOSIN HYDROCHLORIDE 0.4 MILLIGRAM(S): 0.4 CAPSULE ORAL at 22:19

## 2019-04-06 RX ADMIN — Medication 600 MILLIGRAM(S): at 06:11

## 2019-04-06 RX ADMIN — CEFEPIME 100 MILLIGRAM(S): 1 INJECTION, POWDER, FOR SOLUTION INTRAMUSCULAR; INTRAVENOUS at 06:10

## 2019-04-06 RX ADMIN — POTASSIUM PHOSPHATE, MONOBASIC POTASSIUM PHOSPHATE, DIBASIC 62.5 MILLIMOLE(S): 236; 224 INJECTION, SOLUTION INTRAVENOUS at 10:24
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
## 2019-04-06 NOTE — PROGRESS NOTE ADULT - ATTENDING COMMENTS
I personally performed the E/M service provided and was physically present during key portions of the service furnished by the resident/fellow. I agree with the history, physical and assessment/plan as stated above

## 2019-04-06 NOTE — PROGRESS NOTE ADULT - SUBJECTIVE AND OBJECTIVE BOX
Patient is a 43y old  Male who presents with a chief complaint of sepsis (05 Apr 2019 16:54)      SUBJECTIVE / OVERNIGHT EVENTS: No acute events overnight. Still with orthopnea. Remains afebrile    MEDICATIONS  (STANDING):  cefepime   IVPB 2000 milliGRAM(s) IV Intermittent every 8 hours  cefepime   IVPB      enoxaparin Injectable 40 milliGRAM(s) SubCutaneous daily  guaiFENesin  milliGRAM(s) Oral every 12 hours  multivitamin 1 Tablet(s) Oral daily  potassium phosphate IVPB 15 milliMole(s) IV Intermittent once  sodium chloride 0.9%. 1000 milliLiter(s) (75 mL/Hr) IV Continuous <Continuous>  tamsulosin 0.4 milliGRAM(s) Oral at bedtime    MEDICATIONS  (PRN):  acetaminophen   Tablet .. 650 milliGRAM(s) Oral every 6 hours PRN Moderate Pain (4 - 6)  acetaminophen   Tablet .. 650 milliGRAM(s) Oral once PRN Temp greater or equal to 38C (100.4F)  aluminum hydroxide/magnesium hydroxide/simethicone Suspension 30 milliLiter(s) Oral every 6 hours PRN Dyspepsia  tiZANidine 4 milliGRAM(s) Oral <User Schedule> PRN Muscle Spasm  traMADol 50 milliGRAM(s) Oral every 6 hours PRN Severe Pain (7 - 10)      Vital Signs Last 24 Hrs  T(C): 37 (06 Apr 2019 04:20), Max: 37 (06 Apr 2019 04:20)  T(F): 98.6 (06 Apr 2019 04:20), Max: 98.6 (06 Apr 2019 04:20)  HR: 70 (06 Apr 2019 04:20) (70 - 84)  BP: 114/86 (06 Apr 2019 04:20) (114/86 - 140/96)  BP(mean): --  RR: 18 (06 Apr 2019 04:20) (18 - 18)  SpO2: 92% (06 Apr 2019 04:20) (92% - 99%)  CAPILLARY BLOOD GLUCOSE        I&O's Summary    05 Apr 2019 07:01  -  06 Apr 2019 07:00  --------------------------------------------------------  IN: 0 mL / OUT: 2700 mL / NET: -2700 mL        PHYSICAL EXAM:  GENERAL: NAD, well-developed  HEAD:  Atraumatic, Normocephalic  EYES: EOMI, PERRLA, conjunctiva and sclera clear  NECK: Supple, No JVD  CHEST/LUNG: Clear to auscultation bilaterally; No wheeze  HEART: Regular rate and rhythm; No murmurs, rubs, or gallops  ABDOMEN: Soft, Nontender, Nondistended; Bowel sounds present  EXTREMITIES:  2+ Peripheral Pulses, No clubbing, cyanosis, or edema  PSYCH: AAOx3  NEUROLOGY: non-focal  SKIN: No rashes or lesions    LABS:                        8.6    4.1   )-----------( 221      ( 06 Apr 2019 06:44 )             27.8     04-06    139  |  102  |  <4<L>  ----------------------------<  90  3.0<L>   |  25  |  0.45<L>    Ca    8.1<L>      06 Apr 2019 06:44  Phos  2.0     04-06  Mg     2.0     04-06    TPro  6.3  /  Alb  2.9<L>  /  TBili  0.2  /  DBili  x   /  AST  17  /  ALT  9<L>  /  AlkPhos  54  04-04    PT/INR - ( 04 Apr 2019 14:11 )   PT: 15.4 sec;   INR: 1.33 ratio         PTT - ( 05 Apr 2019 14:52 )  PTT:58.8 sec          RADIOLOGY & ADDITIONAL TESTS:    Imaging Personally Reviewed:    Consultant(s) Notes Reviewed:      Care Discussed with Consultants/Other Providers: Patient is a 43y old  Male who presents with a chief complaint of sepsis (05 Apr 2019 16:54)      SUBJECTIVE / OVERNIGHT EVENTS: No acute events overnight. Still with orthopnea. Remains afebrile. Loose stool x 1 yesterday afternoon. No loose stools since then. No other complaints at this time.     MEDICATIONS  (STANDING):  cefepime   IVPB 2000 milliGRAM(s) IV Intermittent every 8 hours  cefepime   IVPB      enoxaparin Injectable 40 milliGRAM(s) SubCutaneous daily  guaiFENesin  milliGRAM(s) Oral every 12 hours  multivitamin 1 Tablet(s) Oral daily  potassium phosphate IVPB 15 milliMole(s) IV Intermittent once  sodium chloride 0.9%. 1000 milliLiter(s) (75 mL/Hr) IV Continuous <Continuous>  tamsulosin 0.4 milliGRAM(s) Oral at bedtime    MEDICATIONS  (PRN):  acetaminophen   Tablet .. 650 milliGRAM(s) Oral every 6 hours PRN Moderate Pain (4 - 6)  acetaminophen   Tablet .. 650 milliGRAM(s) Oral once PRN Temp greater or equal to 38C (100.4F)  aluminum hydroxide/magnesium hydroxide/simethicone Suspension 30 milliLiter(s) Oral every 6 hours PRN Dyspepsia  tiZANidine 4 milliGRAM(s) Oral <User Schedule> PRN Muscle Spasm  traMADol 50 milliGRAM(s) Oral every 6 hours PRN Severe Pain (7 - 10)      Vital Signs Last 24 Hrs  T(C): 37 (06 Apr 2019 04:20), Max: 37 (06 Apr 2019 04:20)  T(F): 98.6 (06 Apr 2019 04:20), Max: 98.6 (06 Apr 2019 04:20)  HR: 70 (06 Apr 2019 04:20) (70 - 84)  BP: 114/86 (06 Apr 2019 04:20) (114/86 - 140/96)  BP(mean): --  RR: 18 (06 Apr 2019 04:20) (18 - 18)  SpO2: 92% (06 Apr 2019 04:20) (92% - 99%)  CAPILLARY BLOOD GLUCOSE        I&O's Summary    05 Apr 2019 07:01  -  06 Apr 2019 07:00  --------------------------------------------------------  IN: 0 mL / OUT: 2700 mL / NET: -2700 mL        PHYSICAL EXAM:  	HEAD:  Atraumatic, Normocephalic  	EYES:, conjunctiva and sclera clear  	ENT: no pharyngeal erythema or cervical lymphadenopathy  	NECK: old trach scar, Supple, No JVD  	CHEST/LUNG: Clear to auscultation bilaterally; No wheeze, decreased breath sounds bilateral bases  	HEART: Regular rate and rhythm; No murmurs, rubs, or gallops  	ABDOMEN: distended abdomen without associated TTP, rebound tenderness. Surgical sutures in abdomen with lap scar without erythema of skin. previous LORRIE drain site c/d/i this AM   	EXTREMITIES: Contracted upper and lower extremities with associated muscle loss.    	PSYCH: AAOx3, flat affect  	NEUROLOGY: CN II-XII grossly intact, minimal movement upper extremities, gross sensation intact, 0/5 strength in b/l UE and LE   SKIN: healed sacral wounds, no pressure ulcers       LABS:                        8.6    4.1   )-----------( 221      ( 06 Apr 2019 06:44 )             27.8     04-06    139  |  102  |  <4<L>  ----------------------------<  90  3.0<L>   |  25  |  0.45<L>    Ca    8.1<L>      06 Apr 2019 06:44  Phos  2.0     04-06  Mg     2.0     04-06    TPro  6.3  /  Alb  2.9<L>  /  TBili  0.2  /  DBili  x   /  AST  17  /  ALT  9<L>  /  AlkPhos  54  04-04    PT/INR - ( 04 Apr 2019 14:11 )   PT: 15.4 sec;   INR: 1.33 ratio         PTT - ( 05 Apr 2019 14:52 )  PTT:58.8 sec          RADIOLOGY & ADDITIONAL TESTS:    Imaging Personally Reviewed:    Consultant(s) Notes Reviewed:      Care Discussed with Consultants/Other Providers:

## 2019-04-06 NOTE — PROGRESS NOTE ADULT - ASSESSMENT
43 harish old angry and not cooperative  exam limited/ recent nephrectomy on right and history Pt with recurrent kidney stones.  presents with diarrhea and colitis on ct scan and indeTER C diff test.  PCR pending    stent on left that has been removed/           urine culture is growing gram negative rods senstive to cefepime    ct reviewed with ongoing colitis  no obv abscess in surgical bed   and several areas of consolidation    fever down for last 36 hrs     wd packed by urology not obv infected  wd seen .      UTI plus PNA ( HCAP)  This is not an atypical pna     dc Zithromax  continue cefepime  for pneumonia  now is off the vancomycin   still no explantation for colitis and distension.    ID service will be covering over the weekend. Please call for acute issues or questions. [phone]

## 2019-04-06 NOTE — PROGRESS NOTE ADULT - PROBLEM SELECTOR PLAN 2
- multifocal airspace disease, predominantly in left lobe. Suspect HCAP 2/2 pseudomonal vs. other gram negative pneumonia  - also with mild bronchiectasis noted in lower lobe on CT abd, as well as pleural effusion  - RVP negative   - CT chest with diffuse airspace disease  - c/w CAP coverage

## 2019-04-06 NOTE — PROGRESS NOTE ADULT - SUBJECTIVE AND OBJECTIVE BOX
Patient is a 43y old  Male who presents with a chief complaint of sepsis (06 Apr 2019 09:00)      SUBJECTIVE / OVERNIGHT EVENTS:    MEDICATIONS  (STANDING):  cefepime   IVPB 2000 milliGRAM(s) IV Intermittent every 8 hours  cefepime   IVPB      enoxaparin Injectable 40 milliGRAM(s) SubCutaneous daily  guaiFENesin  milliGRAM(s) Oral every 12 hours  multivitamin 1 Tablet(s) Oral daily  potassium chloride    Tablet ER 20 milliEquivalent(s) Oral every 2 hours  potassium phosphate / sodium phosphate powder 1 Packet(s) Oral three times a day  potassium phosphate IVPB 15 milliMole(s) IV Intermittent once  sodium chloride 0.9%. 1000 milliLiter(s) (75 mL/Hr) IV Continuous <Continuous>  tamsulosin 0.4 milliGRAM(s) Oral at bedtime    MEDICATIONS  (PRN):  acetaminophen   Tablet .. 650 milliGRAM(s) Oral every 6 hours PRN Moderate Pain (4 - 6)  acetaminophen   Tablet .. 650 milliGRAM(s) Oral once PRN Temp greater or equal to 38C (100.4F)  aluminum hydroxide/magnesium hydroxide/simethicone Suspension 30 milliLiter(s) Oral every 6 hours PRN Dyspepsia  tiZANidine 4 milliGRAM(s) Oral <User Schedule> PRN Muscle Spasm  traMADol 50 milliGRAM(s) Oral every 6 hours PRN Severe Pain (7 - 10)      Vital Signs Last 24 Hrs  T(C): 37 (06 Apr 2019 04:20), Max: 37 (06 Apr 2019 04:20)  T(F): 98.6 (06 Apr 2019 04:20), Max: 98.6 (06 Apr 2019 04:20)  HR: 70 (06 Apr 2019 04:20) (70 - 84)  BP: 114/86 (06 Apr 2019 04:20) (114/86 - 140/96)  BP(mean): --  RR: 18 (06 Apr 2019 04:20) (18 - 18)  SpO2: 92% (06 Apr 2019 04:20) (92% - 99%)  CAPILLARY BLOOD GLUCOSE        I&O's Summary    05 Apr 2019 07:01  -  06 Apr 2019 07:00  --------------------------------------------------------  IN: 0 mL / OUT: 2700 mL / NET: -2700 mL        PHYSICAL EXAM:  GENERAL: NAD, well-developed  HEAD:  Atraumatic, Normocephalic  EYES: EOMI, PERRLA, conjunctiva and sclera clear  NECK: Supple, No JVD  CHEST/LUNG: Clear to auscultation bilaterally; No wheeze  HEART: Regular rate and rhythm; No murmurs, rubs, or gallops  ABDOMEN: Soft, Nontender, Nondistended; Bowel sounds present  EXTREMITIES:  2+ Peripheral Pulses, No clubbing, cyanosis, or edema  PSYCH: AAOx3  NEUROLOGY: non-focal  SKIN: No rashes or lesions    LABS:                        8.6    4.1   )-----------( 221      ( 06 Apr 2019 06:44 )             27.8     04-06    139  |  102  |  <4<L>  ----------------------------<  90  3.0<L>   |  25  |  0.45<L>    Ca    8.1<L>      06 Apr 2019 06:44  Phos  2.0     04-06  Mg     2.0     04-06    TPro  6.3  /  Alb  2.9<L>  /  TBili  0.2  /  DBili  x   /  AST  17  /  ALT  9<L>  /  AlkPhos  54  04-04    PT/INR - ( 04 Apr 2019 14:11 )   PT: 15.4 sec;   INR: 1.33 ratio         PTT - ( 05 Apr 2019 14:52 )  PTT:58.8 sec          RADIOLOGY & ADDITIONAL TESTS:    Imaging Personally Reviewed:    Consultant(s) Notes Reviewed:      Care Discussed with Consultants/Other Providers:

## 2019-04-06 NOTE — PROGRESS NOTE ADULT - PROBLEM SELECTOR PLAN 6
repleted yesterday, still hypokalemic and hypophosphatemic this AM suspected 2/2 to poor PO intake and GI looses  - will replete IV And PO
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
## 2019-04-06 NOTE — PROGRESS NOTE ADULT - PROBLEM SELECTOR PLAN 1
- Patient presenting w/ fever, generalized body aches and fever, CXR with evidence of multifocal opacities, urine with LE And WBC, and CT Abd showing left sided colitis   - c/w broad spectrum coverage w/ cefepime, f/u bcx ngtd , ucx w/ provedentia, plan for tx for 7 days for CAP coverage   - monitor vital q4, will d/c vanc   - recent surgical site appears clean, ELIAS drain for perc nephrostomy removed, no evidence of Intra Abd collection on CT   -f/u blood cultures, UCx  -urology consulted, appreciate reccs,   - ID consulted, appreciate reccs

## 2019-04-06 NOTE — PROGRESS NOTE ADULT - PROBLEM SELECTOR PLAN 3
patient w/ hx of pyelo, s/p left perc neph, right nephrectomy   [place]  Urine w/ provedentia sensitive to cefepime  c/w broad spectrum coverage with cefepime for now  urology following, no intraabdominal collection, nephrostomy drain d/c'ed

## 2019-04-07 LAB
ANION GAP SERPL CALC-SCNC: 12 MMOL/L — SIGNIFICANT CHANGE UP (ref 5–17)
BUN SERPL-MCNC: 5 MG/DL — LOW (ref 7–23)
CALCIUM SERPL-MCNC: 8.3 MG/DL — LOW (ref 8.4–10.5)
CHLORIDE SERPL-SCNC: 100 MMOL/L — SIGNIFICANT CHANGE UP (ref 96–108)
CO2 SERPL-SCNC: 25 MMOL/L — SIGNIFICANT CHANGE UP (ref 22–31)
CREAT SERPL-MCNC: 0.49 MG/DL — LOW (ref 0.5–1.3)
CULTURE RESULTS: SIGNIFICANT CHANGE UP
CULTURE RESULTS: SIGNIFICANT CHANGE UP
GLUCOSE SERPL-MCNC: 104 MG/DL — HIGH (ref 70–99)
HCT VFR BLD CALC: 30.4 % — LOW (ref 39–50)
HGB BLD-MCNC: 9.3 G/DL — LOW (ref 13–17)
MCHC RBC-ENTMCNC: 26.5 PG — LOW (ref 27–34)
MCHC RBC-ENTMCNC: 30.7 GM/DL — LOW (ref 32–36)
MCV RBC AUTO: 86.2 FL — SIGNIFICANT CHANGE UP (ref 80–100)
PLATELET # BLD AUTO: 216 K/UL — SIGNIFICANT CHANGE UP (ref 150–400)
POTASSIUM SERPL-MCNC: 3.3 MMOL/L — LOW (ref 3.5–5.3)
POTASSIUM SERPL-SCNC: 3.3 MMOL/L — LOW (ref 3.5–5.3)
RBC # BLD: 3.52 M/UL — LOW (ref 4.2–5.8)
RBC # FLD: 16.8 % — HIGH (ref 10.3–14.5)
SODIUM SERPL-SCNC: 137 MMOL/L — SIGNIFICANT CHANGE UP (ref 135–145)
SPECIMEN SOURCE: SIGNIFICANT CHANGE UP
SPECIMEN SOURCE: SIGNIFICANT CHANGE UP
WBC # BLD: 5.3 K/UL — SIGNIFICANT CHANGE UP (ref 3.8–10.5)
WBC # FLD AUTO: 5.3 K/UL — SIGNIFICANT CHANGE UP (ref 3.8–10.5)

## 2019-04-07 PROCEDURE — 99232 SBSQ HOSP IP/OBS MODERATE 35: CPT

## 2019-04-07 PROCEDURE — 99233 SBSQ HOSP IP/OBS HIGH 50: CPT

## 2019-04-07 RX ORDER — HYDRALAZINE HCL 50 MG
5 TABLET ORAL ONCE
Qty: 0 | Refills: 0 | Status: COMPLETED | OUTPATIENT
Start: 2019-04-07 | End: 2019-04-07

## 2019-04-07 RX ORDER — POTASSIUM CHLORIDE 20 MEQ
20 PACKET (EA) ORAL ONCE
Qty: 0 | Refills: 0 | Status: COMPLETED | OUTPATIENT
Start: 2019-04-07 | End: 2019-04-07

## 2019-04-07 RX ADMIN — Medication 1 TABLET(S): at 11:04

## 2019-04-07 RX ADMIN — TRAMADOL HYDROCHLORIDE 50 MILLIGRAM(S): 50 TABLET ORAL at 23:44

## 2019-04-07 RX ADMIN — CEFEPIME 100 MILLIGRAM(S): 1 INJECTION, POWDER, FOR SOLUTION INTRAMUSCULAR; INTRAVENOUS at 13:11

## 2019-04-07 RX ADMIN — Medication 600 MILLIGRAM(S): at 18:54

## 2019-04-07 RX ADMIN — Medication 650 MILLIGRAM(S): at 00:05

## 2019-04-07 RX ADMIN — ENOXAPARIN SODIUM 40 MILLIGRAM(S): 100 INJECTION SUBCUTANEOUS at 11:04

## 2019-04-07 RX ADMIN — Medication 20 MILLIEQUIVALENT(S): at 11:04

## 2019-04-07 RX ADMIN — TRAMADOL HYDROCHLORIDE 50 MILLIGRAM(S): 50 TABLET ORAL at 04:36

## 2019-04-07 RX ADMIN — Medication 100 MILLIGRAM(S): at 05:45

## 2019-04-07 RX ADMIN — CEFEPIME 100 MILLIGRAM(S): 1 INJECTION, POWDER, FOR SOLUTION INTRAMUSCULAR; INTRAVENOUS at 21:19

## 2019-04-07 RX ADMIN — Medication 100 MILLIGRAM(S): at 13:52

## 2019-04-07 RX ADMIN — Medication 600 MILLIGRAM(S): at 05:45

## 2019-04-07 RX ADMIN — Medication 1 PACKET(S): at 21:19

## 2019-04-07 RX ADMIN — TAMSULOSIN HYDROCHLORIDE 0.4 MILLIGRAM(S): 0.4 CAPSULE ORAL at 21:19

## 2019-04-07 RX ADMIN — TRAMADOL HYDROCHLORIDE 50 MILLIGRAM(S): 50 TABLET ORAL at 05:01

## 2019-04-07 RX ADMIN — Medication 1 PACKET(S): at 05:44

## 2019-04-07 RX ADMIN — Medication 650 MILLIGRAM(S): at 19:32

## 2019-04-07 RX ADMIN — Medication 650 MILLIGRAM(S): at 20:02

## 2019-04-07 RX ADMIN — CEFEPIME 100 MILLIGRAM(S): 1 INJECTION, POWDER, FOR SOLUTION INTRAMUSCULAR; INTRAVENOUS at 05:45

## 2019-04-07 RX ADMIN — TRAMADOL HYDROCHLORIDE 50 MILLIGRAM(S): 50 TABLET ORAL at 15:05

## 2019-04-07 RX ADMIN — Medication 100 MILLIGRAM(S): at 21:19

## 2019-04-07 RX ADMIN — Medication 5 MILLIGRAM(S): at 05:45

## 2019-04-07 RX ADMIN — TRAMADOL HYDROCHLORIDE 50 MILLIGRAM(S): 50 TABLET ORAL at 13:50

## 2019-04-07 RX ADMIN — Medication 1 PACKET(S): at 11:04

## 2019-04-07 NOTE — PROGRESS NOTE ADULT - SUBJECTIVE AND OBJECTIVE BOX
Patient is a 43y old  Male who presents with a chief complaint of sepsis (06 Apr 2019 12:14)    SUBJECTIVE / OVERNIGHT EVENTS: Had elevated BPs overnight (SBP 180s), s/p hydralazine 5mg IV x1. Pt. endorses ongoing cough, however has not worsened since before. Denies fevers, chills, cp, sob, abdominal pain, n/v/d.     MEDICATIONS  (STANDING):  cefepime   IVPB 2000 milliGRAM(s) IV Intermittent every 8 hours  cefepime   IVPB      enoxaparin Injectable 40 milliGRAM(s) SubCutaneous daily  guaiFENesin  milliGRAM(s) Oral every 12 hours  lactated ringers. 1000 milliLiter(s) (100 mL/Hr) IV Continuous <Continuous>  metroNIDAZOLE  IVPB      metroNIDAZOLE  IVPB 500 milliGRAM(s) IV Intermittent every 8 hours  multivitamin 1 Tablet(s) Oral daily  potassium chloride    Tablet ER 20 milliEquivalent(s) Oral once  potassium phosphate / sodium phosphate powder 1 Packet(s) Oral three times a day  tamsulosin 0.4 milliGRAM(s) Oral at bedtime    MEDICATIONS  (PRN):  acetaminophen   Tablet .. 650 milliGRAM(s) Oral every 6 hours PRN Moderate Pain (4 - 6)  aluminum hydroxide/magnesium hydroxide/simethicone Suspension 30 milliLiter(s) Oral every 6 hours PRN Dyspepsia  tiZANidine 4 milliGRAM(s) Oral <User Schedule> PRN Muscle Spasm  traMADol 50 milliGRAM(s) Oral every 6 hours PRN Severe Pain (7 - 10)      Vital Signs Last 24 Hrs  T(C): 37.4 (07 Apr 2019 06:39), Max: 38.2 (06 Apr 2019 15:01)  T(F): 99.3 (07 Apr 2019 06:39), Max: 100.7 (06 Apr 2019 15:01)  HR: 60 (07 Apr 2019 06:39) (60 - 78)  BP: 137/85 (07 Apr 2019 06:39) (137/85 - 180/86)  RR: 18 (07 Apr 2019 06:39) (18 - 28)  SpO2: 93% (07 Apr 2019 06:39) (90% - 97%)  CAPILLARY BLOOD GLUCOSE    I&O's Summary    06 Apr 2019 07:01  -  07 Apr 2019 07:00  --------------------------------------------------------  IN: 0 mL / OUT: 4225 mL / NET: -4225 mL    PHYSICAL EXAM:  	HEAD:  Atraumatic, Normocephalic  	EYES:, conjunctiva and sclera clear  	ENT: no pharyngeal erythema or cervical lymphadenopathy  	NECK: old trach scar, Supple, No JVD  	CHEST/LUNG: Clear to auscultation bilaterally; No wheeze, decreased breath sounds bilateral bases  	HEART: Regular rate and rhythm; No murmurs, rubs, or gallops  	ABDOMEN: distended abdomen without associated TTP, rebound tenderness. Surgical sutures in abdomen with lap scar without erythema of skin. previous LORRIE drain site c/d/i this AM   	EXTREMITIES: Contracted upper and lower extremities with associated muscle loss.    	PSYCH: AAOx3, flat affect  	NEUROLOGY: CN II-XII grossly intact, minimal movement upper extremities, gross sensation intact, 0/5 strength in b/l UE and LE     SKIN: healed sacral wounds, no pressure ulcers     LABS:                        9.3    5.3   )-----------( 216      ( 07 Apr 2019 07:09 )             30.4     04-07    137  |  100  |  5<L>  ----------------------------<  104<H>  3.3<L>   |  25  |  0.49<L>    Ca    8.3<L>      07 Apr 2019 07:09  Phos  2.0     04-06  Mg     2.0     04-06      PTT - ( 05 Apr 2019 14:52 )  PTT:58.8 sec
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
## 2019-04-07 NOTE — PROGRESS NOTE ADULT - PROBLEM SELECTOR PLAN 3
- patient w/ hx of pyelo, s/p left perc neph, right nephrectomy   - Urine w/ provedentia sensitive to cefepime  - c/w broad spectrum coverage with cefepime for now  - urology following, no intraabdominal collection, nephrostomy drain d/c'ed
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
## 2019-04-07 NOTE — PROGRESS NOTE ADULT - PROBLEM SELECTOR PLAN 5
- Anemia s/p infection w/o clear signs of blood loss, has back pain with fluid in abdomen  - Iron studies with LORRIE, may benefit from IV iron when not acutely septic

## 2019-04-07 NOTE — PROGRESS NOTE ADULT - PROBLEM SELECTOR PLAN 2
- multifocal PNA noted on CT chest   - RVP negative, MRSA swab negative   - s/p vancomycin, c/w cefepime day 6/7

## 2019-04-07 NOTE — PROGRESS NOTE ADULT - PROBLEM SELECTOR PLAN 1
- 2/2 multifocal PNA, colitis, and UTI  - CXR with multifocal opacities, CTAP with left sided colitis, UCx with Provendentia, recent nephrectomy surgical site appears clean, perc nephrostomy removed by urology upon admission   - s/p vancomycin, c/w cefepime day 6/7, started on metronidazole day 2 as pt. remained febrile for better anaerobic coverage in setting of colitis   - appreciate ID recommendations - 2/2 multifocal PNA, colitis, and UTI  - CXR with multifocal opacities, CTAP with left sided colitis, UCx with Provendentia, recent nephrectomy surgical site appears clean, perc nephrostomy removed by urology upon admission   - s/p vancomycin, c/w cefepime day 6/7, started on metronidazole day 2 as pt. remained febrile for better anaerobic coverage in setting of colitis   - if becomes febrile again, will get CTAP with IV contrast to r/o abscess, and resume azithromycin and consult pulm in setting of secretions and multifocal PNA  - appreciate ID recommendations - 2/2 multifocal PNA, colitis, and UTI  - CXR with multifocal opacities, CTAP with left sided colitis, UCx with Provendentia, recent nephrectomy surgical site appears clean, perc nephrostomy removed by urology upon admission   - s/p vancomycin, c/w cefepime day 6/7, started on metronidazole day 2 as pt. remained febrile for better anaerobic coverage in setting of colitis   - if becomes febrile again, will get CTAP with IV contrast to r/o abscess, and resume azithromycin for atypical coverage and consult pulm in setting of secretions and multifocal PNA  - appreciate ID recommendations

## 2019-04-07 NOTE — PROGRESS NOTE ADULT - ATTENDING COMMENTS
I personally performed the E/M service provided and was physically present during key portions of the service furnished by the resident/fellow. I agree with the history, physical and assessment/plan as stated above, with the following additional remarks:  - fevers: 2/2 UTI, colitis, and suspected gram negative + gram positive HCAP: continue cefepime + flagyl. If spikes again, would reculture, re-scan abd with IV contrast, add azithromycin for possible atypical coverage, and consult pulm for possible bronch as patient still has copious sputum and has difficulty expectorating due to neuromuscular weakness

## 2019-04-08 LAB
ALBUMIN SERPL ELPH-MCNC: 3 G/DL — LOW (ref 3.3–5)
ALP SERPL-CCNC: 60 U/L — SIGNIFICANT CHANGE UP (ref 40–120)
ALT FLD-CCNC: 11 U/L — SIGNIFICANT CHANGE UP (ref 10–45)
ANION GAP SERPL CALC-SCNC: 13 MMOL/L — SIGNIFICANT CHANGE UP (ref 5–17)
AST SERPL-CCNC: 17 U/L — SIGNIFICANT CHANGE UP (ref 10–40)
BASOPHILS # BLD AUTO: 0 K/UL — SIGNIFICANT CHANGE UP (ref 0–0.2)
BILIRUB SERPL-MCNC: 0.2 MG/DL — SIGNIFICANT CHANGE UP (ref 0.2–1.2)
BUN SERPL-MCNC: 5 MG/DL — LOW (ref 7–23)
CALCIUM SERPL-MCNC: 8.4 MG/DL — SIGNIFICANT CHANGE UP (ref 8.4–10.5)
CHLORIDE SERPL-SCNC: 100 MMOL/L — SIGNIFICANT CHANGE UP (ref 96–108)
CO2 SERPL-SCNC: 26 MMOL/L — SIGNIFICANT CHANGE UP (ref 22–31)
CREAT SERPL-MCNC: 0.43 MG/DL — LOW (ref 0.5–1.3)
EOSINOPHIL # BLD AUTO: 0 K/UL — SIGNIFICANT CHANGE UP (ref 0–0.5)
GLUCOSE SERPL-MCNC: 140 MG/DL — HIGH (ref 70–99)
HCT VFR BLD CALC: 28.3 % — LOW (ref 39–50)
HGB BLD-MCNC: 9 G/DL — LOW (ref 13–17)
LYMPHOCYTES # BLD AUTO: 1.4 K/UL — SIGNIFICANT CHANGE UP (ref 1–3.3)
LYMPHOCYTES # BLD AUTO: 27 % — SIGNIFICANT CHANGE UP (ref 13–44)
MAGNESIUM SERPL-MCNC: 2 MG/DL — SIGNIFICANT CHANGE UP (ref 1.6–2.6)
MCHC RBC-ENTMCNC: 27.2 PG — SIGNIFICANT CHANGE UP (ref 27–34)
MCHC RBC-ENTMCNC: 32 GM/DL — SIGNIFICANT CHANGE UP (ref 32–36)
MCV RBC AUTO: 85.1 FL — SIGNIFICANT CHANGE UP (ref 80–100)
MONOCYTES # BLD AUTO: 0.5 K/UL — SIGNIFICANT CHANGE UP (ref 0–0.9)
MONOCYTES NFR BLD AUTO: 7 % — SIGNIFICANT CHANGE UP (ref 2–14)
NEUTROPHILS # BLD AUTO: 2.9 K/UL — SIGNIFICANT CHANGE UP (ref 1.8–7.4)
NEUTROPHILS NFR BLD AUTO: 62 % — SIGNIFICANT CHANGE UP (ref 43–77)
NEUTS BAND # BLD: 2 % — SIGNIFICANT CHANGE UP (ref 0–8)
PHOSPHATE SERPL-MCNC: 3.3 MG/DL — SIGNIFICANT CHANGE UP (ref 2.5–4.5)
PLAT MORPH BLD: NORMAL — SIGNIFICANT CHANGE UP
PLATELET # BLD AUTO: 224 K/UL — SIGNIFICANT CHANGE UP (ref 150–400)
POTASSIUM SERPL-MCNC: 3.4 MMOL/L — LOW (ref 3.5–5.3)
POTASSIUM SERPL-SCNC: 3.4 MMOL/L — LOW (ref 3.5–5.3)
PROT SERPL-MCNC: 6.6 G/DL — SIGNIFICANT CHANGE UP (ref 6–8.3)
RBC # BLD: 3.32 M/UL — LOW (ref 4.2–5.8)
RBC # FLD: 16.6 % — HIGH (ref 10.3–14.5)
RBC BLD AUTO: SIGNIFICANT CHANGE UP
SODIUM SERPL-SCNC: 139 MMOL/L — SIGNIFICANT CHANGE UP (ref 135–145)
VARIANT LYMPHS # BLD: 2 % — SIGNIFICANT CHANGE UP (ref 0–6)
WBC # BLD: 4.9 K/UL — SIGNIFICANT CHANGE UP (ref 3.8–10.5)
WBC # FLD AUTO: 4.9 K/UL — SIGNIFICANT CHANGE UP (ref 3.8–10.5)

## 2019-04-08 PROCEDURE — 99233 SBSQ HOSP IP/OBS HIGH 50: CPT | Mod: GC

## 2019-04-08 RX ORDER — POTASSIUM CHLORIDE 20 MEQ
20 PACKET (EA) ORAL ONCE
Qty: 0 | Refills: 0 | Status: COMPLETED | OUTPATIENT
Start: 2019-04-08 | End: 2019-04-08

## 2019-04-08 RX ADMIN — CEFEPIME 100 MILLIGRAM(S): 1 INJECTION, POWDER, FOR SOLUTION INTRAMUSCULAR; INTRAVENOUS at 06:07

## 2019-04-08 RX ADMIN — CEFEPIME 100 MILLIGRAM(S): 1 INJECTION, POWDER, FOR SOLUTION INTRAMUSCULAR; INTRAVENOUS at 13:52

## 2019-04-08 RX ADMIN — Medication 1 PACKET(S): at 11:52

## 2019-04-08 RX ADMIN — Medication 600 MILLIGRAM(S): at 17:28

## 2019-04-08 RX ADMIN — Medication 100 MILLIGRAM(S): at 06:08

## 2019-04-08 RX ADMIN — TIZANIDINE 4 MILLIGRAM(S): 4 TABLET ORAL at 22:49

## 2019-04-08 RX ADMIN — CEFEPIME 100 MILLIGRAM(S): 1 INJECTION, POWDER, FOR SOLUTION INTRAMUSCULAR; INTRAVENOUS at 22:16

## 2019-04-08 RX ADMIN — Medication 30 MILLILITER(S): at 14:32

## 2019-04-08 RX ADMIN — Medication 100 MILLIGRAM(S): at 22:16

## 2019-04-08 RX ADMIN — ENOXAPARIN SODIUM 40 MILLIGRAM(S): 100 INJECTION SUBCUTANEOUS at 11:52

## 2019-04-08 RX ADMIN — TRAMADOL HYDROCHLORIDE 50 MILLIGRAM(S): 50 TABLET ORAL at 00:02

## 2019-04-08 RX ADMIN — TIZANIDINE 4 MILLIGRAM(S): 4 TABLET ORAL at 00:35

## 2019-04-08 RX ADMIN — Medication 1 TABLET(S): at 11:52

## 2019-04-08 RX ADMIN — TAMSULOSIN HYDROCHLORIDE 0.4 MILLIGRAM(S): 0.4 CAPSULE ORAL at 22:20

## 2019-04-08 RX ADMIN — TRAMADOL HYDROCHLORIDE 50 MILLIGRAM(S): 50 TABLET ORAL at 17:28

## 2019-04-08 RX ADMIN — Medication 100 MILLIGRAM(S): at 13:52

## 2019-04-08 RX ADMIN — Medication 1 PACKET(S): at 06:08

## 2019-04-08 RX ADMIN — Medication 20 MILLIEQUIVALENT(S): at 11:52

## 2019-04-08 RX ADMIN — Medication 600 MILLIGRAM(S): at 06:08

## 2019-04-08 NOTE — DIETITIAN INITIAL EVALUATION ADULT. - PROBLEM SELECTOR PLAN 2
Patient presenting w/ fever and back pain w/ CT showing descending colitis s/p clindamycin and recent hospitalization. No leukocytosis or excessive diarrhea.  -will empirically treat for c. dif despite indeterminant result given high clinical suspicion  -monitor vital q4, IVF to prevent volume depletion  Also w/ recent nephrectomy with some discharge at right surgical incision, however does not appear cellulitic w/ ELIAS drain could be source. Less likely UTI  -f/u blood cultures, UCx  -will cover w/ broad spectrum ceefepime and vanc and if negative cultures + C.dif will dc.  -wound care consult  -urology consult to eval possible post op infection  - ID consult
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
## 2019-04-08 NOTE — DIETITIAN INITIAL EVALUATION ADULT. - NS AS NUTRI INTERV FEED ASSISTANCE
Pt on volunteer Meal Time Mate list. Nursing staff aware of feeding assistance if volunteer not available./Feeding Assistance/Mouth care/Feeding position/Meal set -up/Menu selection assistance

## 2019-04-08 NOTE — PROGRESS NOTE ADULT - SUBJECTIVE AND OBJECTIVE BOX
Patient is a 43y old  Male who presents with a chief complaint of sepsis (07 Apr 2019 10:01)      SUBJECTIVE / OVERNIGHT EVENTS:  No acute events overnight. Remains afebrile x 36 hrs. Still with mildly productive cough. Intermittent SOB when laying flat but reportedly 2/2 to diffucting with expectoration. Still with loose BM x 2 yesterday. No otehr complaints this AM.     MEDICATIONS  (STANDING):  cefepime   IVPB 2000 milliGRAM(s) IV Intermittent every 8 hours  cefepime   IVPB      enoxaparin Injectable 40 milliGRAM(s) SubCutaneous daily  guaiFENesin  milliGRAM(s) Oral every 12 hours  metroNIDAZOLE  IVPB      metroNIDAZOLE  IVPB 500 milliGRAM(s) IV Intermittent every 8 hours  multivitamin 1 Tablet(s) Oral daily  potassium phosphate / sodium phosphate powder 1 Packet(s) Oral three times a day  tamsulosin 0.4 milliGRAM(s) Oral at bedtime    MEDICATIONS  (PRN):  acetaminophen   Tablet .. 650 milliGRAM(s) Oral every 6 hours PRN Moderate Pain (4 - 6)  aluminum hydroxide/magnesium hydroxide/simethicone Suspension 30 milliLiter(s) Oral every 6 hours PRN Dyspepsia  tiZANidine 4 milliGRAM(s) Oral <User Schedule> PRN Muscle Spasm  traMADol 50 milliGRAM(s) Oral every 6 hours PRN Severe Pain (7 - 10)      Vital Signs Last 24 Hrs  T(C): 36.7 (08 Apr 2019 12:53), Max: 37.2 (08 Apr 2019 06:33)  T(F): 98 (08 Apr 2019 12:53), Max: 98.9 (08 Apr 2019 06:33)  HR: 63 (08 Apr 2019 12:53) (63 - 68)  BP: 99/62 (08 Apr 2019 12:53) (99/62 - 163/103)  BP(mean): --  RR: 18 (08 Apr 2019 12:53) (18 - 18)  SpO2: 95% (08 Apr 2019 12:53) (95% - 100%)  CAPILLARY BLOOD GLUCOSE        I&O's Summary    07 Apr 2019 07:01  -  08 Apr 2019 07:00  --------------------------------------------------------  IN: 0 mL / OUT: 2550 mL / NET: -2550 mL    08 Apr 2019 07:01  -  08 Apr 2019 16:47  --------------------------------------------------------  IN: 0 mL / OUT: 800 mL / NET: -800 mL        PHYSICAL EXAM:  	HEAD:  Atraumatic, Normocephalic  	EYES:, conjunctiva and sclera clear  	ENT: no pharyngeal erythema or cervical lymphadenopathy  	NECK: old trach scar, Supple, No JVD  	CHEST/LUNG: Clear to auscultation bilaterally; No wheeze, decreased breath sounds bilateral bases  	HEART: Regular rate and rhythm; No murmurs, rubs, or gallops  	ABDOMEN: distended abdomen without associated TTP, rebound tenderness. Surgical sutures in abdomen with lap scar without erythema of skin. previous LORRIE drain site c/d/i this AM   	EXTREMITIES: Contracted upper and lower extremities with associated muscle loss.    	PSYCH: AAOx3, flat affect  	NEUROLOGY: CN II-XII grossly intact, minimal movement upper extremities, gross sensation intact, 0/5 strength in b/l UE and LE     SKIN: healed sacral wounds, no pressure ulcers     LABS:                        9.0    4.9   )-----------( 224      ( 08 Apr 2019 06:57 )             28.3     04-08    139  |  100  |  5<L>  ----------------------------<  140<H>  3.4<L>   |  26  |  0.43<L>    Ca    8.4      08 Apr 2019 06:55  Phos  3.3     04-08  Mg     2.0     04-08    TPro  6.6  /  Alb  3.0<L>  /  TBili  0.2  /  DBili  x   /  AST  17  /  ALT  11  /  AlkPhos  60  04-08              RADIOLOGY & ADDITIONAL TESTS:    Imaging Personally Reviewed:    Consultant(s) Notes Reviewed:      Care Discussed with Consultants/Other Providers:

## 2019-04-08 NOTE — DIETITIAN INITIAL EVALUATION ADULT. - ORAL INTAKE PTA
Pt has 24 home aides PTA who prepare food and feed him PTA. Reports good appetite and PO intake./good

## 2019-04-08 NOTE — PROGRESS NOTE ADULT - ATTENDING COMMENTS
Continue current management .  NO fever today .  Patient is currently on Cefepime and Flagyl/ CDIff PCR was negative , will discuss with the ID team about his antibiotics .  Potassium is replaced / cont to monitor       Ivis Llanes   Hospitalist   [phone]

## 2019-04-08 NOTE — PROGRESS NOTE ADULT - PROBLEM SELECTOR PLAN 4
- C-diff pcr negative, GI stool pcr negative  - started on flagyl day 2 - C-diff pcr negative, GI stool pcr negative  - started on flagyl day 3

## 2019-04-08 NOTE — DIETITIAN INITIAL EVALUATION ADULT. - NS AS NUTRI INTERV MEALS SNACK
Encourage po intake w/ snacks ordered at meals. Provide food preferences within diet restrictions as feasible.

## 2019-04-08 NOTE — DIETITIAN INITIAL EVALUATION ADULT. - OTHER INFO
Pt seen for length of stay. Pt reports  pounds, Per previous RD note Pt weighed 170 pounds on 3/13/19. On this admission Pt weighed 172.1 pounds on 4/3/19. Pt reports good appetite, he is a quadriplegic and states a volunteer, RN or PCA feed him at all meals. Per nursing documentation Pt eats % at meals. Pt states he tries not to overeat because he wants to maintain his weight. Denies any GI distress, noted with 1 BM thus far today and 3 BMs yesterday. Reports no difficulty chewing/swallowing.

## 2019-04-08 NOTE — PROGRESS NOTE ADULT - PROBLEM SELECTOR PLAN 1
- 2/2 multifocal PNA, colitis, and UTI  - CXR with multifocal opacities, CTAP with left sided colitis, UCx with Provendentia, recent nephrectomy surgical site appears clean, perc nephrostomy removed by urology upon admission. Repeat CT a/p with ill defined fluid in post-operative bed, will consider repeat CT abdomen if spikes fever.   - s/p vancomycin, c/w cefepime day 7/7, started on metronidazole day 3 as pt. remained febrile for better anaerobic coverage in setting of colitis   - if becomes febrile again, will get CTAP with IV contrast to r/o abscess, and resume azithromycin for atypical coverage and consult pulm in setting of secretions and multifocal PNA  - appreciate ID recommendations
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
## 2019-04-08 NOTE — PROGRESS NOTE ADULT - PROBLEM SELECTOR PLAN 2
- multifocal PNA noted on CT chest   - RVP negative, MRSA swab negative   - s/p vancomycin, c/w cefepime day 7/7 for PNA

## 2019-04-08 NOTE — PROGRESS NOTE ADULT - PROBLEM SELECTOR PLAN 3
- patient w/ hx of pyelo, s/p left perc neph, right nephrectomy   - Urine w/ provedentia sensitive to cefepime  - c/w broad spectrum coverage with cefepime for now  - urology following, no intraabdominal collection, nephrostomy drain d/c'ed - patient w/ hx of pyelo, s/p left perc neph, right nephrectomy   - Urine w/ provedentia sensitive to cefepime  - c/w broad spectrum coverage with cefepime for now  - urology following, nephrostomy drain d/c'ed, ?intraabdominal collection. Will reconsult regarding reccs

## 2019-04-09 LAB
ANION GAP SERPL CALC-SCNC: 13 MMOL/L — SIGNIFICANT CHANGE UP (ref 5–17)
BASOPHILS # BLD AUTO: 0 K/UL — SIGNIFICANT CHANGE UP (ref 0–0.2)
BASOPHILS NFR BLD AUTO: 0 % — SIGNIFICANT CHANGE UP (ref 0–2)
BUN SERPL-MCNC: 9 MG/DL — SIGNIFICANT CHANGE UP (ref 7–23)
CALCIUM SERPL-MCNC: 8.4 MG/DL — SIGNIFICANT CHANGE UP (ref 8.4–10.5)
CHLORIDE SERPL-SCNC: 100 MMOL/L — SIGNIFICANT CHANGE UP (ref 96–108)
CO2 SERPL-SCNC: 28 MMOL/L — SIGNIFICANT CHANGE UP (ref 22–31)
CREAT SERPL-MCNC: 0.56 MG/DL — SIGNIFICANT CHANGE UP (ref 0.5–1.3)
EOSINOPHIL # BLD AUTO: 0.1 K/UL — SIGNIFICANT CHANGE UP (ref 0–0.5)
EOSINOPHIL NFR BLD AUTO: 1.1 % — SIGNIFICANT CHANGE UP (ref 0–6)
GLUCOSE SERPL-MCNC: 111 MG/DL — HIGH (ref 70–99)
HCT VFR BLD CALC: 29.7 % — LOW (ref 39–50)
HGB BLD-MCNC: 8.8 G/DL — LOW (ref 13–17)
LYMPHOCYTES # BLD AUTO: 1.8 K/UL — SIGNIFICANT CHANGE UP (ref 1–3.3)
LYMPHOCYTES # BLD AUTO: 29 % — SIGNIFICANT CHANGE UP (ref 13–44)
MAGNESIUM SERPL-MCNC: 2.2 MG/DL — SIGNIFICANT CHANGE UP (ref 1.6–2.6)
MCHC RBC-ENTMCNC: 25.2 PG — LOW (ref 27–34)
MCHC RBC-ENTMCNC: 29.7 GM/DL — LOW (ref 32–36)
MCV RBC AUTO: 84.8 FL — SIGNIFICANT CHANGE UP (ref 80–100)
MONOCYTES # BLD AUTO: 0.8 K/UL — SIGNIFICANT CHANGE UP (ref 0–0.9)
MONOCYTES NFR BLD AUTO: 12.3 % — SIGNIFICANT CHANGE UP (ref 2–14)
NEUTROPHILS # BLD AUTO: 3.6 K/UL — SIGNIFICANT CHANGE UP (ref 1.8–7.4)
NEUTROPHILS NFR BLD AUTO: 57.5 % — SIGNIFICANT CHANGE UP (ref 43–77)
PHOSPHATE SERPL-MCNC: 2.8 MG/DL — SIGNIFICANT CHANGE UP (ref 2.5–4.5)
PLATELET # BLD AUTO: 256 K/UL — SIGNIFICANT CHANGE UP (ref 150–400)
POTASSIUM SERPL-MCNC: 3.4 MMOL/L — LOW (ref 3.5–5.3)
POTASSIUM SERPL-SCNC: 3.4 MMOL/L — LOW (ref 3.5–5.3)
RBC # BLD: 3.5 M/UL — LOW (ref 4.2–5.8)
RBC # FLD: 16.3 % — HIGH (ref 10.3–14.5)
SODIUM SERPL-SCNC: 141 MMOL/L — SIGNIFICANT CHANGE UP (ref 135–145)
WBC # BLD: 6.2 K/UL — SIGNIFICANT CHANGE UP (ref 3.8–10.5)
WBC # FLD AUTO: 6.2 K/UL — SIGNIFICANT CHANGE UP (ref 3.8–10.5)

## 2019-04-09 PROCEDURE — 99233 SBSQ HOSP IP/OBS HIGH 50: CPT | Mod: GC

## 2019-04-09 PROCEDURE — 99232 SBSQ HOSP IP/OBS MODERATE 35: CPT

## 2019-04-09 RX ORDER — POTASSIUM CHLORIDE 20 MEQ
20 PACKET (EA) ORAL ONCE
Qty: 0 | Refills: 0 | Status: DISCONTINUED | OUTPATIENT
Start: 2019-04-09 | End: 2019-04-09

## 2019-04-09 RX ORDER — FERROUS SULFATE 325(65) MG
325 TABLET ORAL DAILY
Qty: 0 | Refills: 0 | Status: DISCONTINUED | OUTPATIENT
Start: 2019-04-09 | End: 2019-04-12

## 2019-04-09 RX ORDER — POTASSIUM CHLORIDE 20 MEQ
20 PACKET (EA) ORAL
Qty: 0 | Refills: 0 | Status: COMPLETED | OUTPATIENT
Start: 2019-04-09 | End: 2019-04-09

## 2019-04-09 RX ADMIN — ENOXAPARIN SODIUM 40 MILLIGRAM(S): 100 INJECTION SUBCUTANEOUS at 12:09

## 2019-04-09 RX ADMIN — Medication 20 MILLIEQUIVALENT(S): at 09:09

## 2019-04-09 RX ADMIN — Medication 100 MILLIGRAM(S): at 05:39

## 2019-04-09 RX ADMIN — TAMSULOSIN HYDROCHLORIDE 0.4 MILLIGRAM(S): 0.4 CAPSULE ORAL at 21:01

## 2019-04-09 RX ADMIN — Medication 600 MILLIGRAM(S): at 17:59

## 2019-04-09 RX ADMIN — CEFEPIME 100 MILLIGRAM(S): 1 INJECTION, POWDER, FOR SOLUTION INTRAMUSCULAR; INTRAVENOUS at 05:39

## 2019-04-09 RX ADMIN — Medication 600 MILLIGRAM(S): at 05:39

## 2019-04-09 RX ADMIN — TIZANIDINE 4 MILLIGRAM(S): 4 TABLET ORAL at 15:21

## 2019-04-09 RX ADMIN — Medication 1 TABLET(S): at 12:09

## 2019-04-09 RX ADMIN — TIZANIDINE 4 MILLIGRAM(S): 4 TABLET ORAL at 20:58

## 2019-04-09 RX ADMIN — Medication 325 MILLIGRAM(S): at 12:09

## 2019-04-09 RX ADMIN — Medication 20 MILLIEQUIVALENT(S): at 15:21

## 2019-04-09 RX ADMIN — Medication 20 MILLIEQUIVALENT(S): at 12:09

## 2019-04-09 NOTE — PROGRESS NOTE ADULT - SUBJECTIVE AND OBJECTIVE BOX
Patient is a 43y old  Male who presents with a chief complaint of sepsis (09 Apr 2019 15:40)      SUBJECTIVE / OVERNIGHT EVENTS: No acute events overnight. This AM cough improved but still intermittently productive. States using oxygen for comfort but denies SOB at rest, intermittent SOB with turning. Otherwise no other complaints this AM.     MEDICATIONS  (STANDING):  enoxaparin Injectable 40 milliGRAM(s) SubCutaneous daily  ferrous    sulfate 325 milliGRAM(s) Oral daily  guaiFENesin  milliGRAM(s) Oral every 12 hours  multivitamin 1 Tablet(s) Oral daily  tamsulosin 0.4 milliGRAM(s) Oral at bedtime    MEDICATIONS  (PRN):  acetaminophen   Tablet .. 650 milliGRAM(s) Oral every 6 hours PRN Moderate Pain (4 - 6)  aluminum hydroxide/magnesium hydroxide/simethicone Suspension 30 milliLiter(s) Oral every 6 hours PRN Dyspepsia  tiZANidine 4 milliGRAM(s) Oral <User Schedule> PRN Muscle Spasm  traMADol 50 milliGRAM(s) Oral every 6 hours PRN Severe Pain (7 - 10)      Vital Signs Last 24 Hrs  T(C): 37.1 (09 Apr 2019 14:25), Max: 37.1 (08 Apr 2019 19:36)  T(F): 98.8 (09 Apr 2019 14:25), Max: 98.8 (08 Apr 2019 19:36)  HR: 71 (09 Apr 2019 14:25) (60 - 84)  BP: 142/92 (09 Apr 2019 14:25) (103/68 - 146/90)  BP(mean): --  RR: 18 (09 Apr 2019 14:25) (18 - 18)  SpO2: 100% (09 Apr 2019 14:25) (97% - 100%)  CAPILLARY BLOOD GLUCOSE        I&O's Summary    08 Apr 2019 07:01  -  09 Apr 2019 07:00  --------------------------------------------------------  IN: 900 mL / OUT: 4200 mL / NET: -3300 mL    09 Apr 2019 07:01  -  09 Apr 2019 16:17  --------------------------------------------------------  IN: 0 mL / OUT: 1000 mL / NET: -1000 mL        PHYSICAL EXAM:  HEAD:  Atraumatic, Normocephalic  	EYES:, conjunctiva and sclera clear  	ENT: no pharyngeal erythema or cervical lymphadenopathy  	NECK: old trach scar, Supple, No JVD  	CHEST/LUNG: Clear to auscultation bilaterally; No wheeze, decreased breath sounds bilateral bases  	HEART: Regular rate and rhythm; No murmurs, rubs, or gallops  	ABDOMEN: distended abdomen without associated TTP, rebound tenderness. Surgical sutures in abdomen with lap scar without erythema of skin. previous LORRIE drain site c/d/i this AM   	EXTREMITIES: Contracted upper and lower extremities with associated muscle loss.    	PSYCH: AAOx3, flat affect  	NEUROLOGY: CN II-XII grossly intact, minimal movement upper extremities, gross sensation intact, 0/5 strength in b/l UE and LE     SKIN: healed sacral wounds, no pressure ulcers   LABS:                        8.8    6.2   )-----------( 256      ( 09 Apr 2019 06:53 )             29.7     04-09    141  |  100  |  9   ----------------------------<  111<H>  3.4<L>   |  28  |  0.56    Ca    8.4      09 Apr 2019 06:50  Phos  2.8     04-09  Mg     2.2     04-09    TPro  6.6  /  Alb  3.0<L>  /  TBili  0.2  /  DBili  x   /  AST  17  /  ALT  11  /  AlkPhos  60  04-08              RADIOLOGY & ADDITIONAL TESTS:    Imaging Personally Reviewed:    Consultant(s) Notes Reviewed:      Care Discussed with Consultants/Other Providers:

## 2019-04-09 NOTE — PROGRESS NOTE ADULT - PROBLEM SELECTOR PLAN 2
- multifocal PNA noted on CT chest   - RVP negative, MRSA swab negative   - cefepime day 7/7 for PNA, monitor off abx

## 2019-04-09 NOTE — PROGRESS NOTE ADULT - ASSESSMENT
43 harish old angry and not cooperative  exam limited/ recent nephrectomy on right and history Pt with recurrent kidney stones.  presents with diarrhea and colitis on ct scan and indeTER C diff test.  PCR pending    stent on left that has been removed/           urine culture is growing gram negative rods senstive to cefepime    ct reviewed with ongoing colitis  no obv abscess in surgical bed   and several areas of consolidation    fever down for last 36 hrs 38 .2 max     wd packed by urology not obv infected  wd seen .      UTI plus PNA ( HCAP)  This is not an atypical pna        complete  cefepime  for pneumonia today  now is off the vancomycin   still no explantation for colitis and distension.    would watch off ab after today and then decide whether we  need to do colonoscopy  at risk for DVT    I

## 2019-04-09 NOTE — PROGRESS NOTE ADULT - PROBLEM SELECTOR PLAN 5
- Anemia s/p infection w/o clear signs of blood loss, has back pain with fluid in abdomen  - Iron studies with LORRIE, may benefit from IV iron when not acutely septic, will start PO ferrous sulfate

## 2019-04-09 NOTE — PROGRESS NOTE ADULT - PROBLEM SELECTOR PLAN 1
- 2/2 multifocal PNA, colitis, and UTI  - CXR with multifocal opacities, CTAP with left sided colitis, UCx with Provendentia, recent nephrectomy surgical site appears clean, perc nephrostomy removed by urology upon admission. Repeat CT a/p with ill defined fluid in post-operative bed, will consider repeat CT abdomen if spikes fever.   - s/p vancomycin, c/w cefepime day 7/7, tx w/ metronidazole for 4 days for anaerobic coverage and  colitis.    - if becomes febrile again, will get CTAP with IV contrast to r/o intraabdominal abscess  - appreciate ID recommendation, monitor off abx

## 2019-04-09 NOTE — PROGRESS NOTE ADULT - PROBLEM SELECTOR PLAN 3
- patient w/ hx of pyelo, s/p left perc neph, right nephrectomy   - Urine w/ provedentia sensitive to cefepime  - c/w broad spectrum coverage with cefepime for now  - urology following, nephrostomy drain d/c'ed, ?intraabdominal collection. Will reconsult regarding reccs
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
## 2019-04-09 NOTE — PROGRESS NOTE ADULT - SUBJECTIVE AND OBJECTIVE BOX
infectious diseases progress note:    Patient is a 43y old  Male who presents with a chief complaint of sepsis (08 Apr 2019 16:47)        Noninfectious gastroenteritis        R     Allergies    penicillin (Rash)    Intolerances        ANTIBIOTICS/RELEVANT:  antimicrobials  cefepime   IVPB 2000 milliGRAM(s) IV Intermittent every 8 hours  cefepime   IVPB      metroNIDAZOLE  IVPB      metroNIDAZOLE  IVPB 500 milliGRAM(s) IV Intermittent every 8 hours    immunologic:    OTHER:  acetaminophen   Tablet .. 650 milliGRAM(s) Oral every 6 hours PRN  aluminum hydroxide/magnesium hydroxide/simethicone Suspension 30 milliLiter(s) Oral every 6 hours PRN  enoxaparin Injectable 40 milliGRAM(s) SubCutaneous daily  guaiFENesin  milliGRAM(s) Oral every 12 hours  multivitamin 1 Tablet(s) Oral daily  potassium chloride    Tablet ER 20 milliEquivalent(s) Oral once  tamsulosin 0.4 milliGRAM(s) Oral at bedtime  tiZANidine 4 milliGRAM(s) Oral <User Schedule> PRN  traMADol 50 milliGRAM(s) Oral every 6 hours PRN      Objective:  Vital Signs Last 24 Hrs  T(C): 36.7 (09 Apr 2019 04:43), Max: 37.1 (08 Apr 2019 19:36)  T(F): 98 (09 Apr 2019 04:43), Max: 98.8 (08 Apr 2019 19:36)  HR: 60 (09 Apr 2019 04:43) (60 - 84)  BP: 103/68 (09 Apr 2019 04:43) (99/62 - 146/90)  BP(mean): --  RR: 18 (09 Apr 2019 04:43) (18 - 18)  SpO2: 100% (09 Apr 2019 04:43) (95% - 100%)    PHYSICAL EXAM:     Ear/Nose/Throat: no oral lesion, no sinus tenderness on percussion	  Neck:no JVD, no lymphadenopathy, supple  Respiratory: CTA sujatha  Cardiovascular: S1S2 RRR, no murmurs  Gastrointestinal:soft, (+) BS, no HSM  Extremities:no e/e/c        LABS:                        9.0    4.9   )-----------( 224      ( 08 Apr 2019 06:57 )             28.3     04-09    141  |  100  |  9   ----------------------------<  111<H>  3.4<L>   |  28  |  0.56    Ca    8.4      09 Apr 2019 06:50  Phos  2.8     04-09  Mg     2.2     04-09    TPro  6.6  /  Alb  3.0<L>  /  TBili  0.2  /  DBili  x   /  AST  17  /  ALT  11  /  AlkPhos  60  04-08            MICROBIOLOGY:    RECENT CULTURES:  04-06 @ 21:05 .Blood Blood-Peripheral                No growth to date.    04-03 @ 09:05 .Stool Feces                GI PCR Results: NOT detected  *******Please Note:*******  GI panel PCR evaluates for:  Campylobacter, Plesiomonas shigelloides, Salmonella,  Vibrio, Yersinia enterocolitica, Enteroaggregative  Escherichia coli (EAEC), Enteropathogenic E.coli (EPEC),  Enterotoxigenic E. coli (ETEC) lt/st, Shiga-like  toxin-producing E. coli (STEC) stx1/stx2,  Shigella/ Enteroinvasive E. coli (EIEC), Cryptosporidium,  Cyclospora cayetanensis, Entamoeba histolytica,  Giardia lamblia, Adenovirus F 40/41, Astrovirus,  Norovirus GI/GII, Rotavirus A, Sapovirus          RESPIRATORY CULTURES:      C Diff by PCR Result: Timo (04-02 @ 09:35)          RADIOLOGY & ADDITIONAL STUDIES:        Pager [phone]  After 5 pm/weekends or if no response :[phone]

## 2019-04-09 NOTE — PROGRESS NOTE ADULT - ATTENDING COMMENTS
Patient was seen this afternoon and he was very angry, and would not elaborate about any concern or any questions that he might have .  As per ID , pt will complete antibiotic therapy today and observe off of ABX afterward.  OT consult was place , since patient has significant contracture of the hands.            Ivis Llanes   Hospitalist   [phone]

## 2019-04-10 LAB
ALBUMIN SERPL ELPH-MCNC: 3.5 G/DL — SIGNIFICANT CHANGE UP (ref 3.3–5)
ALP SERPL-CCNC: 62 U/L — SIGNIFICANT CHANGE UP (ref 40–120)
ALT FLD-CCNC: 11 U/L — SIGNIFICANT CHANGE UP (ref 10–45)
ANION GAP SERPL CALC-SCNC: 10 MMOL/L — SIGNIFICANT CHANGE UP (ref 5–17)
AST SERPL-CCNC: 26 U/L — SIGNIFICANT CHANGE UP (ref 10–40)
BILIRUB DIRECT SERPL-MCNC: <0.1 MG/DL — SIGNIFICANT CHANGE UP (ref 0–0.2)
BILIRUB INDIRECT FLD-MCNC: >0.1 MG/DL — LOW (ref 0.2–1)
BILIRUB SERPL-MCNC: 0.2 MG/DL — SIGNIFICANT CHANGE UP (ref 0.2–1.2)
BUN SERPL-MCNC: 6 MG/DL — LOW (ref 7–23)
CALCIUM SERPL-MCNC: 8.9 MG/DL — SIGNIFICANT CHANGE UP (ref 8.4–10.5)
CHLORIDE SERPL-SCNC: 103 MMOL/L — SIGNIFICANT CHANGE UP (ref 96–108)
CO2 SERPL-SCNC: 28 MMOL/L — SIGNIFICANT CHANGE UP (ref 22–31)
CREAT SERPL-MCNC: 0.48 MG/DL — LOW (ref 0.5–1.3)
GLUCOSE SERPL-MCNC: 97 MG/DL — SIGNIFICANT CHANGE UP (ref 70–99)
HCT VFR BLD CALC: 31.8 % — LOW (ref 39–50)
HGB BLD-MCNC: 10.2 G/DL — LOW (ref 13–17)
MAGNESIUM SERPL-MCNC: 2.3 MG/DL — SIGNIFICANT CHANGE UP (ref 1.6–2.6)
MCHC RBC-ENTMCNC: 27.3 PG — SIGNIFICANT CHANGE UP (ref 27–34)
MCHC RBC-ENTMCNC: 32.1 GM/DL — SIGNIFICANT CHANGE UP (ref 32–36)
MCV RBC AUTO: 85.2 FL — SIGNIFICANT CHANGE UP (ref 80–100)
PHOSPHATE SERPL-MCNC: 2 MG/DL — LOW (ref 2.5–4.5)
PLATELET # BLD AUTO: 283 K/UL — SIGNIFICANT CHANGE UP (ref 150–400)
POTASSIUM SERPL-MCNC: 4.3 MMOL/L — SIGNIFICANT CHANGE UP (ref 3.5–5.3)
POTASSIUM SERPL-SCNC: 4.3 MMOL/L — SIGNIFICANT CHANGE UP (ref 3.5–5.3)
PROT SERPL-MCNC: 7 G/DL — SIGNIFICANT CHANGE UP (ref 6–8.3)
RBC # BLD: 3.74 M/UL — LOW (ref 4.2–5.8)
RBC # FLD: 16.5 % — HIGH (ref 10.3–14.5)
SODIUM SERPL-SCNC: 141 MMOL/L — SIGNIFICANT CHANGE UP (ref 135–145)
WBC # BLD: 8.3 K/UL — SIGNIFICANT CHANGE UP (ref 3.8–10.5)
WBC # FLD AUTO: 8.3 K/UL — SIGNIFICANT CHANGE UP (ref 3.8–10.5)

## 2019-04-10 PROCEDURE — 71045 X-RAY EXAM CHEST 1 VIEW: CPT | Mod: 26

## 2019-04-10 PROCEDURE — 99232 SBSQ HOSP IP/OBS MODERATE 35: CPT

## 2019-04-10 PROCEDURE — 76705 ECHO EXAM OF ABDOMEN: CPT | Mod: 26

## 2019-04-10 PROCEDURE — 99233 SBSQ HOSP IP/OBS HIGH 50: CPT | Mod: GC

## 2019-04-10 RX ORDER — SODIUM,POTASSIUM PHOSPHATES 278-250MG
1 POWDER IN PACKET (EA) ORAL ONCE
Qty: 0 | Refills: 0 | Status: COMPLETED | OUTPATIENT
Start: 2019-04-10 | End: 2019-04-10

## 2019-04-10 RX ORDER — TIZANIDINE 4 MG/1
4 TABLET ORAL THREE TIMES A DAY
Qty: 0 | Refills: 0 | Status: DISCONTINUED | OUTPATIENT
Start: 2019-04-10 | End: 2019-04-12

## 2019-04-10 RX ADMIN — TRAMADOL HYDROCHLORIDE 50 MILLIGRAM(S): 50 TABLET ORAL at 10:09

## 2019-04-10 RX ADMIN — Medication 325 MILLIGRAM(S): at 11:15

## 2019-04-10 RX ADMIN — TIZANIDINE 4 MILLIGRAM(S): 4 TABLET ORAL at 15:32

## 2019-04-10 RX ADMIN — Medication 600 MILLIGRAM(S): at 05:54

## 2019-04-10 RX ADMIN — ENOXAPARIN SODIUM 40 MILLIGRAM(S): 100 INJECTION SUBCUTANEOUS at 11:14

## 2019-04-10 RX ADMIN — TAMSULOSIN HYDROCHLORIDE 0.4 MILLIGRAM(S): 0.4 CAPSULE ORAL at 22:16

## 2019-04-10 RX ADMIN — Medication 1 PACKET(S): at 11:15

## 2019-04-10 RX ADMIN — Medication 650 MILLIGRAM(S): at 05:30

## 2019-04-10 RX ADMIN — Medication 600 MILLIGRAM(S): at 16:43

## 2019-04-10 RX ADMIN — TRAMADOL HYDROCHLORIDE 50 MILLIGRAM(S): 50 TABLET ORAL at 01:04

## 2019-04-10 RX ADMIN — Medication 1 TABLET(S): at 11:15

## 2019-04-10 NOTE — PROGRESS NOTE ADULT - SUBJECTIVE AND OBJECTIVE BOX
Patient is a 43y old  Male who presents with a chief complaint of sepsis (10 Apr 2019 07:56)      SUBJECTIVE / OVERNIGHT EVENTS: Seen and examined this AM, currently endorsing orthopnea reportedly feels SOB while laying flat. However cough improved, still with intermittently     MEDICATIONS  (STANDING):  enoxaparin Injectable 40 milliGRAM(s) SubCutaneous daily  ferrous    sulfate 325 milliGRAM(s) Oral daily  guaiFENesin  milliGRAM(s) Oral every 12 hours  multivitamin 1 Tablet(s) Oral daily  tamsulosin 0.4 milliGRAM(s) Oral at bedtime    MEDICATIONS  (PRN):  acetaminophen   Tablet .. 650 milliGRAM(s) Oral every 6 hours PRN Moderate Pain (4 - 6)  aluminum hydroxide/magnesium hydroxide/simethicone Suspension 30 milliLiter(s) Oral every 6 hours PRN Dyspepsia  tiZANidine 4 milliGRAM(s) Oral three times a day PRN spasm  traMADol 50 milliGRAM(s) Oral every 6 hours PRN Severe Pain (7 - 10)      Vital Signs Last 24 Hrs  T(C): 36.6 (10 Apr 2019 15:30), Max: 37.2 (09 Apr 2019 19:18)  T(F): 97.8 (10 Apr 2019 15:30), Max: 99 (09 Apr 2019 19:18)  HR: 50 (10 Apr 2019 15:30) (50 - 72)  BP: 112/78 (10 Apr 2019 15:30) (109/76 - 128/97)  BP(mean): --  RR: 25 (10 Apr 2019 15:30) (18 - 26)  SpO2: 93% (10 Apr 2019 15:30) (93% - 100%)  CAPILLARY BLOOD GLUCOSE        I&O's Summary    09 Apr 2019 07:01  -  10 Apr 2019 07:00  --------------------------------------------------------  IN: 1660 mL / OUT: 4050 mL / NET: -2390 mL    10 Apr 2019 07:01  -  10 Apr 2019 16:29  --------------------------------------------------------  IN: 0 mL / OUT: 450 mL / NET: -450 mL        PHYSICAL EXAM:  GENERAL: NAD, well-developed  HEAD:  Atraumatic, Normocephalic  EYES: EOMI, PERRLA, conjunctiva and sclera clear  NECK: Supple, No JVD  CHEST/LUNG: Clear to auscultation bilaterally; No wheeze  HEART: Regular rate and rhythm; No murmurs, rubs, or gallops  ABDOMEN: Soft, Nontender, Nondistended; Bowel sounds present  EXTREMITIES:  2+ Peripheral Pulses, No clubbing, cyanosis, or edema  PSYCH: AAOx3  NEUROLOGY: non-focal  SKIN: No rashes or lesions    LABS:                        10.2   8.3   )-----------( 283      ( 10 Apr 2019 06:58 )             31.8     04-10    141  |  103  |  6<L>  ----------------------------<  97  4.3   |  28  |  0.48<L>    Ca    8.9      10 Apr 2019 06:58  Phos  2.0     04-10  Mg     2.3     04-10    TPro  7.0  /  Alb  3.5  /  TBili  0.2  /  DBili  <0.1  /  AST  26  /  ALT  11  /  AlkPhos  62  04-10              RADIOLOGY & ADDITIONAL TESTS:    Imaging Personally Reviewed:    Consultant(s) Notes Reviewed:      Care Discussed with Consultants/Other Providers: Patient is a 43y old  Male who presents with a chief complaint of sepsis (10 Apr 2019 07:56)      SUBJECTIVE / OVERNIGHT EVENTS: Seen and examined this AM, currently endorsing orthopnea reportedly feels SOB while laying flat. However cough improved, still intermittently productive. No other complaints this AM but feeling anxious about possible being discharged.     MEDICATIONS  (STANDING):  enoxaparin Injectable 40 milliGRAM(s) SubCutaneous daily  ferrous    sulfate 325 milliGRAM(s) Oral daily  guaiFENesin  milliGRAM(s) Oral every 12 hours  multivitamin 1 Tablet(s) Oral daily  tamsulosin 0.4 milliGRAM(s) Oral at bedtime    MEDICATIONS  (PRN):  acetaminophen   Tablet .. 650 milliGRAM(s) Oral every 6 hours PRN Moderate Pain (4 - 6)  aluminum hydroxide/magnesium hydroxide/simethicone Suspension 30 milliLiter(s) Oral every 6 hours PRN Dyspepsia  tiZANidine 4 milliGRAM(s) Oral three times a day PRN spasm  traMADol 50 milliGRAM(s) Oral every 6 hours PRN Severe Pain (7 - 10)      Vital Signs Last 24 Hrs  T(C): 36.6 (10 Apr 2019 15:30), Max: 37.2 (09 Apr 2019 19:18)  T(F): 97.8 (10 Apr 2019 15:30), Max: 99 (09 Apr 2019 19:18)  HR: 50 (10 Apr 2019 15:30) (50 - 72)  BP: 112/78 (10 Apr 2019 15:30) (109/76 - 128/97)  BP(mean): --  RR: 25 (10 Apr 2019 15:30) (18 - 26)  SpO2: 93% (10 Apr 2019 15:30) (93% - 100%)  CAPILLARY BLOOD GLUCOSE        I&O's Summary    09 Apr 2019 07:01  -  10 Apr 2019 07:00  --------------------------------------------------------  IN: 1660 mL / OUT: 4050 mL / NET: -2390 mL    10 Apr 2019 07:01  -  10 Apr 2019 16:29  --------------------------------------------------------  IN: 0 mL / OUT: 450 mL / NET: -450 mL        PHYSICAL EXAM:  HEAD:  Atraumatic, Normocephalic  	EYES:, conjunctiva and sclera clear  	ENT: no pharyngeal erythema or cervical lymphadenopathy  	NECK: old trach scar, Supple, No JVD  	CHEST/LUNG: Clear to auscultation bilaterally; No wheeze, decreased breath sounds bilateral bases  	HEART: Regular rate and rhythm; No murmurs, rubs, or gallops  	ABDOMEN: distended abdomen without associated TTP, rebound tenderness. Surgical sutures in abdomen with lap scar without erythema of skin. previous LORRIE drain site c/d/i this AM   	EXTREMITIES: Contracted upper and lower extremities with associated muscle loss.    	PSYCH: AAOx3, flat affect  	NEUROLOGY: CN II-XII grossly intact, minimal movement upper extremities, gross sensation intact, 0/5 strength in b/l UE and LE     SKIN: healed sacral wounds, no pressure ulcers     LABS:                        10.2   8.3   )-----------( 283      ( 10 Apr 2019 06:58 )             31.8     04-10    141  |  103  |  6<L>  ----------------------------<  97  4.3   |  28  |  0.48<L>    Ca    8.9      10 Apr 2019 06:58  Phos  2.0     04-10  Mg     2.3     04-10    TPro  7.0  /  Alb  3.5  /  TBili  0.2  /  DBili  <0.1  /  AST  26  /  ALT  11  /  AlkPhos  62  04-10              RADIOLOGY & ADDITIONAL TESTS:    Imaging Personally Reviewed:    Consultant(s) Notes Reviewed:      Care Discussed with Consultants/Other Providers:

## 2019-04-10 NOTE — PROGRESS NOTE ADULT - PROBLEM SELECTOR PLAN 2
- multifocal PNA noted on CT chest   - RVP negative, MRSA swab negative   - cefepime day 7/7 for PNA, monitor off abx  - still with mild pleural effusion but overall improved

## 2019-04-10 NOTE — PROGRESS NOTE ADULT - ASSESSMENT
43 harish old angry and not cooperative  exam limited/ recent nephrectomy on right and history Pt with recurrent kidney stones.  presents with diarrhea and colitis on ct scan and indeTER C diff test.  PCR pending    stent on left that has been removed/           urine culture is growing gram negative rods senstive to cefepime    ct reviewed with ongoing colitis  no obv abscess in surgical bed   and several areas of consolidation         wd packed by urology not obv infected  wd seen .      UTI plus PNA ( HCAP)  This is not an atypical pna        complete  cefepime  for pneumonia today  now is off the vancomycin   still no explantation for colitis and distension.     pt is afebrile and has nl wbc  looks alittle SOB with decreased BS  check follow up chest xray   did have effusions previously and ascites without clear etiology   I

## 2019-04-10 NOTE — PROGRESS NOTE ADULT - ATTENDING COMMENTS
NO fever , patient states that he feels SOB upon lying flat and is requesting O2 supplement , patient was seen with the whole team and SPO2 is stable on RA so far .  Lungs with good air entry .  Will f/up repeat CXR and abd US although abd examination is mostly tympanitic with good BS.  Patient is currently off ABX.  IF no issues, anticipate dc tomorrow with outpatient GI follow for possible scope in search of possible etiology of Colitis with negative C DIff and stool PCR .  Awaiting on Urology follow up on surgical site .  COnt close monitoring.        Ivis Reddyre   Hospitalist   [phone]

## 2019-04-10 NOTE — PROGRESS NOTE ADULT - SUBJECTIVE AND OBJECTIVE BOX
Patient seen and examined.  Rest of staples removed.    T(F): 97.3, Max: 99 (04-09-19 @ 19:18)  HR: 50  BP: 147/94  SpO2: 93%    OUT:    Incontinent per Condom Catheter: 4050 mL  Total OUT: 4050 mL    Gen NAD  Abd Staples removed, packing in incision    04-10 @ 06:58  WBC 8.3   / Hct 31.8  / SCr 0.48     04-09 @ 06:53  WBC 6.2   / Hct 29.7  / SCr --       .Blood Blood-Peripheral  04-06  No growth to date.
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
## 2019-04-10 NOTE — PROGRESS NOTE ADULT - PROBLEM SELECTOR PLAN 3
- patient w/ hx of pyelo, s/p left perc neph, right nephrectomy 2/2 to atrophic non-functioning right kidney with chronic hydro   - Urine w/ provedentia sensitive to cefepime  - s/p tx   - urology following, nephrostomy drain d/c'ed, previous open right nephrectomy, ?collection in surgical bed, will be reeevaluated by urology prior to d/c

## 2019-04-10 NOTE — PROGRESS NOTE ADULT - SUBJECTIVE AND OBJECTIVE BOX
infectious diseases progress note:    Patient is a 43y old  Male who presents with a chief complaint of sepsis (09 Apr 2019 15:40)        Noninfectious gastroenteritis        ROS:  CONSTITUTIONAL:  Negative fever or chills, feels well, good appetite  EYES:  Negative  blurry vision or double vision  CARDIOVASCULAR:  Negative for chest pain or palpitations  RESPIRATORY:  Negative for cough, wheezing,   SOB   GASTROINTESTINAL:  Negative for nausea, vomiting, diarrhea, constipation, or abdominal pain  GENITOURINARY:  Negative frequency, urgency or dysuria  NEUROLOGIC:  No headache, confusion, dizziness, lightheadedness    Allergies    penicillin (Rash)    Intolerances        ANTIBIOTICS/RELEVANT:  antimicrobials    immunologic:    OTHER:  acetaminophen   Tablet .. 650 milliGRAM(s) Oral every 6 hours PRN  aluminum hydroxide/magnesium hydroxide/simethicone Suspension 30 milliLiter(s) Oral every 6 hours PRN  enoxaparin Injectable 40 milliGRAM(s) SubCutaneous daily  ferrous    sulfate 325 milliGRAM(s) Oral daily  guaiFENesin  milliGRAM(s) Oral every 12 hours  multivitamin 1 Tablet(s) Oral daily  potassium phosphate / sodium phosphate powder 1 Packet(s) Oral once  tamsulosin 0.4 milliGRAM(s) Oral at bedtime  tiZANidine 4 milliGRAM(s) Oral <User Schedule> PRN  traMADol 50 milliGRAM(s) Oral every 6 hours PRN      Objective:  Vital Signs Last 24 Hrs  T(C): 36.8 (10 Apr 2019 05:32), Max: 37.2 (09 Apr 2019 19:18)  T(F): 98.2 (10 Apr 2019 05:32), Max: 99 (09 Apr 2019 19:18)  HR: 67 (10 Apr 2019 05:32) (67 - 72)  BP: 109/76 (10 Apr 2019 05:32) (109/76 - 142/92)  BP(mean): --  RR: 18 (10 Apr 2019 05:32) (18 - 20)  SpO2: 95% (10 Apr 2019 05:32) (95% - 100%)    PHYSICAL EXAM:  Constitutional:Well-developed, well nourished--no acute distress  Eyes:ASHLEY, EOMI  Ear/Nose/Throat: no oral lesion, no sinus tenderness on percussion	  Neck:no JVD, no lymphadenopathy, supple  Respiratory: CTA sujatha  Cardiovascular: S1S2 RRR, no murmurs  Gastrointestinal:soft, (+) BS, no HSM  Extremities:no e/e/c        LABS:                        10.2   8.3   )-----------( 283      ( 10 Apr 2019 06:58 )             31.8     04-10    141  |  103  |  6<L>  ----------------------------<  97  4.3   |  28  |  0.48<L>    Ca    8.9      10 Apr 2019 06:58  Phos  2.0     04-10  Mg     2.3     04-10              MICROBIOLOGY:    RECENT CULTURES:  04-06 @ 21:05 .Blood Blood-Peripheral                No growth to date.    04-03 @ 09:05 .Stool Feces                GI PCR Results: NOT detected  *******Please Note:*******  GI panel PCR evaluates for:  Campylobacter, Plesiomonas shigelloides, Salmonella,  Vibrio, Yersinia enterocolitica, Enteroaggregative  Escherichia coli (EAEC), Enteropathogenic E.coli (EPEC),  Enterotoxigenic E. coli (ETEC) lt/st, Shiga-like  toxin-producing E. coli (STEC) stx1/stx2,  Shigella/ Enteroinvasive E. coli (EIEC), Cryptosporidium,  Cyclospora cayetanensis, Entamoeba histolytica,  Giardia lamblia, Adenovirus F 40/41, Astrovirus,  Norovirus GI/GII, Rotavirus A, Sapovirus          RESPIRATORY CULTURES:              RADIOLOGY & ADDITIONAL STUDIES:        Pager [phone]  After 5 pm/weekends or if no response :[phone]

## 2019-04-10 NOTE — PROGRESS NOTE ADULT - PROBLEM SELECTOR PLAN 1
- 2/2 multifocal PNA, colitis, and UTI now resolved   - CXR with multifocal opacities, CTAP with left sided colitis, UCx with Provendentia, recent nephrectomy surgical site appears clean, perc nephrostomy removed by urology upon admission. Repeat CT a/p with ill defined fluid in post-operative bed, will consider repeat CT abdomen if spikes fever.   - s/p vancomycin, c/w cefepime day 7/7, tx w/ metronidazole for 4 days for anaerobic coverage and  colitis.    - appreciate ID recommendation, monitor off abx

## 2019-04-11 PROCEDURE — 99233 SBSQ HOSP IP/OBS HIGH 50: CPT | Mod: GC

## 2019-04-11 PROCEDURE — 99232 SBSQ HOSP IP/OBS MODERATE 35: CPT

## 2019-04-11 RX ADMIN — Medication 1 TABLET(S): at 11:29

## 2019-04-11 RX ADMIN — ENOXAPARIN SODIUM 40 MILLIGRAM(S): 100 INJECTION SUBCUTANEOUS at 11:29

## 2019-04-11 RX ADMIN — TRAMADOL HYDROCHLORIDE 50 MILLIGRAM(S): 50 TABLET ORAL at 12:11

## 2019-04-11 RX ADMIN — TRAMADOL HYDROCHLORIDE 50 MILLIGRAM(S): 50 TABLET ORAL at 01:00

## 2019-04-11 RX ADMIN — Medication 600 MILLIGRAM(S): at 06:22

## 2019-04-11 RX ADMIN — Medication 600 MILLIGRAM(S): at 18:26

## 2019-04-11 RX ADMIN — Medication 325 MILLIGRAM(S): at 11:29

## 2019-04-11 RX ADMIN — TRAMADOL HYDROCHLORIDE 50 MILLIGRAM(S): 50 TABLET ORAL at 00:45

## 2019-04-11 RX ADMIN — TAMSULOSIN HYDROCHLORIDE 0.4 MILLIGRAM(S): 0.4 CAPSULE ORAL at 21:19

## 2019-04-11 NOTE — PROGRESS NOTE ADULT - ASSESSMENT
43 harish old angry and not cooperative  exam limited/ recent nephrectomy on right and history Pt with recurrent kidney stones.  presents with diarrhea and colitis on ct scan and indeTER C diff test.  PCR pending    stent on left that has been removed/           urine culture is growing gram negative rods senstive to cefepime    ct reviewed with ongoing colitis  no obv abscess in surgical bed   and several areas of consolidation         wd packed by urology not obv infected  wd seen .      UTI plus PNA ( HCAP)  This is not an atypical pna        complete  cefepime  for pneumonia today  now is off the vancomycin   still no explantation for colitis and distension.     pt is afebrile and has nl wbc  looks alittle SOB with decreased BS    chest xray    reviewed  no new pna did have effusions previously    stable off ab   nl wbc  continue to watch off ab.   I

## 2019-04-11 NOTE — PROGRESS NOTE ADULT - SUBJECTIVE AND OBJECTIVE BOX
infectious diseases progress note:    Patient is a 43y old  Male who presents with a chief complaint of sepsis (10 Apr 2019 18:14)        Noninfectious gastroenteritis        ROS:  CONSTITUTIONAL:  Negative fever or chills, feels well, good appetite  EYES:  Negative  blurry vision or double vision  CARDIOVASCULAR:  Negative for chest pain or palpitations  RESPIRATORY:  Negative for cough, wheezing, or SOB   GASTROINTESTINAL:  Negative for nausea, vomiting, diarrhea, constipation, or abdominal pain  GENITOURINARY:  Negative frequency, urgency or dysuria  NEUROLOGIC:  No headache, confusion, dizziness, lightheadedness    Allergies    penicillin (Rash)    Intolerances        ANTIBIOTICS/RELEVANT:  antimicrobials    immunologic:    OTHER:  acetaminophen   Tablet .. 650 milliGRAM(s) Oral every 6 hours PRN  aluminum hydroxide/magnesium hydroxide/simethicone Suspension 30 milliLiter(s) Oral every 6 hours PRN  enoxaparin Injectable 40 milliGRAM(s) SubCutaneous daily  ferrous    sulfate 325 milliGRAM(s) Oral daily  guaiFENesin  milliGRAM(s) Oral every 12 hours  multivitamin 1 Tablet(s) Oral daily  tamsulosin 0.4 milliGRAM(s) Oral at bedtime  tiZANidine 4 milliGRAM(s) Oral three times a day PRN  traMADol 50 milliGRAM(s) Oral every 6 hours PRN      Objective:  Vital Signs Last 24 Hrs  T(C): 36.4 (11 Apr 2019 06:20), Max: 36.8 (10 Apr 2019 21:56)  T(F): 97.6 (11 Apr 2019 06:20), Max: 98.2 (10 Apr 2019 21:56)  HR: 53 (11 Apr 2019 06:20) (50 - 65)  BP: 122/78 (11 Apr 2019 06:20) (112/78 - 147/94)  BP(mean): --  RR: 20 (11 Apr 2019 06:20) (20 - 26)  SpO2: 95% (11 Apr 2019 06:20) (93% - 95%)    PHYSICAL EXAM:     Eyes:ASHLEY, EOMI  Ear/Nose/Throat: no oral lesion, no sinus tenderness on percussion	  Neck:no JVD, no lymphadenopathy, supple  Respiratory: CTA sujatha  Cardiovascular: S1S2 RRR, no murmurs  Gastrointestinal:soft, (+) BS, no HSM  Extremities:no e/e/c        LABS:                        10.2   8.3   )-----------( 283      ( 10 Apr 2019 06:58 )             31.8     04-10    141  |  103  |  6<L>  ----------------------------<  97  4.3   |  28  |  0.48<L>    Ca    8.9      10 Apr 2019 06:58  Phos  2.0     04-10  Mg     2.3     04-10    TPro  7.0  /  Alb  3.5  /  TBili  0.2  /  DBili  <0.1  /  AST  26  /  ALT  11  /  AlkPhos  62  04-10            MICROBIOLOGY:    RECENT CULTURES:  04-06 @ 21:05 .Blood Blood-Peripheral                No growth to date.          RESPIRATORY CULTURES:              RADIOLOGY & ADDITIONAL STUDIES:        Pager [phone]  After 5 pm/weekends or if no response :[phone]
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
## 2019-04-11 NOTE — PROGRESS NOTE ADULT - PROBLEM SELECTOR PLAN 5
- Anemia s/p infection w/o clear signs of blood loss, has back pain with fluid in abdomen  - Iron studies with LORRIE, may benefit from IV iron when not acutely septic, will start PO ferrous sulfate - Anemia s/p infection w/o clear signs of blood loss, has back pain with fluid in abdomen  - Iron studies with LORRIE, may benefit from IV iron when not acutely septic, will start PO ferrous sulfate  Hemoglobin is stable

## 2019-04-11 NOTE — PROGRESS NOTE ADULT - PROBLEM SELECTOR PLAN 4
- 2/2 multifocal PNA, colitis, and UTI now resolved   - CXR with multifocal opacities, CTAP with left sided colitis, UCx with Provendentia, recent nephrectomy surgical site appears clean, perc nephrostomy removed by urology upon admission. Repeat CT a/p with ill defined fluid in post-operative bed, will consider repeat CT abdomen if spikes fever.   - s/p vancomycin, c/w cefepime day 7/7, tx w/ metronidazole for 4 days for anaerobic coverage and  colitis.    - appreciate ID recommendation, monitor off abx RESOLVED   - 2/2 multifocal PNA, colitis, and UTI now resolved   - CXR with multifocal opacities, CTAP with left sided colitis, UCx with Provendentia, recent nephrectomy surgical site appears clean, perc nephrostomy removed by urology upon admission. Repeat CT a/p with ill defined fluid in post-operative bed, will consider repeat CT abdomen if spikes fever.   - s/p vancomycin, c/w cefepime day 7/7, tx w/ metronidazole for 4 days for anaerobic coverage and  colitis.    - appreciate ID recommendation, monitor off abx

## 2019-04-11 NOTE — PROGRESS NOTE ADULT - PROBLEM SELECTOR PLAN 2
- C-diff pcr negative, GI stool pcr negative  - monitor off abx   - will have patient follow up with GI outpatient

## 2019-04-11 NOTE — PROGRESS NOTE ADULT - ATTENDING COMMENTS
Patient is seen and denies any SOB,  no fever , no chest pain .  NO diarrhea .  Patient has completed ABX therapy with no fever .  Patient will be dc to home with home care services and will need to f/up with PCP/Urologist/ Patient will need outpatient GI and Pulmonary evaluation.    DC time 40mns       Ivis Llanes   Hospitalist   [phone]
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
## 2019-04-11 NOTE — PROGRESS NOTE ADULT - PROBLEM SELECTOR PLAN 1
- multifocal PNA noted on CT chest   - RVP negative, MRSA swab negative   - cefepime day 7/7 for PNA, monitor off abx  - still with mild pleural effusion but overall improved, trace pleural effusion  - also with mild broncheictasis right lower lobe and emphesamatous changes, will have patient connected with pulmonary outpatient for pneumonia admission - multifocal PNA noted on CT chest   - RVP negative, MRSA swab negative   - S/P cefepime 7/7 for PNA, monitor off abx  - still with mild pleural effusion but overall improved, trace pleural effusion  - also with mild broncheictasis right lower lobe and emphesamatous changes, will have patient connected with pulmonary outpatient for pneumonia admission

## 2019-04-11 NOTE — PROGRESS NOTE ADULT - PROBLEM SELECTOR PLAN 6
- hypokalemic suspected 2/2 to poor PO intake and GI looses  - improved - hypokalemic suspected 2/2 to poor PO intake and GI looses  -RESOLVED

## 2019-04-12 DIAGNOSIS — J96.01 ACUTE RESPIRATORY FAILURE WITH HYPOXIA: ICD-10-CM

## 2019-04-12 LAB
ALBUMIN SERPL ELPH-MCNC: 3.2 G/DL — LOW (ref 3.3–5)
ALBUMIN SERPL ELPH-MCNC: 3.3 G/DL — SIGNIFICANT CHANGE UP (ref 3.3–5)
ALBUMIN SERPL ELPH-MCNC: 3.6 G/DL — SIGNIFICANT CHANGE UP (ref 3.3–5)
ALP SERPL-CCNC: 58 U/L — SIGNIFICANT CHANGE UP (ref 40–120)
ALP SERPL-CCNC: 58 U/L — SIGNIFICANT CHANGE UP (ref 40–120)
ALP SERPL-CCNC: 59 U/L — SIGNIFICANT CHANGE UP (ref 40–120)
ALT FLD-CCNC: 12 U/L — SIGNIFICANT CHANGE UP (ref 10–45)
ALT FLD-CCNC: 14 U/L — SIGNIFICANT CHANGE UP (ref 10–45)
ALT FLD-CCNC: 15 U/L — SIGNIFICANT CHANGE UP (ref 10–45)
ANION GAP SERPL CALC-SCNC: 6 MMOL/L — SIGNIFICANT CHANGE UP (ref 5–17)
ANION GAP SERPL CALC-SCNC: 8 MMOL/L — SIGNIFICANT CHANGE UP (ref 5–17)
ANION GAP SERPL CALC-SCNC: 8 MMOL/L — SIGNIFICANT CHANGE UP (ref 5–17)
APTT BLD: 23 SEC — LOW (ref 27.5–36.3)
AST SERPL-CCNC: 12 U/L — SIGNIFICANT CHANGE UP (ref 10–40)
AST SERPL-CCNC: 14 U/L — SIGNIFICANT CHANGE UP (ref 10–40)
AST SERPL-CCNC: 8 U/L — LOW (ref 10–40)
BASOPHILS # BLD AUTO: 0 K/UL — SIGNIFICANT CHANGE UP (ref 0–0.2)
BASOPHILS # BLD AUTO: SIGNIFICANT CHANGE UP (ref 0–0.2)
BASOPHILS NFR BLD AUTO: 0.1 % — SIGNIFICANT CHANGE UP (ref 0–2)
BASOPHILS NFR BLD AUTO: SIGNIFICANT CHANGE UP % (ref 0–2)
BILIRUB SERPL-MCNC: 0.1 MG/DL — LOW (ref 0.2–1.2)
BUN SERPL-MCNC: 7 MG/DL — SIGNIFICANT CHANGE UP (ref 7–23)
BUN SERPL-MCNC: 8 MG/DL — SIGNIFICANT CHANGE UP (ref 7–23)
BUN SERPL-MCNC: 9 MG/DL — SIGNIFICANT CHANGE UP (ref 7–23)
CALCIUM SERPL-MCNC: 8.5 MG/DL — SIGNIFICANT CHANGE UP (ref 8.4–10.5)
CALCIUM SERPL-MCNC: 8.8 MG/DL — SIGNIFICANT CHANGE UP (ref 8.4–10.5)
CALCIUM SERPL-MCNC: 8.9 MG/DL — SIGNIFICANT CHANGE UP (ref 8.4–10.5)
CHLORIDE SERPL-SCNC: 102 MMOL/L — SIGNIFICANT CHANGE UP (ref 96–108)
CHLORIDE SERPL-SCNC: 103 MMOL/L — SIGNIFICANT CHANGE UP (ref 96–108)
CHLORIDE SERPL-SCNC: 104 MMOL/L — SIGNIFICANT CHANGE UP (ref 96–108)
CK MB CFR SERPL CALC: 2.3 NG/ML — SIGNIFICANT CHANGE UP (ref 0–6.7)
CK SERPL-CCNC: 88 U/L — SIGNIFICANT CHANGE UP (ref 30–200)
CO2 SERPL-SCNC: 30 MMOL/L — SIGNIFICANT CHANGE UP (ref 22–31)
CO2 SERPL-SCNC: 30 MMOL/L — SIGNIFICANT CHANGE UP (ref 22–31)
CO2 SERPL-SCNC: 32 MMOL/L — HIGH (ref 22–31)
CREAT SERPL-MCNC: 0.41 MG/DL — LOW (ref 0.5–1.3)
CREAT SERPL-MCNC: 0.48 MG/DL — LOW (ref 0.5–1.3)
CREAT SERPL-MCNC: 0.56 MG/DL — SIGNIFICANT CHANGE UP (ref 0.5–1.3)
EOSINOPHIL # BLD AUTO: 0.1 K/UL — SIGNIFICANT CHANGE UP (ref 0–0.5)
EOSINOPHIL # BLD AUTO: SIGNIFICANT CHANGE UP (ref 0–0.5)
EOSINOPHIL NFR BLD AUTO: 0.6 % — SIGNIFICANT CHANGE UP (ref 0–6)
EOSINOPHIL NFR BLD AUTO: SIGNIFICANT CHANGE UP % (ref 0–6)
GAS PNL BLDA: SIGNIFICANT CHANGE UP
GLUCOSE BLDC GLUCOMTR-MCNC: 128 MG/DL — HIGH (ref 70–99)
GLUCOSE SERPL-MCNC: 139 MG/DL — HIGH (ref 70–99)
GLUCOSE SERPL-MCNC: 143 MG/DL — HIGH (ref 70–99)
GLUCOSE SERPL-MCNC: 97 MG/DL — SIGNIFICANT CHANGE UP (ref 70–99)
HCT VFR BLD CALC: 32.7 % — LOW (ref 39–50)
HCT VFR BLD CALC: 34.4 % — LOW (ref 39–50)
HGB BLD-MCNC: 10.2 G/DL — LOW (ref 13–17)
HGB BLD-MCNC: 9.8 G/DL — LOW (ref 13–17)
HYPOCHROMIA BLD QL: SLIGHT — SIGNIFICANT CHANGE UP
INR BLD: 1.11 RATIO — SIGNIFICANT CHANGE UP (ref 0.88–1.16)
LYMPHOCYTES # BLD AUTO: 1.1 K/UL — SIGNIFICANT CHANGE UP (ref 1–3.3)
LYMPHOCYTES # BLD AUTO: 13.1 % — SIGNIFICANT CHANGE UP (ref 13–44)
LYMPHOCYTES # BLD AUTO: SIGNIFICANT CHANGE UP % (ref 13–44)
LYMPHOCYTES # BLD AUTO: SIGNIFICANT CHANGE UP (ref 1–3.3)
MAGNESIUM SERPL-MCNC: 1.9 MG/DL — SIGNIFICANT CHANGE UP (ref 1.6–2.6)
MAGNESIUM SERPL-MCNC: 2 MG/DL — SIGNIFICANT CHANGE UP (ref 1.6–2.6)
MAGNESIUM SERPL-MCNC: 2.2 MG/DL — SIGNIFICANT CHANGE UP (ref 1.6–2.6)
MCHC RBC-ENTMCNC: 25.6 PG — LOW (ref 27–34)
MCHC RBC-ENTMCNC: 26.4 PG — LOW (ref 27–34)
MCHC RBC-ENTMCNC: 29.5 GM/DL — LOW (ref 32–36)
MCHC RBC-ENTMCNC: 30.1 GM/DL — LOW (ref 32–36)
MCV RBC AUTO: 86.5 FL — SIGNIFICANT CHANGE UP (ref 80–100)
MCV RBC AUTO: 87.9 FL — SIGNIFICANT CHANGE UP (ref 80–100)
MONOCYTES # BLD AUTO: 0.6 K/UL — SIGNIFICANT CHANGE UP (ref 0–0.9)
MONOCYTES # BLD AUTO: SIGNIFICANT CHANGE UP (ref 0–0.9)
MONOCYTES NFR BLD AUTO: 7.4 % — SIGNIFICANT CHANGE UP (ref 2–14)
MONOCYTES NFR BLD AUTO: SIGNIFICANT CHANGE UP % (ref 2–14)
NEUTROPHILS # BLD AUTO: 6.9 K/UL — SIGNIFICANT CHANGE UP (ref 1.8–7.4)
NEUTROPHILS # BLD AUTO: SIGNIFICANT CHANGE UP (ref 1.8–7.4)
NEUTROPHILS NFR BLD AUTO: 78.8 % — HIGH (ref 43–77)
NEUTROPHILS NFR BLD AUTO: SIGNIFICANT CHANGE UP % (ref 43–77)
PHOSPHATE SERPL-MCNC: 2.7 MG/DL — SIGNIFICANT CHANGE UP (ref 2.5–4.5)
PHOSPHATE SERPL-MCNC: 3.7 MG/DL — SIGNIFICANT CHANGE UP (ref 2.5–4.5)
PHOSPHATE SERPL-MCNC: 3.9 MG/DL — SIGNIFICANT CHANGE UP (ref 2.5–4.5)
PLAT MORPH BLD: NORMAL — SIGNIFICANT CHANGE UP
PLATELET # BLD AUTO: 240 K/UL — SIGNIFICANT CHANGE UP (ref 150–400)
PLATELET # BLD AUTO: 364 K/UL — SIGNIFICANT CHANGE UP (ref 150–400)
POTASSIUM SERPL-MCNC: 3.8 MMOL/L — SIGNIFICANT CHANGE UP (ref 3.5–5.3)
POTASSIUM SERPL-MCNC: 3.9 MMOL/L — SIGNIFICANT CHANGE UP (ref 3.5–5.3)
POTASSIUM SERPL-MCNC: 4 MMOL/L — SIGNIFICANT CHANGE UP (ref 3.5–5.3)
POTASSIUM SERPL-SCNC: 3.8 MMOL/L — SIGNIFICANT CHANGE UP (ref 3.5–5.3)
POTASSIUM SERPL-SCNC: 3.9 MMOL/L — SIGNIFICANT CHANGE UP (ref 3.5–5.3)
POTASSIUM SERPL-SCNC: 4 MMOL/L — SIGNIFICANT CHANGE UP (ref 3.5–5.3)
PROT SERPL-MCNC: 6.6 G/DL — SIGNIFICANT CHANGE UP (ref 6–8.3)
PROT SERPL-MCNC: 6.7 G/DL — SIGNIFICANT CHANGE UP (ref 6–8.3)
PROT SERPL-MCNC: 7.2 G/DL — SIGNIFICANT CHANGE UP (ref 6–8.3)
PROTHROM AB SERPL-ACNC: 12.8 SEC — SIGNIFICANT CHANGE UP (ref 10–12.9)
RAPID RVP RESULT: SIGNIFICANT CHANGE UP
RBC # BLD: 3.72 M/UL — LOW (ref 4.2–5.8)
RBC # BLD: 3.98 M/UL — LOW (ref 4.2–5.8)
RBC # FLD: 16.7 % — HIGH (ref 10.3–14.5)
RBC # FLD: 16.8 % — HIGH (ref 10.3–14.5)
RBC BLD AUTO: SIGNIFICANT CHANGE UP
SODIUM SERPL-SCNC: 140 MMOL/L — SIGNIFICANT CHANGE UP (ref 135–145)
SODIUM SERPL-SCNC: 141 MMOL/L — SIGNIFICANT CHANGE UP (ref 135–145)
SODIUM SERPL-SCNC: 142 MMOL/L — SIGNIFICANT CHANGE UP (ref 135–145)
TROPONIN T, HIGH SENSITIVITY RESULT: 32 NG/L — SIGNIFICANT CHANGE UP (ref 0–51)
WBC # BLD: 8.5 K/UL — SIGNIFICANT CHANGE UP (ref 3.8–10.5)
WBC # BLD: 8.8 K/UL — SIGNIFICANT CHANGE UP (ref 3.8–10.5)
WBC # FLD AUTO: 8.5 K/UL — SIGNIFICANT CHANGE UP (ref 3.8–10.5)
WBC # FLD AUTO: 8.8 K/UL — SIGNIFICANT CHANGE UP (ref 3.8–10.5)

## 2019-04-12 PROCEDURE — 93010 ELECTROCARDIOGRAM REPORT: CPT | Mod: 76

## 2019-04-12 PROCEDURE — 99233 SBSQ HOSP IP/OBS HIGH 50: CPT

## 2019-04-12 PROCEDURE — 99223 1ST HOSP IP/OBS HIGH 75: CPT | Mod: GC

## 2019-04-12 PROCEDURE — 74177 CT ABD & PELVIS W/CONTRAST: CPT | Mod: 26

## 2019-04-12 PROCEDURE — 99291 CRITICAL CARE FIRST HOUR: CPT

## 2019-04-12 RX ORDER — NOREPINEPHRINE BITARTRATE/D5W 8 MG/250ML
0.05 PLASTIC BAG, INJECTION (ML) INTRAVENOUS
Qty: 8 | Refills: 0 | Status: DISCONTINUED | OUTPATIENT
Start: 2019-04-12 | End: 2019-04-13

## 2019-04-12 RX ORDER — VANCOMYCIN HCL 1 G
1000 VIAL (EA) INTRAVENOUS ONCE
Qty: 0 | Refills: 0 | Status: COMPLETED | OUTPATIENT
Start: 2019-04-12 | End: 2019-04-12

## 2019-04-12 RX ORDER — ATROPINE SULFATE 0.1 MG/ML
1 SYRINGE (ML) INJECTION ONCE
Qty: 0 | Refills: 0 | Status: COMPLETED | OUTPATIENT
Start: 2019-04-12 | End: 2019-04-12

## 2019-04-12 RX ORDER — CHLORHEXIDINE GLUCONATE 213 G/1000ML
1 SOLUTION TOPICAL DAILY
Qty: 0 | Refills: 0 | Status: DISCONTINUED | OUTPATIENT
Start: 2019-04-12 | End: 2019-04-12

## 2019-04-12 RX ORDER — ATROPINE SULFATE 0.1 MG/ML
0.5 SYRINGE (ML) INJECTION ONCE
Qty: 0 | Refills: 0 | Status: DISCONTINUED | OUTPATIENT
Start: 2019-04-12 | End: 2019-04-18

## 2019-04-12 RX ORDER — IPRATROPIUM/ALBUTEROL SULFATE 18-103MCG
3 AEROSOL WITH ADAPTER (GRAM) INHALATION EVERY 6 HOURS
Qty: 0 | Refills: 0 | Status: DISCONTINUED | OUTPATIENT
Start: 2019-04-12 | End: 2019-04-18

## 2019-04-12 RX ORDER — SODIUM CHLORIDE 9 MG/ML
1000 INJECTION, SOLUTION INTRAVENOUS
Qty: 0 | Refills: 0 | Status: DISCONTINUED | OUTPATIENT
Start: 2019-04-12 | End: 2019-04-14

## 2019-04-12 RX ORDER — NALOXONE HYDROCHLORIDE 4 MG/.1ML
0.4 SPRAY NASAL ONCE
Qty: 0 | Refills: 0 | Status: DISCONTINUED | OUTPATIENT
Start: 2019-04-12 | End: 2019-04-14

## 2019-04-12 RX ORDER — SODIUM CHLORIDE 9 MG/ML
1000 INJECTION, SOLUTION INTRAVENOUS ONCE
Qty: 0 | Refills: 0 | Status: COMPLETED | OUTPATIENT
Start: 2019-04-12 | End: 2019-04-12

## 2019-04-12 RX ORDER — CEFEPIME 1 G/1
2000 INJECTION, POWDER, FOR SOLUTION INTRAMUSCULAR; INTRAVENOUS EVERY 8 HOURS
Qty: 0 | Refills: 0 | Status: DISCONTINUED | OUTPATIENT
Start: 2019-04-12 | End: 2019-04-17

## 2019-04-12 RX ORDER — CEFEPIME 1 G/1
2000 INJECTION, POWDER, FOR SOLUTION INTRAMUSCULAR; INTRAVENOUS ONCE
Qty: 0 | Refills: 0 | Status: COMPLETED | OUTPATIENT
Start: 2019-04-12 | End: 2019-04-12

## 2019-04-12 RX ORDER — ATROPINE SULFATE 0.1 MG/ML
0.5 SYRINGE (ML) INJECTION ONCE
Qty: 0 | Refills: 0 | Status: DISCONTINUED | OUTPATIENT
Start: 2019-04-12 | End: 2019-04-12

## 2019-04-12 RX ORDER — CHLORHEXIDINE GLUCONATE 213 G/1000ML
1 SOLUTION TOPICAL
Qty: 0 | Refills: 0 | Status: DISCONTINUED | OUTPATIENT
Start: 2019-04-12 | End: 2019-04-12

## 2019-04-12 RX ORDER — CEFEPIME 1 G/1
INJECTION, POWDER, FOR SOLUTION INTRAMUSCULAR; INTRAVENOUS
Qty: 0 | Refills: 0 | Status: DISCONTINUED | OUTPATIENT
Start: 2019-04-12 | End: 2019-04-17

## 2019-04-12 RX ADMIN — Medication 650 MILLIGRAM(S): at 05:33

## 2019-04-12 RX ADMIN — Medication 3 MILLILITER(S): at 17:33

## 2019-04-12 RX ADMIN — Medication 600 MILLIGRAM(S): at 05:08

## 2019-04-12 RX ADMIN — CHLORHEXIDINE GLUCONATE 1 APPLICATION(S): 213 SOLUTION TOPICAL at 12:50

## 2019-04-12 RX ADMIN — SODIUM CHLORIDE 2000 MILLILITER(S): 9 INJECTION, SOLUTION INTRAVENOUS at 13:11

## 2019-04-12 RX ADMIN — Medication 650 MILLIGRAM(S): at 05:08

## 2019-04-12 RX ADMIN — SODIUM CHLORIDE 75 MILLILITER(S): 9 INJECTION, SOLUTION INTRAVENOUS at 20:00

## 2019-04-12 RX ADMIN — Medication 3 MILLILITER(S): at 12:50

## 2019-04-12 RX ADMIN — CEFEPIME 100 MILLIGRAM(S): 1 INJECTION, POWDER, FOR SOLUTION INTRAMUSCULAR; INTRAVENOUS at 21:24

## 2019-04-12 RX ADMIN — CEFEPIME 100 MILLIGRAM(S): 1 INJECTION, POWDER, FOR SOLUTION INTRAMUSCULAR; INTRAVENOUS at 12:51

## 2019-04-12 RX ADMIN — Medication 250 MILLIGRAM(S): at 12:50

## 2019-04-12 RX ADMIN — TRAMADOL HYDROCHLORIDE 50 MILLIGRAM(S): 50 TABLET ORAL at 00:05

## 2019-04-12 RX ADMIN — TIZANIDINE 4 MILLIGRAM(S): 4 TABLET ORAL at 03:05

## 2019-04-12 RX ADMIN — ENOXAPARIN SODIUM 40 MILLIGRAM(S): 100 INJECTION SUBCUTANEOUS at 12:49

## 2019-04-12 RX ADMIN — TRAMADOL HYDROCHLORIDE 50 MILLIGRAM(S): 50 TABLET ORAL at 00:37

## 2019-04-12 NOTE — PROGRESS NOTE ADULT - PROBLEM SELECTOR PLAN 5
- Anemia s/p infection w/o clear signs of blood loss, has back pain with fluid in abdomen  - Iron studies with LORRIE, may benefit from IV iron when not acutely septic, will start PO ferrous sulfate  Hemoglobin is stable RESOLVED   - 2/2 multifocal PNA, colitis, and UTI now resolved   - CXR with multifocal opacities, CTAP with left sided colitis, UCx with Prov[facility]ia, recent nephrectomy surgical site appears clean, perc nephrostomy removed by urology upon admission. Repeat CT a/p with ill defined fluid in post-operative bed, will consider repeat CT abdomen if spikes fever.   - s/p vancomycin, c/w cefepime day 7/7, tx w/ metronidazole for 4 days for anaerobic coverage and  colitis.    - appreciate ID recommendation, monitor off abx

## 2019-04-12 NOTE — PROGRESS NOTE ADULT - PROBLEM SELECTOR PLAN 4
RESOLVED   - 2/2 multifocal PNA, colitis, and UTI now resolved   - CXR with multifocal opacities, CTAP with left sided colitis, UCx with Provendentia, recent nephrectomy surgical site appears clean, perc nephrostomy removed by urology upon admission. Repeat CT a/p with ill defined fluid in post-operative bed, will consider repeat CT abdomen if spikes fever.   - s/p vancomycin, c/w cefepime day 7/7, tx w/ metronidazole for 4 days for anaerobic coverage and  colitis.    - appreciate ID recommendation, monitor off abx - patient w/ hx of pyelo, s/p left perc neph, right nephrectomy 2/2 to atrophic non-functioning right kidney with chronic hydro   - Urine w/ provedentia sensitive to cefepime  - s/p tx   - urology following, nephrostomy drain d/c'ed, previous open right nephrectomy, ?collection in surgical bed, likely require repeat CT abdomen given overall picture and acute hypoxic and hypercapnic respiratory failure. F/u repeat blood culture

## 2019-04-12 NOTE — ADVANCED PRACTICE NURSE CONSULT - ASSESSMENT
The pt remains in the MICU- currently on BiPAP. He is on pressor support. NPO at present- will request a nutrition consult for further evaluation.  He is on a Progressa support surface and is being turned and positioned using the Air-Tap system. Staff have applied z-john boots to off-load the heels, he has foot drop and contractures of the hands and arms.  Upon assessment, the pt was incontinent of a large amount of urine and pericare was provided. On the sacrum is a healed hypopigmented wound.  The b/l ischial tuberosities present with healed wounds as well- both areas present with superficial tissue loss over the healed areas. On the left ischium, the open area measures 2cm x3c x0cm, and on the right ischium , the open area measures 3cmx 2cm x0cm. the open tissue is red, moist. there is scant drainage, the periwound skin is hyperpigmented.  Given stool and urinary incontinence, will recommend the application of Advanced Cavilon to be applied M-W-F.  This will lay down a protective coating on the skin.  Education was provided to the pt regarding condition of the skin and the purpose of the Cavilon.
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
## 2019-04-12 NOTE — PROGRESS NOTE ADULT - PROBLEM SELECTOR PLAN 2
- C-diff pcr negative, GI stool pcr negative  - monitor off abx   - will have patient follow up with GI outpatient - multifocal PNA noted on CT chest   - RVP negative, MRSA swab negative   - S/P cefepime 7/7 for PNA, monitor off abx  - also with mild broncheictasis right lower lobe and emphesamatous changes however no evidence of chronic retention

## 2019-04-12 NOTE — PROGRESS NOTE ADULT - ATTENDING COMMENTS
Patient was ready for dc yesterday , however, this morning, patient had a RRT called due to decreased responsiveness , sinus bradycardia with pauses and decreased SPO2.   NO reported fever .  Patient found to have Acute Hypercarbic respiratory failure with Co2= 98 and PH of 7.17.   Patient was transferred to MICU with BIPAP and workup for sepsis and acute Hypercarbic respiratory failure is underway.   Patient was restarted on Cefepime and received a dose of Vancomycin .  CT of the abd/Pelvis was ordered f/up report and Urology follow up .         Ivis [facility]  Hospitalist   [phone]

## 2019-04-12 NOTE — PROGRESS NOTE ADULT - ASSESSMENT
43 harish old angry and not cooperative  exam limited/ recent nephrectomy on right and history Pt with recurrent kidney stones.  presents with diarrhea and colitis on ct scan and indeTER C diff test.  PCR pending    stent on left that has been removed/         urine culture is growing gram negative rods senstive to cefepime    ct reviewed with ongoing colitis  no obv abscess in surgical bed   and several areas of consolidation         wd packed by urology not obv infected  wd seen .      UTI plus PNA ( HCAP)  This is not an atypical pna        complete  cefepime  for pneumonia early this past week      still no explantation for colitis and distension.     pt is afebrile and has nl wbc  looks alittle SOB with decreased BS    chest xray    reviewed  no new pna did have effusions previously  has been stable off ab for most of the week and was getting ready to be discharged  developed hypoxia   and is on bipap    unclear if he is really infected again  Possibilities include    infection in nephrectomy bed.  recurrent UTI:  the left collecting system is not normal and just finished a course of ab for UTI.  pna  has had effusions  and prior cat scan with what was thought to be pna that was treated    restarted on cefepime and given a dose fo vanco  follow up ct of the abd when possible  discussed with micu   I

## 2019-04-12 NOTE — PROGRESS NOTE ADULT - PROBLEM SELECTOR PLAN 6
- hypokalemic suspected 2/2 to poor PO intake and GI looses  -RESOLVED - Anemia s/p infection w/o clear signs of blood loss, has back pain with fluid in abdomen  - Iron studies with LORRIE, may benefit from IV iron when not acutely septic, will start PO ferrous sulfate  Hemoglobin is stable
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
## 2019-04-12 NOTE — PROGRESS NOTE ADULT - PROBLEM SELECTOR PLAN 1
- multifocal PNA noted on CT chest   - RVP negative, MRSA swab negative   - S/P cefepime 7/7 for PNA, monitor off abx  - still with mild pleural effusion but overall improved, trace pleural effusion  - also with mild broncheictasis right lower lobe and emphesamatous changes, will have patient connected with pulmonary outpatient for pneumonia admission - Possibly in setting of aspiration event vs opiod overdose vs recurrence of HCAP vs decompensation 2/2 to sepsis  - suspected aspiration/mucus plug  - also with bradycardia and hypotension likely sequalae of acute respiratory failure  - to be transferred to MICU on BIPAP, on telemetry on pressure support

## 2019-04-12 NOTE — PROGRESS NOTE ADULT - PROBLEM SELECTOR PLAN 3
- patient w/ hx of pyelo, s/p left perc neph, right nephrectomy 2/2 to atrophic non-functioning right kidney with chronic hydro   - Urine w/ provedentia sensitive to cefepime  - s/p tx   - urology following, nephrostomy drain d/c'ed, previous open right nephrectomy, ?collection in surgical bed, evaluated by urology defer repeat CT abd at this time given remains afebrile. Outpatient f/u arranged - C-diff pcr negative, GI stool pcr negative  - monitor off abx   - will have patient follow up with GI outpatient

## 2019-04-12 NOTE — PROGRESS NOTE ADULT - SUBJECTIVE AND OBJECTIVE BOX
infectious diseases progress note:    Patient is a 43y old  Male who presents with a chief complaint of sepsis (12 Apr 2019 07:46)        Noninfectious gastroenteritis             Allergies    penicillin (Rash)    Intolerances        ANTIBIOTICS/RELEVANT:  antimicrobials  cefepime   IVPB      cefepime   IVPB 2000 milliGRAM(s) IV Intermittent once  cefepime   IVPB 2000 milliGRAM(s) IV Intermittent every 8 hours  vancomycin  IVPB 1000 milliGRAM(s) IV Intermittent once    immunologic:    OTHER:  acetaminophen   Tablet .. 650 milliGRAM(s) Oral every 6 hours PRN  ALBUTerol/ipratropium for Nebulization 3 milliLiter(s) Nebulizer every 6 hours  aluminum hydroxide/magnesium hydroxide/simethicone Suspension 30 milliLiter(s) Oral every 6 hours PRN  atropine Injectable 1 milliGRAM(s) IntraMuscular once  chlorhexidine 2% Cloths 1 Application(s) Topical daily  chlorhexidine 4% Liquid 1 Application(s) Topical <User Schedule>  enoxaparin Injectable 40 milliGRAM(s) SubCutaneous daily  ferrous    sulfate 325 milliGRAM(s) Oral daily  guaiFENesin  milliGRAM(s) Oral every 12 hours  lactated ringers Bolus 1000 milliLiter(s) IV Bolus once  multivitamin 1 Tablet(s) Oral daily  naloxone Injectable 0.4 milliGRAM(s) IV Push once  tamsulosin 0.4 milliGRAM(s) Oral at bedtime  tiZANidine 4 milliGRAM(s) Oral three times a day PRN  traMADol 50 milliGRAM(s) Oral every 6 hours PRN      Objective:  Vital Signs Last 24 Hrs  T(C): 36.2 (12 Apr 2019 10:00), Max: 36.4 (12 Apr 2019 04:20)  T(F): 97.2 (12 Apr 2019 10:00), Max: 97.5 (12 Apr 2019 04:20)  HR: 70 (12 Apr 2019 11:00) (53 - 126)  BP: 165/99 (12 Apr 2019 11:00) (89/53 - 222/115)  BP(mean): 125 (12 Apr 2019 11:00) (66 - 160)  RR: 22 (12 Apr 2019 11:00) (18 - 37)  SpO2: 100% (12 Apr 2019 11:00) (92% - 100%)    PHYSICAL EXAM:  Constitutional:Well-developed, well nourished--no acute distress  Eyes:ASHLEY, EOMI  Ear/Nose/Throat: no oral lesion, no sinus tenderness on percussion	  Neck:no JVD, no lymphadenopathy, supple  Respiratory: CTA sujatha  Cardiovascular: S1S2 RRR, no murmurs  Gastrointestinal:soft, (+) BS, no HSM  Extremities:no e/e/c        LABS:                        10.2   8.8   )-----------( 364      ( 12 Apr 2019 11:08 )             34.4     04-12    142  |  102  |  9   ----------------------------<  139<H>  3.9   |  32<H>  |  0.56    Ca    8.9      12 Apr 2019 09:18  Phos  3.9     04-12  Mg     2.2     04-12    TPro  7.2  /  Alb  3.6  /  TBili  0.1<L>  /  DBili  x   /  AST  12  /  ALT  14  /  AlkPhos  58  04-12    PT/INR - ( 12 Apr 2019 09:12 )   PT: 12.8 sec;   INR: 1.11 ratio         PTT - ( 12 Apr 2019 09:12 )  PTT:23.0 sec        MICROBIOLOGY:    RECENT CULTURES:  04-06 @ 21:05 .Blood Blood-Peripheral                No growth at 5 days.          RESPIRATORY CULTURES:              RADIOLOGY & ADDITIONAL STUDIES:        Pager [phone]  After 5 pm/weekends or if no response :[phone]

## 2019-04-12 NOTE — ADVANCED PRACTICE NURSE CONSULT - RECOMMEDATIONS
Will recommend the followin. B/l ischial tuberosities, b/l buttocks: Apply Advanced Cavilon M-W-F. routine pericare between applications.  2. Continue with turning and positioning  3. nutrition consult.  Tx plan discussed with RN
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
## 2019-04-12 NOTE — CONSULT NOTE ADULT - ASSESSMENT
43 year old male with quadriplegia presents with sepsis, found to have sinus bradycardia in the setting of hypercapnic respiratory failure-now resolved    1. Sinus bradycardia  - Given clinical history as well as prolonged P-P interval precipitating bradycardic episodes, it is likely that this represents a physiologic vagal response, which is often accentuated in the setting of preexisting autonomic dysfunction. He never endorsed syncope during this episode, and remained hemodynamically stable.   - Continue to monitor    Alan Sidhu [id number]

## 2019-04-13 LAB
ALBUMIN SERPL ELPH-MCNC: 3.3 G/DL — SIGNIFICANT CHANGE UP (ref 3.3–5)
ALBUMIN SERPL ELPH-MCNC: 3.4 G/DL — SIGNIFICANT CHANGE UP (ref 3.3–5)
ALP SERPL-CCNC: 64 U/L — SIGNIFICANT CHANGE UP (ref 40–120)
ALP SERPL-CCNC: 65 U/L — SIGNIFICANT CHANGE UP (ref 40–120)
ALT FLD-CCNC: 16 U/L — SIGNIFICANT CHANGE UP (ref 10–45)
ALT FLD-CCNC: 16 U/L — SIGNIFICANT CHANGE UP (ref 10–45)
ANION GAP SERPL CALC-SCNC: 12 MMOL/L — SIGNIFICANT CHANGE UP (ref 5–17)
ANION GAP SERPL CALC-SCNC: 12 MMOL/L — SIGNIFICANT CHANGE UP (ref 5–17)
AST SERPL-CCNC: 12 U/L — SIGNIFICANT CHANGE UP (ref 10–40)
AST SERPL-CCNC: 14 U/L — SIGNIFICANT CHANGE UP (ref 10–40)
BILIRUB SERPL-MCNC: 0.1 MG/DL — LOW (ref 0.2–1.2)
BILIRUB SERPL-MCNC: 0.2 MG/DL — SIGNIFICANT CHANGE UP (ref 0.2–1.2)
BUN SERPL-MCNC: 7 MG/DL — SIGNIFICANT CHANGE UP (ref 7–23)
BUN SERPL-MCNC: 8 MG/DL — SIGNIFICANT CHANGE UP (ref 7–23)
CALCIUM SERPL-MCNC: 8.9 MG/DL — SIGNIFICANT CHANGE UP (ref 8.4–10.5)
CALCIUM SERPL-MCNC: 9.1 MG/DL — SIGNIFICANT CHANGE UP (ref 8.4–10.5)
CHLORIDE SERPL-SCNC: 102 MMOL/L — SIGNIFICANT CHANGE UP (ref 96–108)
CHLORIDE SERPL-SCNC: 104 MMOL/L — SIGNIFICANT CHANGE UP (ref 96–108)
CHOLEST SERPL-MCNC: 131 MG/DL — SIGNIFICANT CHANGE UP (ref 10–199)
CO2 SERPL-SCNC: 28 MMOL/L — SIGNIFICANT CHANGE UP (ref 22–31)
CO2 SERPL-SCNC: 29 MMOL/L — SIGNIFICANT CHANGE UP (ref 22–31)
CREAT SERPL-MCNC: 0.42 MG/DL — LOW (ref 0.5–1.3)
CREAT SERPL-MCNC: 0.44 MG/DL — LOW (ref 0.5–1.3)
GAS PNL BLDA: SIGNIFICANT CHANGE UP
GAS PNL BLDA: SIGNIFICANT CHANGE UP
GLUCOSE BLDC GLUCOMTR-MCNC: 105 MG/DL — HIGH (ref 70–99)
GLUCOSE SERPL-MCNC: 110 MG/DL — HIGH (ref 70–99)
GLUCOSE SERPL-MCNC: 92 MG/DL — SIGNIFICANT CHANGE UP (ref 70–99)
GRAM STN FLD: SIGNIFICANT CHANGE UP
HCT VFR BLD CALC: 32.7 % — LOW (ref 39–50)
HCT VFR BLD CALC: 33.8 % — LOW (ref 39–50)
HDLC SERPL-MCNC: 37 MG/DL — LOW
HGB BLD-MCNC: 10 G/DL — LOW (ref 13–17)
HGB BLD-MCNC: 10.2 G/DL — LOW (ref 13–17)
LIPID PNL WITH DIRECT LDL SERPL: 59 MG/DL — SIGNIFICANT CHANGE UP
MAGNESIUM SERPL-MCNC: 2 MG/DL — SIGNIFICANT CHANGE UP (ref 1.6–2.6)
MAGNESIUM SERPL-MCNC: 2 MG/DL — SIGNIFICANT CHANGE UP (ref 1.6–2.6)
MCHC RBC-ENTMCNC: 25.7 PG — LOW (ref 27–34)
MCHC RBC-ENTMCNC: 26.8 PG — LOW (ref 27–34)
MCHC RBC-ENTMCNC: 29.7 GM/DL — LOW (ref 32–36)
MCHC RBC-ENTMCNC: 31.1 GM/DL — LOW (ref 32–36)
MCV RBC AUTO: 86.3 FL — SIGNIFICANT CHANGE UP (ref 80–100)
MCV RBC AUTO: 86.3 FL — SIGNIFICANT CHANGE UP (ref 80–100)
PHOSPHATE SERPL-MCNC: 2.5 MG/DL — SIGNIFICANT CHANGE UP (ref 2.5–4.5)
PHOSPHATE SERPL-MCNC: 2.7 MG/DL — SIGNIFICANT CHANGE UP (ref 2.5–4.5)
PLATELET # BLD AUTO: 322 K/UL — SIGNIFICANT CHANGE UP (ref 150–400)
PLATELET # BLD AUTO: 327 K/UL — SIGNIFICANT CHANGE UP (ref 150–400)
POTASSIUM SERPL-MCNC: 4 MMOL/L — SIGNIFICANT CHANGE UP (ref 3.5–5.3)
POTASSIUM SERPL-MCNC: 4.2 MMOL/L — SIGNIFICANT CHANGE UP (ref 3.5–5.3)
POTASSIUM SERPL-SCNC: 4 MMOL/L — SIGNIFICANT CHANGE UP (ref 3.5–5.3)
POTASSIUM SERPL-SCNC: 4.2 MMOL/L — SIGNIFICANT CHANGE UP (ref 3.5–5.3)
PROT SERPL-MCNC: 7.1 G/DL — SIGNIFICANT CHANGE UP (ref 6–8.3)
PROT SERPL-MCNC: 7.1 G/DL — SIGNIFICANT CHANGE UP (ref 6–8.3)
RBC # BLD: 3.79 M/UL — LOW (ref 4.2–5.8)
RBC # BLD: 3.92 M/UL — LOW (ref 4.2–5.8)
RBC # FLD: 17.4 % — HIGH (ref 10.3–14.5)
RBC # FLD: 17.6 % — HIGH (ref 10.3–14.5)
SODIUM SERPL-SCNC: 142 MMOL/L — SIGNIFICANT CHANGE UP (ref 135–145)
SODIUM SERPL-SCNC: 145 MMOL/L — SIGNIFICANT CHANGE UP (ref 135–145)
SPECIMEN SOURCE: SIGNIFICANT CHANGE UP
TOTAL CHOLESTEROL/HDL RATIO MEASUREMENT: 3.5 RATIO — SIGNIFICANT CHANGE UP (ref 3.4–9.6)
TRIGL SERPL-MCNC: 174 MG/DL — HIGH (ref 10–149)
TROPONIN T, HIGH SENSITIVITY RESULT: 47 NG/L — SIGNIFICANT CHANGE UP (ref 0–51)
TSH SERPL-MCNC: 0.88 UIU/ML — SIGNIFICANT CHANGE UP (ref 0.27–4.2)
WBC # BLD: 7.8 K/UL — SIGNIFICANT CHANGE UP (ref 3.8–10.5)
WBC # BLD: 9.4 K/UL — SIGNIFICANT CHANGE UP (ref 3.8–10.5)
WBC # FLD AUTO: 7.8 K/UL — SIGNIFICANT CHANGE UP (ref 3.8–10.5)
WBC # FLD AUTO: 9.4 K/UL — SIGNIFICANT CHANGE UP (ref 3.8–10.5)

## 2019-04-13 PROCEDURE — 99233 SBSQ HOSP IP/OBS HIGH 50: CPT | Mod: GC

## 2019-04-13 PROCEDURE — 93010 ELECTROCARDIOGRAM REPORT: CPT

## 2019-04-13 PROCEDURE — 99291 CRITICAL CARE FIRST HOUR: CPT

## 2019-04-13 RX ORDER — SODIUM CHLORIDE 9 MG/ML
3 INJECTION INTRAMUSCULAR; INTRAVENOUS; SUBCUTANEOUS EVERY 6 HOURS
Qty: 0 | Refills: 0 | Status: DISCONTINUED | OUTPATIENT
Start: 2019-04-13 | End: 2019-04-20

## 2019-04-13 RX ORDER — VANCOMYCIN HCL 1 G
1250 VIAL (EA) INTRAVENOUS EVERY 12 HOURS
Qty: 0 | Refills: 0 | Status: DISCONTINUED | OUTPATIENT
Start: 2019-04-13 | End: 2019-04-14

## 2019-04-13 RX ADMIN — CEFEPIME 100 MILLIGRAM(S): 1 INJECTION, POWDER, FOR SOLUTION INTRAMUSCULAR; INTRAVENOUS at 05:23

## 2019-04-13 RX ADMIN — CEFEPIME 100 MILLIGRAM(S): 1 INJECTION, POWDER, FOR SOLUTION INTRAMUSCULAR; INTRAVENOUS at 13:16

## 2019-04-13 RX ADMIN — Medication 3 MILLILITER(S): at 14:43

## 2019-04-13 RX ADMIN — Medication 3 MILLILITER(S): at 17:17

## 2019-04-13 RX ADMIN — Medication 3 MILLILITER(S): at 05:32

## 2019-04-13 RX ADMIN — Medication 1 MILLIGRAM(S): at 16:00

## 2019-04-13 RX ADMIN — CEFEPIME 100 MILLIGRAM(S): 1 INJECTION, POWDER, FOR SOLUTION INTRAMUSCULAR; INTRAVENOUS at 21:38

## 2019-04-13 RX ADMIN — SODIUM CHLORIDE 3 MILLILITER(S): 9 INJECTION INTRAMUSCULAR; INTRAVENOUS; SUBCUTANEOUS at 18:02

## 2019-04-13 RX ADMIN — ENOXAPARIN SODIUM 40 MILLIGRAM(S): 100 INJECTION SUBCUTANEOUS at 13:15

## 2019-04-13 RX ADMIN — SODIUM CHLORIDE 75 MILLILITER(S): 9 INJECTION, SOLUTION INTRAVENOUS at 06:45

## 2019-04-13 RX ADMIN — Medication 3 MILLILITER(S): at 00:08
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
## 2019-04-13 NOTE — PROGRESS NOTE ADULT - ATTENDING COMMENTS
In brief 43 year old male with quadriplegia admitted with sepsis secondary to pneumonia, respiratory failure,  and bradycardia.  He was managed with BiPAP overnight with improvement in blood gases and respiratory status.  This morning alert, awake, conversant.  There were no further episodes of bradycardia overnight.  Has rhonchi on lung exam.  I srequring small amount of pressors.  Agree with current management.  Would continue airway clearance techniques- chest PT, percussion, suctioning,  antibioitcs.  He would agree to intubation if respiratory status deteriorates.

## 2019-04-13 NOTE — PROGRESS NOTE ADULT - ASSESSMENT
43 year old male with quadriplegia presents with sepsis, found to have sinus bradycardia in the setting of hypercapnic respiratory failure-now resolved    1. Sinus bradycardia  - Given clinical history as well as prolonged P-P interval precipitating bradycardic episodes, it is likely that this represents a physiologic vagal response, which is often accentuated in the setting of preexisting autonomic dysfunction. He never endorsed syncope during this episode, and remained hemodynamically stable.   - Continue to monitor    Jonny Jason  [id number]

## 2019-04-13 NOTE — PROGRESS NOTE ADULT - SUBJECTIVE AND OBJECTIVE BOX
Kelsey Geronimo MD PGY-1  [phone]    INTERVAL HPI/OVERNIGHT EVENTS:  Continued to have 8s pauses O/N, now becoming symptomatic (syncope, diaphoresis). Per night team, cardiology defering further interventions at this time.    SUBJECTIVE: Patient seen and examined at bedside.     OBJECTIVE:    VITAL SIGNS:  ICU Vital Signs Last 24 Hrs  T(C): 36.9 (13 Apr 2019 05:06), Max: 36.9 (12 Apr 2019 19:00)  T(F): 98.5 (13 Apr 2019 05:06), Max: 98.5 (13 Apr 2019 05:06)  HR: 70 (13 Apr 2019 07:00) (47 - 126)  BP: 113/73 (13 Apr 2019 07:00) (86/50 - 222/115)  BP(mean): 88 (13 Apr 2019 07:00) (61 - 160)  ABP: --  ABP(mean): --  RR: 28 (13 Apr 2019 07:00) (18 - 45)  SpO2: 97% (13 Apr 2019 07:00) (94% - 100%)        04-12 @ 07:01  -  04-13 @ 07:00  --------------------------------------------------------  IN: 1243.2 mL / OUT: 500 mL / NET: 743.2 mL      CAPILLARY BLOOD GLUCOSE      POCT Blood Glucose.: 128 mg/dL (12 Apr 2019 08:44)      PHYSICAL EXAM:    General: NAD  HEENT: NC/AT; PERRL, clear conjunctiva  Neck: supple  Respiratory: CTA b/l  Cardiovascular: +S1/S2; RRR  Abdomen: soft, NT/ND; +BS x4  Extremities: WWP, 2+ peripheral pulses b/l; no LE edema  Skin: normal color and turgor; no rash  Neurological:    MEDICATIONS:  MEDICATIONS  (STANDING):  ALBUTerol/ipratropium for Nebulization 3 milliLiter(s) Nebulizer every 6 hours  atropine Injectable 1 milliGRAM(s) IntraMuscular once  atropine Injectable 0.5 milliGRAM(s) IV Push once  cefepime   IVPB      cefepime   IVPB 2000 milliGRAM(s) IV Intermittent every 8 hours  enoxaparin Injectable 40 milliGRAM(s) SubCutaneous daily  lactated ringers. 1000 milliLiter(s) (75 mL/Hr) IV Continuous <Continuous>  naloxone Injectable 0.4 milliGRAM(s) IV Push once  norepinephrine Infusion 0.05 MICROgram(s)/kG/Min (6.994 mL/Hr) IV Continuous <Continuous>    MEDICATIONS  (PRN):  aluminum hydroxide/magnesium hydroxide/simethicone Suspension 30 milliLiter(s) Oral every 6 hours PRN Dyspepsia  atropine Injectable 0.5 milliGRAM(s) IV Push once PRN bradycardia < 40      ALLERGIES:  Allergies    penicillin (Rash)    Intolerances        LABS:                        10.2   9.4   )-----------( 327      ( 13 Apr 2019 03:44 )             32.7     Hemoglobin: 10.2 g/dL (04-13 @ 03:44)  Hemoglobin: 10.0 g/dL (04-13 @ 00:40)  Hemoglobin: 10.2 g/dL (04-12 @ 11:08)  Hemoglobin: See Note (04-12 @ 09:18)  Hemoglobin: 10.2 g/dL (04-10 @ 06:58)    CBC Full  -  ( 13 Apr 2019 03:44 )  WBC Count : 9.4 K/uL  RBC Count : 3.79 M/uL  Hemoglobin : 10.2 g/dL  Hematocrit : 32.7 %  Platelet Count - Automated : 327 K/uL  Mean Cell Volume : 86.3 fl  Mean Cell Hemoglobin : 26.8 pg  Mean Cell Hemoglobin Concentration : 31.1 gm/dL  Auto Neutrophil # : x  Auto Lymphocyte # : x  Auto Monocyte # : x  Auto Eosinophil # : x  Auto Basophil # : x  Auto Neutrophil % : x  Auto Lymphocyte % : x  Auto Monocyte % : x  Auto Eosinophil % : x  Auto Basophil % : x    04-13    145  |  104  |  8   ----------------------------<  110<H>  4.2   |  29  |  0.44<L>    Ca    8.9      13 Apr 2019 03:45  Phos  2.7     04-13  Mg     2.0     04-13    TPro  7.1  /  Alb  3.4  /  TBili  0.1<L>  /  DBili  x   /  AST  12  /  ALT  16  /  AlkPhos  65  04-13    Creatinine Trend: 0.44<--, 0.42<--, 0.41<--, 0.48<--, 0.56<--, 0.48<--  LIVER FUNCTIONS - ( 13 Apr 2019 03:45 )  Alb: 3.4 g/dL / Pro: 7.1 g/dL / ALK PHOS: 65 U/L / ALT: 16 U/L / AST: 12 U/L / GGT: x           PT/INR - ( 12 Apr 2019 09:12 )   PT: 12.8 sec;   INR: 1.11 ratio         PTT - ( 12 Apr 2019 09:12 )  PTT:23.0 sec  CARDIAC MARKERS ( 12 Apr 2019 09:18 )  x     / x     / 88 U/L / x     / 2.3 ng/mL      ABG - ( 13 Apr 2019 03:41 )  pH, Arterial: 7.34  pH, Blood: x     /  pCO2: 62    /  pO2: 77    / HCO3: 32    / Base Excess: 5.8   /  SaO2: 95                  03:41 - ABG - pH: 7.34  |  pCO2: 62    |  pO2: 77    | Lactate:       | BE: 5.8    20:36 - ABG - pH: 7.37  |  pCO2: 57    |  pO2: 84    | Lactate:       | BE: 6.4    15:34 - ABG - pH: 7.33  |  pCO2: 61    |  pO2: 100   | Lactate:       | BE: 5.0    12:37 - ABG - pH: 7.25  |  pCO2: 72    |  pO2: 90    | Lactate:       | BE: 2.9    10:31 - ABG - pH: 7.23  |  pCO2: 77    |  pO2: 119   | Lactate:       | BE: 2.3    09:03 - ABG - pH: 7.17  |  pCO2: 98    |  pO2: 151   | Lactate:       | BE: 3.9            EKG:   MICROBIOLOGY:    IMAGING:      Labs, imaging, EKG personally reviewed    RADIOLOGY & ADDITIONAL TESTS: Reviewed. Kelsey Geronimo MD PGY-1  [phone]    INTERVAL HPI/OVERNIGHT EVENTS:    Continued to have 8s pauses O/N, now becoming symptomatic (syncope, diaphoresis). Per night team, cardiology defering further interventions at this time.    SUBJECTIVE: Patient seen and examined at bedside.     OBJECTIVE:    VITAL SIGNS:  ICU Vital Signs Last 24 Hrs  T(C): 36.9 (13 Apr 2019 05:06), Max: 36.9 (12 Apr 2019 19:00)  T(F): 98.5 (13 Apr 2019 05:06), Max: 98.5 (13 Apr 2019 05:06)  HR: 70 (13 Apr 2019 07:00) (47 - 126)  BP: 113/73 (13 Apr 2019 07:00) (86/50 - 222/115)  BP(mean): 88 (13 Apr 2019 07:00) (61 - 160)  ABP: --  ABP(mean): --  RR: 28 (13 Apr 2019 07:00) (18 - 45)  SpO2: 97% (13 Apr 2019 07:00) (94% - 100%)        04-12 @ 07:01  -  04-13 @ 07:00  --------------------------------------------------------  IN: 1243.2 mL / OUT: 500 mL / NET: 743.2 mL      CAPILLARY BLOOD GLUCOSE      POCT Blood Glucose.: 128 mg/dL (12 Apr 2019 08:44)      PHYSICAL EXAM:    General: NAD  HEENT: NC/AT; PERRL, clear conjunctiva  Neck: supple  Respiratory: CTA b/l  Cardiovascular: +S1/S2; RRR  Abdomen: soft, NT/ND; +BS x4  Extremities: WWP, 2+ peripheral pulses b/l; no LE edema  Skin: normal color and turgor; no rash  Neurological:    MEDICATIONS:  MEDICATIONS  (STANDING):  ALBUTerol/ipratropium for Nebulization 3 milliLiter(s) Nebulizer every 6 hours  atropine Injectable 1 milliGRAM(s) IntraMuscular once  atropine Injectable 0.5 milliGRAM(s) IV Push once  cefepime   IVPB      cefepime   IVPB 2000 milliGRAM(s) IV Intermittent every 8 hours  enoxaparin Injectable 40 milliGRAM(s) SubCutaneous daily  lactated ringers. 1000 milliLiter(s) (75 mL/Hr) IV Continuous <Continuous>  naloxone Injectable 0.4 milliGRAM(s) IV Push once  norepinephrine Infusion 0.05 MICROgram(s)/kG/Min (6.994 mL/Hr) IV Continuous <Continuous>    MEDICATIONS  (PRN):  aluminum hydroxide/magnesium hydroxide/simethicone Suspension 30 milliLiter(s) Oral every 6 hours PRN Dyspepsia  atropine Injectable 0.5 milliGRAM(s) IV Push once PRN bradycardia < 40      ALLERGIES:  Allergies    penicillin (Rash)    Intolerances        LABS:                        10.2   9.4   )-----------( 327      ( 13 Apr 2019 03:44 )             32.7     Hemoglobin: 10.2 g/dL (04-13 @ 03:44)  Hemoglobin: 10.0 g/dL (04-13 @ 00:40)  Hemoglobin: 10.2 g/dL (04-12 @ 11:08)  Hemoglobin: See Note (04-12 @ 09:18)  Hemoglobin: 10.2 g/dL (04-10 @ 06:58)    CBC Full  -  ( 13 Apr 2019 03:44 )  WBC Count : 9.4 K/uL  RBC Count : 3.79 M/uL  Hemoglobin : 10.2 g/dL  Hematocrit : 32.7 %  Platelet Count - Automated : 327 K/uL  Mean Cell Volume : 86.3 fl  Mean Cell Hemoglobin : 26.8 pg  Mean Cell Hemoglobin Concentration : 31.1 gm/dL  Auto Neutrophil # : x  Auto Lymphocyte # : x  Auto Monocyte # : x  Auto Eosinophil # : x  Auto Basophil # : x  Auto Neutrophil % : x  Auto Lymphocyte % : x  Auto Monocyte % : x  Auto Eosinophil % : x  Auto Basophil % : x    04-13    145  |  104  |  8   ----------------------------<  110<H>  4.2   |  29  |  0.44<L>    Ca    8.9      13 Apr 2019 03:45  Phos  2.7     04-13  Mg     2.0     04-13    TPro  7.1  /  Alb  3.4  /  TBili  0.1<L>  /  DBili  x   /  AST  12  /  ALT  16  /  AlkPhos  65  04-13    Creatinine Trend: 0.44<--, 0.42<--, 0.41<--, 0.48<--, 0.56<--, 0.48<--  LIVER FUNCTIONS - ( 13 Apr 2019 03:45 )  Alb: 3.4 g/dL / Pro: 7.1 g/dL / ALK PHOS: 65 U/L / ALT: 16 U/L / AST: 12 U/L / GGT: x           PT/INR - ( 12 Apr 2019 09:12 )   PT: 12.8 sec;   INR: 1.11 ratio         PTT - ( 12 Apr 2019 09:12 )  PTT:23.0 sec  CARDIAC MARKERS ( 12 Apr 2019 09:18 )  x     / x     / 88 U/L / x     / 2.3 ng/mL      ABG - ( 13 Apr 2019 03:41 )  pH, Arterial: 7.34  pH, Blood: x     /  pCO2: 62    /  pO2: 77    / HCO3: 32    / Base Excess: 5.8   /  SaO2: 95                  03:41 - ABG - pH: 7.34  |  pCO2: 62    |  pO2: 77    | Lactate:       | BE: 5.8    20:36 - ABG - pH: 7.37  |  pCO2: 57    |  pO2: 84    | Lactate:       | BE: 6.4    15:34 - ABG - pH: 7.33  |  pCO2: 61    |  pO2: 100   | Lactate:       | BE: 5.0    12:37 - ABG - pH: 7.25  |  pCO2: 72    |  pO2: 90    | Lactate:       | BE: 2.9    10:31 - ABG - pH: 7.23  |  pCO2: 77    |  pO2: 119   | Lactate:       | BE: 2.3    09:03 - ABG - pH: 7.17  |  pCO2: 98    |  pO2: 151   | Lactate:       | BE: 3.9            EKG:   MICROBIOLOGY:    IMAGING:    < from: CT Abdomen and Pelvis w/ IV Cont (04.12.19 @ 17:13) >  INTERPRETATION:  Interval decreased bilateral pleural effusions compared   to 4/5/2019.  Unchanged multifocal pneumonia.  Small fluid in the right nephrectomy bed without a discrete collection to   suggest an abscess. This is likely postsurgical. Unchanged mild left   proximal periureteral fat stranding and mild proximal left hydroureter  Unchanged pancolitis.  Trace ascites.    < end of copied text >    Labs, imaging, EKG personally reviewed    RADIOLOGY & ADDITIONAL TESTS: Reviewed. Kelsey Geronimo MD PGY-1  [phone]    INTERVAL HPI/OVERNIGHT EVENTS:  Continued to have 8s pauses O/N, now becoming symptomatic (syncope, diaphoresis). Per night team, cardiology defering further interventions at this time.    This AM, no further pauses. Pt denies any episodes of palpitations, CP, syncope/near syncope, or diaphoresis.     SUBJECTIVE: Patient seen and examined at bedside.     OBJECTIVE:    VITAL SIGNS:  ICU Vital Signs Last 24 Hrs  T(C): 36.9 (13 Apr 2019 05:06), Max: 36.9 (12 Apr 2019 19:00)  T(F): 98.5 (13 Apr 2019 05:06), Max: 98.5 (13 Apr 2019 05:06)  HR: 70 (13 Apr 2019 07:00) (47 - 126)  BP: 113/73 (13 Apr 2019 07:00) (86/50 - 222/115)  BP(mean): 88 (13 Apr 2019 07:00) (61 - 160)  ABP: --  ABP(mean): --  RR: 28 (13 Apr 2019 07:00) (18 - 45)  SpO2: 97% (13 Apr 2019 07:00) (94% - 100%)        04-12 @ 07:01  -  04-13 @ 07:00  --------------------------------------------------------  IN: 1243.2 mL / OUT: 500 mL / NET: 743.2 mL      CAPILLARY BLOOD GLUCOSE      POCT Blood Glucose.: 128 mg/dL (12 Apr 2019 08:44)      PHYSICAL EXAM:    General: NAD  HEENT: NC/AT; PERRL, clear conjunctiva  Neck: supple  Respiratory: CTA b/l  Cardiovascular: +S1/S2; RRR  Abdomen: soft, NT/ND; +BS x4  Extremities: WWP, 2+ peripheral pulses b/l; no LE edema  Skin: normal color and turgor; no rash  Neurological:    MEDICATIONS:  MEDICATIONS  (STANDING):  ALBUTerol/ipratropium for Nebulization 3 milliLiter(s) Nebulizer every 6 hours  atropine Injectable 1 milliGRAM(s) IntraMuscular once  atropine Injectable 0.5 milliGRAM(s) IV Push once  cefepime   IVPB      cefepime   IVPB 2000 milliGRAM(s) IV Intermittent every 8 hours  enoxaparin Injectable 40 milliGRAM(s) SubCutaneous daily  lactated ringers. 1000 milliLiter(s) (75 mL/Hr) IV Continuous <Continuous>  naloxone Injectable 0.4 milliGRAM(s) IV Push once  norepinephrine Infusion 0.05 MICROgram(s)/kG/Min (6.994 mL/Hr) IV Continuous <Continuous>    MEDICATIONS  (PRN):  aluminum hydroxide/magnesium hydroxide/simethicone Suspension 30 milliLiter(s) Oral every 6 hours PRN Dyspepsia  atropine Injectable 0.5 milliGRAM(s) IV Push once PRN bradycardia < 40      ALLERGIES:  Allergies    penicillin (Rash)    Intolerances        LABS:                        10.2   9.4   )-----------( 327      ( 13 Apr 2019 03:44 )             32.7     Hemoglobin: 10.2 g/dL (04-13 @ 03:44)  Hemoglobin: 10.0 g/dL (04-13 @ 00:40)  Hemoglobin: 10.2 g/dL (04-12 @ 11:08)  Hemoglobin: See Note (04-12 @ 09:18)  Hemoglobin: 10.2 g/dL (04-10 @ 06:58)    CBC Full  -  ( 13 Apr 2019 03:44 )  WBC Count : 9.4 K/uL  RBC Count : 3.79 M/uL  Hemoglobin : 10.2 g/dL  Hematocrit : 32.7 %  Platelet Count - Automated : 327 K/uL  Mean Cell Volume : 86.3 fl  Mean Cell Hemoglobin : 26.8 pg  Mean Cell Hemoglobin Concentration : 31.1 gm/dL  Auto Neutrophil # : x  Auto Lymphocyte # : x  Auto Monocyte # : x  Auto Eosinophil # : x  Auto Basophil # : x  Auto Neutrophil % : x  Auto Lymphocyte % : x  Auto Monocyte % : x  Auto Eosinophil % : x  Auto Basophil % : x    04-13    145  |  104  |  8   ----------------------------<  110<H>  4.2   |  29  |  0.44<L>    Ca    8.9      13 Apr 2019 03:45  Phos  2.7     04-13  Mg     2.0     04-13    TPro  7.1  /  Alb  3.4  /  TBili  0.1<L>  /  DBili  x   /  AST  12  /  ALT  16  /  AlkPhos  65  04-13    Creatinine Trend: 0.44<--, 0.42<--, 0.41<--, 0.48<--, 0.56<--, 0.48<--  LIVER FUNCTIONS - ( 13 Apr 2019 03:45 )  Alb: 3.4 g/dL / Pro: 7.1 g/dL / ALK PHOS: 65 U/L / ALT: 16 U/L / AST: 12 U/L / GGT: x           PT/INR - ( 12 Apr 2019 09:12 )   PT: 12.8 sec;   INR: 1.11 ratio         PTT - ( 12 Apr 2019 09:12 )  PTT:23.0 sec  CARDIAC MARKERS ( 12 Apr 2019 09:18 )  x     / x     / 88 U/L / x     / 2.3 ng/mL      ABG - ( 13 Apr 2019 03:41 )  pH, Arterial: 7.34  pH, Blood: x     /  pCO2: 62    /  pO2: 77    / HCO3: 32    / Base Excess: 5.8   /  SaO2: 95                  03:41 - ABG - pH: 7.34  |  pCO2: 62    |  pO2: 77    | Lactate:       | BE: 5.8    20:36 - ABG - pH: 7.37  |  pCO2: 57    |  pO2: 84    | Lactate:       | BE: 6.4    15:34 - ABG - pH: 7.33  |  pCO2: 61    |  pO2: 100   | Lactate:       | BE: 5.0    12:37 - ABG - pH: 7.25  |  pCO2: 72    |  pO2: 90    | Lactate:       | BE: 2.9    10:31 - ABG - pH: 7.23  |  pCO2: 77    |  pO2: 119   | Lactate:       | BE: 2.3    09:03 - ABG - pH: 7.17  |  pCO2: 98    |  pO2: 151   | Lactate:       | BE: 3.9            EKG:   MICROBIOLOGY:    IMAGING:    < from: CT Abdomen and Pelvis w/ IV Cont (04.12.19 @ 17:13) >  INTERPRETATION:  Interval decreased bilateral pleural effusions compared   to 4/5/2019.  Unchanged multifocal pneumonia.  Small fluid in the right nephrectomy bed without a discrete collection to   suggest an abscess. This is likely postsurgical. Unchanged mild left   proximal periureteral fat stranding and mild proximal left hydroureter  Unchanged pancolitis.  Trace ascites.    < end of copied text >    Labs, imaging, EKG personally reviewed    RADIOLOGY & ADDITIONAL TESTS: Reviewed. Kelsey Geronimo MD PGY-1  [phone]    INTERVAL HPI/OVERNIGHT EVENTS:  Continued to have 8s pauses O/N, now becoming symptomatic (syncope, diaphoresis). Per night team, cardiology defering further interventions at this time.    This AM, no further pauses. Pt denies any episodes of palpitations, CP, syncope/near syncope, or diaphoresis. Continues to complain of feeling cold despite sweating into bedding.    SUBJECTIVE: Patient seen and examined at bedside.     OBJECTIVE:    VITAL SIGNS:  ICU Vital Signs Last 24 Hrs  T(C): 36.9 (13 Apr 2019 05:06), Max: 36.9 (12 Apr 2019 19:00)  T(F): 98.5 (13 Apr 2019 05:06), Max: 98.5 (13 Apr 2019 05:06)  HR: 70 (13 Apr 2019 07:00) (47 - 126)  BP: 113/73 (13 Apr 2019 07:00) (86/50 - 222/115)  BP(mean): 88 (13 Apr 2019 07:00) (61 - 160)  ABP: --  ABP(mean): --  RR: 28 (13 Apr 2019 07:00) (18 - 45)  SpO2: 97% (13 Apr 2019 07:00) (94% - 100%)        04-12 @ 07:01  -  04-13 @ 07:00  --------------------------------------------------------  IN: 1243.2 mL / OUT: 500 mL / NET: 743.2 mL      CAPILLARY BLOOD GLUCOSE      POCT Blood Glucose.: 128 mg/dL (12 Apr 2019 08:44)      PHYSICAL EXAM:    General: NAD  HEENT: NC/AT; PERRL, clear conjunctiva  Neck: supple  Respiratory: CTA b/l  Cardiovascular: +S1/S2; RRR  Abdomen: soft, NT/ND; +BS x4  Extremities: WWP, 2+ peripheral pulses b/l; no LE edema  Skin: normal color and turgor; no rash  Neurological:    MEDICATIONS:  MEDICATIONS  (STANDING):  ALBUTerol/ipratropium for Nebulization 3 milliLiter(s) Nebulizer every 6 hours  atropine Injectable 1 milliGRAM(s) IntraMuscular once  atropine Injectable 0.5 milliGRAM(s) IV Push once  cefepime   IVPB      cefepime   IVPB 2000 milliGRAM(s) IV Intermittent every 8 hours  enoxaparin Injectable 40 milliGRAM(s) SubCutaneous daily  lactated ringers. 1000 milliLiter(s) (75 mL/Hr) IV Continuous <Continuous>  naloxone Injectable 0.4 milliGRAM(s) IV Push once  norepinephrine Infusion 0.05 MICROgram(s)/kG/Min (6.994 mL/Hr) IV Continuous <Continuous>    MEDICATIONS  (PRN):  aluminum hydroxide/magnesium hydroxide/simethicone Suspension 30 milliLiter(s) Oral every 6 hours PRN Dyspepsia  atropine Injectable 0.5 milliGRAM(s) IV Push once PRN bradycardia < 40      ALLERGIES:  Allergies    penicillin (Rash)    Intolerances        LABS:                        10.2   9.4   )-----------( 327      ( 13 Apr 2019 03:44 )             32.7     Hemoglobin: 10.2 g/dL (04-13 @ 03:44)  Hemoglobin: 10.0 g/dL (04-13 @ 00:40)  Hemoglobin: 10.2 g/dL (04-12 @ 11:08)  Hemoglobin: See Note (04-12 @ 09:18)  Hemoglobin: 10.2 g/dL (04-10 @ 06:58)    CBC Full  -  ( 13 Apr 2019 03:44 )  WBC Count : 9.4 K/uL  RBC Count : 3.79 M/uL  Hemoglobin : 10.2 g/dL  Hematocrit : 32.7 %  Platelet Count - Automated : 327 K/uL  Mean Cell Volume : 86.3 fl  Mean Cell Hemoglobin : 26.8 pg  Mean Cell Hemoglobin Concentration : 31.1 gm/dL  Auto Neutrophil # : x  Auto Lymphocyte # : x  Auto Monocyte # : x  Auto Eosinophil # : x  Auto Basophil # : x  Auto Neutrophil % : x  Auto Lymphocyte % : x  Auto Monocyte % : x  Auto Eosinophil % : x  Auto Basophil % : x    04-13    145  |  104  |  8   ----------------------------<  110<H>  4.2   |  29  |  0.44<L>    Ca    8.9      13 Apr 2019 03:45  Phos  2.7     04-13  Mg     2.0     04-13    TPro  7.1  /  Alb  3.4  /  TBili  0.1<L>  /  DBili  x   /  AST  12  /  ALT  16  /  AlkPhos  65  04-13    Creatinine Trend: 0.44<--, 0.42<--, 0.41<--, 0.48<--, 0.56<--, 0.48<--  LIVER FUNCTIONS - ( 13 Apr 2019 03:45 )  Alb: 3.4 g/dL / Pro: 7.1 g/dL / ALK PHOS: 65 U/L / ALT: 16 U/L / AST: 12 U/L / GGT: x           PT/INR - ( 12 Apr 2019 09:12 )   PT: 12.8 sec;   INR: 1.11 ratio         PTT - ( 12 Apr 2019 09:12 )  PTT:23.0 sec  CARDIAC MARKERS ( 12 Apr 2019 09:18 )  x     / x     / 88 U/L / x     / 2.3 ng/mL      ABG - ( 13 Apr 2019 03:41 )  pH, Arterial: 7.34  pH, Blood: x     /  pCO2: 62    /  pO2: 77    / HCO3: 32    / Base Excess: 5.8   /  SaO2: 95                  03:41 - ABG - pH: 7.34  |  pCO2: 62    |  pO2: 77    | Lactate:       | BE: 5.8    20:36 - ABG - pH: 7.37  |  pCO2: 57    |  pO2: 84    | Lactate:       | BE: 6.4    15:34 - ABG - pH: 7.33  |  pCO2: 61    |  pO2: 100   | Lactate:       | BE: 5.0    12:37 - ABG - pH: 7.25  |  pCO2: 72    |  pO2: 90    | Lactate:       | BE: 2.9    10:31 - ABG - pH: 7.23  |  pCO2: 77    |  pO2: 119   | Lactate:       | BE: 2.3    09:03 - ABG - pH: 7.17  |  pCO2: 98    |  pO2: 151   | Lactate:       | BE: 3.9            EKG:   MICROBIOLOGY:    IMAGING:    < from: CT Abdomen and Pelvis w/ IV Cont (04.12.19 @ 17:13) >  INTERPRETATION:  Interval decreased bilateral pleural effusions compared   to 4/5/2019.  Unchanged multifocal pneumonia.  Small fluid in the right nephrectomy bed without a discrete collection to   suggest an abscess. This is likely postsurgical. Unchanged mild left   proximal periureteral fat stranding and mild proximal left hydroureter  Unchanged pancolitis.  Trace ascites.    < end of copied text >    Labs, imaging, EKG personally reviewed    RADIOLOGY & ADDITIONAL TESTS: Reviewed. Kelsey Geronimo MD PGY-1  [phone]    INTERVAL HPI/OVERNIGHT EVENTS:  Continued to have 8s pauses O/N, now becoming symptomatic (syncope, diaphoresis). Per night team, cardiology defering further interventions at this time.    This AM, no further pauses. Pt denies any episodes of palpitations, CP, syncope/near syncope, or diaphoresis. Continues to complain of feeling cold despite sweating into bedding.    SUBJECTIVE: Patient seen and examined at bedside.     OBJECTIVE:    VITAL SIGNS:  ICU Vital Signs Last 24 Hrs  T(C): 36.9 (13 Apr 2019 05:06), Max: 36.9 (12 Apr 2019 19:00)  T(F): 98.5 (13 Apr 2019 05:06), Max: 98.5 (13 Apr 2019 05:06)  HR: 70 (13 Apr 2019 07:00) (47 - 126)  BP: 113/73 (13 Apr 2019 07:00) (86/50 - 222/115)  BP(mean): 88 (13 Apr 2019 07:00) (61 - 160)  ABP: --  ABP(mean): --  RR: 28 (13 Apr 2019 07:00) (18 - 45)  SpO2: 97% (13 Apr 2019 07:00) (94% - 100%)        04-12 @ 07:01  -  04-13 @ 07:00  --------------------------------------------------------  IN: 1243.2 mL / OUT: 500 mL / NET: 743.2 mL      CAPILLARY BLOOD GLUCOSE      POCT Blood Glucose.: 128 mg/dL (12 Apr 2019 08:44)      PHYSICAL EXAM:    General: NAD  HEENT: NC/AT; PERRL, clear conjunctiva  Neck: supple  Respiratory: diffuse loud ronchi, no clear crackles  Cardiovascular: +S1/S2; sinus john in 60s  Abdomen: soft, NT/ND; +BS x4  Extremities: WWP, 2+ peripheral pulses b/l; no LE edema  Skin: normal color and turgor; no rash  Neurological: AO x 3    MEDICATIONS:  MEDICATIONS  (STANDING):  ALBUTerol/ipratropium for Nebulization 3 milliLiter(s) Nebulizer every 6 hours  atropine Injectable 1 milliGRAM(s) IntraMuscular once  atropine Injectable 0.5 milliGRAM(s) IV Push once  cefepime   IVPB      cefepime   IVPB 2000 milliGRAM(s) IV Intermittent every 8 hours  enoxaparin Injectable 40 milliGRAM(s) SubCutaneous daily  lactated ringers. 1000 milliLiter(s) (75 mL/Hr) IV Continuous <Continuous>  naloxone Injectable 0.4 milliGRAM(s) IV Push once  norepinephrine Infusion 0.05 MICROgram(s)/kG/Min (6.994 mL/Hr) IV Continuous <Continuous>    MEDICATIONS  (PRN):  aluminum hydroxide/magnesium hydroxide/simethicone Suspension 30 milliLiter(s) Oral every 6 hours PRN Dyspepsia  atropine Injectable 0.5 milliGRAM(s) IV Push once PRN bradycardia < 40      ALLERGIES:  Allergies    penicillin (Rash)    Intolerances        LABS:                        10.2   9.4   )-----------( 327      ( 13 Apr 2019 03:44 )             32.7     Hemoglobin: 10.2 g/dL (04-13 @ 03:44)  Hemoglobin: 10.0 g/dL (04-13 @ 00:40)  Hemoglobin: 10.2 g/dL (04-12 @ 11:08)  Hemoglobin: See Note (04-12 @ 09:18)  Hemoglobin: 10.2 g/dL (04-10 @ 06:58)    CBC Full  -  ( 13 Apr 2019 03:44 )  WBC Count : 9.4 K/uL  RBC Count : 3.79 M/uL  Hemoglobin : 10.2 g/dL  Hematocrit : 32.7 %  Platelet Count - Automated : 327 K/uL  Mean Cell Volume : 86.3 fl  Mean Cell Hemoglobin : 26.8 pg  Mean Cell Hemoglobin Concentration : 31.1 gm/dL  Auto Neutrophil # : x  Auto Lymphocyte # : x  Auto Monocyte # : x  Auto Eosinophil # : x  Auto Basophil # : x  Auto Neutrophil % : x  Auto Lymphocyte % : x  Auto Monocyte % : x  Auto Eosinophil % : x  Auto Basophil % : x    04-13    145  |  104  |  8   ----------------------------<  110<H>  4.2   |  29  |  0.44<L>    Ca    8.9      13 Apr 2019 03:45  Phos  2.7     04-13  Mg     2.0     04-13    TPro  7.1  /  Alb  3.4  /  TBili  0.1<L>  /  DBili  x   /  AST  12  /  ALT  16  /  AlkPhos  65  04-13    Creatinine Trend: 0.44<--, 0.42<--, 0.41<--, 0.48<--, 0.56<--, 0.48<--  LIVER FUNCTIONS - ( 13 Apr 2019 03:45 )  Alb: 3.4 g/dL / Pro: 7.1 g/dL / ALK PHOS: 65 U/L / ALT: 16 U/L / AST: 12 U/L / GGT: x           PT/INR - ( 12 Apr 2019 09:12 )   PT: 12.8 sec;   INR: 1.11 ratio         PTT - ( 12 Apr 2019 09:12 )  PTT:23.0 sec  CARDIAC MARKERS ( 12 Apr 2019 09:18 )  x     / x     / 88 U/L / x     / 2.3 ng/mL      ABG - ( 13 Apr 2019 03:41 )  pH, Arterial: 7.34  pH, Blood: x     /  pCO2: 62    /  pO2: 77    / HCO3: 32    / Base Excess: 5.8   /  SaO2: 95                  03:41 - ABG - pH: 7.34  |  pCO2: 62    |  pO2: 77    | Lactate:       | BE: 5.8    20:36 - ABG - pH: 7.37  |  pCO2: 57    |  pO2: 84    | Lactate:       | BE: 6.4    15:34 - ABG - pH: 7.33  |  pCO2: 61    |  pO2: 100   | Lactate:       | BE: 5.0    12:37 - ABG - pH: 7.25  |  pCO2: 72    |  pO2: 90    | Lactate:       | BE: 2.9    10:31 - ABG - pH: 7.23  |  pCO2: 77    |  pO2: 119   | Lactate:       | BE: 2.3    09:03 - ABG - pH: 7.17  |  pCO2: 98    |  pO2: 151   | Lactate:       | BE: 3.9            EKG:   MICROBIOLOGY:    IMAGING:    < from: CT Abdomen and Pelvis w/ IV Cont (04.12.19 @ 17:13) >  INTERPRETATION:  Interval decreased bilateral pleural effusions compared   to 4/5/2019.  Unchanged multifocal pneumonia.  Small fluid in the right nephrectomy bed without a discrete collection to   suggest an abscess. This is likely postsurgical. Unchanged mild left   proximal periureteral fat stranding and mild proximal left hydroureter  Unchanged pancolitis.  Trace ascites.    < end of copied text >    Labs, imaging, EKG personally reviewed    RADIOLOGY & ADDITIONAL TESTS: Reviewed. Kelsey Geronimo MD PGY-1  [phone]    INTERVAL HPI/OVERNIGHT EVENTS:  Continued to have 8s pauses O/N, now becoming symptomatic (syncope, diaphoresis). Per night team, cardiology defering further interventions at this time.    This AM, no further pauses. Pt denies any episodes of palpitations, CP, syncope/near syncope, or diaphoresis. Continues to complain of feeling cold despite sweating into bedding.    SUBJECTIVE: Patient seen and examined at bedside.     OBJECTIVE:    VITAL SIGNS:  ICU Vital Signs Last 24 Hrs  T(C): 36.9 (13 Apr 2019 05:06), Max: 36.9 (12 Apr 2019 19:00)  T(F): 98.5 (13 Apr 2019 05:06), Max: 98.5 (13 Apr 2019 05:06)  HR: 70 (13 Apr 2019 07:00) (47 - 126)  BP: 113/73 (13 Apr 2019 07:00) (86/50 - 222/115)  BP(mean): 88 (13 Apr 2019 07:00) (61 - 160)  ABP: --  ABP(mean): --  RR: 28 (13 Apr 2019 07:00) (18 - 45)  SpO2: 97% (13 Apr 2019 07:00) (94% - 100%)        04-12 @ 07:01  -  04-13 @ 07:00  --------------------------------------------------------  IN: 1243.2 mL / OUT: 500 mL / NET: 743.2 mL      CAPILLARY BLOOD GLUCOSE      POCT Blood Glucose.: 128 mg/dL (12 Apr 2019 08:44)      PHYSICAL EXAM:    General: NAD  HEENT: NC/AT; PERRL, clear conjunctiva  Neck: supple  Respiratory: diffuse loud ronchi, no clear crackles  Cardiovascular: +S1/S2; sinus john in 60s  Abdomen: soft, NT/ND; +BS x4  Extremities: WWP, 2+ peripheral pulses b/l; no LE edema  Skin: normal color and turgor; no rash  Neurological: AO x 3    MEDICATIONS:  MEDICATIONS  (STANDING):  ALBUTerol/ipratropium for Nebulization 3 milliLiter(s) Nebulizer every 6 hours  atropine Injectable 1 milliGRAM(s) IntraMuscular once  atropine Injectable 0.5 milliGRAM(s) IV Push once  cefepime   IVPB      cefepime   IVPB 2000 milliGRAM(s) IV Intermittent every 8 hours  enoxaparin Injectable 40 milliGRAM(s) SubCutaneous daily  lactated ringers. 1000 milliLiter(s) (75 mL/Hr) IV Continuous <Continuous>  naloxone Injectable 0.4 milliGRAM(s) IV Push once  norepinephrine Infusion 0.05 MICROgram(s)/kG/Min (6.994 mL/Hr) IV Continuous <Continuous>    MEDICATIONS  (PRN):  aluminum hydroxide/magnesium hydroxide/simethicone Suspension 30 milliLiter(s) Oral every 6 hours PRN Dyspepsia  atropine Injectable 0.5 milliGRAM(s) IV Push once PRN bradycardia < 40      ALLERGIES:  Allergies    penicillin (Rash)    Intolerances        LABS:                        10.2   9.4   )-----------( 327      ( 13 Apr 2019 03:44 )             32.7     Hemoglobin: 10.2 g/dL (04-13 @ 03:44)  Hemoglobin: 10.0 g/dL (04-13 @ 00:40)  Hemoglobin: 10.2 g/dL (04-12 @ 11:08)  Hemoglobin: See Note (04-12 @ 09:18)  Hemoglobin: 10.2 g/dL (04-10 @ 06:58)    CBC Full  -  ( 13 Apr 2019 03:44 )  WBC Count : 9.4 K/uL  RBC Count : 3.79 M/uL  Hemoglobin : 10.2 g/dL  Hematocrit : 32.7 %  Platelet Count - Automated : 327 K/uL  Mean Cell Volume : 86.3 fl  Mean Cell Hemoglobin : 26.8 pg  Mean Cell Hemoglobin Concentration : 31.1 gm/dL  Auto Neutrophil # : x  Auto Lymphocyte # : x  Auto Monocyte # : x  Auto Eosinophil # : x  Auto Basophil # : x  Auto Neutrophil % : x  Auto Lymphocyte % : x  Auto Monocyte % : x  Auto Eosinophil % : x  Auto Basophil % : x    04-13    145  |  104  |  8   ----------------------------<  110<H>  4.2   |  29  |  0.44<L>    Ca    8.9      13 Apr 2019 03:45  Phos  2.7     04-13  Mg     2.0     04-13    TPro  7.1  /  Alb  3.4  /  TBili  0.1<L>  /  DBili  x   /  AST  12  /  ALT  16  /  AlkPhos  65  04-13    Creatinine Trend: 0.44<--, 0.42<--, 0.41<--, 0.48<--, 0.56<--, 0.48<--  LIVER FUNCTIONS - ( 13 Apr 2019 03:45 )  Alb: 3.4 g/dL / Pro: 7.1 g/dL / ALK PHOS: 65 U/L / ALT: 16 U/L / AST: 12 U/L / GGT: x           PT/INR - ( 12 Apr 2019 09:12 )   PT: 12.8 sec;   INR: 1.11 ratio         PTT - ( 12 Apr 2019 09:12 )  PTT:23.0 sec  CARDIAC MARKERS ( 12 Apr 2019 09:18 )  x     / x     / 88 U/L / x     / 2.3 ng/mL      ABG - ( 13 Apr 2019 03:41 )  pH, Arterial: 7.34  pH, Blood: x     /  pCO2: 62    /  pO2: 77    / HCO3: 32    / Base Excess: 5.8   /  SaO2: 95                  03:41 - ABG - pH: 7.34  |  pCO2: 62    |  pO2: 77    | Lactate:       | BE: 5.8    20:36 - ABG - pH: 7.37  |  pCO2: 57    |  pO2: 84    | Lactate:       | BE: 6.4    15:34 - ABG - pH: 7.33  |  pCO2: 61    |  pO2: 100   | Lactate:       | BE: 5.0    12:37 - ABG - pH: 7.25  |  pCO2: 72    |  pO2: 90    | Lactate:       | BE: 2.9    10:31 - ABG - pH: 7.23  |  pCO2: 77    |  pO2: 119   | Lactate:       | BE: 2.3    09:03 - ABG - pH: 7.17  |  pCO2: 98    |  pO2: 151   | Lactate:       | BE: 3.9            EKG:   MICROBIOLOGY:    IMAGING:    < from: CT Abdomen and Pelvis w/ IV Cont (04.12.19 @ 17:13) >  INTERPRETATION:  Interval decreased bilateral pleural effusions compared   to 4/5/2019.  Unchanged multifocal pneumonia.  Small fluid in the right nephrectomy bed without a discrete collection to   suggest an abscess. This is likely postsurgical. Unchanged mild left   proximal periureteral fat stranding and mild proximal left hydroureter  Unchanged pancolitis.  Trace ascites.    < end of copied text >    Labs, imaging, EKG personally reviewed  < from: CT Abdomen and Pelvis w/ IV Cont (04.12.19 @ 17:13) >  FINDINGS:    LOWER CHEST: Persistent small right pleural effusion. Bibasilar   bronchiectatic changes with associated consolidation right greater than   left Right basilar subsegmental atelectasis.     LIVER: Within normal limits.  BILE DUCTS: Normal caliber.  GALLBLADDER: Within normal limits.  SPLEEN: Within normal limits.  PANCREAS: Within normal limits.  ADRENALS: Within normal limits.  KIDNEYS/URETERS: Status post right nephrectomy. Unchanged mild left   proximal periureteral fat stranding and mild proximal left hydroureter.   Small amount of fluid in the right nephrectomy bed without a discrete   collection to suggest an abscess. This appears slightly decreased   compared to 4/4/2019 and CT abdomen and pelvis.    BLADDER: Within normal limits.  REPRODUCTIVE ORGANS: Prostate within normal limits.    BOWEL: Unchanged mild thickening of the colon and rectum consistent with   pancolitis. No bowel obstruction.  PERITONEUM: Trace ascites.  VESSELS:  Within normal limits. Status post placement of an infrarenal   IVC filter.  RETROPERITONEUM: No lymphadenopathy.    ABDOMINAL WALL: Anasarca.  BONES: Spinal degenerative changes. Unchanged L1, L3, L4 vertebral body   compression deformities. Bilateral hip joint arthrosis.    IMPRESSION:    Interval decreased bilateral pleural effusions compared to 4/5/2019.    Unchanged bilateral lower lobe consolidation right greater than left.    Postsurgical changes in the right nephrectomy bed.    Unchanged mild pancolitis.    < end of copied text >    RADIOLOGY & ADDITIONAL TESTS: Reviewed.
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
## 2019-04-13 NOTE — PROGRESS NOTE ADULT - SUBJECTIVE AND OBJECTIVE BOX
Cardiology Progress Note    Interval events: Symptomatic pauses overnight. Overnight CCU fellow responded at bedside. 10-15 second pauses with eyes rolling back. P-P lengthening prior to pauses. Remains consistent with vagal etiology.    Tele: as above    Medications:  ALBUTerol/ipratropium for Nebulization 3 milliLiter(s) Nebulizer every 6 hours  aluminum hydroxide/magnesium hydroxide/simethicone Suspension 30 milliLiter(s) Oral every 6 hours PRN  atropine Injectable 1 milliGRAM(s) IntraMuscular once  atropine Injectable 0.5 milliGRAM(s) IV Push once  atropine Injectable 0.5 milliGRAM(s) IV Push once PRN  cefepime   IVPB      cefepime   IVPB 2000 milliGRAM(s) IV Intermittent every 8 hours  enoxaparin Injectable 40 milliGRAM(s) SubCutaneous daily  lactated ringers. 1000 milliLiter(s) IV Continuous <Continuous>  naloxone Injectable 0.4 milliGRAM(s) IV Push once  norepinephrine Infusion 0.05 MICROgram(s)/kG/Min IV Continuous <Continuous>      Review of Systems:  Constitutional: [ ] Fever [ ] Chills [ ] Fatigue [ ] Weight change   HEENT: [ ] Blurred vision [ ] Eye Pain [ ] Headache [ ] Runny nose [ ] Sore Throat   Respiratory: [ ] Cough [ ] Wheezing [ ] Shortness of breath  Cardiovascular: [ ] Chest Pain [ ] Palpitations [ ] GUZMAN [ ] PND [ ] Orthopnea  Gastrointestinal: [ ] Abdominal Pain [ ] Diarrhea [ ] Constipation [ ] Hemorrhoids [ ] Nausea [ ] Vomiting  Genitourinary: [ ] Nocturia [ ] Dysuria [ ] Incontinence  Extremities: [ ] Swelling [ ] Joint Pain  Neurologic: [ ] Focal deficit [ ] Paresthesias [ ] Syncope  Lymphatic: [ ] Swelling [ ] Lymphadenopathy   Skin: [ ] Rash [ ] Ecchymoses [ ] Wounds [ ] Lesions  Psychiatry: [ ] Depression [ ] Suicidal/Homicidal Ideation [ ] Anxiety [ ] Sleep Disturbances  [ ] 10 point review of systems is otherwise negative except as mentioned above            [x]Unable to obtain    Vitals:  T(C): 36.9 (19 @ 05:06), Max: 36.9 (19 @ 19:00)  HR: 56 (19 @ 06:00) (47 - 126)  BP: 126/68 (19 @ 06:00) (86/50 - 222/115)  BP(mean): 90 (19 @ 06:00) (61 - 160)  RR: 35 (19 @ 06:00) (18 - 45)  SpO2: 94% (19 @ 06:00) (94% - 100%)  Wt(kg): --  Daily Height in cm: 182.88 (2019 10:00)    Daily Weight in k (2019 00:38)  I&O's Summary    2019 07:01  -  2019 07:00  --------------------------------------------------------  IN: 1043.2 mL / OUT: 400 mL / NET: 643.2 mL        Physical Exam:  Appearance: Bipap on  HEENT:   Normal oral mucosa, PERRL, EOMI	  Lymphatic: No lymphadenopathy  Cardiovascular: Normal S1 S2, No JVD, No murmurs  Respiratory: Course b/l breath sounds. + crackles  Psychiatry: A & O x 3, Mood & affect appropriate  Gastrointestinal:  Soft  Skin: Sacral ulcer noted	  Extremities: Warm and well perfused    Labs:                        10.2   9.4   )-----------( 327      ( 2019 03:44 )             32.7     04-13    145  |  104  |  8   ----------------------------<  110<H>  4.2   |  29  |  0.44<L>    Ca    8.9      2019 03:45  Phos  2.7     04-13  Mg     2.0     04-13    TPro  7.1  /  Alb  3.4  /  TBili  0.1<L>  /  DBili  x   /  AST  12  /  ALT  16  /  AlkPhos  65  04-13    PT/INR - ( 2019 09:12 )   PT: 12.8 sec;   INR: 1.11 ratio         PTT - ( 2019 09:12 )  PTT:23.0 sec  CARDIAC MARKERS ( 2019 09:18 )  x     / x     / 88 U/L / x     / 2.3 ng/mL    New results/imaging:

## 2019-04-13 NOTE — PROGRESS NOTE ADULT - ATTENDING COMMENTS
43 year old man quadriplegic for 10 years admitted with sepsis, sacral decubitus ulcer and colitis had hypercarbic respiratory failure today and then multiple episodes of profound bradycardia, lengthening AV delay, junctional rhythm and subsequently sinus pauses, one lasting 10 seconds. Additional episodes overnight, but none in last few hours. Has no chest pain, comfortable now on BiPAP. Exam as above. Cardiac rhythm abnormalities are all related to excess vagal tone. Keep atropine at bedside. Anticipate resolution as clinical condition and respiratory status improves.

## 2019-04-14 LAB
-  COAGULASE NEGATIVE STAPHYLOCOCCUS: SIGNIFICANT CHANGE UP
ALBUMIN SERPL ELPH-MCNC: 2.7 G/DL — LOW (ref 3.3–5)
ALBUMIN SERPL ELPH-MCNC: 3.2 G/DL — LOW (ref 3.3–5)
ALP SERPL-CCNC: 49 U/L — SIGNIFICANT CHANGE UP (ref 40–120)
ALP SERPL-CCNC: 60 U/L — SIGNIFICANT CHANGE UP (ref 40–120)
ALT FLD-CCNC: 14 U/L — SIGNIFICANT CHANGE UP (ref 10–45)
ALT FLD-CCNC: 16 U/L — SIGNIFICANT CHANGE UP (ref 10–45)
ANION GAP SERPL CALC-SCNC: 13 MMOL/L — SIGNIFICANT CHANGE UP (ref 5–17)
ANION GAP SERPL CALC-SCNC: 8 MMOL/L — SIGNIFICANT CHANGE UP (ref 5–17)
APTT BLD: 33.7 SEC — SIGNIFICANT CHANGE UP (ref 27.5–36.3)
AST SERPL-CCNC: 12 U/L — SIGNIFICANT CHANGE UP (ref 10–40)
AST SERPL-CCNC: 14 U/L — SIGNIFICANT CHANGE UP (ref 10–40)
BILIRUB SERPL-MCNC: 0.1 MG/DL — LOW (ref 0.2–1.2)
BILIRUB SERPL-MCNC: 0.2 MG/DL — SIGNIFICANT CHANGE UP (ref 0.2–1.2)
BUN SERPL-MCNC: 9 MG/DL — SIGNIFICANT CHANGE UP (ref 7–23)
BUN SERPL-MCNC: 9 MG/DL — SIGNIFICANT CHANGE UP (ref 7–23)
CALCIUM SERPL-MCNC: 7.6 MG/DL — LOW (ref 8.4–10.5)
CALCIUM SERPL-MCNC: 9 MG/DL — SIGNIFICANT CHANGE UP (ref 8.4–10.5)
CHLORIDE SERPL-SCNC: 102 MMOL/L — SIGNIFICANT CHANGE UP (ref 96–108)
CHLORIDE SERPL-SCNC: 108 MMOL/L — SIGNIFICANT CHANGE UP (ref 96–108)
CO2 SERPL-SCNC: 26 MMOL/L — SIGNIFICANT CHANGE UP (ref 22–31)
CO2 SERPL-SCNC: 28 MMOL/L — SIGNIFICANT CHANGE UP (ref 22–31)
CORTIS AM PEAK SERPL-MCNC: 21.7 UG/DL — HIGH (ref 6–18.4)
CREAT SERPL-MCNC: 0.39 MG/DL — LOW (ref 0.5–1.3)
CREAT SERPL-MCNC: <0.3 MG/DL — LOW (ref 0.5–1.3)
CULTURE RESULTS: SIGNIFICANT CHANGE UP
GAS PNL BLDA: SIGNIFICANT CHANGE UP
GLUCOSE BLDC GLUCOMTR-MCNC: 75 MG/DL — SIGNIFICANT CHANGE UP (ref 70–99)
GLUCOSE BLDC GLUCOMTR-MCNC: 87 MG/DL — SIGNIFICANT CHANGE UP (ref 70–99)
GLUCOSE BLDC GLUCOMTR-MCNC: 92 MG/DL — SIGNIFICANT CHANGE UP (ref 70–99)
GLUCOSE BLDC GLUCOMTR-MCNC: 92 MG/DL — SIGNIFICANT CHANGE UP (ref 70–99)
GLUCOSE SERPL-MCNC: 164 MG/DL — HIGH (ref 70–99)
GLUCOSE SERPL-MCNC: 90 MG/DL — SIGNIFICANT CHANGE UP (ref 70–99)
GRAM STN FLD: SIGNIFICANT CHANGE UP
HCT VFR BLD CALC: 30.9 % — LOW (ref 39–50)
HGB BLD-MCNC: 9.6 G/DL — LOW (ref 13–17)
INR BLD: 1.29 RATIO — HIGH (ref 0.88–1.16)
MAGNESIUM SERPL-MCNC: 2 MG/DL — SIGNIFICANT CHANGE UP (ref 1.6–2.6)
MCHC RBC-ENTMCNC: 26.6 PG — LOW (ref 27–34)
MCHC RBC-ENTMCNC: 30.9 GM/DL — LOW (ref 32–36)
MCV RBC AUTO: 86 FL — SIGNIFICANT CHANGE UP (ref 80–100)
METHOD TYPE: SIGNIFICANT CHANGE UP
ORGANISM # SPEC MICROSCOPIC CNT: SIGNIFICANT CHANGE UP
ORGANISM # SPEC MICROSCOPIC CNT: SIGNIFICANT CHANGE UP
PHOSPHATE SERPL-MCNC: 2.9 MG/DL — SIGNIFICANT CHANGE UP (ref 2.5–4.5)
PLATELET # BLD AUTO: 309 K/UL — SIGNIFICANT CHANGE UP (ref 150–400)
POTASSIUM SERPL-MCNC: 3.4 MMOL/L — LOW (ref 3.5–5.3)
POTASSIUM SERPL-MCNC: 3.9 MMOL/L — SIGNIFICANT CHANGE UP (ref 3.5–5.3)
POTASSIUM SERPL-SCNC: 3.4 MMOL/L — LOW (ref 3.5–5.3)
POTASSIUM SERPL-SCNC: 3.9 MMOL/L — SIGNIFICANT CHANGE UP (ref 3.5–5.3)
PROT SERPL-MCNC: 6 G/DL — SIGNIFICANT CHANGE UP (ref 6–8.3)
PROT SERPL-MCNC: 6.7 G/DL — SIGNIFICANT CHANGE UP (ref 6–8.3)
PROTHROM AB SERPL-ACNC: 14.8 SEC — HIGH (ref 10–12.9)
RBC # BLD: 3.6 M/UL — LOW (ref 4.2–5.8)
RBC # FLD: 17.4 % — HIGH (ref 10.3–14.5)
SODIUM SERPL-SCNC: 136 MMOL/L — SIGNIFICANT CHANGE UP (ref 135–145)
SODIUM SERPL-SCNC: 149 MMOL/L — HIGH (ref 135–145)
SPECIMEN SOURCE: SIGNIFICANT CHANGE UP
SPECIMEN SOURCE: SIGNIFICANT CHANGE UP
VANCOMYCIN TROUGH SERPL-MCNC: <4 UG/ML — LOW (ref 10–20)
WBC # BLD: 7 K/UL — SIGNIFICANT CHANGE UP (ref 3.8–10.5)
WBC # FLD AUTO: 7 K/UL — SIGNIFICANT CHANGE UP (ref 3.8–10.5)

## 2019-04-14 PROCEDURE — 99291 CRITICAL CARE FIRST HOUR: CPT

## 2019-04-14 PROCEDURE — 99233 SBSQ HOSP IP/OBS HIGH 50: CPT | Mod: GC

## 2019-04-14 PROCEDURE — 99232 SBSQ HOSP IP/OBS MODERATE 35: CPT

## 2019-04-14 RX ORDER — ATROPINE SULFATE 0.1 MG/ML
1 SYRINGE (ML) INJECTION ONCE
Qty: 0 | Refills: 0 | Status: COMPLETED | OUTPATIENT
Start: 2019-04-14 | End: 2019-04-14

## 2019-04-14 RX ORDER — SODIUM CHLORIDE 9 MG/ML
1000 INJECTION, SOLUTION INTRAVENOUS
Qty: 0 | Refills: 0 | Status: DISCONTINUED | OUTPATIENT
Start: 2019-04-14 | End: 2019-04-14

## 2019-04-14 RX ORDER — SODIUM CHLORIDE 9 MG/ML
1000 INJECTION, SOLUTION INTRAVENOUS
Qty: 0 | Refills: 0 | Status: DISCONTINUED | OUTPATIENT
Start: 2019-04-14 | End: 2019-04-15

## 2019-04-14 RX ORDER — POTASSIUM CHLORIDE 20 MEQ
10 PACKET (EA) ORAL
Qty: 0 | Refills: 0 | Status: COMPLETED | OUTPATIENT
Start: 2019-04-14 | End: 2019-04-14

## 2019-04-14 RX ADMIN — Medication 3 MILLILITER(S): at 00:53

## 2019-04-14 RX ADMIN — Medication 3 MILLILITER(S): at 06:21

## 2019-04-14 RX ADMIN — SODIUM CHLORIDE 75 MILLILITER(S): 9 INJECTION, SOLUTION INTRAVENOUS at 19:00

## 2019-04-14 RX ADMIN — CEFEPIME 100 MILLIGRAM(S): 1 INJECTION, POWDER, FOR SOLUTION INTRAMUSCULAR; INTRAVENOUS at 14:59

## 2019-04-14 RX ADMIN — SODIUM CHLORIDE 3 MILLILITER(S): 9 INJECTION INTRAMUSCULAR; INTRAVENOUS; SUBCUTANEOUS at 12:32

## 2019-04-14 RX ADMIN — CEFEPIME 100 MILLIGRAM(S): 1 INJECTION, POWDER, FOR SOLUTION INTRAMUSCULAR; INTRAVENOUS at 21:21

## 2019-04-14 RX ADMIN — Medication 100 MILLIEQUIVALENT(S): at 18:52

## 2019-04-14 RX ADMIN — Medication 1 MILLIGRAM(S): at 05:20

## 2019-04-14 RX ADMIN — Medication 100 MILLIEQUIVALENT(S): at 17:35

## 2019-04-14 RX ADMIN — SODIUM CHLORIDE 3 MILLILITER(S): 9 INJECTION INTRAMUSCULAR; INTRAVENOUS; SUBCUTANEOUS at 18:13

## 2019-04-14 RX ADMIN — ENOXAPARIN SODIUM 40 MILLIGRAM(S): 100 INJECTION SUBCUTANEOUS at 12:58

## 2019-04-14 RX ADMIN — Medication 100 MILLIEQUIVALENT(S): at 20:16

## 2019-04-14 RX ADMIN — SODIUM CHLORIDE 75 MILLILITER(S): 9 INJECTION, SOLUTION INTRAVENOUS at 02:13

## 2019-04-14 RX ADMIN — Medication 3 MILLILITER(S): at 12:33

## 2019-04-14 RX ADMIN — CEFEPIME 100 MILLIGRAM(S): 1 INJECTION, POWDER, FOR SOLUTION INTRAMUSCULAR; INTRAVENOUS at 05:53

## 2019-04-14 RX ADMIN — Medication 166.67 MILLIGRAM(S): at 02:00

## 2019-04-14 RX ADMIN — SODIUM CHLORIDE 3 MILLILITER(S): 9 INJECTION INTRAMUSCULAR; INTRAVENOUS; SUBCUTANEOUS at 06:28

## 2019-04-14 RX ADMIN — SODIUM CHLORIDE 75 MILLILITER(S): 9 INJECTION, SOLUTION INTRAVENOUS at 17:57

## 2019-04-14 RX ADMIN — SODIUM CHLORIDE 75 MILLILITER(S): 9 INJECTION, SOLUTION INTRAVENOUS at 17:26

## 2019-04-14 RX ADMIN — Medication 3 MILLILITER(S): at 18:13

## 2019-04-14 RX ADMIN — SODIUM CHLORIDE 3 MILLILITER(S): 9 INJECTION INTRAMUSCULAR; INTRAVENOUS; SUBCUTANEOUS at 00:55

## 2019-04-14 NOTE — PROGRESS NOTE ADULT - ASSESSMENT
43 harish old recent nephrectomy on right and history Pt with recurrent kidney stones.  presents with diarrhea and colitis on ct scan and indeterminate C diff test.  PCR - Not detected    stent on left that has been removed        urine culture is grew Providencia (recurrent) - senstive to cefepime    ct reviewed with ongoing colitis  no obv abscess in surgical bed   and several areas of consolidation      wd packed by urology not obv infected  wd seen .      UTI plus PNA ( HCAP)  This is not an atypical pna      complete  cefepime  for pneumonia early this past week      still no explantation for colitis and distension.     pt is afebrile and has nl wbc  looks alittle SOB with decreased BS    chest xray    reviewed  no new pna did have effusions previously  has been stable off ab for most of the week and was getting ready to be discharged  developed hypoxia, moved to ICU    Possibilities include    infection in nephrectomy bed.- repeat CT with no formed collection and decrease fluid in bed  recurrent UTI:  the left collecting system is not normal and just finished a course of ab for UTI.  pna  has had effusions  and prior cat scan with what was thought to be pna that was treated    restarted on cefepime and given a dose fo vanco    BC from 4/13 1/2 with CoNS, BCs from 4/12- NGTD  Consider contaminant  Vancomycin restarted- can DC  No further fever, WBC WNL, syncope likely related to bradycardia, if UC/S negative would stop antibiotics    Harrison Moses MD  pager [phone]  office [phone]  ID is available over the weekend, [phone] 43 harish old recent nephrectomy on right and history Pt with recurrent kidney stones.  presents with diarrhea and colitis on ct scan and indeterminate C diff test.  PCR - Not detected    stent on left that has been removed        urine culture is grew Providencia (recurrent) - senstive to cefepime    ct reviewed with ongoing colitis  no obv abscess in surgical bed   and several areas of consolidation      wd packed by urology not obv infected  wd seen .      UTI plus PNA ( HCAP)  This is not an atypical pna      complete  cefepime  for pneumonia early this past week      still no explantation for colitis and distension.     pt is afebrile and has nl wbc  looks alittle SOB with decreased BS    chest xray    reviewed  no new pna did have effusions previously  has been stable off ab for most of the week and was getting ready to be discharged  developed hypoxia, moved to ICU    Possibilities include    infection in nephrectomy bed.- repeat CT with no formed collection and decrease fluid in bed  recurrent UTI:  the left collecting system is not normal and just finished a course of ab for UTI.  pna  has had effusions  and prior cat scan with what was thought to be pna that was treated    restarted on cefepime and given a dose of vanco    BC from 4/13 1/2 with CoNS, BCs from 4/12- NGTD  Consider contaminant  Vancomycin restarted- can DC  No further fever, WBC WNL, syncope likely related to bradycardia, if UC/S negative would stop antibiotics and observe  Discussed with MICU team    Harrison Moses MD  pager [phone]  office [phone]  ID is available over the weekend, [phone]
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
## 2019-04-14 NOTE — PROGRESS NOTE ADULT - ASSESSMENT
43 year old male with quadriplegia presents with sepsis, found to have sinus bradycardia in the setting of hypercapnic respiratory failure.    1. Sinus bradycardia  - Given clinical history as well as prolonged P-P interval precipitating bradycardic episodes, it is likely that this represents an excess vagal response, unopposed autonomic function, which is often accentuated quadriplegic patients.   - Continue episodes occurring  - Consider adding a long acting anticholinergic agent.  - Ultimately will likely require Micra pacemaker prior to discharge as these are wireless and once endothelialized not prone to becoming infected.
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
## 2019-04-14 NOTE — PROGRESS NOTE ADULT - SUBJECTIVE AND OBJECTIVE BOX
HPI:  Atropine push overnight. Patient wakes to calling name, sternal rubs increase HR. Communicative, indicates no distress.  Denies CP, palpitation, SOB, lightheadedness.    Medications:  ALBUTerol/ipratropium for Nebulization 3 milliLiter(s) Nebulizer every 6 hours  aluminum hydroxide/magnesium hydroxide/simethicone Suspension 30 milliLiter(s) Oral every 6 hours PRN  atropine Injectable 0.5 milliGRAM(s) IV Push once  atropine Injectable 0.5 milliGRAM(s) IV Push once PRN  cefepime   IVPB      cefepime   IVPB 2000 milliGRAM(s) IV Intermittent every 8 hours  enoxaparin Injectable 40 milliGRAM(s) SubCutaneous daily  naloxone Injectable 0.4 milliGRAM(s) IV Push once  sodium chloride 0.45%. 1000 milliLiter(s) IV Continuous <Continuous>  sodium chloride 3%  Inhalation 3 milliLiter(s) Inhalation every 6 hours    PMH/PSH/FH/SH:  Unchanged    ROS:  Constitutional: [x ] negative [ ] fevers [ ] chills [ ] weight loss [ ] weight gain  HEENT: [x ] negative [ ] dry eyes [ ] eye irritation [ ] postnasal drip [ ] nasal congestion  CV: [x ] negative  [ ] chest pain [ ] orthopnea [ ] palpitations [ ] murmur  Resp: [x ] negative [ ] cough [ ] shortness of breath [ ] dyspnea [ ] wheezing [ ] sputum [ ] hemoptysis  GI: [x ] no abdominal pain  : [x ] N/A  Neurological: [x ] negative [ ] headache [ ] dizziness [ ] syncope [ ] weakness [ ] numbness  Psychiatric: [x ] negative [ ] anxiety [ ] depression    Vitals:  T(C): 36.3 (19 @ 08:00), Max: 37.1 (19 @ 12:00)  HR: 61 (19 @ 11:00) (0 - 120)  BP: 105/72 (19 @ 11:00) (92/50 - 158/126)  BP(mean): 84 (19 @ 11:00) (67 - 139)  RR: 13 (19 @ 11:00) (13 - 76)  SpO2: 100% (19 @ 11:00) (91% - 100%)  Wt(kg): --  Daily     Daily Weight in k.2 (2019 04:00)    Physical exam:  Appearance: Bipap on  HEENT:   Normal oral mucosa, PERRL, EOMI	  Lymphatic: No lymphadenopathy  Cardiovascular: Normal S1 S2, No JVD, No murmurs  Respiratory: Course b/l breath sounds. + crackles  Psychiatry: A & O x 3, Mood & affect appropriate  Gastrointestinal:  Soft  Skin: Sacral ulcer noted	  Extremities: Warm and well perfused    I&O's Summary    2019 07:01  -  2019 07:00  --------------------------------------------------------  IN: 2000 mL / OUT: 650 mL / NET: 1350 mL    2019 07:01  -  2019 11:43  --------------------------------------------------------  IN: 300 mL / OUT: 0 mL / NET: 300 mL                              9.6    7.0   )-----------( 309      ( 2019 00:49 )             30.9     04-14    149<H>  |  108  |  9   ----------------------------<  90  3.9   |  28  |  0.39<L>    Ca    9.0      2019 00:49  Phos  2.9     04-14  Mg     2.0     04-14    TPro  6.7  /  Alb  3.2<L>  /  TBili  0.2  /  DBili  x   /  AST  14  /  ALT  16  /  AlkPhos  60  04-14    PT/INR - ( 2019 00:49 )   PT: 14.8 sec;   INR: 1.29 ratio         PTT - ( 2019 00:49 )  PTT:33.7 sec      Interpretation of Telemetry: sinus rhythm, intermittent long sinus pauses

## 2019-04-14 NOTE — PROGRESS NOTE ADULT - SUBJECTIVE AND OBJECTIVE BOX
CHIEF COMPLAINT:    Interval Events:    REVIEW OF SYSTEMS:  Constitutional: [ ] negative [ ] fevers [ ] chills [ ] weight loss [ ] weight gain  HEENT: [ ] negative [ ] dry eyes [ ] eye irritation [ ] postnasal drip [ ] nasal congestion  CV: [ ] negative  [ ] chest pain [ ] orthopnea [ ] palpitations [ ] murmur  Resp: [ ] negative [ ] cough [ ] shortness of breath [ ] dyspnea [ ] wheezing [ ] sputum [ ] hemoptysis  GI: [ ] negative [ ] nausea [ ] vomiting [ ] diarrhea [ ] constipation [ ] abd pain [ ] dysphagia   : [ ] negative [ ] dysuria [ ] nocturia [ ] hematuria [ ] increased urinary frequency  Musculoskeletal: [ ] negative [ ] back pain [ ] myalgias [ ] arthralgias [ ] fracture  Skin: [ ] negative [ ] rash [ ] itch  Neurological: [ ] negative [ ] headache [ ] dizziness [ ] syncope [ ] weakness [ ] numbness  Psychiatric: [ ] negative [ ] anxiety [ ] depression  Endocrine: [ ] negative [ ] diabetes [ ] thyroid problem  Hematologic/Lymphatic: [ ] negative [ ] anemia [ ] bleeding problem  Allergic/Immunologic: [ ] negative [ ] itchy eyes [ ] nasal discharge [ ] hives [ ] angioedema  [ ] All other systems negative  [ ] Unable to assess ROS because ________    OBJECTIVE:  ICU Vital Signs Last 24 Hrs  T(C): 36.3 (14 Apr 2019 08:00), Max: 37.1 (13 Apr 2019 12:00)  T(F): 97.3 (14 Apr 2019 08:00), Max: 98.8 (13 Apr 2019 12:00)  HR: 47 (14 Apr 2019 08:22) (0 - 120)  BP: 147/94 (14 Apr 2019 08:00) (92/50 - 158/126)  BP(mean): 115 (14 Apr 2019 08:00) (67 - 139)  ABP: --  ABP(mean): --  RR: 23 (14 Apr 2019 08:00) (17 - 76)  SpO2: 100% (14 Apr 2019 08:22) (91% - 100%)        04-13 @ 07:01  -  04-14 @ 07:00  --------------------------------------------------------  IN: 2000 mL / OUT: 650 mL / NET: 1350 mL      CAPILLARY BLOOD GLUCOSE      POCT Blood Glucose.: 92 mg/dL (14 Apr 2019 06:41)      PHYSICAL EXAM:  General:   HEENT:   Lymph Nodes:  Neck:   Respiratory:   Cardiovascular:   Abdomen:   Extremities:   Skin:   Neurological:  Psychiatry:    LINES:    HOSPITAL MEDICATIONS:  Standing Meds:  ALBUTerol/ipratropium for Nebulization 3 milliLiter(s) Nebulizer every 6 hours  atropine Injectable 0.5 milliGRAM(s) IV Push once  cefepime   IVPB      cefepime   IVPB 2000 milliGRAM(s) IV Intermittent every 8 hours  enoxaparin Injectable 40 milliGRAM(s) SubCutaneous daily  naloxone Injectable 0.4 milliGRAM(s) IV Push once  sodium chloride 0.45%. 1000 milliLiter(s) IV Continuous <Continuous>  sodium chloride 3%  Inhalation 3 milliLiter(s) Inhalation every 6 hours  vancomycin  IVPB 1250 milliGRAM(s) IV Intermittent every 12 hours      PRN Meds:  aluminum hydroxide/magnesium hydroxide/simethicone Suspension 30 milliLiter(s) Oral every 6 hours PRN  atropine Injectable 0.5 milliGRAM(s) IV Push once PRN      LABS:                        9.6    7.0   )-----------( 309      ( 14 Apr 2019 00:49 )             30.9     Hgb Trend: 9.6<--, 10.2<--, 10.0<--, 10.2<--, See Note<--  04-14    149<H>  |  108  |  9   ----------------------------<  90  3.9   |  28  |  0.39<L>    Ca    9.0      14 Apr 2019 00:49  Phos  2.9     04-14  Mg     2.0     04-14    TPro  6.7  /  Alb  3.2<L>  /  TBili  0.2  /  DBili  x   /  AST  14  /  ALT  16  /  AlkPhos  60  04-14    Creatinine Trend: 0.39<--, 0.44<--, 0.42<--, 0.41<--, 0.48<--, 0.56<--  PT/INR - ( 14 Apr 2019 00:49 )   PT: 14.8 sec;   INR: 1.29 ratio         PTT - ( 14 Apr 2019 00:49 )  PTT:33.7 sec    Arterial Blood Gas:  04-14 @ 00:45  7.36/61/105/33/98/6.8  ABG lactate: --  Arterial Blood Gas:  04-13 @ 16:10  7.32/66/78/33/94/5.6  ABG lactate: --  Arterial Blood Gas:  04-13 @ 03:41  7.34/62/77/32/95/5.8  ABG lactate: --  Arterial Blood Gas:  04-12 @ 20:36  7.37/57/84/32/97/6.4  ABG lactate: --  Arterial Blood Gas:  04-12 @ 15:34  7.33/61/100/32/98/5.0  ABG lactate: --  Arterial Blood Gas:  04-12 @ 12:37  7.25/72/90/31/96/2.9  ABG lactate: --  Arterial Blood Gas:  04-12 @ 10:31  7.23/77/119/31/98/2.3  ABG lactate: --  Arterial Blood Gas:  04-12 @ 09:03  7.17/98/151/34/98/3.9  ABG lactate: --        MICROBIOLOGY:     Culture - Blood (collected 13 Apr 2019 00:19)  Source: .Blood  Gram Stain (13 Apr 2019 23:42):    Growth in aerobic bottle: Gram Positive Cocci in Clusters  Preliminary Report (13 Apr 2019 23:43):    Growth in aerobic bottle: Gram Positive Cocci in Clusters    "Due to technical problems, Proteus sp. will Not be reported as part of    the BCID panel until further notice"    ***Blood Panel PCR results on this specimen are available    approximately 3 hours after the Gram stain result.***    Gram stain, PCR, and/or culture results may not always    correspond due to difference in methodologies.    ************************************************************    This PCR assay was performed using [employer].    The following targets are tested for: Enterococcus,    vancomycin resistant enterococci, Listeria monocytogenes,    coagulase negative staphylococci, S. aureus,    methicillin resistant S. aureus, Streptococcus agalactiae    (Group B), S. pneumoniae, S.pyogenes (Group A),    Acinetobacter baumannii, Enterobacter cloacae, E. coli,    Klebsiella oxytoca, K. pneumoniae, Proteus sp.,    Serratia marcescens, Haemophilus influenzae,    Neisseria meningitidis, Pseudomonas aeruginosa, Candida    albicans, C. glabrata, C krusei, C parapsilosis,    C. tropicalis and the KPC resistance gene.  Organism: Blood Culture PCR (14 Apr 2019 02:08)  Organism: Blood Culture PCR (14 Apr 2019 02:08)    Culture - Blood (collected 12 Apr 2019 11:19)  Source: .Blood  Preliminary Report (13 Apr 2019 12:01):    No growth to date.      RADIOLOGY:  [ ] Reviewed and interpreted by me    EKG: Interval Events:  O/N sinus john exacerbation overnight requiring atropine push. Patient wakes up upon name calling and gentle sternal rubs and his HR recovers.  EP consult has been ordered and Vanco started on 1250mg O/N.    This AM, patient remains sinus john, opens his eyes on name calling and communicative.  He denies CP, palpitation, SOB, lightheadednes, visual disturbance. Feeling okay on the AVAPs.    REVIEW OF SYSTEMS:  Constitutional: [x ] negative [ ] fevers [ ] chills [ ] weight loss [ ] weight gain  HEENT: [x ] negative [ ] dry eyes [ ] eye irritation [ ] postnasal drip [ ] nasal congestion  CV: [x ] negative  [ ] chest pain [ ] orthopnea [ ] palpitations [ ] murmur  Resp: [x ] negative [ ] cough [ ] shortness of breath [ ] dyspnea [ ] wheezing [ ] sputum [ ] hemoptysis  GI: [x ] no abdominal pain  : [x ] N/A  Neurological: [x ] negative [ ] headache [ ] dizziness [ ] syncope [ ] weakness [ ] numbness  Psychiatric: [x ] negative [ ] anxiety [ ] depression    OBJECTIVE:  ICU Vital Signs Last 24 Hrs  T(C): 36.3 (14 Apr 2019 08:00), Max: 37.1 (13 Apr 2019 12:00)  T(F): 97.3 (14 Apr 2019 08:00), Max: 98.8 (13 Apr 2019 12:00)  HR: 47 (14 Apr 2019 08:22) (0 - 120)  BP: 147/94 (14 Apr 2019 08:00) (92/50 - 158/126)  BP(mean): 115 (14 Apr 2019 08:00) (67 - 139)  ABP: --  ABP(mean): --  RR: 23 (14 Apr 2019 08:00) (17 - 76)  SpO2: 100% (14 Apr 2019 08:22) (91% - 100%)        04-13 @ 07:01  -  04-14 @ 07:00  --------------------------------------------------------  IN: 2000 mL / OUT: 650 mL / NET: 1350 mL      CAPILLARY BLOOD GLUCOSE      POCT Blood Glucose.: 92 mg/dL (14 Apr 2019 06:41)      PHYSICAL EXAM:  General: NAD  HEENT: NC/AT; PERRL, clear conjunctiva, supple neck.  Respiratory: diffuse loud ronchi, no clear crackles  Cardiovascular: +S1/S2; sinus john in 50s  Abdomen: soft, NT/ND; +BS   Extremities: WWP, 2+ peripheral pulses b/l; no LE edema  Skin: normal color and turgor; no rash  Neurological: AO x 3      HOSPITAL MEDICATIONS:  Standing Meds:  ALBUTerol/ipratropium for Nebulization 3 milliLiter(s) Nebulizer every 6 hours  atropine Injectable 0.5 milliGRAM(s) IV Push once  cefepime   IVPB      cefepime   IVPB 2000 milliGRAM(s) IV Intermittent every 8 hours  enoxaparin Injectable 40 milliGRAM(s) SubCutaneous daily  naloxone Injectable 0.4 milliGRAM(s) IV Push once  sodium chloride 0.45%. 1000 milliLiter(s) IV Continuous <Continuous>  sodium chloride 3%  Inhalation 3 milliLiter(s) Inhalation every 6 hours  vancomycin  IVPB 1250 milliGRAM(s) IV Intermittent every 12 hours      PRN Meds:  aluminum hydroxide/magnesium hydroxide/simethicone Suspension 30 milliLiter(s) Oral every 6 hours PRN  atropine Injectable 0.5 milliGRAM(s) IV Push once PRN      LABS:                        9.6    7.0   )-----------( 309      ( 14 Apr 2019 00:49 )             30.9     Hgb Trend: 9.6<--, 10.2<--, 10.0<--, 10.2<--, See Note<--  04-14    149<H>  |  108  |  9   ----------------------------<  90  3.9   |  28  |  0.39<L>    Ca    9.0      14 Apr 2019 00:49  Phos  2.9     04-14  Mg     2.0     04-14    TPro  6.7  /  Alb  3.2<L>  /  TBili  0.2  /  DBili  x   /  AST  14  /  ALT  16  /  AlkPhos  60  04-14    Creatinine Trend: 0.39<--, 0.44<--, 0.42<--, 0.41<--, 0.48<--, 0.56<--  PT/INR - ( 14 Apr 2019 00:49 )   PT: 14.8 sec;   INR: 1.29 ratio         PTT - ( 14 Apr 2019 00:49 )  PTT:33.7 sec    Arterial Blood Gas:  04-14 @ 00:45  7.36/61/105/33/98/6.8  ABG lactate: --  Arterial Blood Gas:  04-13 @ 16:10  7.32/66/78/33/94/5.6  ABG lactate: --  Arterial Blood Gas:  04-13 @ 03:41  7.34/62/77/32/95/5.8  ABG lactate: --  Arterial Blood Gas:  04-12 @ 20:36  7.37/57/84/32/97/6.4  ABG lactate: --  Arterial Blood Gas:  04-12 @ 15:34  7.33/61/100/32/98/5.0  ABG lactate: --  Arterial Blood Gas:  04-12 @ 12:37  7.25/72/90/31/96/2.9  ABG lactate: --  Arterial Blood Gas:  04-12 @ 10:31  7.23/77/119/31/98/2.3  ABG lactate: --  Arterial Blood Gas:  04-12 @ 09:03  7.17/98/151/34/98/3.9  ABG lactate: --        MICROBIOLOGY:     Culture - Blood (collected 13 Apr 2019 00:19)  Source: .Blood  Gram Stain (13 Apr 2019 23:42):    Growth in aerobic bottle: Gram Positive Cocci in Clusters  Preliminary Report (13 Apr 2019 23:43):    Growth in aerobic bottle: Gram Positive Cocci in Clusters    "Due to technical problems, Proteus sp. will Not be reported as part of    the BCID panel until further notice"    ***Blood Panel PCR results on this specimen are available    approximately 3 hours after the Gram stain result.***    Gram stain, PCR, and/or culture results may not always    correspond due to difference in methodologies.    ************************************************************    This PCR assay was performed using [employer].    The following targets are tested for: Enterococcus,    vancomycin resistant enterococci, Listeria monocytogenes,    coagulase negative staphylococci, S. aureus,    methicillin resistant S. aureus, Streptococcus agalactiae    (Group B), S. pneumoniae, S.pyogenes (Group A),    Acinetobacter baumannii, Enterobacter cloacae, E. coli,    Klebsiella oxytoca, K. pneumoniae, Proteus sp.,    Serratia marcescens, Haemophilus influenzae,    Neisseria meningitidis, Pseudomonas aeruginosa, Candida    albicans, C. glabrata, C krusei, C parapsilosis,    C. tropicalis and the KPC resistance gene.  Organism: Blood Culture PCR (14 Apr 2019 02:08)  Organism: Blood Culture PCR (14 Apr 2019 02:08)    Culture - Blood (collected 12 Apr 2019 11:19)  Source: .Blood  Preliminary Report (13 Apr 2019 12:01):    No growth to date.      RADIOLOGY:  [ ] Reviewed and interpreted by me    EKG:

## 2019-04-14 NOTE — PROCEDURE NOTE - NSURITECHNIQUE_GU_A_CORE
The urinary drainage system is closed at the end of the procedure/Proper hand hygiene was performed/A sterile drape was used to cover all adjacent areas/The catheter was appropriately lubricated

## 2019-04-14 NOTE — PROGRESS NOTE ADULT - ATTENDING COMMENTS
Mr. Roche continues to have periods of profound sinus bradycardia, unclear if related to excess vagal tone vs sinus node dysfunction.  His hypercapnea improves with BiPAP and he remains alert and communicative.  Being treated with antibiotics.  One blood culture shows coag neg Staph, repeat cultures are pending.  Cardiology and EP are following and considering pacemaker.  Continue MICU observation with low threshold for intubation /mechanical ventilation.

## 2019-04-14 NOTE — PROGRESS NOTE ADULT - SUBJECTIVE AND OBJECTIVE BOX
Patient is a 43y old  Male who presents with a chief complaint of sepsis (13 Apr 2019 07:48)    Being followed by ID for UTI    Interval history:  BC- 1/2 from 4/13 with CoNS  Afebrile  CT with slight decrease in nephrectomy bed, no formed collection  No acute events      ROS:  No cough, SOB, CP  No N/V/D./abd pain  No other complaints      Antimicrobials:    cefepime   IVPB      cefepime   IVPB 2000 milliGRAM(s) IV Intermittent every 8 hours  vancomycin  IVPB 1250 milliGRAM(s) IV Intermittent every 12 hours      Vital Signs Last 24 Hrs  T(C): 36.4 (04-14-19 @ 04:00), Max: 37.1 (04-13-19 @ 12:00)  T(F): 97.6 (04-14-19 @ 04:00), Max: 98.8 (04-13-19 @ 12:00)  HR: 62 (04-14-19 @ 07:00) (0 - 120)  BP: 131/84 (04-14-19 @ 07:00) (92/50 - 158/126)  BP(mean): 104 (04-14-19 @ 07:00) (67 - 139)  RR: 21 (04-14-19 @ 07:00) (17 - 76)  SpO2: 100% (04-14-19 @ 07:00) (91% - 100%)    Physical Exam:    Constitutional well preserved, NAD    HEENT EOMI, No pallor or icterus    No oral exudate or erythema    Neck supple     Chest Bilateral scattered rhonchi    CVS  S1 S2 WNl bradycardia    Abd soft BS normal No tenderness no masses    Ext No cyanosis clubbing or edema    IV site no erythema tenderness or discharge    Joints no swelling or LOM    CNS AAO X 3 0/5 B/L upper and lower extremities    Lab Data:                          9.6    7.0   )-----------( 309      ( 14 Apr 2019 00:49 )             30.9       04-14    149<H>  |  108  |  9   ----------------------------<  90  3.9   |  28  |  0.39<L>    Ca    9.0      14 Apr 2019 00:49  Phos  2.9     04-14  Mg     2.0     04-14    TPro  6.7  /  Alb  3.2<L>  /  TBili  0.2  /  DBili  x   /  AST  14  /  ALT  16  /  AlkPhos  60  04-14        .Blood  04-13-19   Growth in aerobic bottle: Gram Positive Cocci in Clusters  "Due to technical problems, Proteus sp. will Not be reported as part of  the BCID panel until further notice"  ***Blood Panel PCR results on this specimen are available  approximately 3 hours after the Gram stain result.***  Gram stain, PCR, and/or culture results may not always  correspond due to difference in methodologies.  ************************************************************  This PCR assay was performed using [employer].  The following targets are tested for: Enterococcus,  vancomycin resistant enterococci, Listeria monocytogenes,  coagulase negative staphylococci, S. aureus,  methicillin resistant S. aureus, Streptococcus agalactiae  (Group B), S. pneumoniae, S.pyogenes (Group A),  Acinetobacter baumannii, Enterobacter cloacae, E. coli,  Klebsiella oxytoca, K. pneumoniae, Proteus sp.,  Serratia marcescens, Haemophilus influenzae,  Neisseria meningitidis, Pseudomonas aeruginosa, Candida  albicans, C. glabrata, C krusei, C parapsilosis,  C. tropicalis and the KPC resistance gene.  --  Blood Culture PCR      .Blood  04-12-19   No growth to date.  --  --      Vancomycin Level, Trough: <4.0 ug/mL (04-14-19 @ 00:49)    < from: CT Abdomen and Pelvis w/ IV Cont (04.12.19 @ 17:13) >  EXAM:  CT ABDOMEN AND PELVIS IC                            PROCEDURE DATE:  04/12/2019            INTERPRETATION:  CLINICAL INFORMATION: Evaluate for infectious source.   Recurrent sepsis.     COMPARISON: CT abdomen pelvis 4/4/2019.    PROCEDURE:   CT of the Abdomen and Pelvis was performed with intravenous contrast.   Intravenous contrast: 90 ml Omnipaque 350. 10 ml discarded.  Oral contrast: None.  Sagittal and coronal reformats were performed.    FINDINGS:    LOWER CHEST: Persistent small right pleural effusion. Bibasilar   bronchiectatic changes with associated consolidation right greater than   left Right basilar subsegmental atelectasis.     LIVER: Within normal limits.  BILE DUCTS: Normal caliber.  GALLBLADDER: Within normal limits.  SPLEEN: Within normal limits.  PANCREAS: Within normal limits.  ADRENALS: Within normal limits.  KIDNEYS/URETERS: Status post right nephrectomy. Unchanged mild left   proximal periureteral fat stranding and mild proximal left hydroureter.   Small amount of fluid in the right nephrectomy bed without a discrete   collection to suggest an abscess. This appears slightly decreased   compared to 4/4/2019 and CT abdomen and pelvis.    BLADDER: Within normal limits.  REPRODUCTIVE ORGANS: Prostate within normal limits.    BOWEL: Unchanged mild thickening of the colon and rectum consistent with   pancolitis. No bowel obstruction.  PERITONEUM: Trace ascites.  VESSELS:  Within normal limits. Status post placement of an infrarenal   IVC filter.  RETROPERITONEUM: No lymphadenopathy.    ABDOMINAL WALL: Anasarca.  BONES: Spinal degenerative changes. Unchanged L1, L3, L4 vertebral body   compression deformities. Bilateral hip joint arthrosis.    IMPRESSION:    Interval decreased bilateral pleural effusions compared to 4/5/2019.    Unchanged bilateral lower lobe consolidation right greater than left.    Postsurgical changes in the right nephrectomy bed.    Unchanged mild pancolitis.    Trace ascites    < end of copied text > Patient is a 43y old  Male who presents with a chief complaint of sepsis (13 Apr 2019 07:48)    Being followed by ID for UTI    Interval history:  BC- 1/2 from 4/13 with CoNS  Afebrile  CT with slight decrease in nephrectomy bed, no formed collection  No acute events      ROS:  No cough, SOB, CP  No N/V/D./abd pain  No other complaints      Antimicrobials:    cefepime   IVPB      cefepime   IVPB 2000 milliGRAM(s) IV Intermittent every 8 hours  vancomycin  IVPB 1250 milliGRAM(s) IV Intermittent every 12 hours      Vital Signs Last 24 Hrs  T(C): 36.4 (04-14-19 @ 04:00), Max: 37.1 (04-13-19 @ 12:00)  T(F): 97.6 (04-14-19 @ 04:00), Max: 98.8 (04-13-19 @ 12:00)  HR: 62 (04-14-19 @ 07:00) (0 - 120)  BP: 131/84 (04-14-19 @ 07:00) (92/50 - 158/126)  BP(mean): 104 (04-14-19 @ 07:00) (67 - 139)  RR: 21 (04-14-19 @ 07:00) (17 - 76)  SpO2: 100% (04-14-19 @ 07:00) (91% - 100%)    Physical Exam:    Constitutional well preserved, NAD,  limited as patient did not want full examination    HEENT EOMI, No pallor or icterus    No oral exudate or erythema    Neck supple     Chest Bilateral scattered rhonchi    CVS  S1 S2 WNl bradycardia    Abd soft BS normal No tenderness no masses    Ext No cyanosis clubbing or edema    IV site no erythema tenderness or discharge    Joints no swelling or LOM    CNS AAO  0/5 B/L upper and lower extremities    Lab Data:                          9.6    7.0   )-----------( 309      ( 14 Apr 2019 00:49 )             30.9       04-14    149<H>  |  108  |  9   ----------------------------<  90  3.9   |  28  |  0.39<L>    Ca    9.0      14 Apr 2019 00:49  Phos  2.9     04-14  Mg     2.0     04-14    TPro  6.7  /  Alb  3.2<L>  /  TBili  0.2  /  DBili  x   /  AST  14  /  ALT  16  /  AlkPhos  60  04-14        .Blood  04-13-19   Growth in aerobic bottle: Gram Positive Cocci in Clusters  "Due to technical problems, Proteus sp. will Not be reported as part of  the BCID panel until further notice"  ***Blood Panel PCR results on this specimen are available  approximately 3 hours after the Gram stain result.***  Gram stain, PCR, and/or culture results may not always  correspond due to difference in methodologies.  ************************************************************  This PCR assay was performed using [employer].  The following targets are tested for: Enterococcus,  vancomycin resistant enterococci, Listeria monocytogenes,  coagulase negative staphylococci, S. aureus,  methicillin resistant S. aureus, Streptococcus agalactiae  (Group B), S. pneumoniae, S.pyogenes (Group A),  Acinetobacter baumannii, Enterobacter cloacae, E. coli,  Klebsiella oxytoca, K. pneumoniae, Proteus sp.,  Serratia marcescens, Haemophilus influenzae,  Neisseria meningitidis, Pseudomonas aeruginosa, Candida  albicans, C. glabrata, C krusei, C parapsilosis,  C. tropicalis and the KPC resistance gene.  --  Blood Culture PCR      .Blood  04-12-19   No growth to date.  --  --      Vancomycin Level, Trough: <4.0 ug/mL (04-14-19 @ 00:49)    < from: CT Abdomen and Pelvis w/ IV Cont (04.12.19 @ 17:13) >  EXAM:  CT ABDOMEN AND PELVIS IC                            PROCEDURE DATE:  04/12/2019            INTERPRETATION:  CLINICAL INFORMATION: Evaluate for infectious source.   Recurrent sepsis.     COMPARISON: CT abdomen pelvis 4/4/2019.    PROCEDURE:   CT of the Abdomen and Pelvis was performed with intravenous contrast.   Intravenous contrast: 90 ml Omnipaque 350. 10 ml discarded.  Oral contrast: None.  Sagittal and coronal reformats were performed.    FINDINGS:    LOWER CHEST: Persistent small right pleural effusion. Bibasilar   bronchiectatic changes with associated consolidation right greater than   left Right basilar subsegmental atelectasis.     LIVER: Within normal limits.  BILE DUCTS: Normal caliber.  GALLBLADDER: Within normal limits.  SPLEEN: Within normal limits.  PANCREAS: Within normal limits.  ADRENALS: Within normal limits.  KIDNEYS/URETERS: Status post right nephrectomy. Unchanged mild left   proximal periureteral fat stranding and mild proximal left hydroureter.   Small amount of fluid in the right nephrectomy bed without a discrete   collection to suggest an abscess. This appears slightly decreased   compared to 4/4/2019 and CT abdomen and pelvis.    BLADDER: Within normal limits.  REPRODUCTIVE ORGANS: Prostate within normal limits.    BOWEL: Unchanged mild thickening of the colon and rectum consistent with   pancolitis. No bowel obstruction.  PERITONEUM: Trace ascites.  VESSELS:  Within normal limits. Status post placement of an infrarenal   IVC filter.  RETROPERITONEUM: No lymphadenopathy.    ABDOMINAL WALL: Anasarca.  BONES: Spinal degenerative changes. Unchanged L1, L3, L4 vertebral body   compression deformities. Bilateral hip joint arthrosis.    IMPRESSION:    Interval decreased bilateral pleural effusions compared to 4/5/2019.    Unchanged bilateral lower lobe consolidation right greater than left.    Postsurgical changes in the right nephrectomy bed.    Unchanged mild pancolitis.    Trace ascites    < end of copied text >

## 2019-04-14 NOTE — PROGRESS NOTE ADULT - ATTENDING COMMENTS
43 year old man quadriplegic for 10 years admitted with sepsis, sacral decubitus ulcer and colitis had hypercarbic respiratory failure today and then multiple episodes of profound bradycardia, lengthening AV delay, junctional rhythm and subsequently sinus pauses, lasting 10 seconds. Additional episodes overnight, and this morning. Has no chest pain, comfortable now on BiPAP. Exam as above. Cardiac rhythm abnormalities seem related to excess vagal tone, unopposed autonomic function associated with quadriplegia. Has intermittently received atropine. Discussed adding a long acting anticholinergic. EP to consult and seems likely he will receive Micra device prior to discharge.

## 2019-04-14 NOTE — PROGRESS NOTE ADULT - SUBJECTIVE AND OBJECTIVE BOX
called to place lindsey in patient.  pt refusing lindsey, but will allow straight catheterization.  16F lindsey inserted under typical sterile technique without difficulty.  ~350cc clear yellow urine returned.  16F lindsey removed intact.

## 2019-04-15 LAB
ALBUMIN SERPL ELPH-MCNC: 3.1 G/DL — LOW (ref 3.3–5)
ALP SERPL-CCNC: 57 U/L — SIGNIFICANT CHANGE UP (ref 40–120)
ALT FLD-CCNC: 18 U/L — SIGNIFICANT CHANGE UP (ref 10–45)
ANION GAP SERPL CALC-SCNC: 13 MMOL/L — SIGNIFICANT CHANGE UP (ref 5–17)
APTT BLD: 31.9 SEC — SIGNIFICANT CHANGE UP (ref 27.5–36.3)
AST SERPL-CCNC: 40 U/L — SIGNIFICANT CHANGE UP (ref 10–40)
BILIRUB SERPL-MCNC: 0.3 MG/DL — SIGNIFICANT CHANGE UP (ref 0.2–1.2)
BUN SERPL-MCNC: 10 MG/DL — SIGNIFICANT CHANGE UP (ref 7–23)
CALCIUM SERPL-MCNC: 8.9 MG/DL — SIGNIFICANT CHANGE UP (ref 8.4–10.5)
CHLORIDE SERPL-SCNC: 104 MMOL/L — SIGNIFICANT CHANGE UP (ref 96–108)
CO2 SERPL-SCNC: 28 MMOL/L — SIGNIFICANT CHANGE UP (ref 22–31)
CREAT SERPL-MCNC: 0.31 MG/DL — LOW (ref 0.5–1.3)
GAS PNL BLDA: SIGNIFICANT CHANGE UP
GLUCOSE BLDC GLUCOMTR-MCNC: 129 MG/DL — HIGH (ref 70–99)
GLUCOSE BLDC GLUCOMTR-MCNC: 88 MG/DL — SIGNIFICANT CHANGE UP (ref 70–99)
GLUCOSE BLDC GLUCOMTR-MCNC: 89 MG/DL — SIGNIFICANT CHANGE UP (ref 70–99)
GLUCOSE BLDC GLUCOMTR-MCNC: 98 MG/DL — SIGNIFICANT CHANGE UP (ref 70–99)
GLUCOSE SERPL-MCNC: 80 MG/DL — SIGNIFICANT CHANGE UP (ref 70–99)
HCT VFR BLD CALC: 32.7 % — LOW (ref 39–50)
HGB BLD-MCNC: 9.2 G/DL — LOW (ref 13–17)
INR BLD: 1.25 RATIO — HIGH (ref 0.88–1.16)
MAGNESIUM SERPL-MCNC: 1.8 MG/DL — SIGNIFICANT CHANGE UP (ref 1.6–2.6)
MCHC RBC-ENTMCNC: 23.8 PG — LOW (ref 27–34)
MCHC RBC-ENTMCNC: 28.1 GM/DL — LOW (ref 32–36)
MCV RBC AUTO: 84.6 FL — SIGNIFICANT CHANGE UP (ref 80–100)
PHOSPHATE SERPL-MCNC: 2.2 MG/DL — LOW (ref 2.5–4.5)
PLATELET # BLD AUTO: 358 K/UL — SIGNIFICANT CHANGE UP (ref 150–400)
POTASSIUM SERPL-MCNC: 5.1 MMOL/L — SIGNIFICANT CHANGE UP (ref 3.5–5.3)
POTASSIUM SERPL-SCNC: 5.1 MMOL/L — SIGNIFICANT CHANGE UP (ref 3.5–5.3)
PROT SERPL-MCNC: 7.1 G/DL — SIGNIFICANT CHANGE UP (ref 6–8.3)
PROTHROM AB SERPL-ACNC: 14.5 SEC — HIGH (ref 10–12.9)
RBC # BLD: 3.86 M/UL — LOW (ref 4.2–5.8)
RBC # FLD: 17.3 % — HIGH (ref 10.3–14.5)
SODIUM SERPL-SCNC: 145 MMOL/L — SIGNIFICANT CHANGE UP (ref 135–145)
WBC # BLD: 9 K/UL — SIGNIFICANT CHANGE UP (ref 3.8–10.5)
WBC # FLD AUTO: 9 K/UL — SIGNIFICANT CHANGE UP (ref 3.8–10.5)

## 2019-04-15 PROCEDURE — 99233 SBSQ HOSP IP/OBS HIGH 50: CPT | Mod: GC

## 2019-04-15 PROCEDURE — 99231 SBSQ HOSP IP/OBS SF/LOW 25: CPT

## 2019-04-15 PROCEDURE — 99232 SBSQ HOSP IP/OBS MODERATE 35: CPT

## 2019-04-15 RX ORDER — MAGNESIUM SULFATE 500 MG/ML
1 VIAL (ML) INJECTION ONCE
Qty: 0 | Refills: 0 | Status: COMPLETED | OUTPATIENT
Start: 2019-04-15 | End: 2019-04-15

## 2019-04-15 RX ORDER — ACETAMINOPHEN 500 MG
1000 TABLET ORAL ONCE
Qty: 0 | Refills: 0 | Status: COMPLETED | OUTPATIENT
Start: 2019-04-15 | End: 2019-04-15

## 2019-04-15 RX ORDER — ACETAMINOPHEN 500 MG
975 TABLET ORAL ONCE
Qty: 0 | Refills: 0 | Status: COMPLETED | OUTPATIENT
Start: 2019-04-15 | End: 2019-04-15

## 2019-04-15 RX ADMIN — Medication 62.5 MILLIMOLE(S): at 01:32

## 2019-04-15 RX ADMIN — CEFEPIME 100 MILLIGRAM(S): 1 INJECTION, POWDER, FOR SOLUTION INTRAMUSCULAR; INTRAVENOUS at 05:19

## 2019-04-15 RX ADMIN — Medication 3 MILLILITER(S): at 00:22

## 2019-04-15 RX ADMIN — Medication 975 MILLIGRAM(S): at 16:00

## 2019-04-15 RX ADMIN — Medication 400 MILLIGRAM(S): at 09:34

## 2019-04-15 RX ADMIN — SODIUM CHLORIDE 3 MILLILITER(S): 9 INJECTION INTRAMUSCULAR; INTRAVENOUS; SUBCUTANEOUS at 00:33

## 2019-04-15 RX ADMIN — Medication 100 GRAM(S): at 09:34

## 2019-04-15 RX ADMIN — ENOXAPARIN SODIUM 40 MILLIGRAM(S): 100 INJECTION SUBCUTANEOUS at 15:25

## 2019-04-15 RX ADMIN — Medication 3 MILLILITER(S): at 11:34

## 2019-04-15 RX ADMIN — SODIUM CHLORIDE 75 MILLILITER(S): 9 INJECTION, SOLUTION INTRAVENOUS at 09:35

## 2019-04-15 RX ADMIN — SODIUM CHLORIDE 3 MILLILITER(S): 9 INJECTION INTRAMUSCULAR; INTRAVENOUS; SUBCUTANEOUS at 05:49

## 2019-04-15 RX ADMIN — CEFEPIME 100 MILLIGRAM(S): 1 INJECTION, POWDER, FOR SOLUTION INTRAMUSCULAR; INTRAVENOUS at 21:34

## 2019-04-15 RX ADMIN — CEFEPIME 100 MILLIGRAM(S): 1 INJECTION, POWDER, FOR SOLUTION INTRAMUSCULAR; INTRAVENOUS at 15:59

## 2019-04-15 RX ADMIN — Medication 3 MILLILITER(S): at 05:48

## 2019-04-15 RX ADMIN — Medication 975 MILLIGRAM(S): at 15:24

## 2019-04-15 RX ADMIN — SODIUM CHLORIDE 3 MILLILITER(S): 9 INJECTION INTRAMUSCULAR; INTRAVENOUS; SUBCUTANEOUS at 11:34

## 2019-04-15 NOTE — PROGRESS NOTE ADULT - SUBJECTIVE AND OBJECTIVE BOX
CHIEF COMPLAINT:    Interval Events:    REVIEW OF SYSTEMS:  Constitutional: [ ] negative [ ] fevers [ ] chills [ ] weight loss [ ] weight gain  HEENT: [ ] negative [ ] dry eyes [ ] eye irritation [ ] postnasal drip [ ] nasal congestion  CV: [ ] negative  [ ] chest pain [ ] orthopnea [ ] palpitations [ ] murmur  Resp: [ ] negative [ ] cough [ ] shortness of breath [ ] dyspnea [ ] wheezing [ ] sputum [ ] hemoptysis  GI: [ ] negative [ ] nausea [ ] vomiting [ ] diarrhea [ ] constipation [ ] abd pain [ ] dysphagia   : [ ] negative [ ] dysuria [ ] nocturia [ ] hematuria [ ] increased urinary frequency  Musculoskeletal: [ ] negative [ ] back pain [ ] myalgias [ ] arthralgias [ ] fracture  Skin: [ ] negative [ ] rash [ ] itch  Neurological: [ ] negative [ ] headache [ ] dizziness [ ] syncope [ ] weakness [ ] numbness  Psychiatric: [ ] negative [ ] anxiety [ ] depression  Endocrine: [ ] negative [ ] diabetes [ ] thyroid problem  Hematologic/Lymphatic: [ ] negative [ ] anemia [ ] bleeding problem  Allergic/Immunologic: [ ] negative [ ] itchy eyes [ ] nasal discharge [ ] hives [ ] angioedema  [ ] All other systems negative  [ ] Unable to assess ROS because ________    OBJECTIVE:  ICU Vital Signs Last 24 Hrs  T(C): 36.7 (15 Apr 2019 00:00), Max: 36.7 (15 Apr 2019 00:00)  T(F): 98 (15 Apr 2019 00:00), Max: 98 (15 Apr 2019 00:00)  HR: 75 (15 Apr 2019 06:00) (37 - 96)  BP: 116/67 (15 Apr 2019 06:00) (103/62 - 174/100)  BP(mean): 85 (15 Apr 2019 06:00) (66 - 131)  ABP: --  ABP(mean): --  RR: 33 (15 Apr 2019 06:00) (13 - 36)  SpO2: 100% (15 Apr 2019 06:00) (97% - 100%)        04-13 @ 07:01  -  04-14 @ 07:00  --------------------------------------------------------  IN: 2000 mL / OUT: 650 mL / NET: 1350 mL    04-14 @ 07:01  -  04-15 @ 06:59  --------------------------------------------------------  IN: 2500 mL / OUT: 900 mL / NET: 1600 mL      CAPILLARY BLOOD GLUCOSE      POCT Blood Glucose.: 98 mg/dL (15 Apr 2019 05:48)      PHYSICAL EXAM:  General:   HEENT:   Lymph Nodes:  Neck:   Respiratory:   Cardiovascular:   Abdomen:   Extremities:   Skin:   Neurological:  Psychiatry:    LINES:    HOSPITAL MEDICATIONS:  enoxaparin Injectable 40 milliGRAM(s) SubCutaneous daily    cefepime   IVPB      cefepime   IVPB 2000 milliGRAM(s) IV Intermittent every 8 hours        ALBUTerol/ipratropium for Nebulization 3 milliLiter(s) Nebulizer every 6 hours  sodium chloride 3%  Inhalation 3 milliLiter(s) Inhalation every 6 hours      aluminum hydroxide/magnesium hydroxide/simethicone Suspension 30 milliLiter(s) Oral every 6 hours PRN  atropine Injectable 0.5 milliGRAM(s) IV Push once  atropine Injectable 0.5 milliGRAM(s) IV Push once PRN        dextrose 5% + lactated ringers. 1000 milliLiter(s) IV Continuous <Continuous>            LABS:                        9.2    9.0   )-----------( 358      ( 15 Apr 2019 00:16 )             32.7     Hgb Trend: 9.2<--, 9.6<--, 10.2<--, 10.0<--, 10.2<--  04-15    145  |  104  |  10  ----------------------------<  80  5.1   |  28  |  0.31<L>    Ca    8.9      15 Apr 2019 00:16  Phos  2.2     04-15  Mg     1.8     04-15    TPro  7.1  /  Alb  3.1<L>  /  TBili  0.3  /  DBili  x   /  AST  40  /  ALT  18  /  AlkPhos  57  04-15    Creatinine Trend: 0.31<--, <0.30<--, 0.39<--, 0.44<--, 0.42<--, 0.41<--  PT/INR - ( 15 Apr 2019 00:16 )   PT: 14.5 sec;   INR: 1.25 ratio         PTT - ( 15 Apr 2019 00:16 )  PTT:31.9 sec    Arterial Blood Gas:  04-14 @ 00:45  7.36/61/105/33/98/6.8  ABG lactate: --  Arterial Blood Gas:  04-13 @ 16:10  7.32/66/78/33/94/5.6  ABG lactate: --        MICROBIOLOGY:     RADIOLOGY:  [ ] Reviewed and interpreted by me    EKG: CHIEF COMPLAINT:    Interval Events:    Pt spiked fever to 101.9 rectal at 10 AM, cultured. Pt denies any shortness of breath    OBJECTIVE:  ICU Vital Signs Last 24 Hrs  T(C): 36.7 (15 Apr 2019 00:00), Max: 36.7 (15 Apr 2019 00:00)  T(F): 98 (15 Apr 2019 00:00), Max: 98 (15 Apr 2019 00:00)  HR: 75 (15 Apr 2019 06:00) (37 - 96)  BP: 116/67 (15 Apr 2019 06:00) (103/62 - 174/100)  BP(mean): 85 (15 Apr 2019 06:00) (66 - 131)  ABP: --  ABP(mean): --  RR: 33 (15 Apr 2019 06:00) (13 - 36)  SpO2: 100% (15 Apr 2019 06:00) (97% - 100%)        04-13 @ 07:01  -  04-14 @ 07:00  --------------------------------------------------------  IN: 2000 mL / OUT: 650 mL / NET: 1350 mL    04-14 @ 07:01 - 04-15 @ 06:59  --------------------------------------------------------  IN: 2500 mL / OUT: 900 mL / NET: 1600 mL      CAPILLARY BLOOD GLUCOSE      POCT Blood Glucose.: 98 mg/dL (15 Apr 2019 05:48)      PHYSICAL EXAM:  General:   HEENT:   Lymph Nodes:  Neck:   Respiratory:   Cardiovascular:   Abdomen:   Extremities:   Skin:   Neurological:  Psychiatry:    LINES:    HOSPITAL MEDICATIONS:  enoxaparin Injectable 40 milliGRAM(s) SubCutaneous daily    cefepime   IVPB      cefepime   IVPB 2000 milliGRAM(s) IV Intermittent every 8 hours        ALBUTerol/ipratropium for Nebulization 3 milliLiter(s) Nebulizer every 6 hours  sodium chloride 3%  Inhalation 3 milliLiter(s) Inhalation every 6 hours      aluminum hydroxide/magnesium hydroxide/simethicone Suspension 30 milliLiter(s) Oral every 6 hours PRN  atropine Injectable 0.5 milliGRAM(s) IV Push once  atropine Injectable 0.5 milliGRAM(s) IV Push once PRN        dextrose 5% + lactated ringers. 1000 milliLiter(s) IV Continuous <Continuous>            LABS:                        9.2    9.0   )-----------( 358      ( 15 Apr 2019 00:16 )             32.7     Hgb Trend: 9.2<--, 9.6<--, 10.2<--, 10.0<--, 10.2<--  04-15    145  |  104  |  10  ----------------------------<  80  5.1   |  28  |  0.31<L>    Ca    8.9      15 Apr 2019 00:16  Phos  2.2     04-15  Mg     1.8     04-15    TPro  7.1  /  Alb  3.1<L>  /  TBili  0.3  /  DBili  x   /  AST  40  /  ALT  18  /  AlkPhos  57  04-15    Creatinine Trend: 0.31<--, <0.30<--, 0.39<--, 0.44<--, 0.42<--, 0.41<--  PT/INR - ( 15 Apr 2019 00:16 )   PT: 14.5 sec;   INR: 1.25 ratio         PTT - ( 15 Apr 2019 00:16 )  PTT:31.9 sec    Arterial Blood Gas:  04-14 @ 00:45  7.36/61/105/33/98/6.8  ABG lactate: --  Arterial Blood Gas:  04-13 @ 16:10  7.32/66/78/33/94/5.6  ABG lactate: --        MICROBIOLOGY:     RADIOLOGY:  [ ] Reviewed and interpreted by me    EKG: CHIEF COMPLAINT:    Interval Events:    Pt spiked fever to 101.9 rectal at 10 AM, cultured. Pt denies any shortness of breath or abdominal pain/nausea/vomiting.     OBJECTIVE:  ICU Vital Signs Last 24 Hrs  T(C): 36.7 (15 Apr 2019 00:00), Max: 36.7 (15 Apr 2019 00:00)  T(F): 98 (15 Apr 2019 00:00), Max: 98 (15 Apr 2019 00:00)  HR: 75 (15 Apr 2019 06:00) (37 - 96)  BP: 116/67 (15 Apr 2019 06:00) (103/62 - 174/100)  BP(mean): 85 (15 Apr 2019 06:00) (66 - 131)  ABP: --  ABP(mean): --  RR: 33 (15 Apr 2019 06:00) (13 - 36)  SpO2: 100% (15 Apr 2019 06:00) (97% - 100%)        04-13 @ 07:01 - 04-14 @ 07:00  --------------------------------------------------------  IN: 2000 mL / OUT: 650 mL / NET: 1350 mL    04-14 @ 07:01 - 04-15 @ 06:59  --------------------------------------------------------  IN: 2500 mL / OUT: 900 mL / NET: 1600 mL      CAPILLARY BLOOD GLUCOSE      POCT Blood Glucose.: 98 mg/dL (15 Apr 2019 05:48)      PHYSICAL EXAM:  General: NAD  HEENT: NC/AT; PERRL, clear conjunctiva, supple neck.  Respiratory: coarse breath sounds in bilateral lung fields   Cardiovascular: +S1/S2  Abdomen: soft, NT/ND; +BS   Extremities: WWP, 2+ peripheral pulses b/l; no LE edema  Skin: normal color and turgor; no rash  Neurological: AO x 3. Paraplegic    LINES:    HOSPITAL MEDICATIONS:  enoxaparin Injectable 40 milliGRAM(s) SubCutaneous daily    cefepime   IVPB      cefepime   IVPB 2000 milliGRAM(s) IV Intermittent every 8 hours        ALBUTerol/ipratropium for Nebulization 3 milliLiter(s) Nebulizer every 6 hours  sodium chloride 3%  Inhalation 3 milliLiter(s) Inhalation every 6 hours      aluminum hydroxide/magnesium hydroxide/simethicone Suspension 30 milliLiter(s) Oral every 6 hours PRN  atropine Injectable 0.5 milliGRAM(s) IV Push once  atropine Injectable 0.5 milliGRAM(s) IV Push once PRN        dextrose 5% + lactated ringers. 1000 milliLiter(s) IV Continuous <Continuous>            LABS:                        9.2    9.0   )-----------( 358      ( 15 Apr 2019 00:16 )             32.7     Hgb Trend: 9.2<--, 9.6<--, 10.2<--, 10.0<--, 10.2<--  04-15    145  |  104  |  10  ----------------------------<  80  5.1   |  28  |  0.31<L>    Ca    8.9      15 Apr 2019 00:16  Phos  2.2     04-15  Mg     1.8     04-15    TPro  7.1  /  Alb  3.1<L>  /  TBili  0.3  /  DBili  x   /  AST  40  /  ALT  18  /  AlkPhos  57  04-15    Creatinine Trend: 0.31<--, <0.30<--, 0.39<--, 0.44<--, 0.42<--, 0.41<--  PT/INR - ( 15 Apr 2019 00:16 )   PT: 14.5 sec;   INR: 1.25 ratio         PTT - ( 15 Apr 2019 00:16 )  PTT:31.9 sec    Arterial Blood Gas:  04-14 @ 00:45  7.36/61/105/33/98/6.8  ABG lactate: --  Arterial Blood Gas:  04-13 @ 16:10  7.32/66/78/33/94/5.6  ABG lactate: --        MICROBIOLOGY:     RADIOLOGY:  [ ] Reviewed and interpreted by me    EKG:

## 2019-04-15 NOTE — PROGRESS NOTE ADULT - ATTENDING COMMENTS
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     He was on AVAPS overnight  He denies pain, lightheadedness, dizziness, SOB today     Recs:  --Neuro: supportive care  --Cardiac: EP considering PPM placement - patient is thinking about it, HR is stable at this time, avoid josé miguel blockers, continue pacer pads and transcutaneous pacemaker at bedside   --Pulm: resp status stable, ABG of AVAPS acceptable, continue AVAPS qhs and prn, continue pulmonary toilet and aspiration precautions   --GI: tolerating po diet   --ID: febrile this morning however WBC normal and recent BCx and sputum cx neg (4/14), agree with ID to d/c abx and monitor   --PPx: lovenox   --Stable for tele

## 2019-04-15 NOTE — PROGRESS NOTE ADULT - SUBJECTIVE AND OBJECTIVE BOX
infectious diseases progress note:    Patient is a 43y old  Male who presents with a chief complaint of sepsis (15 Apr 2019 06:58)        Noninfectious gastroenteritis           Allergies    penicillin (Rash)    Intolerances        ANTIBIOTICS/RELEVANT:  antimicrobials  cefepime   IVPB      cefepime   IVPB 2000 milliGRAM(s) IV Intermittent every 8 hours    immunologic:    OTHER:  ALBUTerol/ipratropium for Nebulization 3 milliLiter(s) Nebulizer every 6 hours  aluminum hydroxide/magnesium hydroxide/simethicone Suspension 30 milliLiter(s) Oral every 6 hours PRN  atropine Injectable 0.5 milliGRAM(s) IV Push once  atropine Injectable 0.5 milliGRAM(s) IV Push once PRN  dextrose 5% + lactated ringers. 1000 milliLiter(s) IV Continuous <Continuous>  enoxaparin Injectable 40 milliGRAM(s) SubCutaneous daily  magnesium sulfate  IVPB 1 Gram(s) IV Intermittent once  sodium chloride 3%  Inhalation 3 milliLiter(s) Inhalation every 6 hours      Objective:  Vital Signs Last 24 Hrs  T(C): 36.7 (15 Apr 2019 00:00), Max: 36.7 (15 Apr 2019 00:00)  T(F): 98 (15 Apr 2019 00:00), Max: 98 (15 Apr 2019 00:00)  HR: 82 (15 Apr 2019 07:00) (37 - 96)  BP: 165/103 (15 Apr 2019 07:00) (103/62 - 174/100)  BP(mean): 129 (15 Apr 2019 07:00) (66 - 131)  RR: 38 (15 Apr 2019 07:00) (13 - 38)  SpO2: 100% (15 Apr 2019 07:00) (97% - 100%)    PHYSICAL EXAM:     Eyes:ASHLEY, EOMI  Ear/Nose/Throat: no oral lesion, no sinus tenderness on percussion	  Neck:no JVD, no lymphadenopathy, supple  Respiratory: CTA sujatha  Cardiovascular: S1S2 RRR, no murmurs  Gastrointestinal:soft, (+) BS, no HSM  Extremities:no e/e/c        LABS:                        9.2    9.0   )-----------( 358      ( 15 Apr 2019 00:16 )             32.7     04-15    145  |  104  |  10  ----------------------------<  80  5.1   |  28  |  0.31<L>    Ca    8.9      15 Apr 2019 00:16  Phos  2.2     04-15  Mg     1.8     04-15    TPro  7.1  /  Alb  3.1<L>  /  TBili  0.3  /  DBili  x   /  AST  40  /  ALT  18  /  AlkPhos  57  04-15    PT/INR - ( 15 Apr 2019 00:16 )   PT: 14.5 sec;   INR: 1.25 ratio         PTT - ( 15 Apr 2019 00:16 )  PTT:31.9 sec        MICROBIOLOGY:    RECENT CULTURES:  04-14 @ 07:02 .Sputum       Moderate polymorphonuclear leukocytes per low power field  Moderate Squamous epithelial cells per low power field  Numerous Gram Negative Rods per oil power field  Moderate Yeast like cells per oil power field             04-13 @ 00:19 .Blood   PCR    Growth in aerobic bottle: Gram Positive Cocci in Clusters    Blood Culture PCR  Blood Culture PCR     Growth in aerobic bottle: Coag Negative Staphylococcus  Single set isolate, possible contaminant. Contact  Microbiology if susceptibility testing clinically  indicated.  "Due to technical problems, Proteus sp. will Not be reported as part of  the BCID panel until further notice"  ***Blood Panel PCR results on this specimen are available  approximately 3 hours after the Gram stain result.***  Gram stain, PCR, and/or culture results may not always  correspond due to difference in methodologies.  ************************************************************  This PCR assay was performed using [employer].  The following targets are tested for: Enterococcus,  vancomycin resistant enterococci, Listeria monocytogenes,  coagulase negative staphylococci, S. aureus,  methicillin resistant S. aureus, Streptococcus agalactiae  (Group B), S. pneumoniae, S. pyogenes (Group A),  Acinetobacter baumannii, Enterobacter cloacae, E. coli,  Klebsiella oxytoca, K. pneumoniae, Proteus sp.,  Serratia marcescens, Haemophilus influenzae,  Neisseria meningitidis, Pseudomonas aeruginosa, Candida  albicans, C. glabrata, C krusei, C parapsilosis,  C. tropicalis and the KPC resistance gene.    04-12 @ 11:19 .Blood                No growth to date.          RESPIRATORY CULTURES:              RADIOLOGY & ADDITIONAL STUDIES:        Pager [phone]  After 5 pm/weekends or if no response :[phone]
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
## 2019-04-15 NOTE — PROGRESS NOTE ADULT - ASSESSMENT
43 harish old recent nephrectomy on right and history Pt with recurrent kidney stones.  presents with diarrhea and colitis on ct scan and indeterminate C diff test.  PCR - Not detected    stent on left that has been removed        urine culture is grew Providencia (recurrent) - senstive to cefepime    ct reviewed with ongoing colitis  no obv abscess in surgical bed   and several areas of consolidation      wd packed by urology not obv infected  wd seen .      UTI plus PNA ( HCAP)  This is not an atypical pna      completed  cefepime  for pneumonia early this past week      still no explantation for colitis and distension.     pt is afebrile and has nl wbc  looks alittle SOB with decreased BS    chest xray    reviewed  no new pna did have effusions previously  has been stable off ab for most of the week and was getting ready to be discharged  developed hypoxia, moved to ICU    Possibilities include    infection in nephrectomy bed.- repeat CT with no formed collection and decrease fluid in bed  recurrent UTI:  the left collecting system is not normal and just finished a course of ab for UTI.  pna  has had effusions  and prior cat scan with what was thought to be pna that was treated    restarted on cefepime and given a dose of vanco    BC from 4/13 1/2 with CoNS, BCs from 4/12- NGTD  Consider contaminant  Vancomycin restarted- can DC  No further fever, WBC WNL, syncope likely related to bradycardia,      would dc antibiotics and observe
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
## 2019-04-16 DIAGNOSIS — G90.9 DISORDER OF THE AUTONOMIC NERVOUS SYSTEM, UNSPECIFIED: ICD-10-CM

## 2019-04-16 DIAGNOSIS — Z29.9 ENCOUNTER FOR PROPHYLACTIC MEASURES, UNSPECIFIED: ICD-10-CM

## 2019-04-16 DIAGNOSIS — R00.1 BRADYCARDIA, UNSPECIFIED: ICD-10-CM

## 2019-04-16 DIAGNOSIS — R50.9 FEVER, UNSPECIFIED: ICD-10-CM

## 2019-04-16 LAB
ALBUMIN SERPL ELPH-MCNC: 3.3 G/DL — SIGNIFICANT CHANGE UP (ref 3.3–5)
ALP SERPL-CCNC: 51 U/L — SIGNIFICANT CHANGE UP (ref 40–120)
ALT FLD-CCNC: 8 U/L — LOW (ref 10–45)
ANION GAP SERPL CALC-SCNC: 12 MMOL/L — SIGNIFICANT CHANGE UP (ref 5–17)
ANION GAP SERPL CALC-SCNC: 16 MMOL/L — SIGNIFICANT CHANGE UP (ref 5–17)
AST SERPL-CCNC: 12 U/L — SIGNIFICANT CHANGE UP (ref 10–40)
BASOPHILS # BLD AUTO: 0 K/UL — SIGNIFICANT CHANGE UP (ref 0–0.2)
BASOPHILS NFR BLD AUTO: 0.3 % — SIGNIFICANT CHANGE UP (ref 0–2)
BILIRUB SERPL-MCNC: 0.3 MG/DL — SIGNIFICANT CHANGE UP (ref 0.2–1.2)
BUN SERPL-MCNC: 10 MG/DL — SIGNIFICANT CHANGE UP (ref 7–23)
BUN SERPL-MCNC: 12 MG/DL — SIGNIFICANT CHANGE UP (ref 7–23)
CALCIUM SERPL-MCNC: 8.4 MG/DL — SIGNIFICANT CHANGE UP (ref 8.4–10.5)
CALCIUM SERPL-MCNC: 9 MG/DL — SIGNIFICANT CHANGE UP (ref 8.4–10.5)
CHLORIDE SERPL-SCNC: 101 MMOL/L — SIGNIFICANT CHANGE UP (ref 96–108)
CHLORIDE SERPL-SCNC: 96 MMOL/L — SIGNIFICANT CHANGE UP (ref 96–108)
CO2 SERPL-SCNC: 23 MMOL/L — SIGNIFICANT CHANGE UP (ref 22–31)
CO2 SERPL-SCNC: 30 MMOL/L — SIGNIFICANT CHANGE UP (ref 22–31)
CREAT SERPL-MCNC: 0.5 MG/DL — SIGNIFICANT CHANGE UP (ref 0.5–1.3)
CREAT SERPL-MCNC: 0.5 MG/DL — SIGNIFICANT CHANGE UP (ref 0.5–1.3)
EOSINOPHIL # BLD AUTO: 0.1 K/UL — SIGNIFICANT CHANGE UP (ref 0–0.5)
EOSINOPHIL NFR BLD AUTO: 1.8 % — SIGNIFICANT CHANGE UP (ref 0–6)
GLUCOSE SERPL-MCNC: 110 MG/DL — HIGH (ref 70–99)
GLUCOSE SERPL-MCNC: 83 MG/DL — SIGNIFICANT CHANGE UP (ref 70–99)
HCT VFR BLD CALC: 29.9 % — LOW (ref 39–50)
HGB BLD-MCNC: 9.4 G/DL — LOW (ref 13–17)
LYMPHOCYTES # BLD AUTO: 1.8 K/UL — SIGNIFICANT CHANGE UP (ref 1–3.3)
LYMPHOCYTES # BLD AUTO: 31.4 % — SIGNIFICANT CHANGE UP (ref 13–44)
MAGNESIUM SERPL-MCNC: 2.1 MG/DL — SIGNIFICANT CHANGE UP (ref 1.6–2.6)
MCHC RBC-ENTMCNC: 26.9 PG — LOW (ref 27–34)
MCHC RBC-ENTMCNC: 31.5 GM/DL — LOW (ref 32–36)
MCV RBC AUTO: 85.3 FL — SIGNIFICANT CHANGE UP (ref 80–100)
MONOCYTES # BLD AUTO: 0.6 K/UL — SIGNIFICANT CHANGE UP (ref 0–0.9)
MONOCYTES NFR BLD AUTO: 10.5 % — SIGNIFICANT CHANGE UP (ref 2–14)
NEUTROPHILS # BLD AUTO: 3.3 K/UL — SIGNIFICANT CHANGE UP (ref 1.8–7.4)
NEUTROPHILS NFR BLD AUTO: 56 % — SIGNIFICANT CHANGE UP (ref 43–77)
PHOSPHATE SERPL-MCNC: 3.3 MG/DL — SIGNIFICANT CHANGE UP (ref 2.5–4.5)
PLATELET # BLD AUTO: 315 K/UL — SIGNIFICANT CHANGE UP (ref 150–400)
POTASSIUM SERPL-MCNC: 3.9 MMOL/L — SIGNIFICANT CHANGE UP (ref 3.5–5.3)
POTASSIUM SERPL-MCNC: 5.1 MMOL/L — SIGNIFICANT CHANGE UP (ref 3.5–5.3)
POTASSIUM SERPL-SCNC: 3.9 MMOL/L — SIGNIFICANT CHANGE UP (ref 3.5–5.3)
POTASSIUM SERPL-SCNC: 5.1 MMOL/L — SIGNIFICANT CHANGE UP (ref 3.5–5.3)
PROT SERPL-MCNC: 6.6 G/DL — SIGNIFICANT CHANGE UP (ref 6–8.3)
RBC # BLD: 3.51 M/UL — LOW (ref 4.2–5.8)
RBC # FLD: 16.7 % — HIGH (ref 10.3–14.5)
SODIUM SERPL-SCNC: 135 MMOL/L — SIGNIFICANT CHANGE UP (ref 135–145)
SODIUM SERPL-SCNC: 143 MMOL/L — SIGNIFICANT CHANGE UP (ref 135–145)
WBC # BLD: 5.9 K/UL — SIGNIFICANT CHANGE UP (ref 3.8–10.5)
WBC # FLD AUTO: 5.9 K/UL — SIGNIFICANT CHANGE UP (ref 3.8–10.5)

## 2019-04-16 PROCEDURE — 99233 SBSQ HOSP IP/OBS HIGH 50: CPT | Mod: GC

## 2019-04-16 PROCEDURE — 99232 SBSQ HOSP IP/OBS MODERATE 35: CPT

## 2019-04-16 RX ADMIN — CEFEPIME 100 MILLIGRAM(S): 1 INJECTION, POWDER, FOR SOLUTION INTRAMUSCULAR; INTRAVENOUS at 15:58

## 2019-04-16 RX ADMIN — CEFEPIME 100 MILLIGRAM(S): 1 INJECTION, POWDER, FOR SOLUTION INTRAMUSCULAR; INTRAVENOUS at 05:48

## 2019-04-16 RX ADMIN — ENOXAPARIN SODIUM 40 MILLIGRAM(S): 100 INJECTION SUBCUTANEOUS at 11:22

## 2019-04-16 RX ADMIN — CEFEPIME 100 MILLIGRAM(S): 1 INJECTION, POWDER, FOR SOLUTION INTRAMUSCULAR; INTRAVENOUS at 21:27

## 2019-04-16 NOTE — PROGRESS NOTE ADULT - PROBLEM SELECTOR PLAN 1
-no episodes bradycardia O/N  -pending PPM w/ EP tomorrow for symptomatic bradycardia and symtpomatic pauses.          -until PPM, keep atropine at  bedside w/ pads in place.  -per Cards, concern for vagal pauses + john relating to autonomic dysfunction 2/2 spinal cord injury
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
## 2019-04-16 NOTE — PROGRESS NOTE ADULT - ATTENDING COMMENTS
chart reviewed. pt had mild subjective fever at time of fever yest but no chills/rigors. No abd discomfort/N/V. No FMH of rheum disease or GI disease per pt. PE: A+O x 3; nontoxic; quadriplegia; no skin evidence of infection; abd benign- surgical site c/d/i.    Sinus pauses- awaiting PPM    Recurrent fevers- unclear etiology. If fevers persist on abx, will begin FUO w/u. check venous duplex r/o DVT for now    Acute resp failure- titrate O2 to off if possible    d/w pt

## 2019-04-16 NOTE — PROGRESS NOTE ADULT - ASSESSMENT
43 harish old recent nephrectomy on right and history Pt with recurrent kidney stones.  presents with diarrhea and colitis on ct scan and indeterminate C diff test.  PCR - Not detected    stent on left that has been removed        urine culture is grew Providencia (recurrent) - senstive to cefepime    ct reviewed with ongoing colitis  no obv abscess in surgical bed   and several areas of consolidation      wd packed by urology not obv infected  wd seen .      UTI plus PNA ( [facility])  This is not an atypical pna      completed  cefepime  for pneumonia early this past week      still no explantation for colitis and distension.     pt is afebrile and has nl wbc  looks alittle SOB with decreased BS    chest xray    reviewed  no new pna did have effusions previously  has been stable off ab for most of the week and was getting ready to be discharged  developed hypoxia, moved to ICU    Possibilities include    infection in nephrectomy bed.- repeat CT with no formed collection and decrease fluid in bed  recurrent UTI:  the left collecting system is not normal and just finished a course of ab for UTI.  pna  has had effusions  and prior cat scan with what was thought to be pna that was treated    restarted on cefepime and given a dose of vanco    BC from 4/13 1/2 with CoNS, BCs from 4/12- NGTD  no recent urine culture    fever on 4/15   feels better and not sob  unclear as to cause of recent fever  recurrent UTI ?  doubt ongoing pna or nephrectomy bed site infection     continue cefepine   seems to be responding  urine culture    no additional vanco needed

## 2019-04-16 NOTE — PROGRESS NOTE ADULT - SUBJECTIVE AND OBJECTIVE BOX
infectious diseases progress note:    Patient is a 43y old  Male who presents with a chief complaint of sepsis (16 Apr 2019 07:08)        Noninfectious gastroenteritis             Allergies    penicillin (Rash)    Intolerances        ANTIBIOTICS/RELEVANT:  antimicrobials  cefepime   IVPB      cefepime   IVPB 2000 milliGRAM(s) IV Intermittent every 8 hours    immunologic:    OTHER:  ALBUTerol/ipratropium for Nebulization 3 milliLiter(s) Nebulizer every 6 hours  aluminum hydroxide/magnesium hydroxide/simethicone Suspension 30 milliLiter(s) Oral every 6 hours PRN  atropine Injectable 0.5 milliGRAM(s) IV Push once  atropine Injectable 0.5 milliGRAM(s) IV Push once PRN  enoxaparin Injectable 40 milliGRAM(s) SubCutaneous daily  sodium chloride 3%  Inhalation 3 milliLiter(s) Inhalation every 6 hours      Objective:  Vital Signs Last 24 Hrs  T(C): 36.7 (16 Apr 2019 04:19), Max: 38.8 (15 Apr 2019 08:00)  T(F): 98.1 (16 Apr 2019 04:19), Max: 101.9 (15 Apr 2019 08:00)  HR: 60 (16 Apr 2019 04:19) (60 - 124)  BP: 162/109 (16 Apr 2019 04:19) (94/56 - 180/111)  BP(mean): 80 (15 Apr 2019 18:00) (69 - 140)  RR: 19 (16 Apr 2019 04:19) (19 - 56)  SpO2: 100% (16 Apr 2019 04:19) (97% - 100%)       Eyes:ASHLEY, EOMI  Ear/Nose/Throat: no oral lesion, no sinus tenderness on percussion	  Neck:no JVD, no lymphadenopathy, supple  Respiratory: CTA sujatha  Cardiovascular: S1S2 RRR, no murmurs  Gastrointestinal:soft, (+) BS, no HSM  Extremities:no e/e/c        LABS:                        9.4    5.9   )-----------( 315      ( 16 Apr 2019 07:11 )             29.9     04-15    145  |  104  |  10  ----------------------------<  80  5.1   |  28  |  0.31<L>    Ca    8.9      15 Apr 2019 00:16  Phos  2.2     04-15  Mg     1.8     04-15    TPro  7.1  /  Alb  3.1<L>  /  TBili  0.3  /  DBili  x   /  AST  40  /  ALT  18  /  AlkPhos  57  04-15    PT/INR - ( 15 Apr 2019 00:16 )   PT: 14.5 sec;   INR: 1.25 ratio         PTT - ( 15 Apr 2019 00:16 )  PTT:31.9 sec        MICROBIOLOGY:    RECENT CULTURES:  04-14 @ 09:56 .Blood                No growth to date.    04-14 @ 07:02 .Sputum       Moderate polymorphonuclear leukocytes per low power field  Moderate Squamous epithelial cells per low power field  Numerous Gram Negative Rods per oil power field  Moderate Yeast like cells per oil power field           Normal Respiratory Sirisha present    04-13 @ 00:19 .Blood   PCR    Growth in aerobic bottle: Gram Positive Cocci in Clusters    Blood Culture PCR  Blood Culture PCR     Growth in aerobic bottle: Coag Negative Staphylococcus  Single set isolate, possible contaminant. Contact  Microbiology if susceptibility testing clinically  indicated.  "Due to technical problems, Proteus sp. will Not be reported as part of  the BCID panel until further notice"  ***Blood Panel PCR results on this specimen are available  approximately 3 hours after the Gram stain result.***  Gram stain, PCR, and/or culture results may not always  correspond due to difference in methodologies.  ************************************************************  This PCR assay was performed using [employer].  The following targets are tested for: Enterococcus,  vancomycin resistant enterococci, Listeria monocytogenes,  coagulase negative staphylococci, S. aureus,  methicillin resistant S. aureus, Streptococcus agalactiae  (Group B), S. pneumoniae, S. pyogenes (Group A),  Acinetobacter baumannii, Enterobacter cloacae, E. coli,  Klebsiella oxytoca, K. pneumoniae, Proteus sp.,  Serratia marcescens, Haemophilus influenzae,  Neisseria meningitidis, Pseudomonas aeruginosa, Candida  albicans, C. glabrata, C krusei, C parapsilosis,  C. tropicalis and the KPC resistance gene.    04-12 @ 11:19 .Blood                No growth to date.          RESPIRATORY CULTURES:              RADIOLOGY & ADDITIONAL STUDIES:        Pager [phone]  After 5 pm/weekends or if no response :[phone]

## 2019-04-16 NOTE — PROGRESS NOTE ADULT - SUBJECTIVE AND OBJECTIVE BOX
Kelsey Geronimo MD PGY1  [id number]    Patient is a 43y old  Male who presents with a chief complaint of sepsis (15 Apr 2019 10:33)    SUBJECTIVE/OVERNIGHT EVENTS     Vital Signs Last 24 Hrs  T(C): 36.7 (16 Apr 2019 04:19), Max: 38.8 (15 Apr 2019 08:00)  T(F): 98.1 (16 Apr 2019 04:19), Max: 101.9 (15 Apr 2019 08:00)  HR: 60 (16 Apr 2019 04:19) (60 - 124)  BP: 162/109 (16 Apr 2019 04:19) (94/56 - 180/111)  BP(mean): 80 (15 Apr 2019 18:00) (69 - 140)  RR: 19 (16 Apr 2019 04:19) (19 - 56)  SpO2: 100% (16 Apr 2019 04:19) (97% - 100%) Kelsey Geronimo MD PGY1  [id number]    Patient is a 43y old  Male who presents with a chief complaint of sepsis (15 Apr 2019 10:33)    SUBJECTIVE/OVERNIGHT EVENTS   O/N tele w/ sinus 60-90s.    Continues to be febrile. Denies subjective fevers, sweats, or chills. Doing well on NC. Denies any chest pain, palpitations, syncope, lightheadedness. Pending PPM tomorrow w/ EP.    Vital Signs Last 24 Hrs  T(C): 36.7 (16 Apr 2019 04:19), Max: 38.8 (15 Apr 2019 08:00)  T(F): 98.1 (16 Apr 2019 04:19), Max: 101.9 (15 Apr 2019 08:00)  HR: 60 (16 Apr 2019 04:19) (60 - 124)  BP: 162/109 (16 Apr 2019 04:19) (94/56 - 180/111)  BP(mean): 80 (15 Apr 2019 18:00) (69 - 140)  RR: 19 (16 Apr 2019 04:19) (19 - 56)  SpO2: 100% (16 Apr 2019 04:19) (97% - 100%)    PHYSICAL EXAM:  GENERAL: NAD, appears chronically ill. Speaking in full sentences.  HEAD:  Atraumatic, Normocephalic  EYES: EOMI, conjunctiva and sclera clear  NECK: Supple  CHEST/LUNG: Mild ronchi, but overall clear to auscultation bilaterally  HEART: Regular rate and rhythm; No murmurs, rubs, or gallops  ABDOMEN: Soft, Nontender, Nondistended; Bowel sounds present  EXTREMITIES:  2+ Peripheral Pulses, mild trace to 1+ symmetrical edema   PSYCH: AAOx3 flat affect.  NEUROLOGY: non-focal  SKIN: No rashes or lesions        MEDICATIONS  (STANDING):  ALBUTerol/ipratropium for Nebulization 3 milliLiter(s) Nebulizer every 6 hours  atropine Injectable 0.5 milliGRAM(s) IV Push once  cefepime   IVPB      cefepime   IVPB 2000 milliGRAM(s) IV Intermittent every 8 hours  enoxaparin Injectable 40 milliGRAM(s) SubCutaneous daily  sodium chloride 3%  Inhalation 3 milliLiter(s) Inhalation every 6 hours    MEDICATIONS  (PRN):  aluminum hydroxide/magnesium hydroxide/simethicone Suspension 30 milliLiter(s) Oral every 6 hours PRN Dyspepsia  atropine Injectable 0.5 milliGRAM(s) IV Push once PRN bradycardia < 40    CAPILLARY BLOOD GLUCOSE      POCT Blood Glucose.: 129 mg/dL (15 Apr 2019 15:30)    I&O's Summary    15 Apr 2019 07:01  -  16 Apr 2019 07:00  --------------------------------------------------------  IN: 2365 mL / OUT: 600 mL / NET: 1765 mL    16 Apr 2019 07:01  -  16 Apr 2019 11:39  --------------------------------------------------------  IN: 240 mL / OUT: 0 mL / NET: 240 mL        LABS                        9.4    5.9   )-----------( 315      ( 16 Apr 2019 07:11 )             29.9                         9.2    9.0   )-----------( 358      ( 15 Apr 2019 00:16 )             32.7     04-16    143  |  101  |  10  ----------------------------<  83  3.9   |  30  |  0.50  04-15    145  |  104  |  10  ----------------------------<  80  5.1   |  28  |  0.31<L>    Ca    9.0      16 Apr 2019 07:07  Ca    8.9      15 Apr 2019 00:16  Phos  3.3     04-16  Mg     2.1     04-16    TPro  6.6  /  Alb  3.3  /  TBili  0.3  /  DBili  x   /  AST  12  /  ALT  8<L>  /  AlkPhos  51  04-16  TPro  7.1  /  Alb  3.1<L>  /  TBili  0.3  /  DBili  x   /  AST  40  /  ALT  18  /  AlkPhos  57  04-15    PT/INR - ( 15 Apr 2019 00:16 )   PT: 14.5 sec;   INR: 1.25 ratio         PTT - ( 15 Apr 2019 00:16 )  PTT:31.9 sec   12 Apr 2019 09:18 Troponin x     / CK 88 U/L / CKMB x     / CKMB Units 2.3 ng/mL          ( 15 Apr 2019 09:58 ) pH: 7.45  /  pCO2: 46    /  pO2: 85    / HCO3: 32    / Base Excess: 7.7   /  SaO2: 98              ( 14 Apr 2019 00:45 ) pH: 7.36  /  pCO2: 61    /  pO2: 105   / HCO3: 33    / Base Excess: 6.8   /  SaO2: 98              ( 13 Apr 2019 16:10 ) pH: 7.32  /  pCO2: 66    /  pO2: 78    / HCO3: 33    / Base Excess: 5.6   /  SaO2: 94                    RADIOLOGY & ADDITIONAL TESTS:    < from: CT Abdomen and Pelvis w/ IV Cont (04.12.19 @ 17:13) >  IMPRESSION:    Interval decreased bilateral pleural effusions compared to 4/5/2019.    Unchanged bilateral lower lobe consolidation right greater than left.    Postsurgical changes in the right nephrectomy bed.    Unchanged mild pancolitis.    Trace ascites    < end of copied text > Kelsey Geronimo MD PGY1  [phone]    Patient is a 43y old  Male who presents with a chief complaint of sepsis (15 Apr 2019 10:33)    SUBJECTIVE/OVERNIGHT EVENTS   O/N tele w/ sinus 60-90s.    Continues to be febrile. Denies subjective fevers, sweats, or chills. Doing well on NC. Denies any chest pain, palpitations, syncope, lightheadedness. Pending PPM tomorrow w/ EP.    Vital Signs Last 24 Hrs  T(C): 36.7 (16 Apr 2019 04:19), Max: 38.8 (15 Apr 2019 08:00)  T(F): 98.1 (16 Apr 2019 04:19), Max: 101.9 (15 Apr 2019 08:00)  HR: 60 (16 Apr 2019 04:19) (60 - 124)  BP: 162/109 (16 Apr 2019 04:19) (94/56 - 180/111)  BP(mean): 80 (15 Apr 2019 18:00) (69 - 140)  RR: 19 (16 Apr 2019 04:19) (19 - 56)  SpO2: 100% (16 Apr 2019 04:19) (97% - 100%)    PHYSICAL EXAM:  GENERAL: NAD, appears chronically ill. Speaking in full sentences.  HEAD:  Atraumatic, Normocephalic  EYES: EOMI, conjunctiva and sclera clear  NECK: Supple  CHEST/LUNG: Mild ronchi, but overall clear to auscultation bilaterally  HEART: Regular rate and rhythm; No murmurs, rubs, or gallops  ABDOMEN: Soft, Nontender, Nondistended; Bowel sounds present  EXTREMITIES:  2+ Peripheral Pulses, mild trace to 1+ symmetrical edema   PSYCH: AAOx3 flat affect.  NEUROLOGY: quadriplegia  SKIN: healed lesions on buttocks        MEDICATIONS  (STANDING):  ALBUTerol/ipratropium for Nebulization 3 milliLiter(s) Nebulizer every 6 hours  atropine Injectable 0.5 milliGRAM(s) IV Push once  cefepime   IVPB      cefepime   IVPB 2000 milliGRAM(s) IV Intermittent every 8 hours  enoxaparin Injectable 40 milliGRAM(s) SubCutaneous daily  sodium chloride 3%  Inhalation 3 milliLiter(s) Inhalation every 6 hours    MEDICATIONS  (PRN):  aluminum hydroxide/magnesium hydroxide/simethicone Suspension 30 milliLiter(s) Oral every 6 hours PRN Dyspepsia  atropine Injectable 0.5 milliGRAM(s) IV Push once PRN bradycardia < 40    CAPILLARY BLOOD GLUCOSE      POCT Blood Glucose.: 129 mg/dL (15 Apr 2019 15:30)    I&O's Summary    15 Apr 2019 07:01  -  16 Apr 2019 07:00  --------------------------------------------------------  IN: 2365 mL / OUT: 600 mL / NET: 1765 mL    16 Apr 2019 07:01  -  16 Apr 2019 11:39  --------------------------------------------------------  IN: 240 mL / OUT: 0 mL / NET: 240 mL        LABS                        9.4    5.9   )-----------( 315      ( 16 Apr 2019 07:11 )             29.9                         9.2    9.0   )-----------( 358      ( 15 Apr 2019 00:16 )             32.7     04-16    143  |  101  |  10  ----------------------------<  83  3.9   |  30  |  0.50  04-15    145  |  104  |  10  ----------------------------<  80  5.1   |  28  |  0.31<L>    Ca    9.0      16 Apr 2019 07:07  Ca    8.9      15 Apr 2019 00:16  Phos  3.3     04-16  Mg     2.1     04-16    TPro  6.6  /  Alb  3.3  /  TBili  0.3  /  DBili  x   /  AST  12  /  ALT  8<L>  /  AlkPhos  51  04-16  TPro  7.1  /  Alb  3.1<L>  /  TBili  0.3  /  DBili  x   /  AST  40  /  ALT  18  /  AlkPhos  57  04-15    PT/INR - ( 15 Apr 2019 00:16 )   PT: 14.5 sec;   INR: 1.25 ratio         PTT - ( 15 Apr 2019 00:16 )  PTT:31.9 sec   12 Apr 2019 09:18 Troponin x     / CK 88 U/L / CKMB x     / CKMB Units 2.3 ng/mL          ( 15 Apr 2019 09:58 ) pH: 7.45  /  pCO2: 46    /  pO2: 85    / HCO3: 32    / Base Excess: 7.7   /  SaO2: 98              ( 14 Apr 2019 00:45 ) pH: 7.36  /  pCO2: 61    /  pO2: 105   / HCO3: 33    / Base Excess: 6.8   /  SaO2: 98              ( 13 Apr 2019 16:10 ) pH: 7.32  /  pCO2: 66    /  pO2: 78    / HCO3: 33    / Base Excess: 5.6   /  SaO2: 94                    RADIOLOGY & ADDITIONAL TESTS:    < from: CT Abdomen and Pelvis w/ IV Cont (04.12.19 @ 17:13) >  IMPRESSION:    Interval decreased bilateral pleural effusions compared to 4/5/2019.    Unchanged bilateral lower lobe consolidation right greater than left.    Postsurgical changes in the right nephrectomy bed.    Unchanged mild pancolitis.    Trace ascites    < end of copied text >

## 2019-04-16 NOTE — PROGRESS NOTE ADULT - PROBLEM SELECTOR PLAN 4
-significant improvement. now able to be comfortable on NC. will attempt to wean as tolerated  -cont aggressive chest PT and breathing treatment to help w/ secretion mobilization  -admitted w/ hypercapnic respiratory likely in setting of able to be improved on BiPAP alone w/out intubation now w/ improved mental status

## 2019-04-16 NOTE — PROGRESS NOTE ADULT - SUBJECTIVE AND OBJECTIVE BOX
24H hour events:   No acute events overnight. Denies chest pain, palpitations, lightheadedness, dizziness. No events on Telemetry overnight.     MEDICATIONS:  enoxaparin Injectable 40 milliGRAM(s) SubCutaneous daily  cefepime   IVPB      cefepime   IVPB 2000 milliGRAM(s) IV Intermittent every 8 hours  ALBUTerol/ipratropium for Nebulization 3 milliLiter(s) Nebulizer every 6 hours  sodium chloride 3%  Inhalation 3 milliLiter(s) Inhalation every 6 hours  luminum hydroxide/magnesium hydroxide/simethicone Suspension 30 milliLiter(s) Oral every 6 hours PRN  atropine Injectable 0.5 milliGRAM(s) IV Push once  atropine Injectable 0.5 milliGRAM(s) IV Push once PRN    REVIEW OF SYSTEMS:  See HPI, otherwise ROS negative.    PHYSICAL EXAM:  T(C): 36.7 (04-16-19 @ 04:19), Max: 38.7 (04-15-19 @ 16:00)  HR: 60 (04-16-19 @ 04:19) (60 - 124)  BP: 162/109 (04-16-19 @ 04:19) (94/56 - 171/96)  RR: 19 (04-16-19 @ 04:19) (19 - 56)  SpO2: 100% (04-16-19 @ 04:19) (97% - 100%)  Wt(kg): --  I&O's Summary    15 Apr 2019 07:01  -  16 Apr 2019 07:00  --------------------------------------------------------  IN: 2365 mL / OUT: 600 mL / NET: 1765 mL      Appearance: Alert. NAD	  Cardiovascular: +S1S2 RRR no m/g/r  Respiratory: CTA B/L	  Psychiatry: A & O x 3, Mood & affect appropriate  Gastrointestinal:  Soft, NT. ND. +BS	  Skin: No rashes	  Neurologic: Quadriplegic  Vascular: Peripheral pulses palpable 2+ bilaterally      LABS:	 	  CBC Full  -  ( 16 Apr 2019 07:11 )  WBC Count : 5.9 K/uL  Hemoglobin : 9.4 g/dL  Hematocrit : 29.9 %  Platelet Count - Automated : 315 K/uL  Mean Cell Volume : 85.3 fl  Mean Cell Hemoglobin : 26.9 pg  Mean Cell Hemoglobin Concentration : 31.5 gm/dL  Auto Neutrophil # : 3.3 K/uL  Auto Lymphocyte # : 1.8 K/uL  Auto Monocyte # : 0.6 K/uL  Auto Eosinophil # : 0.1 K/uL  Auto Basophil # : 0.0 K/uL  Auto Neutrophil % : 56.0 %  Auto Lymphocyte % : 31.4 %  Auto Monocyte % : 10.5 %  Auto Eosinophil % : 1.8 %  Auto Basophil % : 0.3 %    04-16    143  |  101  |  10  ----------------------------<  83  3.9   |  30  |  0.50  04-15    145  |  104  |  10  ----------------------------<  80  5.1   |  28  |  0.31<L>    Ca    9.0      16 Apr 2019 07:07  Ca    8.9      15 Apr 2019 00:16  Phos  3.3     04-16  Phos  2.2     04-15  Mg     2.1     04-16  Mg     1.8     04-15    TPro  6.6  /  Alb  3.3  /  TBili  0.3  /  DBili  x   /  AST  12  /  ALT  8<L>  /  AlkPhos  51  04-16  TPro  7.1  /  Alb  3.1<L>  /  TBili  0.3  /  DBili  x   /  AST  40  /  ALT  18  /  AlkPhos  57  04-15    TELEMETRY: NSR 50 - 90
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
## 2019-04-16 NOTE — PROGRESS NOTE ADULT - PROBLEM SELECTOR PLAN 2
-originally admitted w/ multifocal PNA s/p 7 day course of cefepime but continues to have fevers  -restarted on cefepime on 4/12 but febrile again O/N  -ID following  -other infectious w/up negative so far including UA, CT c/a/p  -pending duplex BLE r/o DVT  -will discuss w/ ID for appropriate FUO w/up (no prior c-scopes, rheum w/up)

## 2019-04-17 ENCOUNTER — TRANSCRIPTION ENCOUNTER (OUTPATIENT)
Age: 44
End: 2019-04-17

## 2019-04-17 ENCOUNTER — APPOINTMENT (OUTPATIENT)
Dept: UROLOGY | Facility: CLINIC | Age: 44
End: 2019-04-17

## 2019-04-17 LAB
-  AMIKACIN: SIGNIFICANT CHANGE UP
-  AZTREONAM: SIGNIFICANT CHANGE UP
-  CEFEPIME: SIGNIFICANT CHANGE UP
-  CEFTAZIDIME: SIGNIFICANT CHANGE UP
-  CIPROFLOXACIN: SIGNIFICANT CHANGE UP
-  GENTAMICIN: SIGNIFICANT CHANGE UP
-  IMIPENEM: SIGNIFICANT CHANGE UP
-  LEVOFLOXACIN: SIGNIFICANT CHANGE UP
-  MEROPENEM: SIGNIFICANT CHANGE UP
-  PIPERACILLIN/TAZOBACTAM: SIGNIFICANT CHANGE UP
-  TOBRAMYCIN: SIGNIFICANT CHANGE UP
ANION GAP SERPL CALC-SCNC: 10 MMOL/L — SIGNIFICANT CHANGE UP (ref 5–17)
APTT BLD: 34 SEC — SIGNIFICANT CHANGE UP (ref 27.5–36.3)
BLD GP AB SCN SERPL QL: NEGATIVE — SIGNIFICANT CHANGE UP
BUN SERPL-MCNC: 14 MG/DL — SIGNIFICANT CHANGE UP (ref 7–23)
CALCIUM SERPL-MCNC: 8.8 MG/DL — SIGNIFICANT CHANGE UP (ref 8.4–10.5)
CHLORIDE SERPL-SCNC: 98 MMOL/L — SIGNIFICANT CHANGE UP (ref 96–108)
CO2 SERPL-SCNC: 34 MMOL/L — HIGH (ref 22–31)
CREAT ?TM UR-MCNC: 21 MG/DL — SIGNIFICANT CHANGE UP
CREAT SERPL-MCNC: 0.48 MG/DL — LOW (ref 0.5–1.3)
CRP SERPL-MCNC: 3.75 MG/DL — HIGH (ref 0–0.4)
CULTURE RESULTS: SIGNIFICANT CHANGE UP
ERYTHROCYTE [SEDIMENTATION RATE] IN BLOOD: 65 MM/HR — HIGH (ref 0–15)
GAS PNL BLDV: SIGNIFICANT CHANGE UP
GLUCOSE SERPL-MCNC: 99 MG/DL — SIGNIFICANT CHANGE UP (ref 70–99)
HCT VFR BLD CALC: 31.5 % — LOW (ref 39–50)
HGB BLD-MCNC: 9 G/DL — LOW (ref 13–17)
INR BLD: 1.21 RATIO — HIGH (ref 0.88–1.16)
MAGNESIUM SERPL-MCNC: 2 MG/DL — SIGNIFICANT CHANGE UP (ref 1.6–2.6)
MCHC RBC-ENTMCNC: 24.5 PG — LOW (ref 27–34)
MCHC RBC-ENTMCNC: 28.6 GM/DL — LOW (ref 32–36)
MCV RBC AUTO: 85.8 FL — SIGNIFICANT CHANGE UP (ref 80–100)
METHOD TYPE: SIGNIFICANT CHANGE UP
OSMOLALITY UR: 348 MOS/KG — SIGNIFICANT CHANGE UP (ref 50–1200)
PHOSPHATE SERPL-MCNC: 2.3 MG/DL — LOW (ref 2.5–4.5)
PLATELET # BLD AUTO: 340 K/UL — SIGNIFICANT CHANGE UP (ref 150–400)
POTASSIUM SERPL-MCNC: 4.1 MMOL/L — SIGNIFICANT CHANGE UP (ref 3.5–5.3)
POTASSIUM SERPL-SCNC: 4.1 MMOL/L — SIGNIFICANT CHANGE UP (ref 3.5–5.3)
PROTHROM AB SERPL-ACNC: 14 SEC — HIGH (ref 10–13.1)
RBC # BLD: 3.67 M/UL — LOW (ref 4.2–5.8)
RBC # FLD: 17.2 % — HIGH (ref 10.3–14.5)
RH IG SCN BLD-IMP: POSITIVE — SIGNIFICANT CHANGE UP
SODIUM SERPL-SCNC: 142 MMOL/L — SIGNIFICANT CHANGE UP (ref 135–145)
SODIUM UR-SCNC: 119 MMOL/L — SIGNIFICANT CHANGE UP
SPECIMEN SOURCE: SIGNIFICANT CHANGE UP
WBC # BLD: 6.32 K/UL — SIGNIFICANT CHANGE UP (ref 3.8–10.5)
WBC # FLD AUTO: 6.32 K/UL — SIGNIFICANT CHANGE UP (ref 3.8–10.5)

## 2019-04-17 PROCEDURE — 93010 ELECTROCARDIOGRAM REPORT: CPT

## 2019-04-17 PROCEDURE — 99233 SBSQ HOSP IP/OBS HIGH 50: CPT | Mod: GC

## 2019-04-17 PROCEDURE — 99223 1ST HOSP IP/OBS HIGH 75: CPT | Mod: GC

## 2019-04-17 PROCEDURE — 33274 TCAT INSJ/RPL PERM LDLS PM: CPT

## 2019-04-17 PROCEDURE — 99232 SBSQ HOSP IP/OBS MODERATE 35: CPT

## 2019-04-17 RX ORDER — SODIUM,POTASSIUM PHOSPHATES 278-250MG
1 POWDER IN PACKET (EA) ORAL
Qty: 0 | Refills: 0 | Status: COMPLETED | OUTPATIENT
Start: 2019-04-17 | End: 2019-04-17

## 2019-04-17 RX ADMIN — SODIUM CHLORIDE 3 MILLILITER(S): 9 INJECTION INTRAMUSCULAR; INTRAVENOUS; SUBCUTANEOUS at 17:38

## 2019-04-17 RX ADMIN — Medication 1 TABLET(S): at 21:11

## 2019-04-17 RX ADMIN — CEFEPIME 100 MILLIGRAM(S): 1 INJECTION, POWDER, FOR SOLUTION INTRAMUSCULAR; INTRAVENOUS at 05:11

## 2019-04-17 RX ADMIN — Medication 3 MILLILITER(S): at 17:37

## 2019-04-17 NOTE — PROGRESS NOTE ADULT - ASSESSMENT
43 harish old recent nephrectomy on right and history Pt with recurrent kidney stones.  presents with diarrhea and colitis on ct scan and indeterminate C diff test.  PCR - Not detected    stent on left that has been removed        urine culture is grew Providencia (recurrent) - senstive to cefepime    ct reviewed with ongoing colitis  no obv abscess in surgical bed   and several areas of consolidation      wd packed by urology not obv infected  wd seen .      UTI plus PNA ( HCAP)  This is not an atypical pna      completed  cefepime  for pneumonia early this past week      still no explantation for colitis and distension.     pt is afebrile and has nl wbc  looks alittle SOB with decreased BS    chest xray    reviewed  no new pna did have effusions previously  has been stable off ab for most of the week and was getting ready to be discharged  developed hypoxia, moved to ICU    Possibilities include    infection in nephrectomy bed.- repeat CT with no formed collection and decrease fluid in bed  recurrent UTI:  the left collecting system is not normal and just finished a course of ab for UTI.  pna  has had effusions  and prior cat scan with what was thought to be pna that was treated    restarted on cefepime and given a dose of vanco    BC from 4/13 1/2 with CoNS, BCs from 4/12- NGTD  no recent urine culture    fever on 4/15   feels better and not sob  unclear as to cause of recent fever  recurrent UTI ?  doubt ongoing pna or nephrectomy bed site infection      persistent fever and in retrospect , he has interm been febrile since admission despite ab  dismissed with medicine    dc antibiotics ( not helping)  check dopplers   colonoscopy if possible for colitis

## 2019-04-17 NOTE — DISCHARGE NOTE PROVIDER - PROVIDER TOKENS
PROVIDER:[TOKEN:[[id number]],FOLLOWUP:[2 weeks]],FREE:[LAST:[Chin],FIRST:[Rylee],PHONE:[phone]],FAX:[(   )    -],FOLLOWUP:[1 week]]

## 2019-04-17 NOTE — CHART NOTE - NSCHARTNOTEFT_GEN_A_CORE
BRIEF PROCEDURE NOTE    BLACK RAMSEY  [id number]    Pre-op Diagnosis: syncope, sinus pauses    Post-op Diagnosis: same    Procedure: leadless pacemaker    Electrophysiologist: Maty Cordero MD    Assistant: none    Anesthesia: IV sedation/Local    Access: RFV    Description:  6 Thai sheath right femoral vein  Superstiff Amplatz advanced to SVC  Serial dilatation with 12 Thai and 18 Thai dilator  27 Thai Micra sheath positioned in right atrium  Micra delivered to mid RV septum  Device tested and released  Sheath removed and hemostasis with figure-of-eight stitch    Complications: none    EBL: 20cc    Disposition: to recovery/stable    Plan:  remove stitch at 5PM  CXR  ECG
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
## 2019-04-17 NOTE — PROGRESS NOTE ADULT - PROBLEM SELECTOR PLAN 2
-no fever O/N. Last BCx x 2 4/15 NGTD.  -restarted on cefepime on 4/12 but ID wishes to monitor off abx now.  -other infectious w/up negative so far including UA, CT c/a/p. will obtain pro-calcitonin.  -pending duplex BLE r/o DVT. discussed possibility of colonoscopy w/ pt, amenable if fevers return.  -pending rheum w/up.  -originally admitted w/ multifocal PNA s/p 7 day course of cefepime -no fever O/N. Last BCx x 2 4/15 NGTD.  -per ID, d/c'ed cefepime today (4/12-4/16 second course)  -other infectious w/up negative so far including UA, CT c/a/p. will obtain pro-calcitonin.  -pending duplex BLE r/o DVT.   -discussed possibility of colonoscopy w/ pt, amenable if fevers return for persistent colitis seen on CT. GI contacted.  -pending rheum w/up.  -originally admitted w/ multifocal PNA s/p 7 day course of cefepime -no fever O/N. Last BCx x 2 4/15 NGTD.  -per ID, d/c'ed cefepime today (4/12-4/16 second course)  -other infectious w/up negative so far including UA, CT c/a/p.   -pending duplex BLE r/o DVT.   -discussed possibility of colonoscopy w/ pt, amenable if fevers return for persistent colitis seen on CT. GI contacted.  -pending rheum w/up.  -originally admitted w/ multifocal PNA s/p 7 day course of cefepime

## 2019-04-17 NOTE — PROGRESS NOTE ADULT - SUBJECTIVE AND OBJECTIVE BOX
Kelsey Geronimo MD PGY1  [phone]    Patient is a 43y old  Male who presents with a chief complaint of sepsis (16 Apr 2019 09:34)    SUBJECTIVE/OVERNIGHT EVENTS     Vital Signs Last 24 Hrs  T(C): 37.2 (16 Apr 2019 12:00), Max: 37.2 (16 Apr 2019 12:00)  T(F): 99 (16 Apr 2019 12:00), Max: 99 (16 Apr 2019 12:00)  HR: 82 (17 Apr 2019 05:34) (63 - 107)  BP: 103/61 (16 Apr 2019 12:00) (103/61 - 103/61)  BP(mean): --  RR: 18 (16 Apr 2019 12:00) (18 - 18)  SpO2: 97% (17 Apr 2019 05:34) (97% - 100%) Kelsey Geronimo MD PGY1  [phone]    Patient is a 43y old  Male who presents with a chief complaint of sepsis (16 Apr 2019 09:34)    SUBJECTIVE/OVERNIGHT EVENTS   O/N tele w/ intermittent sinus john rhythm 50-60s + occasional PVCs/PACs. No further pauses.    This AM doing well. No SOB w/ lying flat. No episodes of palpitations, nervousness, diaphoresis. Amenable to wean off O2 today after PPM since no home O2 or BiPAP needs.     Vital Signs Last 24 Hrs  T(C): 37.2 (16 Apr 2019 12:00), Max: 37.2 (16 Apr 2019 12:00)  T(F): 99 (16 Apr 2019 12:00), Max: 99 (16 Apr 2019 12:00)  HR: 82 (17 Apr 2019 05:34) (63 - 107)  BP: 103/61 (16 Apr 2019 12:00) (103/61 - 103/61)  BP(mean): --  RR: 18 (16 Apr 2019 12:00) (18 - 18)  SpO2: 97% (17 Apr 2019 05:34) (97% - 100%)    16 Apr 2019 07:01  -  17 Apr 2019 07:00  --------------------------------------------------------  IN: 1380 mL / OUT: 4300 mL / NET: -2920 mL    17 Apr 2019 07:01  -  17 Apr 2019 10:14  --------------------------------------------------------  IN: 0 mL / OUT: 0 mL / NET: 0 mL    PHYSICAL EXAM:  GENERAL: NAD, appears chronically ill. Speaking in full sentences.  HEAD:  Atraumatic, Normocephalic  EYES: EOMI, conjunctiva and sclera clear  NECK: Supple  CHEST/LUNG: Cclear to auscultation bilaterally  HEART: Regular rate and rhythm; 2/6 systolic murmur.  ABDOMEN: Soft, Nontender, Nondistended; Bowel sounds present  EXTREMITIES:  2+ Peripheral Pulses, mild trace to 1+ symmetrical edema   PSYCH: AAOx3 flat affect.  NEUROLOGY: quadriplegia  SKIN: healed lesions on buttocks        MEDICATIONS  (STANDING):  ALBUTerol/ipratropium for Nebulization 3 milliLiter(s) Nebulizer every 6 hours  atropine Injectable 0.5 milliGRAM(s) IV Push once  cefepime   IVPB      cefepime   IVPB 2000 milliGRAM(s) IV Intermittent every 8 hours  enoxaparin Injectable 40 milliGRAM(s) SubCutaneous daily  potassium acid phosphate/sodium acid phosphate tablet (K-PHOS No. 2) 1 Tablet(s) Oral four times a day with meals  sodium chloride 3%  Inhalation 3 milliLiter(s) Inhalation every 6 hours    MEDICATIONS  (PRN):  aluminum hydroxide/magnesium hydroxide/simethicone Suspension 30 milliLiter(s) Oral every 6 hours PRN Dyspepsia  atropine Injectable 0.5 milliGRAM(s) IV Push once PRN bradycardia < 40    CAPILLARY BLOOD GLUCOSE        I&O's Summary    16 Apr 2019 07:01  -  17 Apr 2019 07:00  --------------------------------------------------------  IN: 1380 mL / OUT: 4300 mL / NET: -2920 mL    17 Apr 2019 07:01  -  17 Apr 2019 10:15  --------------------------------------------------------  IN: 0 mL / OUT: 0 mL / NET: 0 mL        LABS                        9.0    6.32  )-----------( 340      ( 17 Apr 2019 09:29 )             31.5                         9.4    5.9   )-----------( 315      ( 16 Apr 2019 07:11 )             29.9     04-17    142  |  98  |  14  ----------------------------<  99  4.1   |  34<H>  |  0.48<L>  04-16    135  |  96  |  12  ----------------------------<  110<H>  5.1   |  23  |  0.50    Ca    8.8      17 Apr 2019 06:12  Ca    8.4      16 Apr 2019 20:04  Phos  2.3     04-17  Mg     2.0     04-17    TPro  6.6  /  Alb  3.3  /  TBili  0.3  /  DBili  x   /  AST  12  /  ALT  8<L>  /  AlkPhos  51  04-16    PT/INR - ( 17 Apr 2019 07:54 )   PT: 14.0 sec;   INR: 1.21 ratio         PTT - ( 17 Apr 2019 07:54 )  PTT:34.0 sec   12 Apr 2019 09:18 Troponin x     / CK 88 U/L / CKMB x     / CKMB Units 2.3 ng/mL          ( 15 Apr 2019 09:58 ) pH: 7.45  /  pCO2: 46    /  pO2: 85    / HCO3: 32    / Base Excess: 7.7   /  SaO2: 98              ( 14 Apr 2019 00:45 ) pH: 7.36  /  pCO2: 61    /  pO2: 105   / HCO3: 33    / Base Excess: 6.8   /  SaO2: 98              ( 13 Apr 2019 16:10 ) pH: 7.32  /  pCO2: 66    /  pO2: 78    / HCO3: 33    / Base Excess: 5.6   /  SaO2: 94 Kelsey Geronimo MD PGY1  [phone]    Patient is a 43y old  Male who presents with a chief complaint of sepsis (16 Apr 2019 09:34)    SUBJECTIVE/OVERNIGHT EVENTS   O/N tele w/ intermittent sinus john rhythm 50-60s + occasional PVCs/PACs. No further pauses.    This AM doing well. No SOB w/ lying flat. No episodes of palpitations, nervousness, diaphoresis. Amenable to wean off O2 today after PPM since no home O2 or BiPAP needs.     Vital Signs Last 24 Hrs  T(C): 37.2 (16 Apr 2019 12:00), Max: 37.2 (16 Apr 2019 12:00)  T(F): 99 (16 Apr 2019 12:00), Max: 99 (16 Apr 2019 12:00)  HR: 82 (17 Apr 2019 05:34) (63 - 107)  BP: 103/61 (16 Apr 2019 12:00) (103/61 - 103/61)  BP(mean): --  RR: 18 (16 Apr 2019 12:00) (18 - 18)  SpO2: 97% (17 Apr 2019 05:34) (97% - 100%)    16 Apr 2019 07:01  -  17 Apr 2019 07:00  --------------------------------------------------------  IN: 1380 mL / OUT: 4300 mL / NET: -2920 mL    17 Apr 2019 07:01  -  17 Apr 2019 10:14  --------------------------------------------------------  IN: 0 mL / OUT: 0 mL / NET: 0 mL    PHYSICAL EXAM:  GENERAL: NAD, appears chronically ill. Speaking in full sentences.  HEAD:  Atraumatic, Normocephalic  EYES: EOMI, conjunctiva and sclera clear  NECK: Supple  CHEST/LUNG: Cclear to auscultation bilaterally  HEART: Regular rate and rhythm; 2/6 systolic murmur.  ABDOMEN: Soft, Nontender, mild distention; Bowel sounds present  EXTREMITIES:  2+ Peripheral Pulses, mild trace to 1+ symmetrical edema   PSYCH: AAOx3 flat affect.  NEUROLOGY: quadriplegia  SKIN: healed lesions on buttocks        MEDICATIONS  (STANDING):  ALBUTerol/ipratropium for Nebulization 3 milliLiter(s) Nebulizer every 6 hours  atropine Injectable 0.5 milliGRAM(s) IV Push once  cefepime   IVPB      cefepime   IVPB 2000 milliGRAM(s) IV Intermittent every 8 hours  enoxaparin Injectable 40 milliGRAM(s) SubCutaneous daily  potassium acid phosphate/sodium acid phosphate tablet (K-PHOS No. 2) 1 Tablet(s) Oral four times a day with meals  sodium chloride 3%  Inhalation 3 milliLiter(s) Inhalation every 6 hours    MEDICATIONS  (PRN):  aluminum hydroxide/magnesium hydroxide/simethicone Suspension 30 milliLiter(s) Oral every 6 hours PRN Dyspepsia  atropine Injectable 0.5 milliGRAM(s) IV Push once PRN bradycardia < 40    CAPILLARY BLOOD GLUCOSE        I&O's Summary    16 Apr 2019 07:01  -  17 Apr 2019 07:00  --------------------------------------------------------  IN: 1380 mL / OUT: 4300 mL / NET: -2920 mL    17 Apr 2019 07:01  -  17 Apr 2019 10:15  --------------------------------------------------------  IN: 0 mL / OUT: 0 mL / NET: 0 mL        LABS                        9.0    6.32  )-----------( 340      ( 17 Apr 2019 09:29 )             31.5                         9.4    5.9   )-----------( 315      ( 16 Apr 2019 07:11 )             29.9     04-17    142  |  98  |  14  ----------------------------<  99  4.1   |  34<H>  |  0.48<L>  04-16    135  |  96  |  12  ----------------------------<  110<H>  5.1   |  23  |  0.50    Ca    8.8      17 Apr 2019 06:12  Ca    8.4      16 Apr 2019 20:04  Phos  2.3     04-17  Mg     2.0     04-17    TPro  6.6  /  Alb  3.3  /  TBili  0.3  /  DBili  x   /  AST  12  /  ALT  8<L>  /  AlkPhos  51  04-16    PT/INR - ( 17 Apr 2019 07:54 )   PT: 14.0 sec;   INR: 1.21 ratio         PTT - ( 17 Apr 2019 07:54 )  PTT:34.0 sec   12 Apr 2019 09:18 Troponin x     / CK 88 U/L / CKMB x     / CKMB Units 2.3 ng/mL          ( 15 Apr 2019 09:58 ) pH: 7.45  /  pCO2: 46    /  pO2: 85    / HCO3: 32    / Base Excess: 7.7   /  SaO2: 98              ( 14 Apr 2019 00:45 ) pH: 7.36  /  pCO2: 61    /  pO2: 105   / HCO3: 33    / Base Excess: 6.8   /  SaO2: 98              ( 13 Apr 2019 16:10 ) pH: 7.32  /  pCO2: 66    /  pO2: 78    / HCO3: 33    / Base Excess: 5.6   /  SaO2: 94
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
## 2019-04-17 NOTE — PROGRESS NOTE ADULT - SUBJECTIVE AND OBJECTIVE BOX
infectious diseases progress note:    Patient is a 43y old  Male who presents with a chief complaint of sepsis (17 Apr 2019 07:26)        Noninfectious gastroenteritis                                                                                             Allergies    penicillin (Rash)    Intolerances        ANTIBIOTICS/RELEVANT:  antimicrobials  cefepime   IVPB      cefepime   IVPB 2000 milliGRAM(s) IV Intermittent every 8 hours    immunologic:    OTHER:  ALBUTerol/ipratropium for Nebulization 3 milliLiter(s) Nebulizer every 6 hours  aluminum hydroxide/magnesium hydroxide/simethicone Suspension 30 milliLiter(s) Oral every 6 hours PRN  atropine Injectable 0.5 milliGRAM(s) IV Push once  atropine Injectable 0.5 milliGRAM(s) IV Push once PRN  enoxaparin Injectable 40 milliGRAM(s) SubCutaneous daily  potassium acid phosphate/sodium acid phosphate tablet (K-PHOS No. 2) 1 Tablet(s) Oral four times a day with meals  sodium chloride 3%  Inhalation 3 milliLiter(s) Inhalation every 6 hours      Objective:  Vital Signs Last 24 Hrs  T(C): 37.2 (16 Apr 2019 12:00), Max: 37.2 (16 Apr 2019 12:00)  T(F): 99 (16 Apr 2019 12:00), Max: 99 (16 Apr 2019 12:00)  HR: 82 (17 Apr 2019 05:34) (63 - 107)  BP: 103/61 (16 Apr 2019 12:00) (103/61 - 103/61)  BP(mean): --  RR: 18 (16 Apr 2019 12:00) (18 - 18)  SpO2: 97% (17 Apr 2019 05:34) (97% - 100%)     -no acute distress  Eyes:ASHLEY, EOMI  Ear/Nose/Throat: no oral lesion, no sinus tenderness on percussion	  Neck:no JVD, no lymphadenopathy, supple  Respiratory: CTA sujatha  Cardiovascular: S1S2 RRR, no murmurs  Gastrointestinal:soft, (+) BS, no HSM  Extremities:no e/e/c        LABS:                        9.4    5.9   )-----------( 315      ( 16 Apr 2019 07:11 )             29.9     04-17    142  |  98  |  14  ----------------------------<  99  4.1   |  34<H>  |  0.48<L>    Ca    8.8      17 Apr 2019 06:12  Phos  2.3     04-17  Mg     2.0     04-17    TPro  6.6  /  Alb  3.3  /  TBili  0.3  /  DBili  x   /  AST  12  /  ALT  8<L>  /  AlkPhos  51  04-16            MICROBIOLOGY:    RECENT CULTURES:  04-15 @ 13:53 .Blood                No growth to date.    04-14 @ 09:56 .Blood                No growth to date.    04-14 @ 07:02 .Sputum       Moderate polymorphonuclear leukocytes per low power field  Moderate Squamous epithelial cells per low power field  Numerous Gram Negative Rods per oil power field  Moderate Yeast like cells per oil power field           Normal Respiratory Sirisha present  Numerous Gram Negative Rods Identification and susceptibility to follow.    04-13 @ 00:19 .Blood   PCR    Growth in aerobic bottle: Gram Positive Cocci in Clusters    Blood Culture PCR  Blood Culture PCR     Growth in aerobic bottle: Coag Negative Staphylococcus  Single set isolate, possible contaminant. Contact  Microbiology if susceptibility testing clinically  indicated.  "Due to technical problems, Proteus sp. will Not be reported as part of  the BCID panel until further notice"  ***Blood Panel PCR results on this specimen are available  approximately 3 hours after the Gram stain result.***  Gram stain, PCR, and/or culture results may not always  correspond due to difference in methodologies.  ************************************************************  This PCR assay was performed using [employer].  The following targets are tested for: Enterococcus,  vancomycin resistant enterococci, Listeria monocytogenes,  coagulase negative staphylococci, S. aureus,  methicillin resistant S. aureus, Streptococcus agalactiae  (Group B), S. pneumoniae, S. pyogenes (Group A),  Acinetobacter baumannii, Enterobacter cloacae, E. coli,  Klebsiella oxytoca, K. pneumoniae, Proteus sp.,  Serratia marcescens, Haemophilus influenzae,  Neisseria meningitidis, Pseudomonas aeruginosa, Candida  albicans, C. glabrata, C krusei, C parapsilosis,  C. tropicalis and the KPC resistance gene.    04-12 @ 11:19 .Blood                No growth to date.          RESPIRATORY CULTURES:              RADIOLOGY & ADDITIONAL STUDIES:        Pager [phone]  After 5 pm/weekends or if no response :[phone]
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
## 2019-04-17 NOTE — PROGRESS NOTE ADULT - ATTENDING COMMENTS
pt feels good. denies any complaints. No N/V. No f/c/r. No cp/sob. PE: NAD; abd is soft, mildly distended, NT, no guard, rebound; no edema. 93% on 1L NC checked by me.     Sinus pauses- s/p PPM    Recurrent fevers- unclear etiology. f/u flex sig    Acute resp failure- titrate O2 to off if possible    Colitis- flex sig. apprec GI input    d/w pt
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
## 2019-04-17 NOTE — DISCHARGE NOTE PROVIDER - CARE PROVIDER_API CALL
Maty Cordero)  Cardiac Electrophysiology; Cardiovascular Disease; Internal Medicine  [address] [place] [address]  Phone: [phone]  Fax: [phone]  Follow Up Time: 2 weeks    Rylee Real  Phone: [phone]  Fax: (   )    -  Follow Up Time: 1 week
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
## 2019-04-18 ENCOUNTER — RESULT REVIEW (OUTPATIENT)
Age: 44
End: 2019-04-18

## 2019-04-18 LAB
ANION GAP SERPL CALC-SCNC: 8 MMOL/L — SIGNIFICANT CHANGE UP (ref 5–17)
BASOPHILS # BLD AUTO: 0.02 K/UL — SIGNIFICANT CHANGE UP (ref 0–0.2)
BASOPHILS NFR BLD AUTO: 0.4 % — SIGNIFICANT CHANGE UP (ref 0–2)
BUN SERPL-MCNC: 12 MG/DL — SIGNIFICANT CHANGE UP (ref 7–23)
CALCIUM SERPL-MCNC: 9.1 MG/DL — SIGNIFICANT CHANGE UP (ref 8.4–10.5)
CHLORIDE SERPL-SCNC: 97 MMOL/L — SIGNIFICANT CHANGE UP (ref 96–108)
CO2 SERPL-SCNC: 33 MMOL/L — HIGH (ref 22–31)
CREAT SERPL-MCNC: 0.46 MG/DL — LOW (ref 0.5–1.3)
CULTURE RESULTS: SIGNIFICANT CHANGE UP
EOSINOPHIL # BLD AUTO: 0.06 K/UL — SIGNIFICANT CHANGE UP (ref 0–0.5)
EOSINOPHIL NFR BLD AUTO: 1.1 % — SIGNIFICANT CHANGE UP (ref 0–6)
GAS PNL BLDV: SIGNIFICANT CHANGE UP
GLUCOSE SERPL-MCNC: 100 MG/DL — HIGH (ref 70–99)
HCT VFR BLD CALC: 29 % — LOW (ref 39–50)
HGB BLD-MCNC: 8.3 G/DL — LOW (ref 13–17)
IMM GRANULOCYTES NFR BLD AUTO: 0.2 % — SIGNIFICANT CHANGE UP (ref 0–1.5)
LYMPHOCYTES # BLD AUTO: 1.69 K/UL — SIGNIFICANT CHANGE UP (ref 1–3.3)
LYMPHOCYTES # BLD AUTO: 32.2 % — SIGNIFICANT CHANGE UP (ref 13–44)
MAGNESIUM SERPL-MCNC: 2 MG/DL — SIGNIFICANT CHANGE UP (ref 1.6–2.6)
MCHC RBC-ENTMCNC: 24 PG — LOW (ref 27–34)
MCHC RBC-ENTMCNC: 28.6 GM/DL — LOW (ref 32–36)
MCV RBC AUTO: 83.8 FL — SIGNIFICANT CHANGE UP (ref 80–100)
MONOCYTES # BLD AUTO: 0.71 K/UL — SIGNIFICANT CHANGE UP (ref 0–0.9)
MONOCYTES NFR BLD AUTO: 13.5 % — SIGNIFICANT CHANGE UP (ref 2–14)
NEUTROPHILS # BLD AUTO: 2.76 K/UL — SIGNIFICANT CHANGE UP (ref 1.8–7.4)
NEUTROPHILS NFR BLD AUTO: 52.6 % — SIGNIFICANT CHANGE UP (ref 43–77)
ORGANISM # SPEC MICROSCOPIC CNT: SIGNIFICANT CHANGE UP
ORGANISM # SPEC MICROSCOPIC CNT: SIGNIFICANT CHANGE UP
PHOSPHATE SERPL-MCNC: 2.3 MG/DL — LOW (ref 2.5–4.5)
PLATELET # BLD AUTO: 257 K/UL — SIGNIFICANT CHANGE UP (ref 150–400)
POTASSIUM SERPL-MCNC: 4.4 MMOL/L — SIGNIFICANT CHANGE UP (ref 3.5–5.3)
POTASSIUM SERPL-SCNC: 4.4 MMOL/L — SIGNIFICANT CHANGE UP (ref 3.5–5.3)
RBC # BLD: 3.46 M/UL — LOW (ref 4.2–5.8)
RBC # FLD: 17.5 % — HIGH (ref 10.3–14.5)
SODIUM SERPL-SCNC: 138 MMOL/L — SIGNIFICANT CHANGE UP (ref 135–145)
SPECIMEN SOURCE: SIGNIFICANT CHANGE UP
WBC # BLD: 5.25 K/UL — SIGNIFICANT CHANGE UP (ref 3.8–10.5)
WBC # FLD AUTO: 5.25 K/UL — SIGNIFICANT CHANGE UP (ref 3.8–10.5)

## 2019-04-18 PROCEDURE — 45331 SIGMOIDOSCOPY AND BIOPSY: CPT | Mod: GC

## 2019-04-18 PROCEDURE — 99233 SBSQ HOSP IP/OBS HIGH 50: CPT | Mod: GC

## 2019-04-18 PROCEDURE — 71045 X-RAY EXAM CHEST 1 VIEW: CPT | Mod: 26

## 2019-04-18 PROCEDURE — 99232 SBSQ HOSP IP/OBS MODERATE 35: CPT

## 2019-04-18 RX ORDER — ENOXAPARIN SODIUM 100 MG/ML
40 INJECTION SUBCUTANEOUS DAILY
Qty: 0 | Refills: 0 | Status: DISCONTINUED | OUTPATIENT
Start: 2019-04-19 | End: 2019-04-19

## 2019-04-18 RX ORDER — SODIUM,POTASSIUM PHOSPHATES 278-250MG
1 POWDER IN PACKET (EA) ORAL
Qty: 0 | Refills: 0 | Status: COMPLETED | OUTPATIENT
Start: 2019-04-18 | End: 2019-04-18

## 2019-04-18 RX ADMIN — Medication 3 MILLILITER(S): at 05:57

## 2019-04-18 RX ADMIN — SODIUM CHLORIDE 3 MILLILITER(S): 9 INJECTION INTRAMUSCULAR; INTRAVENOUS; SUBCUTANEOUS at 11:13

## 2019-04-18 RX ADMIN — Medication 1 TABLET(S): at 11:14

## 2019-04-18 RX ADMIN — Medication 1 TABLET(S): at 13:28

## 2019-04-18 RX ADMIN — SODIUM CHLORIDE 3 MILLILITER(S): 9 INJECTION INTRAMUSCULAR; INTRAVENOUS; SUBCUTANEOUS at 00:41

## 2019-04-18 RX ADMIN — Medication 3 MILLILITER(S): at 00:42

## 2019-04-18 RX ADMIN — Medication 1 TABLET(S): at 18:59

## 2019-04-18 RX ADMIN — Medication 3 MILLILITER(S): at 11:13

## 2019-04-18 RX ADMIN — SODIUM CHLORIDE 3 MILLILITER(S): 9 INJECTION INTRAMUSCULAR; INTRAVENOUS; SUBCUTANEOUS at 18:59

## 2019-04-18 RX ADMIN — SODIUM CHLORIDE 3 MILLILITER(S): 9 INJECTION INTRAMUSCULAR; INTRAVENOUS; SUBCUTANEOUS at 05:54

## 2019-04-18 NOTE — PROGRESS NOTE ADULT - PROBLEM SELECTOR PLAN 2
-no fever x 48h. Last BCx x 2 4/15 NGTD.  -monitoring off abx per ID  -pending flex sig this PM for persistent colitis on repeat CT.  -other infectious w/up negative so far including UA, CT c/a/p.   -pending duplex BLE r/o DVT, FRANCK, ANCA  -originally admitted w/ multifocal PNA s/p 7 day course of cefepime -no fever x 48h. Last BCx x 2 4/15 NGTD.  -monitoring off abx per ID  -pending flex sig this PM for persistent colitis on repeat CT.  -other infectious w/up negative so far including UA, CT c/a/p.   -pending FRANCK, ANCA  -originally admitted w/ multifocal PNA s/p 7 day course of cefepime

## 2019-04-18 NOTE — PROGRESS NOTE ADULT - ASSESSMENT
43 harish old recent nephrectomy on right and history Pt with recurrent kidney stones.  presents with diarrhea and colitis on ct scan and indeterminate C diff test.  PCR - Not detected    stent on left that has been removed        urine culture is grew Providencia (recurrent) - senstive to cefepime    ct reviewed with ongoing colitis  no obv abscess in surgical bed   and several areas of consolidation      wd packed by urology not obv infected  wd seen .      UTI plus PNA ( HCAP)  This is not an atypical pna      completed  cefepime  for pneumonia early this past week      still no explantation for colitis and distension.     pt is afebrile and has nl wbc  looks alittle SOB with decreased BS    chest xray    reviewed  no new pna did have effusions previously  has been stable off ab for most of the week and was getting ready to be discharged  developed hypoxia, moved to ICU    Possibilities include    infection in nephrectomy bed.- repeat CT with no formed collection and decrease fluid in bed  recurrent UTI:  the left collecting system is not normal and just finished a course of ab for UTI.  pna  has had effusions  and prior cat scan with what was thought to be pna that was treated    restarted on cefepime and given a dose of vanco    BC from 4/13 1/2 with CoNS, BCs from 4/12- NGTD  no recent urine culture    fever on 4/15   feels better and not sob  unclear as to cause of recent fever  recurrent UTI ?  doubt ongoing pna or nephrectomy bed site infection      persistent fever and in retrospect ,but  he has interm been febrile since admission despite ab but fever down last 36 hrs        antibiotics  stopped.   for procto today.  not infectious cause of intern fever found   possible due to colitis

## 2019-04-18 NOTE — PROVIDER CONTACT NOTE (OTHER) - NAME OF MD/NP/PA/DO NOTIFIED:
Dr. Anton Dacosta
Dr. Fowler
Dr. Govea
Dr. Govea
Dr. Robbins
Elvia RESTREPO
MD Weaver
MICU resident
MICU resident
MD Weaver
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
## 2019-04-18 NOTE — PROVIDER CONTACT NOTE (OTHER) - REASON
Patient refuses assessment by RN
Patient w/ temperature of 100.7 orally
Pt febrile 102.6 Axillary temp
Slight serous sanguineous drainage to right groin s/p MICRA PPM placement
patient became bradycardic and unconcious
patient had pause
pt bp 163/103
pt bp 180/86
pt had pause
patient should be transported back to MICU

## 2019-04-18 NOTE — PROGRESS NOTE ADULT - PROBLEM SELECTOR PLAN 1
-improving. O/N w/ sinus john 50-60s, no pauses.   -s/p PPM, in place on CXR  -R groin site appears stable, no hematoma or active bleeding

## 2019-04-18 NOTE — PROGRESS NOTE ADULT - PROBLEM SELECTOR PLAN 4
-significant improvement. now able to be comfortable on 1L NC. will attempt to wean to RA  -AVAPS at night, will also continue to wean as tolerated.  -cont aggressive chest PT and breathing treatment to help w/ secretion mobilization  -admitted w/ hypercapnic respiratory likely in setting of able to be improved on BiPAP alone w/out intubation now w/ improved mental status

## 2019-04-18 NOTE — PROVIDER CONTACT NOTE (OTHER) - SITUATION
MICU resident at bedside patient when patient became bradycardic and had pause.
Patient refuses assessment by RN
Patient w/ temperature of 100.7 orally
Pt febrile 102.6 Axillary temp
Slight serous sanguineous drainage to right groin s/p MICRA PPM placement
patient had cardiac pause, on monitor.
patient was turned and talking to nurse. patient's eyes rolled back became unresponsive and bradycardic to 15-patient had a few second pause. regained alertness and HR went to 50s.
pt bp 163/103
pt sbp 180/86, pt exhibits no s/s of distress or complications.
patient had 2 episodes in a row of pauses- close together. Attending and cardiologist at bedside assessing patient.
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
## 2019-04-18 NOTE — PROVIDER CONTACT NOTE (OTHER) - ASSESSMENT
patient has had episodes of bradycardia with 2 second loss of consciousness with eyes rolling back.
No s/s of distress. Patient refuses daily assessment by RN.
Patient alert and oriented x 4. Cardiac monitoring ongoing, No events on TELE. Right groin site post Micra PPm placement on 04/17/19
Patient febrile to 100.7 and tachypneic to 28.   No s/s of distress. Patient placed on 2L NC for comfort, tylenol administered.
Pt alert and oriented' Pt complains he feels hot. No chills or rigors. All other VS WNL; Pt on 2 L nasal cannula
patient eyes rolled back during episode. episode lasted 3 seconds
patient eyes rolled back patient wasn't responsive. within 3 seconds patient was alert and heart rate back in 50s.
patient's eye's rolled back. pulse was felt. pt head moved like seizure movement for 2seconds. patient was unresponsive for a total of 5 seconds.
pt asleep in bed, no s/s of distress
pt exhibits no s/s of distress or complications, pt is agitated and rude and has been since beginning of shift.

## 2019-04-18 NOTE — PROVIDER CONTACT NOTE (OTHER) - ACTION/TREATMENT ORDERED:
will move patient back to MICU
Refusal noted.
Blood cultures, will do flagyl instead of vancomycin, and will assess patient at bedside.
MD made aware, Administer Tylenol will restart vanco
MD notifed and orders to continue site for further changes, bleeding or hematoma. Will continue to monitor.
attending & cardiologist were called to come at bedside.
continue to monitor pt, if sbp during next vital sign assessement  exceeds 180 call dr again
continue to monitor. atropine at bedside.
hydrazaline
will come up to assess patient

## 2019-04-18 NOTE — PROGRESS NOTE ADULT - ATTENDING COMMENTS
Only complaint is dry nose from oxygen. No f/c/r. No CP/SOB. PE: NAD; abd is s/mild distention unchanged/+bs/no guard, rebound    Sinus pauses- s/p PPM    Recurrent fevers- unclear etiology. f/u flex sig, FRANCK, ANCA    Acute resp failure- titrate O2 to off if possible    Colitis- flex sig. apprec GI input

## 2019-04-18 NOTE — PROVIDER CONTACT NOTE (OTHER) - RECOMMENDATIONS
patient should be monitored on MICU unit for attendings and residents to be at bedside.
RN explained patient for need for assessment. Patient continues to refuse.  Note refusal
BP med
Notify Provider
Restart vancomycin - (discontinued yesterday)? Blood cultures
Site assessed - no hematoma. Scant oozing to the site. Dressing reniforced.
come assess patient at bedside.
continue to monitor. atropine at bedside.
have attending and cardiology assess patient
hydrazaline 5mg

## 2019-04-18 NOTE — PROGRESS NOTE ADULT - SUBJECTIVE AND OBJECTIVE BOX
Kelsey Geronimo MD PGY1  [phone]    Patient is a 43y old  Male who presents with a chief complaint of sepsis (17 Apr 2019 17:51)    SUBJECTIVE/OVERNIGHT EVENTS   Pending flex sig today after AM enemas.    Vital Signs Last 24 Hrs  T(C): 37 (17 Apr 2019 21:01), Max: 37.2 (17 Apr 2019 08:10)  T(F): 98.6 (17 Apr 2019 21:01), Max: 98.9 (17 Apr 2019 08:10)  HR: 95 (18 Apr 2019 06:11) (71 - 105)  BP: 117/73 (17 Apr 2019 21:01) (117/73 - 156/75)  BP(mean): --  RR: 18 (17 Apr 2019 21:01) (18 - 18)  SpO2: 97% (18 Apr 2019 06:11) (95% - 98%)    04-17-19 @ 07:01  -  04-18-19 @ 07:00  --------------------------------------------------------  IN: 660 mL / OUT: 1550 mL / NET: -890 mL Kelsey Geronimo MD PGY1  [phone]    Patient is a 43y old  Male who presents with a chief complaint of sepsis (17 Apr 2019 17:51)    SUBJECTIVE/OVERNIGHT EVENTS   Pending flex sig today in PM after AM enemas.    Pt agitated today due to numerous teams seeing pt. Denies any chest pain, syncope, diaphoresis. O/N tele w/ occasional v-pacing, otherwise sinus 70-100s.      Vital Signs Last 24 Hrs  T(C): 37 (17 Apr 2019 21:01), Max: 37.2 (17 Apr 2019 08:10)  T(F): 98.6 (17 Apr 2019 21:01), Max: 98.9 (17 Apr 2019 08:10)  HR: 95 (18 Apr 2019 06:11) (71 - 105)  BP: 117/73 (17 Apr 2019 21:01) (117/73 - 156/75)  BP(mean): --  RR: 18 (17 Apr 2019 21:01) (18 - 18)  SpO2: 97% (18 Apr 2019 06:11) (95% - 98%)    04-17-19 @ 07:01  -  04-18-19 @ 07:00  --------------------------------------------------------  IN: 660 mL / OUT: 1550 mL / NET: -890 mL    PHYSICAL EXAM:  GENERAL: NAD, appears chronically ill. Speaking in full sentences.  HEAD:  Atraumatic, Normocephalic  EYES: EOMI, conjunctiva and sclera clear  NECK: Supple  CHEST/LUNG: clear to auscultation bilaterally  HEART: Regular rate and rhythm; 1/6 systolic murmur.  ABDOMEN: Soft, Nontender, mild distention; Bowel sounds present  EXTREMITIES:  2+ Peripheral Pulses, mild trace to 1+ symmetrical edema                        R groin catheter site w/ no active bleeding or oozing. Overlying dressing w/ small amount of visible blood.  PSYCH: AAOx3 flat affect.  NEUROLOGY: quadriplegia  SKIN: healed lesions on buttocks        MEDICATIONS  (STANDING):  ALBUTerol/ipratropium for Nebulization 3 milliLiter(s) Nebulizer every 6 hours  atropine Injectable 0.5 milliGRAM(s) IV Push once  potassium acid phosphate/sodium acid phosphate tablet (K-PHOS No. 2) 1 Tablet(s) Oral four times a day with meals  sodium chloride 3%  Inhalation 3 milliLiter(s) Inhalation every 6 hours    MEDICATIONS  (PRN):  aluminum hydroxide/magnesium hydroxide/simethicone Suspension 30 milliLiter(s) Oral every 6 hours PRN Dyspepsia  atropine Injectable 0.5 milliGRAM(s) IV Push once PRN bradycardia < 40    CAPILLARY BLOOD GLUCOSE        I&O's Summary    17 Apr 2019 07:01  -  18 Apr 2019 07:00  --------------------------------------------------------  IN: 660 mL / OUT: 1750 mL / NET: -1090 mL        LABS                        8.3    5.25  )-----------( 257      ( 18 Apr 2019 07:44 )             29.0                         9.0    6.32  )-----------( 340      ( 17 Apr 2019 09:29 )             31.5     04-18    138  |  97  |  12  ----------------------------<  100<H>  4.4   |  33<H>  |  0.46<L>  04-17    142  |  98  |  14  ----------------------------<  99  4.1   |  34<H>  |  0.48<L>    Ca    9.1      18 Apr 2019 06:22  Ca    8.8      17 Apr 2019 06:12  Phos  2.3     04-18  Mg     2.0     04-18      PT/INR - ( 17 Apr 2019 07:54 )   PT: 14.0 sec;   INR: 1.21 ratio         PTT - ( 17 Apr 2019 07:54 )  PTT:34.0 sec   12 Apr 2019 09:18 Troponin x     / CK 88 U/L / CKMB x     / CKMB Units 2.3 ng/mL          ( 15 Apr 2019 09:58 ) pH: 7.45  /  pCO2: 46    /  pO2: 85    / HCO3: 32    / Base Excess: 7.7   /  SaO2: 98              ( 14 Apr 2019 00:45 ) pH: 7.36  /  pCO2: 61    /  pO2: 105   / HCO3: 33    / Base Excess: 6.8   /  SaO2: 98              ( 13 Apr 2019 16:10 ) pH: 7.32  /  pCO2: 66    /  pO2: 78    / HCO3: 33    / Base Excess: 5.6   /  SaO2: 94                04-18-19 @ 06:11 - VBG - pH: 7.48  | pCO2: 47    | pO2: 118   | Lactate: 1.0    04-17-19 @ 13:54 - VBG - pH: 7.38  | pCO2: 67    | pO2: 48    | Lactate: 0.6        RADIOLOGY & ADDITIONAL TESTS:  Pending final CXR read. MIRCA PPM in place. Interval improvement in BL opacities vs previous CXR.
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
## 2019-04-18 NOTE — PROGRESS NOTE ADULT - SUBJECTIVE AND OBJECTIVE BOX
infectious diseases progress note:    Patient is a 43y old  Male who presents with a chief complaint of sepsis (18 Apr 2019 07:18)        Noninfectious gastroenteritis             Allergies    penicillin (Rash)    Intolerances        ANTIBIOTICS/RELEVANT:  antimicrobials    immunologic:    OTHER:  ALBUTerol/ipratropium for Nebulization 3 milliLiter(s) Nebulizer every 6 hours  aluminum hydroxide/magnesium hydroxide/simethicone Suspension 30 milliLiter(s) Oral every 6 hours PRN  atropine Injectable 0.5 milliGRAM(s) IV Push once  atropine Injectable 0.5 milliGRAM(s) IV Push once PRN  potassium acid phosphate/sodium acid phosphate tablet (K-PHOS No. 2) 1 Tablet(s) Oral four times a day with meals  sodium chloride 3%  Inhalation 3 milliLiter(s) Inhalation every 6 hours      Objective:  Vital Signs Last 24 Hrs  T(C): 37 (17 Apr 2019 21:01), Max: 37.2 (17 Apr 2019 08:10)  T(F): 98.6 (17 Apr 2019 21:01), Max: 98.9 (17 Apr 2019 08:10)  HR: 95 (18 Apr 2019 06:11) (71 - 105)  BP: 117/73 (17 Apr 2019 21:01) (117/73 - 156/75)  BP(mean): --  RR: 18 (17 Apr 2019 21:01) (18 - 18)  SpO2: 97% (18 Apr 2019 06:11) (95% - 98%)    PHYSICAL EXAM:  Constitutional:Well-developed, well nourished--no acute distress  Eyes:ASHLEY, EOMI  Ear/Nose/Throat: no oral lesion, no sinus tenderness on percussion	  Neck:no JVD, no lymphadenopathy, supple  Respiratory: CTA sujatha  Cardiovascular: S1S2 RRR, no murmurs  Gastrointestinal:soft, (+) BS, no HSM  Extremities:no e/e/c        LABS:                        9.0    6.32  )-----------( 340      ( 17 Apr 2019 09:29 )             31.5     04-18    138  |  97  |  12  ----------------------------<  100<H>  4.4   |  33<H>  |  0.46<L>    Ca    9.1      18 Apr 2019 06:22  Phos  2.3     04-18  Mg     2.0     04-18      PT/INR - ( 17 Apr 2019 07:54 )   PT: 14.0 sec;   INR: 1.21 ratio         PTT - ( 17 Apr 2019 07:54 )  PTT:34.0 sec        MICROBIOLOGY:    RECENT CULTURES:  04-15 @ 13:53 .Blood                No growth to date.    04-14 @ 09:56 .Blood                No growth to date.    04-14 @ 07:02 .Sputum   TWAN    Moderate polymorphonuclear leukocytes per low power field  Moderate Squamous epithelial cells per low power field  Numerous Gram Negative Rods per oil power field  Moderate Yeast like cells per oil power field    Pseudomonas aeruginosa  Pseudomonas aeruginosa     Normal Respiratory Sirisha present  Moderate Pseudomonas aeruginosa  Numerous other gram negative sylwia  Identification and susceptibility to follow.    04-13 @ 00:19 .Blood   PCR    Growth in aerobic bottle: Gram Positive Cocci in Clusters    Blood Culture PCR  Blood Culture PCR     Growth in aerobic bottle: Coag Negative Staphylococcus  Single set isolate, possible contaminant. Contact  Microbiology if susceptibility testing clinically  indicated.  "Due to technical problems, Proteus sp. will Not be reported as part of  the BCID panel until further notice"  ***Blood Panel PCR results on this specimen are available  approximately 3 hours after the Gram stain result.***  Gram stain, PCR, and/or culture results may not always  correspond due to difference in methodologies.  ************************************************************  This PCR assay was performed using [employer].  The following targets are tested for: Enterococcus,  vancomycin resistant enterococci, Listeria monocytogenes,  coagulase negative staphylococci, S. aureus,  methicillin resistant S. aureus, Streptococcus agalactiae  (Group B), S. pneumoniae, S. pyogenes (Group A),  Acinetobacter baumannii, Enterobacter cloacae, E. coli,  Klebsiella oxytoca, K. pneumoniae, Proteus sp.,  Serratia marcescens, Haemophilus influenzae,  Neisseria meningitidis, Pseudomonas aeruginosa, Candida  albicans, C. glabrata, C krusei, C parapsilosis,  C. tropicalis and the KPC resistance gene.    04-12 @ 11:19 .Blood                No growth at 5 days.          RESPIRATORY CULTURES:              RADIOLOGY & ADDITIONAL STUDIES:        Pager [phone]  After 5 pm/weekends or if no response :[phone]

## 2019-04-18 NOTE — PROVIDER CONTACT NOTE (OTHER) - DATE AND TIME:
03-Apr-2019 19:00
06-Apr-2019 07:35
06-Apr-2019 16:00
07-Apr-2019 04:59
07-Apr-2019 21:52
12-Apr-2019 23:15
13-Apr-2019 02:58
13-Apr-2019 03:26
18-Apr-2019 04:30
13-Apr-2019 03:45

## 2019-04-19 LAB
ANA TITR SER: NEGATIVE — SIGNIFICANT CHANGE UP
ANION GAP SERPL CALC-SCNC: 10 MMOL/L — SIGNIFICANT CHANGE UP (ref 5–17)
BASE EXCESS BLDV CALC-SCNC: 9.3 MMOL/L — HIGH (ref -2–2)
BUN SERPL-MCNC: 11 MG/DL — SIGNIFICANT CHANGE UP (ref 7–23)
CALCIUM SERPL-MCNC: 9.3 MG/DL — SIGNIFICANT CHANGE UP (ref 8.4–10.5)
CHLORIDE SERPL-SCNC: 99 MMOL/L — SIGNIFICANT CHANGE UP (ref 96–108)
CO2 BLDV-SCNC: 35 MMOL/L — HIGH (ref 22–30)
CO2 SERPL-SCNC: 31 MMOL/L — SIGNIFICANT CHANGE UP (ref 22–31)
CREAT SERPL-MCNC: 0.54 MG/DL — SIGNIFICANT CHANGE UP (ref 0.5–1.3)
CULTURE RESULTS: SIGNIFICANT CHANGE UP
CULTURE RESULTS: SIGNIFICANT CHANGE UP
GAS PNL BLDV: SIGNIFICANT CHANGE UP
GLUCOSE SERPL-MCNC: 106 MG/DL — HIGH (ref 70–99)
HCO3 BLDV-SCNC: 34 MMOL/L — HIGH (ref 21–29)
HCT VFR BLD CALC: 32.4 % — LOW (ref 39–50)
HGB BLD-MCNC: 9.6 G/DL — LOW (ref 13–17)
MAGNESIUM SERPL-MCNC: 2.1 MG/DL — SIGNIFICANT CHANGE UP (ref 1.6–2.6)
MCHC RBC-ENTMCNC: 24.8 PG — LOW (ref 27–34)
MCHC RBC-ENTMCNC: 29.6 GM/DL — LOW (ref 32–36)
MCV RBC AUTO: 83.6 FL — SIGNIFICANT CHANGE UP (ref 80–100)
PCO2 BLDV: 48 MMHG — SIGNIFICANT CHANGE UP (ref 35–50)
PH BLDV: 7.46 — HIGH (ref 7.35–7.45)
PHOSPHATE SERPL-MCNC: 3 MG/DL — SIGNIFICANT CHANGE UP (ref 2.5–4.5)
PLATELET # BLD AUTO: 251 K/UL — SIGNIFICANT CHANGE UP (ref 150–400)
PO2 BLDV: 47 MMHG — HIGH (ref 25–45)
POTASSIUM SERPL-MCNC: 3.9 MMOL/L — SIGNIFICANT CHANGE UP (ref 3.5–5.3)
POTASSIUM SERPL-SCNC: 3.9 MMOL/L — SIGNIFICANT CHANGE UP (ref 3.5–5.3)
RBC # BLD: 3.88 M/UL — LOW (ref 4.2–5.8)
RBC # FLD: 17.3 % — HIGH (ref 10.3–14.5)
SAO2 % BLDV: 83 % — SIGNIFICANT CHANGE UP (ref 67–88)
SODIUM SERPL-SCNC: 140 MMOL/L — SIGNIFICANT CHANGE UP (ref 135–145)
SPECIMEN SOURCE: SIGNIFICANT CHANGE UP
SPECIMEN SOURCE: SIGNIFICANT CHANGE UP
WBC # BLD: 5 K/UL — SIGNIFICANT CHANGE UP (ref 3.8–10.5)
WBC # FLD AUTO: 5 K/UL — SIGNIFICANT CHANGE UP (ref 3.8–10.5)

## 2019-04-19 PROCEDURE — 93970 EXTREMITY STUDY: CPT | Mod: 26

## 2019-04-19 PROCEDURE — 99232 SBSQ HOSP IP/OBS MODERATE 35: CPT

## 2019-04-19 PROCEDURE — 99233 SBSQ HOSP IP/OBS HIGH 50: CPT | Mod: GC

## 2019-04-19 RX ORDER — APIXABAN 2.5 MG/1
1 TABLET, FILM COATED ORAL
Qty: 14 | Refills: 0 | OUTPATIENT
Start: 2019-04-19 | End: 2019-05-02

## 2019-04-19 RX ORDER — APIXABAN 2.5 MG/1
1 TABLET, FILM COATED ORAL
Qty: 5 | Refills: 0 | OUTPATIENT
Start: 2019-04-19 | End: 2019-04-23

## 2019-04-19 RX ORDER — RIVAROXABAN 15 MG-20MG
1 KIT ORAL
Qty: 5 | Refills: 0 | OUTPATIENT
Start: 2019-04-19 | End: 2019-04-23

## 2019-04-19 RX ORDER — APIXABAN 2.5 MG/1
10 TABLET, FILM COATED ORAL EVERY 12 HOURS
Qty: 0 | Refills: 0 | Status: COMPLETED | OUTPATIENT
Start: 2019-04-19 | End: 2019-04-26

## 2019-04-19 RX ADMIN — SODIUM CHLORIDE 3 MILLILITER(S): 9 INJECTION INTRAMUSCULAR; INTRAVENOUS; SUBCUTANEOUS at 00:40

## 2019-04-19 RX ADMIN — ENOXAPARIN SODIUM 40 MILLIGRAM(S): 100 INJECTION SUBCUTANEOUS at 14:16

## 2019-04-19 RX ADMIN — SODIUM CHLORIDE 3 MILLILITER(S): 9 INJECTION INTRAMUSCULAR; INTRAVENOUS; SUBCUTANEOUS at 18:20

## 2019-04-19 RX ADMIN — APIXABAN 10 MILLIGRAM(S): 2.5 TABLET, FILM COATED ORAL at 18:19

## 2019-04-19 RX ADMIN — SODIUM CHLORIDE 3 MILLILITER(S): 9 INJECTION INTRAMUSCULAR; INTRAVENOUS; SUBCUTANEOUS at 06:13

## 2019-04-19 RX ADMIN — SODIUM CHLORIDE 3 MILLILITER(S): 9 INJECTION INTRAMUSCULAR; INTRAVENOUS; SUBCUTANEOUS at 14:21

## 2019-04-19 NOTE — PROGRESS NOTE ADULT - ASSESSMENT
43 harish old recent nephrectomy on right and history Pt with recurrent kidney stones.  presents with diarrhea and colitis on ct scan and indeterminate C diff test.  PCR - Not detected    stent on left that has been removed        urine culture is grew Providencia (recurrent) - senstive to cefepime    ct reviewed with ongoing colitis  no obv abscess in surgical bed   and several areas of consolidation             UTI plus PNA ( HCAP)  This is not an atypical pna      completed  cefepime  for pneumonia early this past week      still no explantation for colitis and distension.     pt is afebrile and has nl wbc  looks alittle SOB with decreased BS    chest xray    reviewed  no new pna did have effusions previously  has been stable off ab for most of the week and was getting ready to be discharged  developed hypoxia, moved to ICU    Possibilities include    infection in nephrectomy bed.- repeat CT with no formed collection and decrease fluid in bed  recurrent UTI:  the left collecting system is not normal and just finished a course of ab for UTI.  pna  has had effusions  and prior cat scan with what was thought to be pna that was treated    restarted on cefepime and given a dose of vanco    BC from 4/13 1/2 with CoNS, BCs from 4/12- NGTD  no recent urine culture    fever on 4/15   feels better and not sob  unclear as to cause of recent fever  recurrent UTI ?  doubt ongoing pna or nephrectomy bed site infection      persistent fever and in retrospect ,but  he has interm been febrile since admission despite ab but fever down last 36 hrs        antibiotics  stopped and has been without fever of ab      no infectious cause of intermittent  fever found   possible due to colitis   please call if fever returns

## 2019-04-19 NOTE — PROGRESS NOTE ADULT - ATTENDING COMMENTS
off oxygen and AVAPS. pt feels good and wants to go home. denies complaints. PE: NAD; euvolemic; surgical site c/d/i; abd mildly distended/nt/+bs    Acute DVT- resulted after pt encounter. risks/benefits discussed by team. pt doesn't want injections. will find out what insurance covers.    Acute hypoxic/hypercarbic failure- resolved. monitor    Fever- resolved. likely sec to DVT. Rheum labs still testing

## 2019-04-19 NOTE — PROGRESS NOTE ADULT - PROBLEM SELECTOR PLAN 1
-improving. O/N w/ sinus john w/ vpace 70-90s. no pauses.   -R groin site appears stable, no hematoma or active bleeding  -medically optimized for d/c. f/up 5/3 w/ EP.

## 2019-04-19 NOTE — PROGRESS NOTE ADULT - SUBJECTIVE AND OBJECTIVE BOX
Kelsey Geronimo MD PGY1  [phone]    Patient is a 43y old  Male who presents with a chief complaint of sepsis (18 Apr 2019 13:45)    SUBJECTIVE/OVERNIGHT EVENTS     Vital Signs Last 24 Hrs  T(C): 37 (19 Apr 2019 04:18), Max: 37.4 (19 Apr 2019 00:10)  T(F): 98.6 (19 Apr 2019 04:18), Max: 99.4 (19 Apr 2019 00:10)  HR: 101 (19 Apr 2019 04:18) (72 - 101)  BP: 101/68 (19 Apr 2019 04:18) (101/68 - 139/93)  BP(mean): --  RR: 18 (19 Apr 2019 04:18) (18 - 18)  SpO2: 98% (19 Apr 2019 04:18) (95% - 100%)    04-18-19 @ 07:01  -  04-19-19 @ 07:00  --------------------------------------------------------  IN: 580 mL / OUT: 2900 mL / NET: -2320 mL    04-19-19 @ 07:01  -  04-19-19 @ 07:17  --------------------------------------------------------  IN: 0 mL / OUT: 1800 mL / NET: -1800 mL Kelsey Geronimo MD PGY1  [phone]    Patient is a 43y old  Male who presents with a chief complaint of sepsis (18 Apr 2019 13:45)    SUBJECTIVE/OVERNIGHT EVENTS   O/N slept off NC, maintained SpO2 > 90s.    Vital Signs Last 24 Hrs  T(C): 37 (19 Apr 2019 04:18), Max: 37.4 (19 Apr 2019 00:10)  T(F): 98.6 (19 Apr 2019 04:18), Max: 99.4 (19 Apr 2019 00:10)  HR: 101 (19 Apr 2019 04:18) (72 - 101)  BP: 101/68 (19 Apr 2019 04:18) (101/68 - 139/93)  BP(mean): --  RR: 18 (19 Apr 2019 04:18) (18 - 18)  SpO2: 98% (19 Apr 2019 04:18) (95% - 100%)    04-18-19 @ 07:01  -  04-19-19 @ 07:00  --------------------------------------------------------  IN: 580 mL / OUT: 2900 mL / NET: -2320 mL    04-19-19 @ 07:01  -  04-19-19 @ 07:17  --------------------------------------------------------  IN: 0 mL / OUT: 1800 mL / NET: -1800 mL Kelsey Geronimo MD PGY1  [phone]    Patient is a 43y old  Male who presents with a chief complaint of sepsis (18 Apr 2019 13:45)    SUBJECTIVE/OVERNIGHT EVENTS   O/N slept off NC, maintained SpO2 > 90s.    This AM, states he is feeling well and wishes   Vital Signs Last 24 Hrs  T(C): 37 (19 Apr 2019 04:18), Max: 37.4 (19 Apr 2019 00:10)  T(F): 98.6 (19 Apr 2019 04:18), Max: 99.4 (19 Apr 2019 00:10)  HR: 101 (19 Apr 2019 04:18) (72 - 101)  BP: 101/68 (19 Apr 2019 04:18) (101/68 - 139/93)  BP(mean): --  RR: 18 (19 Apr 2019 04:18) (18 - 18)  SpO2: 98% (19 Apr 2019 04:18) (95% - 100%)    04-18-19 @ 07:01  -  04-19-19 @ 07:00  --------------------------------------------------------  IN: 580 mL / OUT: 2900 mL / NET: -2320 mL    04-19-19 @ 07:01  -  04-19-19 @ 07:17  --------------------------------------------------------  IN: 0 mL / OUT: 1800 mL / NET: -1800 mL Kelsey Geronimo MD PGY1  [phone]    Patient is a 43y old  Male who presents with a chief complaint of sepsis (18 Apr 2019 13:45)    SUBJECTIVE/OVERNIGHT EVENTS   O/N slept off NC, maintained SpO2 > 90s.    This AM, states he is feeling well and wishes to go home. Denies any SOB, chest pain, palpitations. Tele O/N w/ vpaced sinus 80-90s.    Vital Signs Last 24 Hrs  T(C): 37 (19 Apr 2019 04:18), Max: 37.4 (19 Apr 2019 00:10)  T(F): 98.6 (19 Apr 2019 04:18), Max: 99.4 (19 Apr 2019 00:10)  HR: 101 (19 Apr 2019 04:18) (72 - 101)  BP: 101/68 (19 Apr 2019 04:18) (101/68 - 139/93)  BP(mean): --  RR: 18 (19 Apr 2019 04:18) (18 - 18)  SpO2: 98% (19 Apr 2019 04:18) (95% - 100%)    04-18-19 @ 07:01  -  04-19-19 @ 07:00  --------------------------------------------------------  IN: 580 mL / OUT: 2900 mL / NET: -2320 mL    04-19-19 @ 07:01  -  04-19-19 @ 07:17  --------------------------------------------------------  IN: 0 mL / OUT: 1800 mL / NET: -1800 mL    PHYSICAL EXAM:  GENERAL: NAD, appears chronically ill. Speaking in full sentences.  HEAD:  Atraumatic, Normocephalic  EYES: EOMI, conjunctiva and sclera clear  NECK: Supple  CHEST/LUNG: clear to auscultation bilaterally  HEART: Regular rate and rhythm; 1/6 systolic murmur.  ABDOMEN: Soft, Nontender, mild distention; Bowel sounds present  EXTREMITIES:  2+ Peripheral Pulses, mild trace to 1+ symmetrical edema                        R groin catheter site w/ no active bleeding or oozing.   PSYCH: AAOx3 flat affect.  NEUROLOGY: quadriplegia  SKIN: healed lesions on buttocks        MEDICATIONS  (STANDING):  enoxaparin Injectable 40 milliGRAM(s) SubCutaneous daily  sodium chloride 3%  Inhalation 3 milliLiter(s) Inhalation every 6 hours    MEDICATIONS  (PRN):  aluminum hydroxide/magnesium hydroxide/simethicone Suspension 30 milliLiter(s) Oral every 6 hours PRN Dyspepsia    CAPILLARY BLOOD GLUCOSE        I&O's Summary    18 Apr 2019 07:01  -  19 Apr 2019 07:00  --------------------------------------------------------  IN: 580 mL / OUT: 2900 mL / NET: -2320 mL    19 Apr 2019 07:01  -  19 Apr 2019 11:12  --------------------------------------------------------  IN: 300 mL / OUT: 1800 mL / NET: -1500 mL        LABS                        9.6    5.0   )-----------( 251      ( 19 Apr 2019 09:22 )             32.4                         8.3    5.25  )-----------( 257      ( 18 Apr 2019 07:44 )             29.0     04-19    140  |  99  |  11  ----------------------------<  106<H>  3.9   |  31  |  0.54  04-18    138  |  97  |  12  ----------------------------<  100<H>  4.4   |  33<H>  |  0.46<L>    Ca    9.3      19 Apr 2019 09:22  Ca    9.1      18 Apr 2019 06:22  Phos  3.0     04-19  Mg     2.1     04-19 12 Apr 2019 09:18 Troponin x     / CK 88 U/L / CKMB x     / CKMB Units 2.3 ng/mL          ( 15 Apr 2019 09:58 ) pH: 7.45  /  pCO2: 46    /  pO2: 85    / HCO3: 32    / Base Excess: 7.7   /  SaO2: 98              ( 14 Apr 2019 00:45 ) pH: 7.36  /  pCO2: 61    /  pO2: 105   / HCO3: 33    / Base Excess: 6.8   /  SaO2: 98              ( 13 Apr 2019 16:10 ) pH: 7.32  /  pCO2: 66    /  pO2: 78    / HCO3: 33    / Base Excess: 5.6   /  SaO2: 94                04-19-19 @ 09:18 - VBG - pH: 7.46  | pCO2: 48    | pO2: 47    | Lactate: Kelsey Geronimo MD PGY1  [phone]    Patient is a 43y old  Male who presents with a chief complaint of sepsis (18 Apr 2019 13:45)    SUBJECTIVE/OVERNIGHT EVENTS   O/N slept off NC, maintained SpO2 > 90s.    This AM, states he is feeling well and wishes to go home. Denies any SOB, chest pain, palpitations. Tele O/N w/ vpaced sinus 80-90s.    Vital Signs Last 24 Hrs  T(C): 37 (19 Apr 2019 04:18), Max: 37.4 (19 Apr 2019 00:10)  T(F): 98.6 (19 Apr 2019 04:18), Max: 99.4 (19 Apr 2019 00:10)  HR: 101 (19 Apr 2019 04:18) (72 - 101)  BP: 101/68 (19 Apr 2019 04:18) (101/68 - 139/93)  BP(mean): --  RR: 18 (19 Apr 2019 04:18) (18 - 18)  SpO2: 98% (19 Apr 2019 04:18) (95% - 100%)    04-18-19 @ 07:01  -  04-19-19 @ 07:00  --------------------------------------------------------  IN: 580 mL / OUT: 2900 mL / NET: -2320 mL    04-19-19 @ 07:01  -  04-19-19 @ 07:17  --------------------------------------------------------  IN: 0 mL / OUT: 1800 mL / NET: -1800 mL    PHYSICAL EXAM:  GENERAL: NAD, appears chronically ill. Speaking in full sentences.  HEAD:  Atraumatic, Normocephalic  EYES: EOMI, conjunctiva and sclera clear  NECK: Supple  CHEST/LUNG: clear to auscultation bilaterally  HEART: Regular rate and rhythm; 1/6 systolic murmur.  ABDOMEN: Soft, Nontender, mild distention; Bowel sounds present  EXTREMITIES:  mild trace to 1+ symmetrical edema                        R groin catheter site w/ no active bleeding or oozing.   PSYCH: AAOx3 flat affect.  NEUROLOGY: quadriplegia  SKIN: healed lesions on buttocks        MEDICATIONS  (STANDING):  enoxaparin Injectable 40 milliGRAM(s) SubCutaneous daily  sodium chloride 3%  Inhalation 3 milliLiter(s) Inhalation every 6 hours    MEDICATIONS  (PRN):  aluminum hydroxide/magnesium hydroxide/simethicone Suspension 30 milliLiter(s) Oral every 6 hours PRN Dyspepsia    CAPILLARY BLOOD GLUCOSE        I&O's Summary    18 Apr 2019 07:01  -  19 Apr 2019 07:00  --------------------------------------------------------  IN: 580 mL / OUT: 2900 mL / NET: -2320 mL    19 Apr 2019 07:01  -  19 Apr 2019 11:12  --------------------------------------------------------  IN: 300 mL / OUT: 1800 mL / NET: -1500 mL        LABS                        9.6    5.0   )-----------( 251      ( 19 Apr 2019 09:22 )             32.4                         8.3    5.25  )-----------( 257      ( 18 Apr 2019 07:44 )             29.0     04-19    140  |  99  |  11  ----------------------------<  106<H>  3.9   |  31  |  0.54  04-18    138  |  97  |  12  ----------------------------<  100<H>  4.4   |  33<H>  |  0.46<L>    Ca    9.3      19 Apr 2019 09:22  Ca    9.1      18 Apr 2019 06:22  Phos  3.0     04-19  Mg     2.1     04-19 12 Apr 2019 09:18 Troponin x     / CK 88 U/L / CKMB x     / CKMB Units 2.3 ng/mL          ( 15 Apr 2019 09:58 ) pH: 7.45  /  pCO2: 46    /  pO2: 85    / HCO3: 32    / Base Excess: 7.7   /  SaO2: 98              ( 14 Apr 2019 00:45 ) pH: 7.36  /  pCO2: 61    /  pO2: 105   / HCO3: 33    / Base Excess: 6.8   /  SaO2: 98              ( 13 Apr 2019 16:10 ) pH: 7.32  /  pCO2: 66    /  pO2: 78    / HCO3: 33    / Base Excess: 5.6   /  SaO2: 94                04-19-19 @ 09:18 - VBG - pH: 7.46  | pCO2: 48    | pO2: 47    | Lactate:

## 2019-04-19 NOTE — CHART NOTE - NSCHARTNOTEFT_GEN_A_CORE
Duplex returned w/ extensive distal and proximal L great saphenous DVT. Pt w/ visualized IVC filter on CT a/p, but pt denies any knowledge of IVC filter placement, hx of clots. Also denies hx of GI bleeds, head bleeds, or family hx of bleeds. Spoke w/ Urology PA and pt should be okay for AC from nephrectomy standpoint (R nephrectomy w/ Dr. Pelayo 3/11/2019). Discussed risks and benefits of anticoagulation. Pt amenable to starting anticoagulation at this time but stated he would only do anticoagulation medications by mouth. Stated this will all be dependent on insurance coverage but team would attempt oral AC if possible.

## 2019-04-19 NOTE — PROGRESS NOTE ADULT - SUBJECTIVE AND OBJECTIVE BOX
infectious diseases progress note:    Patient is a 43y old  Male who presents with a chief complaint of sepsis (19 Apr 2019 07:16)        Noninfectious gastroenteritis        R   Allergies    penicillin (Rash)    Intolerances        ANTIBIOTICS/RELEVANT:  antimicrobials    immunologic:    OTHER:  aluminum hydroxide/magnesium hydroxide/simethicone Suspension 30 milliLiter(s) Oral every 6 hours PRN  enoxaparin Injectable 40 milliGRAM(s) SubCutaneous daily  sodium chloride 3%  Inhalation 3 milliLiter(s) Inhalation every 6 hours      Objective:  Vital Signs Last 24 Hrs  T(C): 37 (19 Apr 2019 04:18), Max: 37.4 (19 Apr 2019 00:10)  T(F): 98.6 (19 Apr 2019 04:18), Max: 99.4 (19 Apr 2019 00:10)  HR: 101 (19 Apr 2019 04:18) (72 - 101)  BP: 101/68 (19 Apr 2019 04:18) (101/68 - 139/93)  BP(mean): --  RR: 18 (19 Apr 2019 04:18) (18 - 18)  SpO2: 98% (19 Apr 2019 04:18) (95% - 100%)    PHYSICAL EXAM:     Eyes:ASHLEY, EOMI  Ear/Nose/Throat: no oral lesion, no sinus tenderness on percussion	  Neck:no JVD, no lymphadenopathy, supple  Respiratory: CTA sujatha  Cardiovascular: S1S2 RRR, no murmurs  Gastrointestinal:soft, (+) BS, no HSM  Extremities:no e/e/c        LABS:                        8.3    5.25  )-----------( 257      ( 18 Apr 2019 07:44 )             29.0     04-18    138  |  97  |  12  ----------------------------<  100<H>  4.4   |  33<H>  |  0.46<L>    Ca    9.1      18 Apr 2019 06:22  Phos  2.3     04-18  Mg     2.0     04-18      PT/INR - ( 17 Apr 2019 07:54 )   PT: 14.0 sec;   INR: 1.21 ratio         PTT - ( 17 Apr 2019 07:54 )  PTT:34.0 sec        MICROBIOLOGY:    RECENT CULTURES:  04-15 @ 13:53 .Blood                No growth to date.    04-14 @ 09:56 .Blood                No growth to date.    04-14 @ 07:02 .Sputum   TWAN    Moderate polymorphonuclear leukocytes per low power field  Moderate Squamous epithelial cells per low power field  Numerous Gram Negative Rods per oil power field  Moderate Yeast like cells per oil power field    Pseudomonas aeruginosa  Pseudomonas aeruginosa     Normal Respiratory Sirisha present  Numerous Three or more mixed gram negative rods including Pseudomonas  aeruginosa    04-13 @ 00:19 .Blood   PCR    Growth in aerobic bottle: Gram Positive Cocci in Clusters    Blood Culture PCR  Blood Culture PCR     Growth in aerobic bottle: Coag Negative Staphylococcus  Single set isolate, possible contaminant. Contact  Microbiology if susceptibility testing clinically  indicated.  "Due to technical problems, Proteus sp. will Not be reported as part of  the BCID panel until further notice"  ***Blood Panel PCR results on this specimen are available  approximately 3 hours after the Gram stain result.***  Gram stain, PCR, and/or culture results may not always  correspond due to difference in methodologies.  ************************************************************  This PCR assay was performed using [employer].  The following targets are tested for: Enterococcus,  vancomycin resistant enterococci, Listeria monocytogenes,  coagulase negative staphylococci, S. aureus,  methicillin resistant S. aureus, Streptococcus agalactiae  (Group B), S. pneumoniae, S. pyogenes (Group A),  Acinetobacter baumannii, Enterobacter cloacae, E. coli,  Klebsiella oxytoca, K. pneumoniae, Proteus sp.,  Serratia marcescens, Haemophilus influenzae,  Neisseria meningitidis, Pseudomonas aeruginosa, Candida  albicans, C. glabrata, C krusei, C parapsilosis,  C. tropicalis and the KPC resistance gene.    04-12 @ 11:19 .Blood                No growth at 5 days.          RESPIRATORY CULTURES:              RADIOLOGY & ADDITIONAL STUDIES:        Pager [phone]  After 5 pm/weekends or if no response :[phone]

## 2019-04-19 NOTE — PROGRESS NOTE ADULT - PROBLEM SELECTOR PLAN 2
-no fever x 72h. Last BCx x 2 4/15 NGTD.  -monitoring off abx per ID  -other infectious w/up negative so far including UA, CT c/a/p, flex sig.  -pending FRANCK, ANCA  -originally admitted w/ multifocal PNA s/p 7 day course of cefepime
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
## 2019-04-19 NOTE — PROGRESS NOTE ADULT - PROBLEM SELECTOR PLAN 4
-weaned to RA w/ stable vBG this AM. medically optimized for discharge.  -admitted w/ hypercapnic respiratory likely in setting of able to be improved on BiPAP alone w/out intubation now w/ improved mental status -weaned to RA w/ stable vBG this AM. medically optimized for discharge.  -admitted w/ hypercapnic respiratory- resolved

## 2019-04-20 DIAGNOSIS — I82.409 ACUTE EMBOLISM AND THROMBOSIS OF UNSPECIFIED DEEP VEINS OF UNSPECIFIED LOWER EXTREMITY: ICD-10-CM

## 2019-04-20 PROCEDURE — 99232 SBSQ HOSP IP/OBS MODERATE 35: CPT | Mod: GC

## 2019-04-20 RX ADMIN — APIXABAN 10 MILLIGRAM(S): 2.5 TABLET, FILM COATED ORAL at 08:25

## 2019-04-20 RX ADMIN — SODIUM CHLORIDE 3 MILLILITER(S): 9 INJECTION INTRAMUSCULAR; INTRAVENOUS; SUBCUTANEOUS at 08:25

## 2019-04-20 RX ADMIN — APIXABAN 10 MILLIGRAM(S): 2.5 TABLET, FILM COATED ORAL at 17:30

## 2019-04-20 NOTE — PROGRESS NOTE ADULT - PROBLEM SELECTOR PLAN 1
-O/N w/ sinus john w/ vpace 50-90s. no pauses.   -R groin site appears stable, no hematoma or active bleeding  -medically optimized for d/c. f/up 5/3 w/ EP.

## 2019-04-20 NOTE — PROGRESS NOTE ADULT - SUBJECTIVE AND OBJECTIVE BOX
Kelsey Geronimo MD PGY1  [id number]    Patient is a 43y old  Male who presents with a chief complaint of sepsis (19 Apr 2019 07:44)    SUBJECTIVE/OVERNIGHT EVENTS     Vital Signs Last 24 Hrs  T(C): 37.4 (19 Apr 2019 20:09), Max: 37.4 (19 Apr 2019 20:09)  T(F): 99.4 (19 Apr 2019 20:09), Max: 99.4 (19 Apr 2019 20:09)  HR: 85 (19 Apr 2019 20:09) (85 - 91)  BP: 134/87 (19 Apr 2019 20:09) (129/87 - 134/87)  BP(mean): --  RR: 18 (19 Apr 2019 20:09) (18 - 18)  SpO2: 98% (19 Apr 2019 20:09) (97% - 98%)    04-19-19 @ 07:01  -  04-20-19 @ 07:00  --------------------------------------------------------  IN: 900 mL / OUT: 2600 mL / NET: -1700 mL Kelsey Geronimo MD PGY1  [phone]    Patient is a 43y old  Male who presents with a chief complaint of sepsis (19 Apr 2019 07:44)    SUBJECTIVE/OVERNIGHT EVENTS   No AM labs today. Pending AM lab tomorrow w/ T&S.    This AM doing well. Denies any SOB, chest pain. Denies any new headaches, vision changes, bleeding or new bruising.    Vital Signs Last 24 Hrs  T(C): 37.4 (19 Apr 2019 20:09), Max: 37.4 (19 Apr 2019 20:09)  T(F): 99.4 (19 Apr 2019 20:09), Max: 99.4 (19 Apr 2019 20:09)  HR: 85 (19 Apr 2019 20:09) (85 - 91)  BP: 134/87 (19 Apr 2019 20:09) (129/87 - 134/87)  BP(mean): --  RR: 18 (19 Apr 2019 20:09) (18 - 18)  SpO2: 98% (19 Apr 2019 20:09) (97% - 98%)    04-19-19 @ 07:01  -  04-20-19 @ 07:00  --------------------------------------------------------  IN: 900 mL / OUT: 2600 mL / NET: -1700 mL    PHYSICAL EXAM:  GENERAL: NAD, appears chronically ill. Speaking in full sentences.  HEAD:  Atraumatic, Normocephalic  EYES: EOMI, conjunctiva and sclera clear  NECK: Supple  CHEST/LUNG: clear to auscultation bilaterally  HEART: Regular rate and rhythm; 1/6 systolic murmur.  ABDOMEN: Soft, Nontender, mild distention; Bowel sounds present  EXTREMITIES:  mild trace to 1+ symmetrical edema                        R groin catheter site w/ no active bleeding or oozing.   PSYCH: AAOx3 flat affect.  NEUROLOGY: quadriplegia  SKIN: healed lesions on buttocks        MEDICATIONS  (STANDING):  apixaban 10 milliGRAM(s) Oral every 12 hours    MEDICATIONS  (PRN):  aluminum hydroxide/magnesium hydroxide/simethicone Suspension 30 milliLiter(s) Oral every 6 hours PRN Dyspepsia    CAPILLARY BLOOD GLUCOSE        I&O's Summary    19 Apr 2019 07:01  -  20 Apr 2019 07:00  --------------------------------------------------------  IN: 900 mL / OUT: 2700 mL / NET: -1800 mL        LABS                        9.6    5.0   )-----------( 251      ( 19 Apr 2019 09:22 )             32.4     04-19    140  |  99  |  11  ----------------------------<  106<H>  3.9   |  31  |  0.54    Ca    9.3      19 Apr 2019 09:22  Phos  3.0     04-19  Mg     2.1     04-19 12 Apr 2019 09:18 Troponin x     / CK 88 U/L / CKMB x     / CKMB Units 2.3 ng/mL          ( 15 Apr 2019 09:58 ) pH: 7.45  /  pCO2: 46    /  pO2: 85    / HCO3: 32    / Base Excess: 7.7   /  SaO2: 98              ( 14 Apr 2019 00:45 ) pH: 7.36  /  pCO2: 61    /  pO2: 105   / HCO3: 33    / Base Excess: 6.8   /  SaO2: 98              ( 13 Apr 2019 16:10 ) pH: 7.32  /  pCO2: 66    /  pO2: 78    / HCO3: 33    / Base Excess: 5.6   /  SaO2: 94            < from: [facility] Duplex Lower Ext Vein Scan, Bilat (04.19.19 @ 14:03) >  IMPRESSION: Asymmetric wall thickening is seen in the left common femoral   vein suggesting chronic venous change. The left great saphenous vein   appears distended with thrombus. Acute thrombus superimposed on chronic   changes cannot be excluded.    There is no evidence of DVT in the visualized venous segments of the   right lowerextremity.    The calf veins were not visualized.            ***Please see the addendum at the top of this report. It may contain   additional important information or changes.****    < end of copied text >  < from: [facility] Duplex Lower Ext Vein Scan, Bilat (04.19.19 @ 14:03) >  *** ADDENDUM 04/19/2019  ***    The left great saphenous vein is distended with thrombus to the level of   the saphenofemoral junction.      *** END OF ADDENDUM 04/19/2019  ***      < end of copied text > Kelsey Geronimo MD PGY1  [phone]    Patient is a 43y old  Male who presents with a chief complaint of sepsis (19 Apr 2019 07:44)    SUBJECTIVE/OVERNIGHT EVENTS   No AM labs today. Pending AM lab tomorrow w/ T&S.    This AM doing well. Denies any SOB, chest pain. Denies any new headaches, vision changes, bleeding or new bruising.    Vital Signs Last 24 Hrs  T(C): 37.4 (19 Apr 2019 20:09), Max: 37.4 (19 Apr 2019 20:09)  T(F): 99.4 (19 Apr 2019 20:09), Max: 99.4 (19 Apr 2019 20:09)  HR: 85 (19 Apr 2019 20:09) (85 - 91)  BP: 134/87 (19 Apr 2019 20:09) (129/87 - 134/87)  BP(mean): --  RR: 18 (19 Apr 2019 20:09) (18 - 18)  SpO2: 98% (19 Apr 2019 20:09) (97% - 98%)    04-19-19 @ 07:01  -  04-20-19 @ 07:00  --------------------------------------------------------  IN: 900 mL / OUT: 2600 mL / NET: -1700 mL    PHYSICAL EXAM:  GENERAL: NAD, appears chronically ill. Speaking in full sentences.  HEAD:  Atraumatic, Normocephalic  EYES: EOMI, conjunctiva and sclera clear  NECK: Supple  CHEST/LUNG: clear to auscultation bilaterally  HEART: Regular rate and rhythm; 1/6 systolic murmur.  ABDOMEN: Soft, Nontender, mild distention; Bowel sounds present                  R nephrectomy incision healing. Areas of small open granulation. No active bleeding or oozing.  EXTREMITIES:  mild trace to 1+ symmetrical edema                        R groin catheter site w/ no active bleeding or oozing.   PSYCH: AAOx3 flat affect.  NEUROLOGY: quadriplegia  SKIN: healed lesions on buttocks        MEDICATIONS  (STANDING):  apixaban 10 milliGRAM(s) Oral every 12 hours    MEDICATIONS  (PRN):  aluminum hydroxide/magnesium hydroxide/simethicone Suspension 30 milliLiter(s) Oral every 6 hours PRN Dyspepsia    CAPILLARY BLOOD GLUCOSE        I&O's Summary    19 Apr 2019 07:01  -  20 Apr 2019 07:00  --------------------------------------------------------  IN: 900 mL / OUT: 2700 mL / NET: -1800 mL        LABS                        9.6    5.0   )-----------( 251      ( 19 Apr 2019 09:22 )             32.4     04-19    140  |  99  |  11  ----------------------------<  106<H>  3.9   |  31  |  0.54    Ca    9.3      19 Apr 2019 09:22  Phos  3.0     04-19  Mg     2.1     04-19 12 Apr 2019 09:18 Troponin x     / CK 88 U/L / CKMB x     / CKMB Units 2.3 ng/mL          ( 15 Apr 2019 09:58 ) pH: 7.45  /  pCO2: 46    /  pO2: 85    / HCO3: 32    / Base Excess: 7.7   /  SaO2: 98              ( 14 Apr 2019 00:45 ) pH: 7.36  /  pCO2: 61    /  pO2: 105   / HCO3: 33    / Base Excess: 6.8   /  SaO2: 98              ( 13 Apr 2019 16:10 ) pH: 7.32  /  pCO2: 66    /  pO2: 78    / HCO3: 33    / Base Excess: 5.6   /  SaO2: 94            < from: [facility] Duplex Lower Ext Vein Scan, Bilat (04.19.19 @ 14:03) >  IMPRESSION: Asymmetric wall thickening is seen in the left common femoral   vein suggesting chronic venous change. The left great saphenous vein   appears distended with thrombus. Acute thrombus superimposed on chronic   changes cannot be excluded.    There is no evidence of DVT in the visualized venous segments of the   right lowerextremity.    The calf veins were not visualized.            ***Please see the addendum at the top of this report. It may contain   additional important information or changes.****    < end of copied text >  < from: [facility] Duplex Lower Ext Vein Scan, Bilat (04.19.19 @ 14:03) >  *** ADDENDUM 04/19/2019  ***    The left great saphenous vein is distended with thrombus to the level of   the saphenofemoral junction.      *** END OF ADDENDUM 04/19/2019  ***      < end of copied text >

## 2019-04-20 NOTE — PROGRESS NOTE ADULT - ATTENDING COMMENTS
-Patient seen/examined on 4/20/19. Case/plan discussed with the intern and resident as reviewed/edited by me above and in any comments below. -Patient seen/examined on 4/20/19. Case/plan discussed with the intern and resident as reviewed/edited by me above and in any comments below.  -DCP with home services for Monday.

## 2019-04-20 NOTE — PROGRESS NOTE ADULT - PROBLEM SELECTOR PLAN 5
-weaned to RA w/ stable vBG this AM. medically optimized for discharge.  -admitted w/ hypercapnic respiratory- resolved -weaned to RA w/ stable vBGs. medically optimized for discharge.  -admitted w/ hypercapnic respiratory- resolved

## 2019-04-20 NOTE — PROGRESS NOTE ADULT - PROBLEM SELECTOR PLAN 3
-no fever > 72h. Last BCx x 2 4/15 NGTD.  -duplex w/ extensive proximial L leg DVT, likely cause of fevers.  -other infectious w/up negative so far including UA, CT c/a/p, flex sig.  -pending FRANCK, ANCA  -originally admitted w/ multifocal PNA s/p 7 day course of cefepime -no fever > 72h. Last BCx x 2 4/15 NGTD.  -duplex w/ extensive proximal L leg DVT, likely cause of fevers.  -other infectious w/up negative so far including UA, CT c/a/p, flex sig.  -pending FRANCK, ANCA  -originally admitted w/ multifocal PNA s/p 7 day course of cefepime

## 2019-04-20 NOTE — PROGRESS NOTE ADULT - PROBLEM SELECTOR PLAN 2
-extensive proximal L leg DVT of great saphenous vein  -started apixaban. verified will be covered by insurance on d/c.  -will get T/S tomorrow AM.  -no hx GIB, brain bleed. no family hx of bleeding disorders.

## 2019-04-21 LAB
ANION GAP SERPL CALC-SCNC: 9 MMOL/L — SIGNIFICANT CHANGE UP (ref 5–17)
APTT BLD: 32.7 SEC — SIGNIFICANT CHANGE UP (ref 27.5–36.3)
BLD GP AB SCN SERPL QL: NEGATIVE — SIGNIFICANT CHANGE UP
BUN SERPL-MCNC: 12 MG/DL — SIGNIFICANT CHANGE UP (ref 7–23)
CALCIUM SERPL-MCNC: 9.1 MG/DL — SIGNIFICANT CHANGE UP (ref 8.4–10.5)
CHLORIDE SERPL-SCNC: 104 MMOL/L — SIGNIFICANT CHANGE UP (ref 96–108)
CO2 SERPL-SCNC: 26 MMOL/L — SIGNIFICANT CHANGE UP (ref 22–31)
CREAT SERPL-MCNC: 0.48 MG/DL — LOW (ref 0.5–1.3)
GAS PNL BLDV: SIGNIFICANT CHANGE UP
GLUCOSE SERPL-MCNC: 101 MG/DL — HIGH (ref 70–99)
HCT VFR BLD CALC: 29.2 % — LOW (ref 39–50)
HGB BLD-MCNC: 8.3 G/DL — LOW (ref 13–17)
INR BLD: 1.29 RATIO — HIGH (ref 0.88–1.16)
MAGNESIUM SERPL-MCNC: 2.1 MG/DL — SIGNIFICANT CHANGE UP (ref 1.6–2.6)
MCHC RBC-ENTMCNC: 23.9 PG — LOW (ref 27–34)
MCHC RBC-ENTMCNC: 28.4 GM/DL — LOW (ref 32–36)
MCV RBC AUTO: 84.1 FL — SIGNIFICANT CHANGE UP (ref 80–100)
PHOSPHATE SERPL-MCNC: 3 MG/DL — SIGNIFICANT CHANGE UP (ref 2.5–4.5)
PLATELET # BLD AUTO: 239 K/UL — SIGNIFICANT CHANGE UP (ref 150–400)
POTASSIUM SERPL-MCNC: 4.2 MMOL/L — SIGNIFICANT CHANGE UP (ref 3.5–5.3)
POTASSIUM SERPL-SCNC: 4.2 MMOL/L — SIGNIFICANT CHANGE UP (ref 3.5–5.3)
PROTHROM AB SERPL-ACNC: 15 SEC — HIGH (ref 10–13.1)
RBC # BLD: 3.47 M/UL — LOW (ref 4.2–5.8)
RBC # FLD: 17.3 % — HIGH (ref 10.3–14.5)
RH IG SCN BLD-IMP: POSITIVE — SIGNIFICANT CHANGE UP
SODIUM SERPL-SCNC: 139 MMOL/L — SIGNIFICANT CHANGE UP (ref 135–145)
WBC # BLD: 4.98 K/UL — SIGNIFICANT CHANGE UP (ref 3.8–10.5)
WBC # FLD AUTO: 4.98 K/UL — SIGNIFICANT CHANGE UP (ref 3.8–10.5)

## 2019-04-21 PROCEDURE — 99232 SBSQ HOSP IP/OBS MODERATE 35: CPT | Mod: GC

## 2019-04-21 RX ADMIN — APIXABAN 10 MILLIGRAM(S): 2.5 TABLET, FILM COATED ORAL at 17:11

## 2019-04-21 RX ADMIN — APIXABAN 10 MILLIGRAM(S): 2.5 TABLET, FILM COATED ORAL at 06:32

## 2019-04-21 NOTE — PROGRESS NOTE ADULT - PROBLEM SELECTOR PLAN 1
-O/N w/ sinus john w/ vpace 50-90s. no pauses.   -R groin site appears stable, no hematoma or active bleeding  -medically optimized for d/c. f/up 5/3 w/ EP. -O/N w/ sinus john w/ vpace 50-100s. no pauses.   -R groin site appears stable, no hematoma or active bleeding  -medically optimized for d/c. f/up 5/3 w/ EP.

## 2019-04-21 NOTE — PROGRESS NOTE ADULT - ATTENDING COMMENTS
-Patient seen/examined on 4/21/19. Case/plan discussed with Dr. Richardson as reviewed/edited by me above and in any comments below. -Patient seen/examined on 4/21/19. Case/plan discussed with Dr. Richardson as reviewed/edited by me above and in any comments below.  -DCP for tomorrow with home services.
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
## 2019-04-21 NOTE — PROGRESS NOTE ADULT - PROBLEM SELECTOR PLAN 5
-weaned to RA w/ stable vBGs. medically optimized for discharge.  -admitted w/ hypercapnic respiratory- resolved

## 2019-04-21 NOTE — PROGRESS NOTE ADULT - SUBJECTIVE AND OBJECTIVE BOX
Jacqueline Richardson MD  Internal Medicine PGY 2  Weekend Coverage for Team 2  Pager Number [phone]      Subjective: Overnight, no acute events.         VITAL SIGNS:  Vital Signs Last 24 Hrs  T(C): 36.6 (21 Apr 2019 04:53), Max: 37.1 (20 Apr 2019 20:27)  T(F): 97.9 (21 Apr 2019 04:53), Max: 98.8 (20 Apr 2019 20:27)  HR: 78 (21 Apr 2019 04:53) (59 - 87)  BP: 109/71 (21 Apr 2019 04:53) (107/72 - 120/85)  BP(mean): --  RR: 18 (21 Apr 2019 04:53) (18 - 18)  SpO2: 97% (21 Apr 2019 04:53) (97% - 100%)      PHYSICAL EXAM:     GENERAL: no acute distress  HEENT: PERRLA, EOMI, moist oropharynx   RESPIRATORY: CTAB, no w/c   CARDIOVASCULAR: RRR, no murmurs, gallops, rubs  ABDOMINAL: soft, non-tender, non-distended, positive bowel sounds   EXTREMITIES: no clubbing, cyanosis, or edema  NEUROLOGICAL: alert and oriented x 3, non-focal  SKIN: no rashes or lesions   MUSCULOSKELETAL: no gross joint deformity                          9.6    5.0   )-----------( 251      ( 19 Apr 2019 09:22 )             32.4     04-21    139  |  104  |  12  ----------------------------<  101<H>  4.2   |  26  |  0.48<L>    Ca    9.1      21 Apr 2019 06:23  Phos  3.0     04-21  Mg     2.1     04-21        CAPILLARY BLOOD GLUCOSE          MEDICATIONS  (STANDING):  apixaban 10 milliGRAM(s) Oral every 12 hours    MEDICATIONS  (PRN):  aluminum hydroxide/magnesium hydroxide/simethicone Suspension 30 milliLiter(s) Oral every 6 hours PRN Dyspepsia Jacqueline Richardson MD  Internal Medicine PGY 2  Weekend Coverage for Team 2  Pager Number [phone]      Subjective: Overnight, no acute events. When seen this morning was sleeping and annoyed upon interview. Denies all symptoms and asking to be left alone.         VITAL SIGNS:  Vital Signs Last 24 Hrs  T(C): 36.6 (21 Apr 2019 04:53), Max: 37.1 (20 Apr 2019 20:27)  T(F): 97.9 (21 Apr 2019 04:53), Max: 98.8 (20 Apr 2019 20:27)  HR: 78 (21 Apr 2019 04:53) (59 - 87)  BP: 109/71 (21 Apr 2019 04:53) (107/72 - 120/85)  BP(mean): --  RR: 18 (21 Apr 2019 04:53) (18 - 18)  SpO2: 97% (21 Apr 2019 04:53) (97% - 100%)      PHYSICAL EXAM:     GENERAL: no acute distress  HEENT: PERRLA, EOMI, moist oropharynx   RESPIRATORY: CTAB, no wheezes or crackles   CARDIOVASCULAR: RRR, no murmurs  ABDOMINAL: +BS, soft, non tender, non distended      Refused rest of examination                           9.6    5.0   )-----------( 251      ( 19 Apr 2019 09:22 )             32.4     04-21    139  |  104  |  12  ----------------------------<  101<H>  4.2   |  26  |  0.48<L>    Ca    9.1      21 Apr 2019 06:23  Phos  3.0     04-21  Mg     2.1     04-21        CAPILLARY BLOOD GLUCOSE          MEDICATIONS  (STANDING):  apixaban 10 milliGRAM(s) Oral every 12 hours    MEDICATIONS  (PRN):  aluminum hydroxide/magnesium hydroxide/simethicone Suspension 30 milliLiter(s) Oral every 6 hours PRN Dyspepsia

## 2019-04-21 NOTE — PROGRESS NOTE ADULT - PROBLEM SELECTOR PLAN 3
-no fever > 72h. Last BCx x 2 4/15 NGTD.  -duplex w/ extensive proximal L leg DVT, likely cause of fevers.  -other infectious w/up negative so far including UA, CT c/a/p, flex sig.  -pending FRANCK, ANCA  -originally admitted w/ multifocal PNA s/p 7 day course of cefepime

## 2019-04-22 LAB
ANION GAP SERPL CALC-SCNC: 11 MMOL/L — SIGNIFICANT CHANGE UP (ref 5–17)
BUN SERPL-MCNC: 12 MG/DL — SIGNIFICANT CHANGE UP (ref 7–23)
CALCIUM SERPL-MCNC: 9.3 MG/DL — SIGNIFICANT CHANGE UP (ref 8.4–10.5)
CALPROTECTIN STL-MCNT: <16 UG/G — SIGNIFICANT CHANGE UP (ref 0–120)
CHLORIDE SERPL-SCNC: 104 MMOL/L — SIGNIFICANT CHANGE UP (ref 96–108)
CO2 SERPL-SCNC: 24 MMOL/L — SIGNIFICANT CHANGE UP (ref 22–31)
CREAT SERPL-MCNC: 0.38 MG/DL — LOW (ref 0.5–1.3)
GLUCOSE SERPL-MCNC: 86 MG/DL — SIGNIFICANT CHANGE UP (ref 70–99)
HCT VFR BLD CALC: 33 % — LOW (ref 39–50)
HGB BLD-MCNC: 9.9 G/DL — LOW (ref 13–17)
MAGNESIUM SERPL-MCNC: 2.1 MG/DL — SIGNIFICANT CHANGE UP (ref 1.6–2.6)
MCHC RBC-ENTMCNC: 25 PG — LOW (ref 27–34)
MCHC RBC-ENTMCNC: 29.8 GM/DL — LOW (ref 32–36)
MCV RBC AUTO: 83.8 FL — SIGNIFICANT CHANGE UP (ref 80–100)
PHOSPHATE SERPL-MCNC: 3.5 MG/DL — SIGNIFICANT CHANGE UP (ref 2.5–4.5)
PLATELET # BLD AUTO: 219 K/UL — SIGNIFICANT CHANGE UP (ref 150–400)
POTASSIUM SERPL-MCNC: 4.6 MMOL/L — SIGNIFICANT CHANGE UP (ref 3.5–5.3)
POTASSIUM SERPL-SCNC: 4.6 MMOL/L — SIGNIFICANT CHANGE UP (ref 3.5–5.3)
RBC # BLD: 3.94 M/UL — LOW (ref 4.2–5.8)
RBC # FLD: 17.2 % — HIGH (ref 10.3–14.5)
SODIUM SERPL-SCNC: 139 MMOL/L — SIGNIFICANT CHANGE UP (ref 135–145)
WBC # BLD: 5.8 K/UL — SIGNIFICANT CHANGE UP (ref 3.8–10.5)
WBC # FLD AUTO: 5.8 K/UL — SIGNIFICANT CHANGE UP (ref 3.8–10.5)

## 2019-04-22 PROCEDURE — 99232 SBSQ HOSP IP/OBS MODERATE 35: CPT | Mod: GC

## 2019-04-22 RX ADMIN — APIXABAN 10 MILLIGRAM(S): 2.5 TABLET, FILM COATED ORAL at 06:38

## 2019-04-22 RX ADMIN — APIXABAN 10 MILLIGRAM(S): 2.5 TABLET, FILM COATED ORAL at 17:46

## 2019-04-22 NOTE — PROGRESS NOTE ADULT - PROBLEM SELECTOR PLAN 2
-extensive proximal L leg DVT of great saphenous vein  -started apixaban. verified will be covered by insurance on d/c.  -will get T/S tomorrow AM.  -no hx GIB, brain bleed. no family hx of bleeding disorders. -extensive proximal L leg DVT of great saphenous vein  -started apixaban. verified will be covered by insurance on d/c.  -no hx GIB, brain bleed. no family hx of bleeding disorders.

## 2019-04-22 NOTE — PROGRESS NOTE ADULT - PROBLEM SELECTOR PLAN 3
-no fever > 72h. Last BCx x 2 4/15 NGTD.  -duplex w/ extensive proximal L leg DVT, likely cause of fevers.  -other infectious w/up negative so far including UA, CT c/a/p, flex sig.  -pending FRNACK, ANCA  -originally admitted w/ multifocal PNA s/p 7 day course of cefepime
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
## 2019-04-22 NOTE — PROGRESS NOTE ADULT - SUBJECTIVE AND OBJECTIVE BOX
Manuel Cardoso  PGY-1 Resident Physician   Pager [phone] or [phone] from 7am to 7pm, after 7pm    Patient is a 43y old  Male who presents with a chief complaint of sepsis (2019 08:01)      SUBJECTIVE / OVERNIGHT EVENTS:    MEDICATIONS  (STANDING):  apixaban 10 milliGRAM(s) Oral every 12 hours    MEDICATIONS  (PRN):  aluminum hydroxide/magnesium hydroxide/simethicone Suspension 30 milliLiter(s) Oral every 6 hours PRN Dyspepsia    Allergies    penicillin (Rash)    Intolerances        Vital Signs Last 24 Hrs  T(C): 36.6 (2019 04:46), Max: 36.6 (2019 16:54)  T(F): 97.9 (2019 04:46), Max: 97.9 (2019 04:46)  HR: 76 (2019 04:46) (64 - 83)  BP: 123/88 (2019 04:46) (105/70 - 138/85)  BP(mean): --  RR: 17 (2019 04:46) (17 - 18)  SpO2: 99% (2019 04:46) (98% - 99%)  Daily     Daily Weight in k.6 (2019 10:00)  CAPILLARY BLOOD GLUCOSE        I&O's Summary    2019 07:01  -  2019 07:00  --------------------------------------------------------  IN: 540 mL / OUT: 550 mL / NET: -10 mL        PHYSICAL EXAM:  GENERAL: NAD, well-developed  HEAD:  Atraumatic, Normocephalic  EYES: EOMI, PERRLA, conjunctiva and sclera clear  NECK: Supple, No JVD  CHEST/LUNG: Clear to auscultation bilaterally; No wheeze  HEART: Regular rate and rhythm; Normal S1 S2, No murmurs, rubs, or gallops  ABDOMEN: Soft, Nontender, Nondistended; Bowel sounds present  EXTREMITIES:  2+ Peripheral Pulses, No clubbing, cyanosis, or edema  PSYCH: AAOx3  NEUROLOGY: non-focal  SKIN: No rashes or lesions    LABS:                        8.3    4.98  )-----------( 239      ( 2019 09:19 )             29.2     Hgb Trend: 8.3<--, 9.6<--, 8.3<--, 9.0<--, 9.4<--  0422    139  |  104  |  12  ----------------------------<  86  4.6   |  24  |  0.38<L>    Ca    9.3      2019 07:06  Phos  3.5       Mg     2.1           Creatinine Trend: 0.38<--, 0.48<--, 0.54<--, 0.46<--, 0.48<--, 0.50<--    PT/INR - ( 2019 09:28 )   PT: 15.0 sec;   INR: 1.29 ratio         PTT - ( 2019 09:28 )  PTT:32.7 sec          RADIOLOGY & ADDITIONAL TESTS:    Imaging Personally Reviewed:    Consultant(s) Notes Reviewed:      Care Discussed with Consultants/Other Providers: Manuel Cardoso  PGY-1 Resident Physician   Pager [phone] or [phone] from 7am to 7pm, after 7pm    Patient is a 43y old  Male who presents with a chief complaint of sepsis (2019 08:01)    SUBJECTIVE / OVERNIGHT EVENTS:  No acute overnight events patient was NSR and V-paced 60-70s. Patient denied fever, chills, sob, nausea, vomiting, chest pain, abdominal pain.     MEDICATIONS  (STANDING):  apixaban 10 milliGRAM(s) Oral every 12 hours    MEDICATIONS  (PRN):  aluminum hydroxide/magnesium hydroxide/simethicone Suspension 30 milliLiter(s) Oral every 6 hours PRN Dyspepsia    Allergies:  penicillin (Rash)  Intolerances    Vital Signs Last 24 Hrs  T(C): 36.6 (2019 04:46), Max: 36.6 (2019 16:54)  T(F): 97.9 (2019 04:46), Max: 97.9 (2019 04:46)  HR: 76 (2019 04:46) (64 - 83)  BP: 123/88 (2019 04:46) (105/70 - 138/85)  RR: 17 (2019 04:46) (17 - 18)  SpO2: 99% (2019 04:46) (98% - 99%)  Daily Weight in k.6 (2019 10:00)  CAPILLARY BLOOD GLUCOSE    I&O's Summary    2019 07:01  -  2019 07:00  --------------------------------------------------------  IN: 540 mL / OUT: 550 mL / NET: -10 mL    PHYSICAL EXAM:  GENERAL: NAD, appears chronically ill. Speaking in full sentences.  HEAD:  Atraumatic, Normocephalic  EYES: EOMI, conjunctiva and sclera clear  NECK: Supple  CHEST/LUNG: clear to auscultation bilaterally  HEART: Regular rate and rhythm; 1/6 systolic murmur.  ABDOMEN: Soft, Nontender, mild distention; Bowel sounds present.  R nephrectomy incision healing. Areas of small open granulation. No active bleeding or oozing.  EXTREMITIES:  mild trace to 1+ symmetrical edema.   PSYCH: AAOx3 flat affect.  NEUROLOGY: quadriplegia  SKIN: healed lesions on buttocks    LABS:                        8.3    4.98  )-----------( 239      ( 2019 09:19 )             29.2     Hgb Trend: 8.3<--, 9.6<--, 8.3<--, 9.0<--, 9.4<--  0422    139  |  104  |  12  ----------------------------<  86  4.6   |  24  |  0.38<L>    Ca    9.3      2019 07:06  Phos  3.5       Mg     2.1           Creatinine Trend: 0.38<--, 0.48<--, 0.54<--, 0.46<--, 0.48<--, 0.50<--    PT/INR - ( 2019 09:28 )   PT: 15.0 sec;   INR: 1.29 ratio         PTT - ( 2019 09:28 )  PTT:32.7 sec          RADIOLOGY & ADDITIONAL TESTS:    Imaging Personally Reviewed:    Consultant(s) Notes Reviewed:      Care Discussed with Consultants/Other Providers:

## 2019-04-22 NOTE — PROGRESS NOTE ADULT - PROBLEM SELECTOR PLAN 1
-O/N w/ sinus john w/ vpace 50-100s. no pauses.   -R groin site appears stable, no hematoma or active bleeding  -medically optimized for d/c. f/up 5/3 w/ EP. - s/p pacemakers. On tele v-apced 60-70s.   -medically optimized for d/c. f/up 5/3 w/ EP. - s/p pacemaker. On tele v-apced 60-70s.   -medically optimized for d/c. f/up 5/3 w/ EP.

## 2019-04-22 NOTE — PROGRESS NOTE ADULT - ATTENDING COMMENTS
pt seen before noon. Pt denies any complaints. PE: euvolemic; abd benign    Acute DVT- cont AC. H/H stable    Needs home services set up

## 2019-04-23 DIAGNOSIS — Z90.5 ACQUIRED ABSENCE OF KIDNEY: ICD-10-CM

## 2019-04-23 PROCEDURE — 99222 1ST HOSP IP/OBS MODERATE 55: CPT

## 2019-04-23 PROCEDURE — 99232 SBSQ HOSP IP/OBS MODERATE 35: CPT

## 2019-04-23 RX ORDER — APIXABAN 2.5 MG/1
2 TABLET, FILM COATED ORAL
Qty: 12 | Refills: 0 | OUTPATIENT
Start: 2019-04-23 | End: 2019-04-25

## 2019-04-23 RX ORDER — APIXABAN 2.5 MG/1
1 TABLET, FILM COATED ORAL
Qty: 60 | Refills: 0
Start: 2019-04-23 | End: 2019-05-22

## 2019-04-23 RX ADMIN — APIXABAN 10 MILLIGRAM(S): 2.5 TABLET, FILM COATED ORAL at 06:31

## 2019-04-23 RX ADMIN — APIXABAN 10 MILLIGRAM(S): 2.5 TABLET, FILM COATED ORAL at 22:27

## 2019-04-23 NOTE — CONSULT NOTE ADULT - CONSULT REASON
Colitis
Pauses on tele
diarrhea
post-op nephrectomy c/b post-op wound infection, colitis
wound care
Sinus bradycardia
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
## 2019-04-23 NOTE — PROGRESS NOTE ADULT - SUBJECTIVE AND OBJECTIVE BOX
Patient is a 43y old  Male who presents with a chief complaint of sepsis (22 Apr 2019 07:34)      SUBJECTIVE / OVERNIGHT EVENTS: No overnight events. Feel well, denies chest pain, sob. Wants to go home.     MEDICATIONS  (STANDING):  apixaban 10 milliGRAM(s) Oral every 12 hours    MEDICATIONS  (PRN):  aluminum hydroxide/magnesium hydroxide/simethicone Suspension 30 milliLiter(s) Oral every 6 hours PRN Dyspepsia      Vital Signs Last 24 Hrs  T(C): 36.8 (23 Apr 2019 06:33), Max: 36.8 (23 Apr 2019 06:33)  T(F): 98.2 (23 Apr 2019 06:33), Max: 98.2 (23 Apr 2019 06:33)  HR: 67 (23 Apr 2019 06:33) (67 - 67)  BP: 111/74 (23 Apr 2019 06:33) (111/74 - 111/74)  BP(mean): --  RR: 18 (23 Apr 2019 06:33) (18 - 18)  SpO2: 98% (23 Apr 2019 06:33) (98% - 98%)  CAPILLARY BLOOD GLUCOSE        I&O's Summary    22 Apr 2019 07:01  -  23 Apr 2019 07:00  --------------------------------------------------------  IN: 1030 mL / OUT: 1200 mL / NET: -170 mL        =PHYSICAL EXAM:  GENERAL: NAD, appears chronically ill. Speaking in full sentences.  HEAD:  Atraumatic, Normocephalic  NECK: Supple  CHEST/LUNG: clear to auscultation bilaterally  HEART: Regular rate and rhythm;   ABDOMEN: Soft, Nontender, nondistended; Bowel sounds present.  R nephrectomy incision healing. Areas of small open granulation. No active bleeding or oozing.  EXTREMITIES:  trace LE edema b/l    PSYCH: AAOx3 flat affect.  NEUROLOGY: quadriplegia  SKIN: healed lesions on buttocks    LABS:                        9.9    5.8   )-----------( 219      ( 22 Apr 2019 07:06 )             33.0     04-22    139  |  104  |  12  ----------------------------<  86  4.6   |  24  |  0.38<L>    Ca    9.3      22 Apr 2019 07:06  Phos  3.5     04-22  Mg     2.1     04-22        RADIOLOGY & ADDITIONAL TESTS:    sinus vpaced 60-90    Imaging Personally Reviewed:    Consultant(s) Notes Reviewed:  urology, ID     Care Discussed with Consultants/Other Providers:

## 2019-04-23 NOTE — PROGRESS NOTE ADULT - PROBLEM SELECTOR PLAN 7
-DVT PPx/Rx: Now on Eliquis.   -Dispo:  Discharge home today if home care set up. Refusing nursing facility, trying to get home care agency for wound care of nephrectomy site which is healing and quadriplegia. Outpatient EP, PCP, urology, followup  spent 40 min on discharge process time

## 2019-04-23 NOTE — PROGRESS NOTE ADULT - PROBLEM SELECTOR PLAN 2
-extensive proximal L leg DVT of great saphenous vein  -c/w eliquis 10mg bid x 7 days through 4/26 AM, then 5mg bid thereafter  -denies hx of bleeding, hgb stable

## 2019-04-23 NOTE — CONSULT NOTE ADULT - CONSULT REQUESTED DATE/TIME
02-Apr-2019
02-Apr-2019 13:36
14-Apr-2019 14:39
17-Apr-2019 13:01
23-Apr-2019 13:29
12-Apr-2019 13:05

## 2019-04-23 NOTE — ADVANCED PRACTICE NURSE CONSULT - ASSESSMENT
The pt is on a low air-loss surface being turned and positioned. As per discussion with the primary RN, the pt has a good appetite- he was seen by nutrition and a consult is noted.  The pt was incontinent of stool and pericare was provided.  Upon assessment, the pt presents with a healed sacral ulcer- the skin is this area is hypopigmented.  The ischial tuberosities have open superficial areas as follows: right ischium measures 3cm x 2cm x0.2cm and the left ischium measures 1cm x 1cm x 0.2cm. The periwound skin is healed hypopigmented.  Pt expressing concern that "my wounds were healed when I got here" Education was provided regarding healed wounds and that the tissue does not fully recover its prior tensile strength. the stress of hospitalization and acute illness would also impact the skin integrity.  will recommend Advanced Cavilon as this will lay down a protective coating on the skin against the incontinence

## 2019-04-23 NOTE — PROGRESS NOTE ADULT - PROBLEM SELECTOR PLAN 6
s/p right nephrectomy for infected atrophic kidney (3/11/19)   -wound as been healing and noninfected per urology, ID, Cts scan  -wound care deferred outpatient rec to urology who rec 3 times a week wound care changes

## 2019-04-23 NOTE — PROGRESS NOTE ADULT - PROBLEM SELECTOR PLAN 3
-afebrile. Last BCx x 2 4/15 NGTD, no leukocytosis  -duplex w/ extensive proximal L leg DVT, likely cause of fevers.  -other infectious w/up negative r including UA, CT c/a/p, flex sig.  -FRANCK, ANCA negative  -originally admitted w/ multifocal PNA s/p 7 day course of cefepime,

## 2019-04-23 NOTE — ADVANCED PRACTICE NURSE CONSULT - RECOMMEDATIONS
Will recommend the followin. B/l ischial tuberosities, b/l buttocks, and sacrum: continue with Advanced Cavilon apply M-W-F  2. continue with turning and positioning  3. Nutrition support as pt condition allows  Tx plan discussed with RN
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
## 2019-04-24 PROCEDURE — 99232 SBSQ HOSP IP/OBS MODERATE 35: CPT

## 2019-04-24 RX ADMIN — APIXABAN 10 MILLIGRAM(S): 2.5 TABLET, FILM COATED ORAL at 16:23

## 2019-04-24 RX ADMIN — APIXABAN 10 MILLIGRAM(S): 2.5 TABLET, FILM COATED ORAL at 06:24

## 2019-04-24 NOTE — PROGRESS NOTE ADULT - PROBLEM SELECTOR PLAN 7
-DVT: eliquis  -Dispo:  Discharge home today if home care set up. Refusing nursing facility, trying to get home care agency for wound care but many rejections due to social issues  Needs Outpatient EP, PCP, urology, followup  spent 40 min on discharge process time

## 2019-04-24 NOTE — PROGRESS NOTE ADULT - SUBJECTIVE AND OBJECTIVE BOX
Patient is a 43y old  Male who presents with a chief complaint of sepsis (23 Apr 2019 13:28)      SUBJECTIVE / OVERNIGHT EVENTS: no overnight events. Feels well. wants to go home    MEDICATIONS  (STANDING):  apixaban 10 milliGRAM(s) Oral every 12 hours    MEDICATIONS  (PRN):  aluminum hydroxide/magnesium hydroxide/simethicone Suspension 30 milliLiter(s) Oral every 6 hours PRN Dyspepsia      Vital Signs Last 24 Hrs  T(C): --  T(F): --  HR: --  BP: --  BP(mean): --  RR: --  SpO2: --  CAPILLARY BLOOD GLUCOSE        I&O's Summary    23 Apr 2019 07:01  -  24 Apr 2019 07:00  --------------------------------------------------------  IN: 0 mL / OUT: 450 mL / NET: -450 mL    24 Apr 2019 07:01  -  24 Apr 2019 15:28  --------------------------------------------------------  IN: 500 mL / OUT: 550 mL / NET: -50 mL        PHYSICAL EXAM:  GENERAL: NAD, appears chronically ill. Speaking in full sentences.  HEAD:  Atraumatic, Normocephalic  NECK: Supple  CHEST/LUNG: clear to auscultation bilaterally  HEART: Regular rate and rhythm;   ABDOMEN: Soft, Nontender, nondistended; Bowel sounds present.  R nephrectomy incision healing. Areas of small open granulation. No active bleeding or oozing.  EXTREMITIES:  trace LE edema b/l    PSYCH: AAOx3 flat affect.  NEUROLOGY: quadriplegia  SKIN: healed lesions on     LABS:                        RADIOLOGY & ADDITIONAL TESTS:    Imaging Personally Reviewed:    Consultant(s) Notes Reviewed:      Care Discussed with Consultants/Other Providers:
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
## 2019-04-25 PROCEDURE — 99232 SBSQ HOSP IP/OBS MODERATE 35: CPT

## 2019-04-25 RX ORDER — APIXABAN 2.5 MG/1
5 TABLET, FILM COATED ORAL EVERY 12 HOURS
Qty: 0 | Refills: 0 | Status: DISCONTINUED | OUTPATIENT
Start: 2019-04-26 | End: 2019-04-26

## 2019-04-25 RX ADMIN — APIXABAN 10 MILLIGRAM(S): 2.5 TABLET, FILM COATED ORAL at 17:20

## 2019-04-25 RX ADMIN — APIXABAN 10 MILLIGRAM(S): 2.5 TABLET, FILM COATED ORAL at 05:49

## 2019-04-25 NOTE — PROGRESS NOTE ADULT - PROBLEM SELECTOR PLAN 6
s/p right nephrectomy for infected atrophic kidney (3/11/19)   -wound as been healing and noninfected per urology, ID, Cts scan  -per urology rec 3 times a week wound care changes

## 2019-04-25 NOTE — PROGRESS NOTE ADULT - SUBJECTIVE AND OBJECTIVE BOX
Patient is a 43y old  Male who presents with a chief complaint of sepsis (24 Apr 2019 15:28)      SUBJECTIVE / OVERNIGHT EVENTS: No overnight events. Feels well, no cp, sob, wants to go home    MEDICATIONS  (STANDING):  apixaban 10 milliGRAM(s) Oral every 12 hours    MEDICATIONS  (PRN):  aluminum hydroxide/magnesium hydroxide/simethicone Suspension 30 milliLiter(s) Oral every 6 hours PRN Dyspepsia      Vital Signs Last 24 Hrs  T(C): --  T(F): --  HR: --  BP: --  BP(mean): --  RR: --  SpO2: --  CAPILLARY BLOOD GLUCOSE        I&O's Summary    24 Apr 2019 07:01  -  25 Apr 2019 07:00  --------------------------------------------------------  IN: 500 mL / OUT: 600 mL / NET: -100 mL          PHYSICAL EXAM:  GENERAL: NAD, appears chronically ill. Speaking in full sentences.  HEAD:  Atraumatic, Normocephalic  NECK: Supple  CHEST/LUNG: clear to auscultation bilaterally  HEART: Regular rate and rhythm;   ABDOMEN: Soft, Nontender, nondistended; Bowel sounds present.  R nephrectomy incision healing. 5 Areas of small open granulation each 1cm with normal skin in between. No active bleeding or oozing. no sign of infection  EXTREMITIES:  no edema  PSYCH: AAOx3   NEUROLOGY: quadriplegia        LABS:              RADIOLOGY & ADDITIONAL TESTS:    Imaging Personally Reviewed:    Consultant(s) Notes Reviewed:      Care Discussed with Consultants/Other Providers: Patient is a 43y old  Male who presents with a chief complaint of sepsis (24 Apr 2019 15:28)      SUBJECTIVE / OVERNIGHT EVENTS: No overnight events. Feels well, no cp, sob, wants to go home, refusing any further tele, will d/c.     MEDICATIONS  (STANDING):  apixaban 10 milliGRAM(s) Oral every 12 hours    MEDICATIONS  (PRN):  aluminum hydroxide/magnesium hydroxide/simethicone Suspension 30 milliLiter(s) Oral every 6 hours PRN Dyspepsia      Vital Signs Last 24 Hrs  T(C): --  T(F): --  HR: --  BP: --  BP(mean): --  RR: --  SpO2: --  CAPILLARY BLOOD GLUCOSE        I&O's Summary    24 Apr 2019 07:01  -  25 Apr 2019 07:00  --------------------------------------------------------  IN: 500 mL / OUT: 600 mL / NET: -100 mL          PHYSICAL EXAM:  GENERAL: NAD, appears chronically ill. Speaking in full sentences.  HEAD:  Atraumatic, Normocephalic  NECK: Supple  CHEST/LUNG: clear to auscultation bilaterally  HEART: Regular rate and rhythm;   ABDOMEN: Soft, Nontender, nondistended; Bowel sounds present.  R nephrectomy incision healing. 5 Areas of small open granulation each 1cm with normal skin in between. No active bleeding or oozing. no sign of infection  EXTREMITIES:  no edema  PSYCH: AAOx3   NEUROLOGY: quadriplegia        LABS:              RADIOLOGY & ADDITIONAL TESTS:    Imaging Personally Reviewed:    Consultant(s) Notes Reviewed:      Care Discussed with Consultants/Other Providers: Patient is a 43y old  Male who presents with a chief complaint of sepsis (24 Apr 2019 15:28)      SUBJECTIVE / OVERNIGHT EVENTS: No overnight events. Feels well, no cp, sob, wants to go home, refusing any further tele,     MEDICATIONS  (STANDING):  apixaban 10 milliGRAM(s) Oral every 12 hours    MEDICATIONS  (PRN):  aluminum hydroxide/magnesium hydroxide/simethicone Suspension 30 milliLiter(s) Oral every 6 hours PRN Dyspepsia      Vital Signs Last 24 Hrs  T(C): --  T(F): --  HR: --  BP: --  BP(mean): --  RR: --  SpO2: --  CAPILLARY BLOOD GLUCOSE        I&O's Summary    24 Apr 2019 07:01  -  25 Apr 2019 07:00  --------------------------------------------------------  IN: 500 mL / OUT: 600 mL / NET: -100 mL          PHYSICAL EXAM:  GENERAL: NAD, appears chronically ill. Speaking in full sentences.  HEAD:  Atraumatic, Normocephalic  NECK: Supple  CHEST/LUNG: clear to auscultation bilaterally  HEART: Regular rate and rhythm;   ABDOMEN: Soft, Nontender, nondistended; Bowel sounds present.  R nephrectomy incision healing. 5 Areas of small open granulation each 1cm with normal skin in between. No active bleeding or oozing. no sign of infection  EXTREMITIES:  no edema  PSYCH: AAOx3   NEUROLOGY: quadriplegia        LABS:              RADIOLOGY & ADDITIONAL TESTS:    Imaging Personally Reviewed:    Consultant(s) Notes Reviewed:      Care Discussed with Consultants/Other Providers:

## 2019-04-26 ENCOUNTER — TRANSCRIPTION ENCOUNTER (OUTPATIENT)
Age: 44
End: 2019-04-26

## 2019-04-26 VITALS
DIASTOLIC BLOOD PRESSURE: 89 MMHG | RESPIRATION RATE: 18 BRPM | SYSTOLIC BLOOD PRESSURE: 131 MMHG | TEMPERATURE: 98 F | HEART RATE: 72 BPM | OXYGEN SATURATION: 98 %

## 2019-04-26 PROCEDURE — C1786: CPT

## 2019-04-26 PROCEDURE — 87640 STAPH A DNA AMP PROBE: CPT

## 2019-04-26 PROCEDURE — 87070 CULTURE OTHR SPECIMN AEROBIC: CPT

## 2019-04-26 PROCEDURE — 85379 FIBRIN DEGRADATION QUANT: CPT

## 2019-04-26 PROCEDURE — 87040 BLOOD CULTURE FOR BACTERIA: CPT

## 2019-04-26 PROCEDURE — 96374 THER/PROPH/DIAG INJ IV PUSH: CPT

## 2019-04-26 PROCEDURE — 84300 ASSAY OF URINE SODIUM: CPT

## 2019-04-26 PROCEDURE — 99285 EMERGENCY DEPT VISIT HI MDM: CPT | Mod: 25

## 2019-04-26 PROCEDURE — 85027 COMPLETE CBC AUTOMATED: CPT

## 2019-04-26 PROCEDURE — 85730 THROMBOPLASTIN TIME PARTIAL: CPT

## 2019-04-26 PROCEDURE — 80061 LIPID PANEL: CPT

## 2019-04-26 PROCEDURE — C1894: CPT

## 2019-04-26 PROCEDURE — 80053 COMPREHEN METABOLIC PANEL: CPT

## 2019-04-26 PROCEDURE — 82553 CREATINE MB FRACTION: CPT

## 2019-04-26 PROCEDURE — 85014 HEMATOCRIT: CPT

## 2019-04-26 PROCEDURE — 85045 AUTOMATED RETICULOCYTE COUNT: CPT

## 2019-04-26 PROCEDURE — 82803 BLOOD GASES ANY COMBINATION: CPT

## 2019-04-26 PROCEDURE — 86850 RBC ANTIBODY SCREEN: CPT

## 2019-04-26 PROCEDURE — 93005 ELECTROCARDIOGRAM TRACING: CPT

## 2019-04-26 PROCEDURE — 99239 HOSP IP/OBS DSCHRG MGMT >30: CPT

## 2019-04-26 PROCEDURE — 86900 BLOOD TYPING SEROLOGIC ABO: CPT

## 2019-04-26 PROCEDURE — 93306 TTE W/DOPPLER COMPLETE: CPT

## 2019-04-26 PROCEDURE — 87507 IADNA-DNA/RNA PROBE TQ 12-25: CPT

## 2019-04-26 PROCEDURE — 76705 ECHO EXAM OF ABDOMEN: CPT

## 2019-04-26 PROCEDURE — 86901 BLOOD TYPING SEROLOGIC RH(D): CPT

## 2019-04-26 PROCEDURE — 87150 DNA/RNA AMPLIFIED PROBE: CPT

## 2019-04-26 PROCEDURE — 87186 SC STD MICRODIL/AGAR DIL: CPT

## 2019-04-26 PROCEDURE — 87633 RESP VIRUS 12-25 TARGETS: CPT

## 2019-04-26 PROCEDURE — 85652 RBC SED RATE AUTOMATED: CPT

## 2019-04-26 PROCEDURE — 87641 MR-STAPH DNA AMP PROBE: CPT

## 2019-04-26 PROCEDURE — 82330 ASSAY OF CALCIUM: CPT

## 2019-04-26 PROCEDURE — 82962 GLUCOSE BLOOD TEST: CPT

## 2019-04-26 PROCEDURE — 84443 ASSAY THYROID STIM HORMONE: CPT

## 2019-04-26 PROCEDURE — 71260 CT THORAX DX C+: CPT

## 2019-04-26 PROCEDURE — 86036 ANCA SCREEN EACH ANTIBODY: CPT

## 2019-04-26 PROCEDURE — 83550 IRON BINDING TEST: CPT

## 2019-04-26 PROCEDURE — 93970 EXTREMITY STUDY: CPT

## 2019-04-26 PROCEDURE — 80202 ASSAY OF VANCOMYCIN: CPT

## 2019-04-26 PROCEDURE — 87486 CHLMYD PNEUM DNA AMP PROBE: CPT

## 2019-04-26 PROCEDURE — 87493 C DIFF AMPLIFIED PROBE: CPT

## 2019-04-26 PROCEDURE — 94640 AIRWAY INHALATION TREATMENT: CPT

## 2019-04-26 PROCEDURE — 94660 CPAP INITIATION&MGMT: CPT

## 2019-04-26 PROCEDURE — 83880 ASSAY OF NATRIURETIC PEPTIDE: CPT

## 2019-04-26 PROCEDURE — 82728 ASSAY OF FERRITIN: CPT

## 2019-04-26 PROCEDURE — 83605 ASSAY OF LACTIC ACID: CPT

## 2019-04-26 PROCEDURE — 82550 ASSAY OF CK (CPK): CPT

## 2019-04-26 PROCEDURE — 74176 CT ABD & PELVIS W/O CONTRAST: CPT

## 2019-04-26 PROCEDURE — 82533 TOTAL CORTISOL: CPT

## 2019-04-26 PROCEDURE — 96361 HYDRATE IV INFUSION ADD-ON: CPT

## 2019-04-26 PROCEDURE — 84295 ASSAY OF SERUM SODIUM: CPT

## 2019-04-26 PROCEDURE — 87581 M.PNEUMON DNA AMP PROBE: CPT

## 2019-04-26 PROCEDURE — 83993 ASSAY FOR CALPROTECTIN FECAL: CPT

## 2019-04-26 PROCEDURE — 84484 ASSAY OF TROPONIN QUANT: CPT

## 2019-04-26 PROCEDURE — 83036 HEMOGLOBIN GLYCOSYLATED A1C: CPT

## 2019-04-26 PROCEDURE — 71045 X-RAY EXAM CHEST 1 VIEW: CPT

## 2019-04-26 PROCEDURE — 87449 NOS EACH ORGANISM AG IA: CPT

## 2019-04-26 PROCEDURE — 81001 URINALYSIS AUTO W/SCOPE: CPT

## 2019-04-26 PROCEDURE — 84100 ASSAY OF PHOSPHORUS: CPT

## 2019-04-26 PROCEDURE — 80076 HEPATIC FUNCTION PANEL: CPT

## 2019-04-26 PROCEDURE — 82570 ASSAY OF URINE CREATININE: CPT

## 2019-04-26 PROCEDURE — 86038 ANTINUCLEAR ANTIBODIES: CPT

## 2019-04-26 PROCEDURE — 80048 BASIC METABOLIC PNL TOTAL CA: CPT

## 2019-04-26 PROCEDURE — 87798 DETECT AGENT NOS DNA AMP: CPT

## 2019-04-26 PROCEDURE — 84466 ASSAY OF TRANSFERRIN: CPT

## 2019-04-26 PROCEDURE — 74177 CT ABD & PELVIS W/CONTRAST: CPT

## 2019-04-26 PROCEDURE — 83735 ASSAY OF MAGNESIUM: CPT

## 2019-04-26 PROCEDURE — 84132 ASSAY OF SERUM POTASSIUM: CPT

## 2019-04-26 PROCEDURE — 83540 ASSAY OF IRON: CPT

## 2019-04-26 PROCEDURE — 83935 ASSAY OF URINE OSMOLALITY: CPT

## 2019-04-26 PROCEDURE — 71275 CT ANGIOGRAPHY CHEST: CPT

## 2019-04-26 PROCEDURE — 87086 URINE CULTURE/COLONY COUNT: CPT

## 2019-04-26 PROCEDURE — 33274 TCAT INSJ/RPL PERM LDLS PM: CPT | Mod: Q0

## 2019-04-26 PROCEDURE — 82565 ASSAY OF CREATININE: CPT

## 2019-04-26 PROCEDURE — 82947 ASSAY GLUCOSE BLOOD QUANT: CPT

## 2019-04-26 PROCEDURE — 86140 C-REACTIVE PROTEIN: CPT

## 2019-04-26 PROCEDURE — 82435 ASSAY OF BLOOD CHLORIDE: CPT

## 2019-04-26 PROCEDURE — 85610 PROTHROMBIN TIME: CPT

## 2019-04-26 PROCEDURE — 87324 CLOSTRIDIUM AG IA: CPT

## 2019-04-26 RX ADMIN — APIXABAN 10 MILLIGRAM(S): 2.5 TABLET, FILM COATED ORAL at 06:31

## 2019-04-26 NOTE — PROGRESS NOTE ADULT - PROBLEM SELECTOR PLAN 7
-DVT: eliquis  -Dispo:  Discharge home today. Home care is set up.  Needs Outpatient EP, PCP, urology, followup  spent 35 min on discharge process time

## 2019-04-26 NOTE — PROGRESS NOTE ADULT - REASON FOR ADMISSION
sepsis

## 2019-04-26 NOTE — DISCHARGE NOTE NURSING/CASE MANAGEMENT/SOCIAL WORK - NSDCDPATPORTLINK_GEN_ALL_CORE
You can access the [facility] Patient Portal, offered by [facility], by registering with the following website: [email or web address]

## 2019-04-26 NOTE — PROGRESS NOTE ADULT - PROBLEM SELECTOR PLAN 2
-extensive proximal L leg DVT of great saphenous vein  -completed eliquis 10mg bid x 7 days, now c/w 5mg bid   -denies hx of bleeding, hgb stable

## 2019-04-26 NOTE — PROGRESS NOTE ADULT - NSHPATTENDINGPLANDISCUSS_GEN_ALL_CORE
Critical care. To reach Cardiology Attending call during weekdays Spectra [phone] or Fellow [phone].
Critical care. To reach Cardiology Attending call during weekdays Spectra [phone] or Fellow [phone].
patient
team
team
MANDIE Singh
MANDIE Singh, CM, SW
NP  Disha, Sw, CM
MANDIE Reynoso

## 2019-04-26 NOTE — PROGRESS NOTE ADULT - SUBJECTIVE AND OBJECTIVE BOX
Patient is a 44y old  Male who presents with a chief complaint of sepsis (25 Apr 2019 10:55)      SUBJECTIVE / OVERNIGHT EVENTS:    MEDICATIONS  (STANDING):  apixaban 5 milliGRAM(s) Oral every 12 hours    MEDICATIONS  (PRN):  aluminum hydroxide/magnesium hydroxide/simethicone Suspension 30 milliLiter(s) Oral every 6 hours PRN Dyspepsia      Vital Signs Last 24 Hrs  T(C): --  T(F): --  HR: --  BP: --  BP(mean): --  RR: --  SpO2: --  CAPILLARY BLOOD GLUCOSE        I&O's Summary      PHYSICAL EXAM:  GENERAL: NAD, well-developed  HEAD:  Atraumatic, Normocephalic  EYES: EOMI, PERRLA, conjunctiva and sclera clear  NECK: Supple, No JVD  CHEST/LUNG: Clear to auscultation bilaterally; No wheeze  HEART: Regular rate and rhythm; No murmurs, rubs, or gallops  ABDOMEN: Soft, Nontender, Nondistended; Bowel sounds present  EXTREMITIES:  2+ Peripheral Pulses, No clubbing, cyanosis, or edema  PSYCH: AAOx3  NEUROLOGY: non-focal  SKIN: No rashes or lesions    LABS:                        RADIOLOGY & ADDITIONAL TESTS:    Imaging Personally Reviewed:    Consultant(s) Notes Reviewed:      Care Discussed with Consultants/Other Providers: Patient is a 44y old  Male who presents with a chief complaint of sepsis (25 Apr 2019 10:55)      SUBJECTIVE / OVERNIGHT EVENTS: No events. Refusing vitals and other medical care. Feels well, denies cp, sob    MEDICATIONS  (STANDING):  apixaban 5 milliGRAM(s) Oral every 12 hours    MEDICATIONS  (PRN):  aluminum hydroxide/magnesium hydroxide/simethicone Suspension 30 milliLiter(s) Oral every 6 hours PRN Dyspepsia      Vital Signs Last 24 Hrs  T(C): --  T(F): --  HR: --  BP: --  BP(mean): --  RR: --  SpO2: --  CAPILLARY BLOOD GLUCOSE        I&O's Summary      PHYSICAL EXAM:  GENERAL: NAD,   HEAD:  Atraumatic, Normocephalic  NECK: Supple  CHEST/LUNG: clear to auscultation bilaterally  HEART: Regular rate and rhythm;   ABDOMEN: Soft, Nontender, nondistended; Bowel sounds present.  R nephrectomy incision healing. 5 Areas of small open granulation each 1cm with normal skin in between. No active bleeding or oozing. no sign of infection  EXTREMITIES:  no edema  PSYCH: AAOx3   NEUROLOGY: quadriplegia      LABS:        RADIOLOGY & ADDITIONAL TESTS:    Imaging Personally Reviewed:    Consultant(s) Notes Reviewed:      Care Discussed with Consultants/Other Providers:

## 2019-05-03 ENCOUNTER — APPOINTMENT (OUTPATIENT)
Dept: ELECTROPHYSIOLOGY | Facility: CLINIC | Age: 44
End: 2019-05-03

## 2019-10-17 NOTE — ED PROVIDER NOTE - CROS ED RESP ALL NEG
[facility]  POST-OP PROGRESS NOTE    HPI: Gabby Dill is a 57 y.o. female status post laparoscopic appendectomy for acute appendicitis.  Patient also has a history of ulcerative colitis.  No acute complaints.  Has some tenderness and soreness in the left lateral abdominal incision site but there is no drainage or overlying skin changes.      VITALS:  Vitals:    10/17/19 1339   BP: 137/87   Pulse: 74   Temp: 97.6 °F (36.4 °C)       PHYSICAL EXAM:  Laparoscopic port site incisions well-healed, no breakdown, no erythema, no large seroma or hematoma identified:    PATHOLOGY:  FINAL PATHOLOGIC DIAGNOSIS  APPENDIX, APPENDECTOMY:  Acute appendicitis with periappendicitis      ASSESSMENT & PLAN:  57 y.o. female s/p laparoscopic appendectomy  - pathology benign  - incisions well-healed  - continue conservative management for soreness in the left lateral incision, if symptoms fail to improve after 2 more weeks contact the office-  - otherwise return to clinic p.r.n.       - - -

## 2019-11-22 ENCOUNTER — INPATIENT (INPATIENT)
Facility: HOSPITAL | Age: 44
LOS: 16 days | Discharge: HOME HEALTH | End: 2019-12-09
Attending: HOSPITALIST | Admitting: HOSPITALIST
Payer: MEDICAID

## 2019-11-22 VITALS
TEMPERATURE: 100 F | HEIGHT: 73 IN | RESPIRATION RATE: 20 BRPM | HEART RATE: 83 BPM | SYSTOLIC BLOOD PRESSURE: 159 MMHG | WEIGHT: 179.9 LBS | DIASTOLIC BLOOD PRESSURE: 122 MMHG | OXYGEN SATURATION: 96 %

## 2019-11-22 DIAGNOSIS — Z90.5 ACQUIRED ABSENCE OF KIDNEY: Chronic | ICD-10-CM

## 2019-11-22 DIAGNOSIS — N20.0 CALCULUS OF KIDNEY: Chronic | ICD-10-CM

## 2019-11-22 DIAGNOSIS — S11.90XS UNSPECIFIED OPEN WOUND OF UNSPECIFIED PART OF NECK, SEQUELA: Chronic | ICD-10-CM

## 2019-11-22 LAB
APPEARANCE UR: ABNORMAL
BACTERIA # UR AUTO: ABNORMAL
BILIRUB UR-MCNC: NEGATIVE — SIGNIFICANT CHANGE UP
COLOR SPEC: YELLOW — SIGNIFICANT CHANGE UP
DIFF PNL FLD: ABNORMAL
EPI CELLS # UR: SIGNIFICANT CHANGE UP
GLUCOSE UR QL: NEGATIVE MG/DL — SIGNIFICANT CHANGE UP
KETONES UR-MCNC: ABNORMAL
LEUKOCYTE ESTERASE UR-ACNC: ABNORMAL
NITRITE UR-MCNC: POSITIVE
PH UR: 8 — SIGNIFICANT CHANGE UP (ref 5–8)
PROT UR-MCNC: 15 MG/DL
RBC CASTS # UR COMP ASSIST: ABNORMAL /HPF (ref 0–4)
SP GR SPEC: 1.01 — SIGNIFICANT CHANGE UP (ref 1.01–1.02)
TRI-PHOS CRY UR QL COMP ASSIST: ABNORMAL
UROBILINOGEN FLD QL: NEGATIVE MG/DL — SIGNIFICANT CHANGE UP
WBC UR QL: SIGNIFICANT CHANGE UP

## 2019-11-22 PROCEDURE — 99285 EMERGENCY DEPT VISIT HI MDM: CPT

## 2019-11-22 RX ORDER — SODIUM CHLORIDE 9 MG/ML
1000 INJECTION INTRAMUSCULAR; INTRAVENOUS; SUBCUTANEOUS ONCE
Refills: 0 | Status: COMPLETED | OUTPATIENT
Start: 2019-11-22 | End: 2019-11-22

## 2019-11-22 RX ORDER — MORPHINE SULFATE 50 MG/1
4 CAPSULE, EXTENDED RELEASE ORAL ONCE
Refills: 0 | Status: DISCONTINUED | OUTPATIENT
Start: 2019-11-22 | End: 2019-11-22

## 2019-11-22 RX ADMIN — MORPHINE SULFATE 4 MILLIGRAM(S): 50 CAPSULE, EXTENDED RELEASE ORAL at 23:56

## 2019-11-22 RX ADMIN — SODIUM CHLORIDE 1000 MILLILITER(S): 9 INJECTION INTRAMUSCULAR; INTRAVENOUS; SUBCUTANEOUS at 23:57

## 2019-11-22 NOTE — ED PROVIDER NOTE - PHYSICAL EXAMINATION
Gen: Alert, mild distress  Head: NC, AT, PERRL, EOMI, normal lids/conjunctiva  ENT: normal hearing, patent oropharynx without erythema/exudate, uvula midline, dry mucus membranes  Neck: +supple, +Trachea midline  Pulm: Bilateral BS, normal resp effort, no wheeze/stridor/retractions  CV: RRR, no M/R/G, +dist pulses  Abd: generalized tenderness, distended, Negative [place] signs, +BS, no palpable masses, old surgical scar on right abdomen  Mskel: no edema/erythema/cyanosis, peripheral contractures and wasting  Skin: no rash, warm/dry  Neuro: AAOx3 Gen: Alert, mild distress  Head: NC, AT, PERRL, EOMI, normal lids/conjunctiva  ENT: normal hearing, patent oropharynx without erythema/exudate, uvula midline, dry mucus membranes  Neck: +supple, +Trachea midline  Pulm: Bilateral BS, normal resp effort, no wheeze/stridor/retractions  CV: RRR, no M/R/G, +dist pulses  Abd: generalized tenderness, distended, Negative [place] signs, +BS, no palpable masses, old surgical scar on right abdomen  : Cath draining cloudy fluid  Mskel: no edema/erythema/cyanosis, peripheral contractures and wasting  Skin: no rash, warm/dry  Neuro: AAOx3

## 2019-11-22 NOTE — ED PROVIDER NOTE - CLINICAL SUMMARY MEDICAL DECISION MAKING FREE TEXT BOX
Patient with abdominal pain, low grade fever.  Work up shows complicated, ascending UTI.  Patient receiving IV cipro and pain control.  Patient is to be admitted to the hospital and the case was discussed with the admitting physician.  Any changes in plan, additional imaging/labs, and further work up will be at the discretion of the admitting physician.
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
## 2019-11-22 NOTE — ED PROVIDER NOTE - OBJECTIVE STATEMENT
Pertinent PMH/PSH/FHx/SHx and Review of Systems contained within:  Patient presents to the ED for abdominal pain and distention x3 days.  Patient denies fever, vomiting, diarrhea.  LBM was 2 days ago.  Reports discomfort on passing urine, has indwelling cath.  Says that abdomen is not usually so distended.  Pain is generalized.  Patient denies EtOH/tobacco/illicit substance use.      ROS: No fever/chills, No headache/photophobia/eye pain/changes in vision, No ear pain/sore throat/dysphagia, No chest pain/palpitations, no SOB/cough/wheeze/stridor, No N/V/D/melena, No neck/back pain, no rash, no changes in neurological status/function.

## 2019-11-22 NOTE — ED PROVIDER NOTE - PSH
Calculus of kidney  bilateral nephrostomy tubes placed 1/2019 @[facility]  H/O right nephrectomy    Open wound of neck, sequela
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
## 2019-11-23 DIAGNOSIS — N39.0 URINARY TRACT INFECTION, SITE NOT SPECIFIED: ICD-10-CM

## 2019-11-23 DIAGNOSIS — I82.509 CHRONIC EMBOLISM AND THROMBOSIS OF UNSPECIFIED DEEP VEINS OF UNSPECIFIED LOWER EXTREMITY: ICD-10-CM

## 2019-11-23 DIAGNOSIS — G82.50 QUADRIPLEGIA, UNSPECIFIED: ICD-10-CM

## 2019-11-23 DIAGNOSIS — N40.0 BENIGN PROSTATIC HYPERPLASIA WITHOUT LOWER URINARY TRACT SYMPTOMS: ICD-10-CM

## 2019-11-23 DIAGNOSIS — K59.00 CONSTIPATION, UNSPECIFIED: ICD-10-CM

## 2019-11-23 DIAGNOSIS — S21.339A PUNCTURE WOUND WITHOUT FOREIGN BODY OF UNSPECIFIED FRONT WALL OF THORAX WITH PENETRATION INTO THORACIC CAVITY, INITIAL ENCOUNTER: ICD-10-CM

## 2019-11-23 DIAGNOSIS — N20.0 CALCULUS OF KIDNEY: ICD-10-CM

## 2019-11-23 DIAGNOSIS — K21.9 GASTRO-ESOPHAGEAL REFLUX DISEASE WITHOUT ESOPHAGITIS: ICD-10-CM

## 2019-11-23 LAB
ALBUMIN SERPL ELPH-MCNC: 4.2 G/DL — SIGNIFICANT CHANGE UP (ref 3.3–5)
ALP SERPL-CCNC: 82 U/L — SIGNIFICANT CHANGE UP (ref 40–120)
ALT FLD-CCNC: 26 U/L — SIGNIFICANT CHANGE UP (ref 12–78)
AMYLASE P1 CFR SERPL: 69 U/L — SIGNIFICANT CHANGE UP (ref 25–115)
ANION GAP SERPL CALC-SCNC: 9 MMOL/L — SIGNIFICANT CHANGE UP (ref 5–17)
AST SERPL-CCNC: 35 U/L — SIGNIFICANT CHANGE UP (ref 15–37)
BASOPHILS # BLD AUTO: 0.06 K/UL — SIGNIFICANT CHANGE UP (ref 0–0.2)
BASOPHILS NFR BLD AUTO: 0.5 % — SIGNIFICANT CHANGE UP (ref 0–2)
BILIRUB SERPL-MCNC: 0.6 MG/DL — SIGNIFICANT CHANGE UP (ref 0.2–1.2)
BUN SERPL-MCNC: 18 MG/DL — SIGNIFICANT CHANGE UP (ref 7–23)
CALCIUM SERPL-MCNC: 9.1 MG/DL — SIGNIFICANT CHANGE UP (ref 8.5–10.1)
CHLORIDE SERPL-SCNC: 100 MMOL/L — SIGNIFICANT CHANGE UP (ref 96–108)
CO2 SERPL-SCNC: 26 MMOL/L — SIGNIFICANT CHANGE UP (ref 22–31)
CREAT SERPL-MCNC: 0.85 MG/DL — SIGNIFICANT CHANGE UP (ref 0.5–1.3)
EOSINOPHIL # BLD AUTO: 0.14 K/UL — SIGNIFICANT CHANGE UP (ref 0–0.5)
EOSINOPHIL NFR BLD AUTO: 1.3 % — SIGNIFICANT CHANGE UP (ref 0–6)
GLUCOSE SERPL-MCNC: 66 MG/DL — LOW (ref 70–99)
HCT VFR BLD CALC: 49.9 % — SIGNIFICANT CHANGE UP (ref 39–50)
HGB BLD-MCNC: 16 G/DL — SIGNIFICANT CHANGE UP (ref 13–17)
IMM GRANULOCYTES NFR BLD AUTO: 0.2 % — SIGNIFICANT CHANGE UP (ref 0–1.5)
LACTATE SERPL-SCNC: 1.1 MMOL/L — SIGNIFICANT CHANGE UP (ref 0.7–2)
LIDOCAIN IGE QN: 116 U/L — SIGNIFICANT CHANGE UP (ref 73–393)
LYMPHOCYTES # BLD AUTO: 3.5 K/UL — HIGH (ref 1–3.3)
LYMPHOCYTES # BLD AUTO: 31.8 % — SIGNIFICANT CHANGE UP (ref 13–44)
MCHC RBC-ENTMCNC: 26.1 PG — LOW (ref 27–34)
MCHC RBC-ENTMCNC: 32.1 GM/DL — SIGNIFICANT CHANGE UP (ref 32–36)
MCV RBC AUTO: 81.3 FL — SIGNIFICANT CHANGE UP (ref 80–100)
MONOCYTES # BLD AUTO: 0.56 K/UL — SIGNIFICANT CHANGE UP (ref 0–0.9)
MONOCYTES NFR BLD AUTO: 5.1 % — SIGNIFICANT CHANGE UP (ref 2–14)
NEUTROPHILS # BLD AUTO: 6.73 K/UL — SIGNIFICANT CHANGE UP (ref 1.8–7.4)
NEUTROPHILS NFR BLD AUTO: 61.1 % — SIGNIFICANT CHANGE UP (ref 43–77)
NRBC # BLD: 0 /100 WBCS — SIGNIFICANT CHANGE UP (ref 0–0)
PLATELET # BLD AUTO: 238 K/UL — SIGNIFICANT CHANGE UP (ref 150–400)
POTASSIUM SERPL-MCNC: 4.1 MMOL/L — SIGNIFICANT CHANGE UP (ref 3.5–5.3)
POTASSIUM SERPL-SCNC: 4.1 MMOL/L — SIGNIFICANT CHANGE UP (ref 3.5–5.3)
PROT SERPL-MCNC: 8.9 GM/DL — HIGH (ref 6–8.3)
RBC # BLD: 6.14 M/UL — HIGH (ref 4.2–5.8)
RBC # FLD: 20.2 % — HIGH (ref 10.3–14.5)
SODIUM SERPL-SCNC: 135 MMOL/L — SIGNIFICANT CHANGE UP (ref 135–145)
WBC # BLD: 10.93 K/UL — HIGH (ref 3.8–10.5)
WBC # FLD AUTO: 10.93 K/UL — HIGH (ref 3.8–10.5)

## 2019-11-23 PROCEDURE — 74176 CT ABD & PELVIS W/O CONTRAST: CPT | Mod: 26

## 2019-11-23 PROCEDURE — 99222 1ST HOSP IP/OBS MODERATE 55: CPT

## 2019-11-23 PROCEDURE — 12345: CPT | Mod: NC

## 2019-11-23 RX ORDER — SODIUM CHLORIDE 9 MG/ML
1000 INJECTION INTRAMUSCULAR; INTRAVENOUS; SUBCUTANEOUS
Refills: 0 | Status: DISCONTINUED | OUTPATIENT
Start: 2019-11-23 | End: 2019-12-08

## 2019-11-23 RX ORDER — SENNA PLUS 8.6 MG/1
2 TABLET ORAL AT BEDTIME
Refills: 0 | Status: DISCONTINUED | OUTPATIENT
Start: 2019-11-23 | End: 2019-12-09

## 2019-11-23 RX ORDER — HYDRALAZINE HCL 50 MG
10 TABLET ORAL ONCE
Refills: 0 | Status: COMPLETED | OUTPATIENT
Start: 2019-11-23 | End: 2019-11-23

## 2019-11-23 RX ORDER — CEFEPIME 1 G/1
1000 INJECTION, POWDER, FOR SOLUTION INTRAMUSCULAR; INTRAVENOUS EVERY 12 HOURS
Refills: 0 | Status: DISCONTINUED | OUTPATIENT
Start: 2019-11-23 | End: 2019-11-25

## 2019-11-23 RX ORDER — MORPHINE SULFATE 50 MG/1
4 CAPSULE, EXTENDED RELEASE ORAL ONCE
Refills: 0 | Status: DISCONTINUED | OUTPATIENT
Start: 2019-11-23 | End: 2019-11-23

## 2019-11-23 RX ORDER — PANTOPRAZOLE SODIUM 20 MG/1
40 TABLET, DELAYED RELEASE ORAL
Refills: 0 | Status: DISCONTINUED | OUTPATIENT
Start: 2019-11-23 | End: 2019-12-09

## 2019-11-23 RX ORDER — POLYETHYLENE GLYCOL 3350 17 G/17G
17 POWDER, FOR SOLUTION ORAL ONCE
Refills: 0 | Status: COMPLETED | OUTPATIENT
Start: 2019-11-23 | End: 2019-11-23

## 2019-11-23 RX ORDER — CIPROFLOXACIN LACTATE 400MG/40ML
400 VIAL (ML) INTRAVENOUS ONCE
Refills: 0 | Status: COMPLETED | OUTPATIENT
Start: 2019-11-23 | End: 2019-11-23

## 2019-11-23 RX ORDER — ACETAMINOPHEN 500 MG
650 TABLET ORAL ONCE
Refills: 0 | Status: COMPLETED | OUTPATIENT
Start: 2019-11-23 | End: 2019-11-23

## 2019-11-23 RX ORDER — ENOXAPARIN SODIUM 100 MG/ML
40 INJECTION SUBCUTANEOUS DAILY
Refills: 0 | Status: DISCONTINUED | OUTPATIENT
Start: 2019-11-23 | End: 2019-11-23

## 2019-11-23 RX ORDER — APIXABAN 2.5 MG/1
2.5 TABLET, FILM COATED ORAL EVERY 12 HOURS
Refills: 0 | Status: DISCONTINUED | OUTPATIENT
Start: 2019-11-23 | End: 2019-12-09

## 2019-11-23 RX ORDER — NIFEDIPINE 30 MG
60 TABLET, EXTENDED RELEASE 24 HR ORAL DAILY
Refills: 0 | Status: DISCONTINUED | OUTPATIENT
Start: 2019-11-23 | End: 2019-12-09

## 2019-11-23 RX ORDER — TAMSULOSIN HYDROCHLORIDE 0.4 MG/1
0.4 CAPSULE ORAL AT BEDTIME
Refills: 0 | Status: DISCONTINUED | OUTPATIENT
Start: 2019-11-23 | End: 2019-12-09

## 2019-11-23 RX ADMIN — Medication 650 MILLIGRAM(S): at 02:47

## 2019-11-23 RX ADMIN — SODIUM CHLORIDE 80 MILLILITER(S): 9 INJECTION INTRAMUSCULAR; INTRAVENOUS; SUBCUTANEOUS at 05:10

## 2019-11-23 RX ADMIN — PANTOPRAZOLE SODIUM 40 MILLIGRAM(S): 20 TABLET, DELAYED RELEASE ORAL at 07:15

## 2019-11-23 RX ADMIN — MORPHINE SULFATE 4 MILLIGRAM(S): 50 CAPSULE, EXTENDED RELEASE ORAL at 02:46

## 2019-11-23 RX ADMIN — POLYETHYLENE GLYCOL 3350 17 GRAM(S): 17 POWDER, FOR SOLUTION ORAL at 06:18

## 2019-11-23 RX ADMIN — APIXABAN 2.5 MILLIGRAM(S): 2.5 TABLET, FILM COATED ORAL at 07:14

## 2019-11-23 RX ADMIN — SENNA PLUS 2 TABLET(S): 8.6 TABLET ORAL at 21:28

## 2019-11-23 RX ADMIN — Medication 400 MILLIGRAM(S): at 03:52

## 2019-11-23 RX ADMIN — APIXABAN 2.5 MILLIGRAM(S): 2.5 TABLET, FILM COATED ORAL at 17:59

## 2019-11-23 RX ADMIN — MORPHINE SULFATE 4 MILLIGRAM(S): 50 CAPSULE, EXTENDED RELEASE ORAL at 04:39

## 2019-11-23 RX ADMIN — CEFEPIME 100 MILLIGRAM(S): 1 INJECTION, POWDER, FOR SOLUTION INTRAMUSCULAR; INTRAVENOUS at 17:59

## 2019-11-23 RX ADMIN — Medication 10 MILLIGRAM(S): at 08:16

## 2019-11-23 RX ADMIN — Medication 60 MILLIGRAM(S): at 06:19

## 2019-11-23 RX ADMIN — Medication 10 MILLIGRAM(S): at 09:09

## 2019-11-23 RX ADMIN — SODIUM CHLORIDE 1000 MILLILITER(S): 9 INJECTION INTRAMUSCULAR; INTRAVENOUS; SUBCUTANEOUS at 02:52

## 2019-11-23 RX ADMIN — Medication 1 TABLET(S): at 12:05

## 2019-11-23 RX ADMIN — Medication 650 MILLIGRAM(S): at 00:51

## 2019-11-23 RX ADMIN — Medication 200 MILLIGRAM(S): at 02:52

## 2019-11-23 RX ADMIN — CEFEPIME 100 MILLIGRAM(S): 1 INJECTION, POWDER, FOR SOLUTION INTRAMUSCULAR; INTRAVENOUS at 06:30

## 2019-11-23 RX ADMIN — MORPHINE SULFATE 4 MILLIGRAM(S): 50 CAPSULE, EXTENDED RELEASE ORAL at 04:54

## 2019-11-23 RX ADMIN — TAMSULOSIN HYDROCHLORIDE 0.4 MILLIGRAM(S): 0.4 CAPSULE ORAL at 21:29

## 2019-11-23 RX ADMIN — Medication 650 MILLIGRAM(S): at 19:15

## 2019-11-23 RX ADMIN — Medication 650 MILLIGRAM(S): at 18:31

## 2019-11-23 NOTE — H&P ADULT - NSHPPHYSICALEXAM_GEN_ALL_CORE
Physical exam:  General: patient in no acute distress, resting comfortably  Cardio: S1/S2 +ve, regular rate and rhythm, no M/G/R  Resp: clear to ausculation bilaterally, no rales or wheezes  GI: abdomen soft, nontender, mildly distended, no guarding, BS +ve x 4  Ext: no significant pedal edema  Neuro: quadirplegic

## 2019-11-23 NOTE — CHART NOTE - NSCHARTNOTEFT_GEN_A_CORE
seen and examined, pt uncomfortable 2/2 abdominal pain, +BS mild tenderness throughout, stool is palpated, will need enema     Vital Signs Last 24 Hrs  T(C): 36.4 (2019 11:25), Max: 37.8 (2019 00:25)  T(F): 97.5 (2019 11:25), Max: 100.1 (2019 00:25)  HR: 90 (2019 11:25) (60 - 90)  BP: 154/114 (2019 11:25) (132/79 - 209/151)  BP(mean): --  RR: 18 (2019 07:44) (18 - 20)  SpO2: 97% (2019 07:44) (96% - 97%)                          16.0   10.93 )-----------( 238      ( 2019 00:24 )             49.9     11-23    135  |  100  |  18  ----------------------------<  66<L>  4.1   |  26  |  0.85    Ca    9.1      2019 00:24    TPro  8.9<H>  /  Alb  4.2  /  TBili  0.6  /  DBili  x   /  AST  35  /  ALT  26  /  AlkPhos  82  11-23      Urinalysis Basic - ( 2019 23:37 )    Color: Yellow / Appearance: Slightly Turbid / S.010 / pH: x  Gluc: x / Ketone: Small  / Bili: Negative / Urobili: Negative mg/dL   Blood: x / Protein: 15 mg/dL / Nitrite: Positive   Leuk Esterase: Moderate / RBC: 3-5 /HPF / WBC 3-5   Sq Epi: x / Non Sq Epi: Occasional / Bacteria: Moderate

## 2019-11-23 NOTE — H&P ADULT - PROBLEM SELECTOR PLAN 1
WBCs and leuk esterase in urine  Started on cipro in ED.  Prior infection showed resistance, will start cefepime.  Follow urine cultures WBCs and leuk esterase in urine  Started on cipro in ED.  Prior infection showed resistance, will start cefepime.  Follow urine cultures.  ID iliana in am
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
## 2019-11-23 NOTE — H&P ADULT - NSICDXPASTSURGICALHX_GEN_ALL_CORE_FT
PAST SURGICAL HISTORY:  Calculus of kidney bilateral nephrostomy tubes placed 1/2019 @[facility]    H/O right nephrectomy     Open wound of neck, sequela
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
## 2019-11-23 NOTE — H&P ADULT - NSHPLABSRESULTS_GEN_ALL_CORE
Recent Vitals  T(C): 37.8 (19 @ 00:25), Max: 37.8 (19 @ 00:25)  HR: 90 (19 @ 00:25) (83 - 90)  BP: 132/79 (19 @ 00:25) (132/79 - 159/122)  RR: 18 (19 @ 00:25) (18 - 20)  SpO2: 97% (19 @ 00:25) (96% - 97%)                        16.0   10.93 )-----------( 238      ( 2019 00:24 )             49.9         135  |  100  |  18  ----------------------------<  66<L>  4.1   |  26  |  0.85    Ca    9.1      2019 00:24    TPro  8.9<H>  /  Alb  4.2  /  TBili  0.6  /  DBili  x   /  AST  35  /  ALT  26  /  AlkPhos  82  11-23      LIVER FUNCTIONS - ( 2019 00:24 )  Alb: 4.2 g/dL / Pro: 8.9 gm/dL / ALK PHOS: 82 U/L / ALT: 26 U/L / AST: 35 U/L / GGT: x           Urinalysis Basic - ( 2019 23:37 )    Color: Yellow / Appearance: Slightly Turbid / S.010 / pH: x  Gluc: x / Ketone: Small  / Bili: Negative / Urobili: Negative mg/dL   Blood: x / Protein: 15 mg/dL / Nitrite: Positive   Leuk Esterase: Moderate / RBC: 3-5 /HPF / WBC 3-5   Sq Epi: x / Non Sq Epi: Occasional / Bacteria: Moderate        Home Medications:  Colace 100 mg oral capsule: 1 cap(s) orally 2 times a day (2019 15:06)  Multiple Vitamins oral capsule: 1 cap(s) orally once a day (2019 15:06)  tiZANidine 4 mg oral tablet: 1 tab(s) orally 2 times a day, As Needed (2019 15:06)    CT abdomen: No bowel obstruction. Large fecal load in the rectosigmoid colon with mild wall thickening of the rectum suggestive of stercoral colitis.    Mild prominence of the left renal collecting system with thickening of   the urothelium which could be seen in the setting of an ascending urinary tract infection. Thickening of the urinary bladder walls which may be due to chronic outlet obstruction or cystitis. Correlate with urinalysis.
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
## 2019-11-24 PROCEDURE — 99233 SBSQ HOSP IP/OBS HIGH 50: CPT

## 2019-11-24 RX ORDER — KETOROLAC TROMETHAMINE 30 MG/ML
15 SYRINGE (ML) INJECTION ONCE
Refills: 0 | Status: DISCONTINUED | OUTPATIENT
Start: 2019-11-24 | End: 2019-11-24

## 2019-11-24 RX ORDER — POLYETHYLENE GLYCOL 3350 17 G/17G
17 POWDER, FOR SOLUTION ORAL AT BEDTIME
Refills: 0 | Status: DISCONTINUED | OUTPATIENT
Start: 2019-11-24 | End: 2019-12-09

## 2019-11-24 RX ORDER — LACTULOSE 10 G/15ML
20 SOLUTION ORAL ONCE
Refills: 0 | Status: COMPLETED | OUTPATIENT
Start: 2019-11-24 | End: 2019-11-24

## 2019-11-24 RX ORDER — ACETAMINOPHEN 500 MG
1000 TABLET ORAL ONCE
Refills: 0 | Status: COMPLETED | OUTPATIENT
Start: 2019-11-24 | End: 2019-11-24

## 2019-11-24 RX ADMIN — Medication 15 MILLIGRAM(S): at 17:54

## 2019-11-24 RX ADMIN — SODIUM CHLORIDE 80 MILLILITER(S): 9 INJECTION INTRAMUSCULAR; INTRAVENOUS; SUBCUTANEOUS at 12:47

## 2019-11-24 RX ADMIN — SODIUM CHLORIDE 80 MILLILITER(S): 9 INJECTION INTRAMUSCULAR; INTRAVENOUS; SUBCUTANEOUS at 06:40

## 2019-11-24 RX ADMIN — SENNA PLUS 2 TABLET(S): 8.6 TABLET ORAL at 21:34

## 2019-11-24 RX ADMIN — PANTOPRAZOLE SODIUM 40 MILLIGRAM(S): 20 TABLET, DELAYED RELEASE ORAL at 08:48

## 2019-11-24 RX ADMIN — POLYETHYLENE GLYCOL 3350 17 GRAM(S): 17 POWDER, FOR SOLUTION ORAL at 21:34

## 2019-11-24 RX ADMIN — POLYETHYLENE GLYCOL 3350 17 GRAM(S): 17 POWDER, FOR SOLUTION ORAL at 02:00

## 2019-11-24 RX ADMIN — Medication 1 TABLET(S): at 11:56

## 2019-11-24 RX ADMIN — SODIUM CHLORIDE 80 MILLILITER(S): 9 INJECTION INTRAMUSCULAR; INTRAVENOUS; SUBCUTANEOUS at 03:04

## 2019-11-24 RX ADMIN — CEFEPIME 100 MILLIGRAM(S): 1 INJECTION, POWDER, FOR SOLUTION INTRAMUSCULAR; INTRAVENOUS at 06:40

## 2019-11-24 RX ADMIN — Medication 1000 MILLIGRAM(S): at 02:15

## 2019-11-24 RX ADMIN — LACTULOSE 20 GRAM(S): 10 SOLUTION ORAL at 11:56

## 2019-11-24 RX ADMIN — APIXABAN 2.5 MILLIGRAM(S): 2.5 TABLET, FILM COATED ORAL at 17:55

## 2019-11-24 RX ADMIN — Medication 60 MILLIGRAM(S): at 06:40

## 2019-11-24 RX ADMIN — Medication 400 MILLIGRAM(S): at 02:00

## 2019-11-24 RX ADMIN — APIXABAN 2.5 MILLIGRAM(S): 2.5 TABLET, FILM COATED ORAL at 06:40

## 2019-11-24 RX ADMIN — TAMSULOSIN HYDROCHLORIDE 0.4 MILLIGRAM(S): 0.4 CAPSULE ORAL at 21:34

## 2019-11-24 RX ADMIN — Medication 15 MILLIGRAM(S): at 18:15

## 2019-11-24 RX ADMIN — CEFEPIME 100 MILLIGRAM(S): 1 INJECTION, POWDER, FOR SOLUTION INTRAMUSCULAR; INTRAVENOUS at 17:56

## 2019-11-24 NOTE — PROGRESS NOTE ADULT - SUBJECTIVE AND OBJECTIVE BOX
Patient is a 44y old  Male who presents with a chief complaint of abdominal pain (2019 19:34)      INTERVAL HPI/OVERNIGHT EVENTS: no events, feels better today after bm    MEDICATIONS  (STANDING):  apixaban 2.5 milliGRAM(s) Oral every 12 hours  cefepime   IVPB 1000 milliGRAM(s) IV Intermittent every 12 hours  lactulose Syrup 20 Gram(s) Oral once  multivitamin 1 Tablet(s) Oral daily  NIFEdipine XL 60 milliGRAM(s) Oral daily  pantoprazole    Tablet 40 milliGRAM(s) Oral before breakfast  polyethylene glycol 3350 17 Gram(s) Oral at bedtime  senna 2 Tablet(s) Oral at bedtime  sodium chloride 0.9%. 1000 milliLiter(s) (80 mL/Hr) IV Continuous <Continuous>  tamsulosin 0.4 milliGRAM(s) Oral at bedtime    MEDICATIONS  (PRN):      Allergies    penicillin (Rash)    Intolerances       Vital Signs Last 24 Hrs  T(C): 36.8 (2019 07:30), Max: 36.8 (2019 07:30)  T(F): 98.3 (2019 07:30), Max: 98.3 (2019 07:30)  HR: 80 (2019 07:30) (80 - 105)  BP: 119/55 (2019 07:30) (119/55 - 170/115)  BP(mean): --  RR: 18 (2019 07:30) (18 - 20)  SpO2: 98% (2019 07:30) (95% - 98%)    PHYSICAL EXAM:  	General: patient in no acute distress, resting comfortably  	Cardio: S1/S2 +ve, regular rate and rhythm, no M/G/R  	Resp: clear to ausculation bilaterally, no rales or wheezes  	GI: abdomen soft, nontender, mildly distended, no guarding, BS +ve x 4  	Ext: no significant pedal edema  Neuro: quadirplegic    LABS:                        16.0   10.93 )-----------( 238      ( 2019 00:24 )             49.9     11-23    135  |  100  |  18  ----------------------------<  66<L>  4.1   |  26  |  0.85    Ca    9.1      2019 00:24    TPro  8.9<H>  /  Alb  4.2  /  TBili  0.6  /  DBili  x   /  AST  35  /  ALT  26  /  AlkPhos  82  11-23      Urinalysis Basic - ( 2019 23:37 )    Color: Yellow / Appearance: Slightly Turbid / S.010 / pH: x  Gluc: x / Ketone: Small  / Bili: Negative / Urobili: Negative mg/dL   Blood: x / Protein: 15 mg/dL / Nitrite: Positive   Leuk Esterase: Moderate / RBC: 3-5 /HPF / WBC 3-5   Sq Epi: x / Non Sq Epi: Occasional / Bacteria: Moderate      CAPILLARY BLOOD GLUCOSE          RADIOLOGY & ADDITIONAL TESTS:    Imaging Personally Reviewed:  [ X] YES  [ ] NO    Consultant(s) Notes Reviewed:  [ X] YES  [ ] NO    Care Discussed with Consultants/Other Providers [X ] YES  [ ] NO

## 2019-11-25 LAB
-  AMIKACIN: SIGNIFICANT CHANGE UP
-  AMPICILLIN/SULBACTAM: SIGNIFICANT CHANGE UP
-  AMPICILLIN: SIGNIFICANT CHANGE UP
-  AZTREONAM: SIGNIFICANT CHANGE UP
-  CEFAZOLIN: SIGNIFICANT CHANGE UP
-  CEFEPIME: SIGNIFICANT CHANGE UP
-  CEFOXITIN: SIGNIFICANT CHANGE UP
-  CEFTRIAXONE: SIGNIFICANT CHANGE UP
-  CIPROFLOXACIN: SIGNIFICANT CHANGE UP
-  ERTAPENEM: SIGNIFICANT CHANGE UP
-  GENTAMICIN: SIGNIFICANT CHANGE UP
-  LEVOFLOXACIN: SIGNIFICANT CHANGE UP
-  MEROPENEM: SIGNIFICANT CHANGE UP
-  NITROFURANTOIN: SIGNIFICANT CHANGE UP
-  PIPERACILLIN/TAZOBACTAM: SIGNIFICANT CHANGE UP
-  TOBRAMYCIN: SIGNIFICANT CHANGE UP
-  TRIMETHOPRIM/SULFAMETHOXAZOLE: SIGNIFICANT CHANGE UP
ALBUMIN SERPL ELPH-MCNC: 3 G/DL — LOW (ref 3.3–5)
ALP SERPL-CCNC: 49 U/L — SIGNIFICANT CHANGE UP (ref 40–120)
ALT FLD-CCNC: 22 U/L — SIGNIFICANT CHANGE UP (ref 12–78)
ANION GAP SERPL CALC-SCNC: 6 MMOL/L — SIGNIFICANT CHANGE UP (ref 5–17)
AST SERPL-CCNC: 28 U/L — SIGNIFICANT CHANGE UP (ref 15–37)
BILIRUB SERPL-MCNC: 0.6 MG/DL — SIGNIFICANT CHANGE UP (ref 0.2–1.2)
BUN SERPL-MCNC: 13 MG/DL — SIGNIFICANT CHANGE UP (ref 7–23)
CALCIUM SERPL-MCNC: 8.2 MG/DL — LOW (ref 8.5–10.1)
CHLORIDE SERPL-SCNC: 111 MMOL/L — HIGH (ref 96–108)
CO2 SERPL-SCNC: 23 MMOL/L — SIGNIFICANT CHANGE UP (ref 22–31)
CREAT SERPL-MCNC: 0.55 MG/DL — SIGNIFICANT CHANGE UP (ref 0.5–1.3)
CULTURE RESULTS: SIGNIFICANT CHANGE UP
GLUCOSE SERPL-MCNC: 82 MG/DL — SIGNIFICANT CHANGE UP (ref 70–99)
HCT VFR BLD CALC: 38.9 % — LOW (ref 39–50)
HGB BLD-MCNC: 12.4 G/DL — LOW (ref 13–17)
MCHC RBC-ENTMCNC: 25.8 PG — LOW (ref 27–34)
MCHC RBC-ENTMCNC: 31.9 GM/DL — LOW (ref 32–36)
MCV RBC AUTO: 80.9 FL — SIGNIFICANT CHANGE UP (ref 80–100)
METHOD TYPE: SIGNIFICANT CHANGE UP
NRBC # BLD: 0 /100 WBCS — SIGNIFICANT CHANGE UP (ref 0–0)
ORGANISM # SPEC MICROSCOPIC CNT: SIGNIFICANT CHANGE UP
ORGANISM # SPEC MICROSCOPIC CNT: SIGNIFICANT CHANGE UP
PLATELET # BLD AUTO: 171 K/UL — SIGNIFICANT CHANGE UP (ref 150–400)
POTASSIUM SERPL-MCNC: 3.3 MMOL/L — LOW (ref 3.5–5.3)
POTASSIUM SERPL-SCNC: 3.3 MMOL/L — LOW (ref 3.5–5.3)
PROT SERPL-MCNC: 6.5 GM/DL — SIGNIFICANT CHANGE UP (ref 6–8.3)
RBC # BLD: 4.81 M/UL — SIGNIFICANT CHANGE UP (ref 4.2–5.8)
RBC # FLD: 19.8 % — HIGH (ref 10.3–14.5)
SODIUM SERPL-SCNC: 140 MMOL/L — SIGNIFICANT CHANGE UP (ref 135–145)
SPECIMEN SOURCE: SIGNIFICANT CHANGE UP
WBC # BLD: 5.52 K/UL — SIGNIFICANT CHANGE UP (ref 3.8–10.5)
WBC # FLD AUTO: 5.52 K/UL — SIGNIFICANT CHANGE UP (ref 3.8–10.5)

## 2019-11-25 PROCEDURE — 99233 SBSQ HOSP IP/OBS HIGH 50: CPT

## 2019-11-25 RX ORDER — POTASSIUM CHLORIDE 20 MEQ
40 PACKET (EA) ORAL
Refills: 0 | Status: COMPLETED | OUTPATIENT
Start: 2019-11-25 | End: 2019-11-25

## 2019-11-25 RX ORDER — MEROPENEM 1 G/30ML
1000 INJECTION INTRAVENOUS EVERY 8 HOURS
Refills: 0 | Status: DISCONTINUED | OUTPATIENT
Start: 2019-11-25 | End: 2019-11-26

## 2019-11-25 RX ADMIN — CEFEPIME 100 MILLIGRAM(S): 1 INJECTION, POWDER, FOR SOLUTION INTRAMUSCULAR; INTRAVENOUS at 17:00

## 2019-11-25 RX ADMIN — Medication 60 MILLIGRAM(S): at 06:02

## 2019-11-25 RX ADMIN — SENNA PLUS 2 TABLET(S): 8.6 TABLET ORAL at 22:58

## 2019-11-25 RX ADMIN — Medication 40 MILLIEQUIVALENT(S): at 11:42

## 2019-11-25 RX ADMIN — POLYETHYLENE GLYCOL 3350 17 GRAM(S): 17 POWDER, FOR SOLUTION ORAL at 22:58

## 2019-11-25 RX ADMIN — TAMSULOSIN HYDROCHLORIDE 0.4 MILLIGRAM(S): 0.4 CAPSULE ORAL at 22:58

## 2019-11-25 RX ADMIN — Medication 40 MILLIEQUIVALENT(S): at 10:39

## 2019-11-25 RX ADMIN — SODIUM CHLORIDE 80 MILLILITER(S): 9 INJECTION INTRAMUSCULAR; INTRAVENOUS; SUBCUTANEOUS at 03:10

## 2019-11-25 RX ADMIN — SODIUM CHLORIDE 80 MILLILITER(S): 9 INJECTION INTRAMUSCULAR; INTRAVENOUS; SUBCUTANEOUS at 13:00

## 2019-11-25 RX ADMIN — APIXABAN 2.5 MILLIGRAM(S): 2.5 TABLET, FILM COATED ORAL at 17:00

## 2019-11-25 RX ADMIN — MEROPENEM 100 MILLIGRAM(S): 1 INJECTION INTRAVENOUS at 22:58

## 2019-11-25 RX ADMIN — PANTOPRAZOLE SODIUM 40 MILLIGRAM(S): 20 TABLET, DELAYED RELEASE ORAL at 07:47

## 2019-11-25 RX ADMIN — APIXABAN 2.5 MILLIGRAM(S): 2.5 TABLET, FILM COATED ORAL at 06:52

## 2019-11-25 RX ADMIN — Medication 1 TABLET(S): at 11:42

## 2019-11-25 RX ADMIN — CEFEPIME 100 MILLIGRAM(S): 1 INJECTION, POWDER, FOR SOLUTION INTRAMUSCULAR; INTRAVENOUS at 06:03
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
## 2019-11-25 NOTE — PROGRESS NOTE ADULT - SUBJECTIVE AND OBJECTIVE BOX
Patient is a 44y old  Male who presents with a chief complaint of abdominal pain (24 Nov 2019 10:27)      INTERVAL HPI/OVERNIGHT EVENTS: no events     MEDICATIONS  (STANDING):  apixaban 2.5 milliGRAM(s) Oral every 12 hours  cefepime   IVPB 1000 milliGRAM(s) IV Intermittent every 12 hours  multivitamin 1 Tablet(s) Oral daily  NIFEdipine XL 60 milliGRAM(s) Oral daily  pantoprazole    Tablet 40 milliGRAM(s) Oral before breakfast  polyethylene glycol 3350 17 Gram(s) Oral at bedtime  potassium chloride    Tablet ER 40 milliEquivalent(s) Oral every 2 hours  senna 2 Tablet(s) Oral at bedtime  sodium chloride 0.9%. 1000 milliLiter(s) (80 mL/Hr) IV Continuous <Continuous>  tamsulosin 0.4 milliGRAM(s) Oral at bedtime    MEDICATIONS  (PRN):      Allergies    penicillin (Rash)    Intolerances         Vital Signs Last 24 Hrs  T(C): 37 (25 Nov 2019 06:03), Max: 37.8 (24 Nov 2019 18:00)  T(F): 98.6 (25 Nov 2019 06:03), Max: 100 (24 Nov 2019 18:00)  HR: 68 (25 Nov 2019 06:03) (65 - 99)  BP: 115/75 (25 Nov 2019 06:03) (101/97 - 115/75)  BP(mean): --  RR: 17 (25 Nov 2019 06:03) (17 - 18)  SpO2: 98% (25 Nov 2019 06:03) (96% - 99%)    PHYSICAL EXAM:  	General: patient in no acute distress, resting comfortably  	Cardio: S1/S2 +ve, regular rate and rhythm, no M/G/R  	Resp: clear to ausculation bilaterally, no rales or wheezes  	GI: abdomen soft, nontender, mildly distended, no guarding, BS +ve x 4  	Ext: no significant pedal edema  Neuro: quadirplegic    LABS:                        12.4   5.52  )-----------( 171      ( 25 Nov 2019 07:33 )             38.9     11-25    140  |  111<H>  |  13  ----------------------------<  82  3.3<L>   |  23  |  0.55    Ca    8.2<L>      25 Nov 2019 07:33    TPro  6.5  /  Alb  3.0<L>  /  TBili  0.6  /  DBili  x   /  AST  28  /  ALT  22  /  AlkPhos  49  11-25        CAPILLARY BLOOD GLUCOSE          RADIOLOGY & ADDITIONAL TESTS:    Imaging Personally Reviewed:  [ X] YES  [ ] NO    Consultant(s) Notes Reviewed:  [ X] YES  [ ] NO    Care Discussed with Consultants/Other Providers [X ] YES  [ ] NO

## 2019-11-26 LAB
ANION GAP SERPL CALC-SCNC: 5 MMOL/L — SIGNIFICANT CHANGE UP (ref 5–17)
BUN SERPL-MCNC: 10 MG/DL — SIGNIFICANT CHANGE UP (ref 7–23)
CALCIUM SERPL-MCNC: 8.5 MG/DL — SIGNIFICANT CHANGE UP (ref 8.5–10.1)
CHLORIDE SERPL-SCNC: 111 MMOL/L — HIGH (ref 96–108)
CO2 SERPL-SCNC: 26 MMOL/L — SIGNIFICANT CHANGE UP (ref 22–31)
CREAT SERPL-MCNC: 0.47 MG/DL — LOW (ref 0.5–1.3)
GLUCOSE SERPL-MCNC: 78 MG/DL — SIGNIFICANT CHANGE UP (ref 70–99)
MAGNESIUM SERPL-MCNC: 1.9 MG/DL — SIGNIFICANT CHANGE UP (ref 1.6–2.6)
PHOSPHATE SERPL-MCNC: 2.3 MG/DL — LOW (ref 2.5–4.5)
POTASSIUM SERPL-MCNC: 4 MMOL/L — SIGNIFICANT CHANGE UP (ref 3.5–5.3)
POTASSIUM SERPL-SCNC: 4 MMOL/L — SIGNIFICANT CHANGE UP (ref 3.5–5.3)
SODIUM SERPL-SCNC: 142 MMOL/L — SIGNIFICANT CHANGE UP (ref 135–145)

## 2019-11-26 PROCEDURE — 99223 1ST HOSP IP/OBS HIGH 75: CPT

## 2019-11-26 PROCEDURE — 99233 SBSQ HOSP IP/OBS HIGH 50: CPT

## 2019-11-26 RX ORDER — MEROPENEM 1 G/30ML
1000 INJECTION INTRAVENOUS EVERY 8 HOURS
Refills: 0 | Status: DISCONTINUED | OUTPATIENT
Start: 2019-11-25 | End: 2019-12-09

## 2019-11-26 RX ORDER — POTASSIUM PHOSPHATE, MONOBASIC POTASSIUM PHOSPHATE, DIBASIC 236; 224 MG/ML; MG/ML
15 INJECTION, SOLUTION INTRAVENOUS ONCE
Refills: 0 | Status: COMPLETED | OUTPATIENT
Start: 2019-11-26 | End: 2019-11-26

## 2019-11-26 RX ADMIN — SODIUM CHLORIDE 80 MILLILITER(S): 9 INJECTION INTRAMUSCULAR; INTRAVENOUS; SUBCUTANEOUS at 04:55

## 2019-11-26 RX ADMIN — APIXABAN 2.5 MILLIGRAM(S): 2.5 TABLET, FILM COATED ORAL at 05:00

## 2019-11-26 RX ADMIN — Medication 1 TABLET(S): at 11:33

## 2019-11-26 RX ADMIN — POLYETHYLENE GLYCOL 3350 17 GRAM(S): 17 POWDER, FOR SOLUTION ORAL at 21:22

## 2019-11-26 RX ADMIN — SODIUM CHLORIDE 80 MILLILITER(S): 9 INJECTION INTRAMUSCULAR; INTRAVENOUS; SUBCUTANEOUS at 13:42

## 2019-11-26 RX ADMIN — TAMSULOSIN HYDROCHLORIDE 0.4 MILLIGRAM(S): 0.4 CAPSULE ORAL at 21:22

## 2019-11-26 RX ADMIN — Medication 60 MILLIGRAM(S): at 05:00

## 2019-11-26 RX ADMIN — APIXABAN 2.5 MILLIGRAM(S): 2.5 TABLET, FILM COATED ORAL at 17:01

## 2019-11-26 RX ADMIN — POTASSIUM PHOSPHATE, MONOBASIC POTASSIUM PHOSPHATE, DIBASIC 62.5 MILLIMOLE(S): 236; 224 INJECTION, SOLUTION INTRAVENOUS at 11:33

## 2019-11-26 RX ADMIN — PANTOPRAZOLE SODIUM 40 MILLIGRAM(S): 20 TABLET, DELAYED RELEASE ORAL at 11:33

## 2019-11-26 RX ADMIN — MEROPENEM 100 MILLIGRAM(S): 1 INJECTION INTRAVENOUS at 21:22

## 2019-11-26 RX ADMIN — MEROPENEM 100 MILLIGRAM(S): 1 INJECTION INTRAVENOUS at 05:00

## 2019-11-26 RX ADMIN — MEROPENEM 100 MILLIGRAM(S): 1 INJECTION INTRAVENOUS at 13:42

## 2019-11-26 RX ADMIN — SENNA PLUS 2 TABLET(S): 8.6 TABLET ORAL at 21:22

## 2019-11-26 NOTE — CONSULT NOTE ADULT - PROBLEM SELECTOR RECOMMENDATION 9
-No acute surgical intervention at this time.    -Continue abx as per ID.  -F/u AM labs.  -Continue medical management.    -Discussed with Dr. Bates

## 2019-11-26 NOTE — CONSULT NOTE ADULT - SUBJECTIVE AND OBJECTIVE BOX
Patient seen and examined at bedside with no complaints with Dr. Bates.    Pt reports abdominal pain is well-controlled.    Denies nausea/ vomiting, fever, chills.    Voiding well.      Vital Signs Last 24 Hrs  T(F): 97 (11-25-19 @ 16:58), Max: 97 (11-25-19 @ 16:58)  HR: 57 (11-25-19 @ 16:58)  BP: 97/61 (11-25-19 @ 16:58)  RR: 18 (11-25-19 @ 16:58)  SpO2: 97% (11-25-19 @ 16:58)      GENERAL: Alert, NAD  ABDOMEN: soft, non-tender, non-distended, no suprapubic tenderness  : uncircumcised phallus, no erythema, edema, or discharge   EXTREMITIES:  no calf tenderness, No edema    I&O's Detail    25 Nov 2019 07:01  -  26 Nov 2019 07:00  --------------------------------------------------------  IN:    IV PiggyBack: 50 mL    sodium chloride 0.9%.: 1840 mL    Solution: 100 mL  Total IN: 1990 mL    OUT:    Voided: 2000 mL  Total OUT: 2000 mL    Total NET: -10 mL        LABS:                        12.4   5.52  )-----------( 171      ( 25 Nov 2019 07:33 )             38.9     11-26    142  |  111<H>  |  10  ----------------------------<  78  4.0   |  26  |  0.47<L>    Ca    8.5      26 Nov 2019 08:10  Phos  2.3     11-26  Mg     1.9     11-26    TPro  6.5  /  Alb  3.0<L>  /  TBili  0.6  /  DBili  x   /  AST  28  /  ALT  22  /  AlkPhos  49  11-25

## 2019-11-26 NOTE — PROGRESS NOTE ADULT - PROBLEM SELECTOR PLAN 1
Acute Cystitis   WBCs and leuk esterase in urine  c/w iv abx, id on board, switched to Merrem   Urology saw patient today, no interventions planned
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
## 2019-11-26 NOTE — PROGRESS NOTE ADULT - SUBJECTIVE AND OBJECTIVE BOX
Patient is a 44y old  Male who presents with a chief complaint of abdominal pain  urinary tract infection (25 Nov 2019 20:32)      INTERVAL HPI/OVERNIGHT EVENTS: no events     MEDICATIONS  (STANDING):  apixaban 2.5 milliGRAM(s) Oral every 12 hours  meropenem  IVPB 1000 milliGRAM(s) IV Intermittent every 8 hours  multivitamin 1 Tablet(s) Oral daily  NIFEdipine XL 60 milliGRAM(s) Oral daily  pantoprazole    Tablet 40 milliGRAM(s) Oral before breakfast  polyethylene glycol 3350 17 Gram(s) Oral at bedtime  potassium phosphate IVPB 15 milliMole(s) IV Intermittent once  senna 2 Tablet(s) Oral at bedtime  sodium chloride 0.9%. 1000 milliLiter(s) (80 mL/Hr) IV Continuous <Continuous>  tamsulosin 0.4 milliGRAM(s) Oral at bedtime    MEDICATIONS  (PRN):      Allergies    penicillin (Rash)    Intolerances         Vital Signs Last 24 Hrs  T(C): 36.1 (25 Nov 2019 16:58), Max: 36.7 (25 Nov 2019 12:56)  T(F): 97 (25 Nov 2019 16:58), Max: 98 (25 Nov 2019 12:56)  HR: 57 (25 Nov 2019 16:58) (57 - 78)  BP: 97/61 (25 Nov 2019 16:58) (97/61 - 109/54)  BP(mean): --  RR: 18 (25 Nov 2019 16:58) (18 - 18)  SpO2: 97% (25 Nov 2019 16:58) (97% - 99%)    PHYSICAL EXAM:  	General: patient in no acute distress, resting comfortably  	Cardio: S1/S2 +ve, regular rate and rhythm, no M/G/R  	Resp: clear to ausculation bilaterally, no rales or wheezes  	GI: abdomen soft, nontender, mildly distended, no guarding, BS +ve x 4  	Ext: no significant pedal edema  Neuro: quadirplegic      LABS:                        12.4   5.52  )-----------( 171      ( 25 Nov 2019 07:33 )             38.9     11-26    142  |  111<H>  |  10  ----------------------------<  78  4.0   |  26  |  0.47<L>    Ca    8.5      26 Nov 2019 08:10  Phos  2.3     11-26  Mg     1.9     11-26    TPro  6.5  /  Alb  3.0<L>  /  TBili  0.6  /  DBili  x   /  AST  28  /  ALT  22  /  AlkPhos  49  11-25        CAPILLARY BLOOD GLUCOSE          RADIOLOGY & ADDITIONAL TESTS:    Imaging Personally Reviewed:  [ X] YES  [ ] NO    Consultant(s) Notes Reviewed:  [ X] YES  [ ] NO    Care Discussed with Consultants/Other Providers [X ] YES  [ ] NO

## 2019-11-27 PROCEDURE — 99233 SBSQ HOSP IP/OBS HIGH 50: CPT

## 2019-11-27 RX ADMIN — SENNA PLUS 2 TABLET(S): 8.6 TABLET ORAL at 22:01

## 2019-11-27 RX ADMIN — TAMSULOSIN HYDROCHLORIDE 0.4 MILLIGRAM(S): 0.4 CAPSULE ORAL at 22:01

## 2019-11-27 RX ADMIN — MEROPENEM 100 MILLIGRAM(S): 1 INJECTION INTRAVENOUS at 05:40

## 2019-11-27 RX ADMIN — APIXABAN 2.5 MILLIGRAM(S): 2.5 TABLET, FILM COATED ORAL at 19:20

## 2019-11-27 RX ADMIN — Medication 5 MILLIGRAM(S): at 22:01

## 2019-11-27 RX ADMIN — APIXABAN 2.5 MILLIGRAM(S): 2.5 TABLET, FILM COATED ORAL at 05:39

## 2019-11-27 RX ADMIN — MEROPENEM 100 MILLIGRAM(S): 1 INJECTION INTRAVENOUS at 14:10

## 2019-11-27 RX ADMIN — PANTOPRAZOLE SODIUM 40 MILLIGRAM(S): 20 TABLET, DELAYED RELEASE ORAL at 09:27

## 2019-11-27 RX ADMIN — Medication 1 TABLET(S): at 12:38

## 2019-11-27 RX ADMIN — MEROPENEM 100 MILLIGRAM(S): 1 INJECTION INTRAVENOUS at 22:00

## 2019-11-27 RX ADMIN — POLYETHYLENE GLYCOL 3350 17 GRAM(S): 17 POWDER, FOR SOLUTION ORAL at 22:01

## 2019-11-27 RX ADMIN — SODIUM CHLORIDE 80 MILLILITER(S): 9 INJECTION INTRAMUSCULAR; INTRAVENOUS; SUBCUTANEOUS at 05:41

## 2019-11-27 RX ADMIN — Medication 60 MILLIGRAM(S): at 05:39

## 2019-11-27 NOTE — PROGRESS NOTE ADULT - ATTENDING COMMENTS
Pt had bilateral renal and pelvic stones and infection, was treated with right nephrectomy and PCNL of left renal pelvic stone 04/2019.  At present there is 3 mm non obstructing left lower pole stone.  Has urinary incontinence secondary to Quadriplegia, managed with Condom catheter.   Urine culture noted, on Merrem.   Will measure PVR urine .     No intervention for the stone at present.  ? etiology of abdominal pain?.
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
## 2019-11-27 NOTE — PROGRESS NOTE ADULT - PROBLEM SELECTOR PLAN 1
Acute Cystitis   WBCs and leuk esterase in urine  c/w iv abx, id on board, switched to Merrem per ID can change to inanz   1 gram daily for 12 days via midline  Urology saw patient today, no interventions planned
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
## 2019-11-27 NOTE — PROGRESS NOTE ADULT - SUBJECTIVE AND OBJECTIVE BOX
Patient is a 44y old  Male who presents with a chief complaint of abdominal pain  urinary tract infection (25 Nov 2019 20:32)      INTERVAL HPI/OVERNIGHT EVENTS: no events     MEDICATIONS  (STANDING):  apixaban 2.5 milliGRAM(s) Oral every 12 hours  meropenem  IVPB 1000 milliGRAM(s) IV Intermittent every 8 hours  multivitamin 1 Tablet(s) Oral daily  NIFEdipine XL 60 milliGRAM(s) Oral daily  pantoprazole    Tablet 40 milliGRAM(s) Oral before breakfast  polyethylene glycol 3350 17 Gram(s) Oral at bedtime  senna 2 Tablet(s) Oral at bedtime  sodium chloride 0.9%. 1000 milliLiter(s) (80 mL/Hr) IV Continuous <Continuous>  tamsulosin 0.4 milliGRAM(s) Oral at bedtime    MEDICATIONS  (PRN):      Allergies    penicillin (Rash)    Intolerances       Vital Signs Last 24 Hrs  T(C): 36.1 (27 Nov 2019 05:39), Max: 36.5 (26 Nov 2019 16:45)  T(F): 97 (27 Nov 2019 05:39), Max: 97.7 (26 Nov 2019 16:45)  HR: 57 (27 Nov 2019 05:39) (57 - 64)  BP: 151/92 (27 Nov 2019 05:39) (133/79 - 151/92)  BP(mean): --  RR: 18 (27 Nov 2019 05:39) (17 - 18)  SpO2: 100% (27 Nov 2019 05:39) (100% - 100%)  PHYSICAL EXAM:  	General: patient in no acute distress, resting comfortably  	Cardio: S1/S2 +ve, regular rate and rhythm, no M/G/R  	Resp: clear to ausculation bilaterally, no rales or wheezes  	GI: abdomen soft, nontender, mildly distended, no guarding, BS +ve x 4  	Ext: no significant pedal edema  Neuro: quadriplegic      LABS:                      no new labs    CAPILLARY BLOOD GLUCOSE          RADIOLOGY & ADDITIONAL TESTS:    Imaging Personally Reviewed:  [ X] YES  [ ] NO    Consultant(s) Notes Reviewed:  [ X] YES  [ ] NO    Care Discussed with Consultants/Other Providers [X ] YES  [ ] NO

## 2019-11-28 LAB
ANION GAP SERPL CALC-SCNC: 2 MMOL/L — LOW (ref 5–17)
BUN SERPL-MCNC: 12 MG/DL — SIGNIFICANT CHANGE UP (ref 7–23)
CALCIUM SERPL-MCNC: 8.6 MG/DL — SIGNIFICANT CHANGE UP (ref 8.5–10.1)
CHLORIDE SERPL-SCNC: 109 MMOL/L — HIGH (ref 96–108)
CO2 SERPL-SCNC: 30 MMOL/L — SIGNIFICANT CHANGE UP (ref 22–31)
CREAT SERPL-MCNC: 0.58 MG/DL — SIGNIFICANT CHANGE UP (ref 0.5–1.3)
GLUCOSE SERPL-MCNC: 78 MG/DL — SIGNIFICANT CHANGE UP (ref 70–99)
HCT VFR BLD CALC: 38.6 % — LOW (ref 39–50)
HGB BLD-MCNC: 12.1 G/DL — LOW (ref 13–17)
MAGNESIUM SERPL-MCNC: 1.6 MG/DL — SIGNIFICANT CHANGE UP (ref 1.6–2.6)
MCHC RBC-ENTMCNC: 25.8 PG — LOW (ref 27–34)
MCHC RBC-ENTMCNC: 31.3 GM/DL — LOW (ref 32–36)
MCV RBC AUTO: 82.3 FL — SIGNIFICANT CHANGE UP (ref 80–100)
NRBC # BLD: 0 /100 WBCS — SIGNIFICANT CHANGE UP (ref 0–0)
PHOSPHATE SERPL-MCNC: 3.2 MG/DL — SIGNIFICANT CHANGE UP (ref 2.5–4.5)
PLATELET # BLD AUTO: 186 K/UL — SIGNIFICANT CHANGE UP (ref 150–400)
POTASSIUM SERPL-MCNC: 4.2 MMOL/L — SIGNIFICANT CHANGE UP (ref 3.5–5.3)
POTASSIUM SERPL-SCNC: 4.2 MMOL/L — SIGNIFICANT CHANGE UP (ref 3.5–5.3)
RBC # BLD: 4.69 M/UL — SIGNIFICANT CHANGE UP (ref 4.2–5.8)
RBC # FLD: 19.7 % — HIGH (ref 10.3–14.5)
SODIUM SERPL-SCNC: 141 MMOL/L — SIGNIFICANT CHANGE UP (ref 135–145)
WBC # BLD: 5.1 K/UL — SIGNIFICANT CHANGE UP (ref 3.8–10.5)
WBC # FLD AUTO: 5.1 K/UL — SIGNIFICANT CHANGE UP (ref 3.8–10.5)

## 2019-11-28 PROCEDURE — 99232 SBSQ HOSP IP/OBS MODERATE 35: CPT

## 2019-11-28 PROCEDURE — 99233 SBSQ HOSP IP/OBS HIGH 50: CPT

## 2019-11-28 RX ADMIN — APIXABAN 2.5 MILLIGRAM(S): 2.5 TABLET, FILM COATED ORAL at 06:15

## 2019-11-28 RX ADMIN — SENNA PLUS 2 TABLET(S): 8.6 TABLET ORAL at 22:12

## 2019-11-28 RX ADMIN — MEROPENEM 100 MILLIGRAM(S): 1 INJECTION INTRAVENOUS at 13:46

## 2019-11-28 RX ADMIN — Medication 5 MILLIGRAM(S): at 22:13

## 2019-11-28 RX ADMIN — PANTOPRAZOLE SODIUM 40 MILLIGRAM(S): 20 TABLET, DELAYED RELEASE ORAL at 07:49

## 2019-11-28 RX ADMIN — MEROPENEM 100 MILLIGRAM(S): 1 INJECTION INTRAVENOUS at 22:12

## 2019-11-28 RX ADMIN — SODIUM CHLORIDE 80 MILLILITER(S): 9 INJECTION INTRAMUSCULAR; INTRAVENOUS; SUBCUTANEOUS at 06:17

## 2019-11-28 RX ADMIN — APIXABAN 2.5 MILLIGRAM(S): 2.5 TABLET, FILM COATED ORAL at 17:04

## 2019-11-28 RX ADMIN — MEROPENEM 100 MILLIGRAM(S): 1 INJECTION INTRAVENOUS at 06:15

## 2019-11-28 RX ADMIN — TAMSULOSIN HYDROCHLORIDE 0.4 MILLIGRAM(S): 0.4 CAPSULE ORAL at 22:12

## 2019-11-28 RX ADMIN — Medication 1 TABLET(S): at 12:03

## 2019-11-28 RX ADMIN — SODIUM CHLORIDE 80 MILLILITER(S): 9 INJECTION INTRAMUSCULAR; INTRAVENOUS; SUBCUTANEOUS at 22:13

## 2019-11-28 NOTE — PROGRESS NOTE ADULT - SUBJECTIVE AND OBJECTIVE BOX
Patient is a 44y old  Male who presents with a chief complaint of abdominal pain  urinary tract infection (25 Nov 2019 20:32)      INTERVAL HPI/OVERNIGHT EVENTS: no events     MEDICATIONS  (STANDING):  apixaban 2.5 milliGRAM(s) Oral every 12 hours  bisacodyl 5 milliGRAM(s) Oral at bedtime  meropenem  IVPB 1000 milliGRAM(s) IV Intermittent every 8 hours  multivitamin 1 Tablet(s) Oral daily  NIFEdipine XL 60 milliGRAM(s) Oral daily  pantoprazole    Tablet 40 milliGRAM(s) Oral before breakfast  polyethylene glycol 3350 17 Gram(s) Oral at bedtime  saline laxative (FLEET) Rectal Enema 1 Enema Rectal daily  senna 2 Tablet(s) Oral at bedtime  sodium chloride 0.9%. 1000 milliLiter(s) (80 mL/Hr) IV Continuous <Continuous>  tamsulosin 0.4 milliGRAM(s) Oral at bedtime    MEDICATIONS  (PRN):      Allergies    penicillin (Rash)    Intolerances    Vital Signs Last 24 Hrs  T(C): 35.6 (28 Nov 2019 06:00), Max: 36.6 (27 Nov 2019 14:35)  T(F): 96 (28 Nov 2019 06:00), Max: 97.8 (27 Nov 2019 14:35)  HR: 55 (28 Nov 2019 06:00) (53 - 59)  BP: 130/82 (28 Nov 2019 06:00) (98/69 - 133/87)  BP(mean): --  RR: 18 (28 Nov 2019 06:00) (18 - 18)  SpO2: 99% (28 Nov 2019 06:00) (97% - 99%)     PHYSICAL EXAM:  	General: patient in no acute distress, resting comfortably  	Cardio: S1/S2 +ve, regular rate and rhythm, no M/G/R  	Resp: clear to ausculation bilaterally, no rales or wheezes  	GI: abdomen soft, nontender, mildly distended, no guarding, BS +ve x 4  	Ext: no significant pedal edema  Neuro: quadriplegic      LABS:                 CAPILLARY BLOOD GLUCOSE          RADIOLOGY & ADDITIONAL TESTS:    Imaging Personally Reviewed:  [ X] YES  [ ] NO    Consultant(s) Notes Reviewed:  [ X] YES  [ ] NO    Care Discussed with Consultants/Other Providers [X ] YES  [ ] NO
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
## 2019-11-28 NOTE — PROGRESS NOTE ADULT - PROBLEM SELECTOR PLAN 1
Acute Cystitis   WBCs and leuk esterase in urine  c/w iv abx, id on board, switched to Merrem per ID can change to inanz   1 gram daily for 12 days via midline but patient  said  he has no one to administer  the antibx at home and his aids are unable to do so . so he will remain In house for completion.   Urology saw patient today, no interventions planned
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
## 2019-11-29 PROCEDURE — 99232 SBSQ HOSP IP/OBS MODERATE 35: CPT

## 2019-11-29 PROCEDURE — 99233 SBSQ HOSP IP/OBS HIGH 50: CPT

## 2019-11-29 RX ADMIN — Medication 1 TABLET(S): at 13:02

## 2019-11-29 RX ADMIN — APIXABAN 2.5 MILLIGRAM(S): 2.5 TABLET, FILM COATED ORAL at 07:12

## 2019-11-29 RX ADMIN — MEROPENEM 100 MILLIGRAM(S): 1 INJECTION INTRAVENOUS at 07:12

## 2019-11-29 RX ADMIN — SODIUM CHLORIDE 80 MILLILITER(S): 9 INJECTION INTRAMUSCULAR; INTRAVENOUS; SUBCUTANEOUS at 23:06

## 2019-11-29 RX ADMIN — MEROPENEM 100 MILLIGRAM(S): 1 INJECTION INTRAVENOUS at 13:09

## 2019-11-29 RX ADMIN — APIXABAN 2.5 MILLIGRAM(S): 2.5 TABLET, FILM COATED ORAL at 17:16

## 2019-11-29 RX ADMIN — TAMSULOSIN HYDROCHLORIDE 0.4 MILLIGRAM(S): 0.4 CAPSULE ORAL at 21:26

## 2019-11-29 RX ADMIN — SODIUM CHLORIDE 80 MILLILITER(S): 9 INJECTION INTRAMUSCULAR; INTRAVENOUS; SUBCUTANEOUS at 07:14

## 2019-11-29 RX ADMIN — SENNA PLUS 2 TABLET(S): 8.6 TABLET ORAL at 21:26

## 2019-11-29 RX ADMIN — MEROPENEM 100 MILLIGRAM(S): 1 INJECTION INTRAVENOUS at 21:27

## 2019-11-29 RX ADMIN — Medication 5 MILLIGRAM(S): at 21:26

## 2019-11-29 RX ADMIN — PANTOPRAZOLE SODIUM 40 MILLIGRAM(S): 20 TABLET, DELAYED RELEASE ORAL at 08:01

## 2019-11-29 NOTE — DIETITIAN INITIAL EVALUATION ADULT. - PROBLEM SELECTOR PLAN 1
WBCs and leuk esterase in urine  Started on cipro in ED.  Prior infection showed resistance, will start cefepime.  Follow urine cultures.  ID iliana in am

## 2019-11-29 NOTE — PROGRESS NOTE ADULT - PROBLEM SELECTOR PLAN 1
Acute Cystitis   WBCs and leuk esterase in urine  c/w iv abx, id on board, switched to Merrem per ID  and will remain for 10 days as   he has no one to administer  the antibx at home and his aids are unable to do so . so he will remain In house for completion.  confirmed with ID   Urology saw patient  no interventions planned

## 2019-11-29 NOTE — DIETITIAN INITIAL EVALUATION ADULT. - PERTINENT LABORATORY DATA
11-28 Na 141 mmol/L Glu 78 mg/dL K+ 4.2 mmol/L Cr 0.58 mg/dL BUN 12 mg/dL Phos 3.2 mg/dL Alb 3.0(11/26)   PAB n/a   Hgb 12.1 g/dL<L> Hct 38.6 %<L> HgA1C n/a    Glucose, Serum: 78 mg/dL   24Hr FS:

## 2019-11-29 NOTE — DIETITIAN INITIAL EVALUATION ADULT. - OTHER INFO
Pt lives alone with HHA 24/7. Pt's HHA does cooking & food shopping. Pt reports he is not food insecure, however his HHA utilizes the food pantry at the [employer] on occasion to save money. Pt refused Food as health program.   Pt with paraplegia fed by staff & reports BM x 1(11/29) & managed with senna & Miralax. Pt followed Regular diet PTA; no difficulty chewing or swallowing.     Diet hx; Breakfast; Eggs with snow or sausage or [place], salt fish, [place]  No Lunch  Dinner; Baked chicken, string beans, Broccoli, rice & beans    UBW obtained from previous dietitian's assessment in EMR 4/19/29. Pt eating well during hospitalization fed by staff % meals & tolerated well.

## 2019-11-29 NOTE — DIETITIAN INITIAL EVALUATION ADULT. - PERTINENT MEDS FT
apixaban  bisacodyl  meropenem  IVPB  multivitamin  NIFEdipine XL  pantoprazole    Tablet  polyethylene glycol 3350  saline laxative (FLEET) Rectal Enema  senna  sodium chloride 0.9%.  tamsulosin
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
## 2019-11-30 PROCEDURE — 99232 SBSQ HOSP IP/OBS MODERATE 35: CPT

## 2019-11-30 RX ADMIN — MEROPENEM 100 MILLIGRAM(S): 1 INJECTION INTRAVENOUS at 22:02

## 2019-11-30 RX ADMIN — APIXABAN 2.5 MILLIGRAM(S): 2.5 TABLET, FILM COATED ORAL at 05:47

## 2019-11-30 RX ADMIN — PANTOPRAZOLE SODIUM 40 MILLIGRAM(S): 20 TABLET, DELAYED RELEASE ORAL at 07:54

## 2019-11-30 RX ADMIN — APIXABAN 2.5 MILLIGRAM(S): 2.5 TABLET, FILM COATED ORAL at 19:50

## 2019-11-30 RX ADMIN — Medication 5 MILLIGRAM(S): at 22:05

## 2019-11-30 RX ADMIN — Medication 1 ENEMA: at 19:50

## 2019-11-30 RX ADMIN — TAMSULOSIN HYDROCHLORIDE 0.4 MILLIGRAM(S): 0.4 CAPSULE ORAL at 22:06

## 2019-11-30 RX ADMIN — MEROPENEM 100 MILLIGRAM(S): 1 INJECTION INTRAVENOUS at 19:50

## 2019-11-30 RX ADMIN — SENNA PLUS 2 TABLET(S): 8.6 TABLET ORAL at 22:03

## 2019-11-30 RX ADMIN — Medication 1 TABLET(S): at 11:26

## 2019-11-30 RX ADMIN — MEROPENEM 100 MILLIGRAM(S): 1 INJECTION INTRAVENOUS at 05:46

## 2019-11-30 NOTE — PROGRESS NOTE ADULT - SUBJECTIVE AND OBJECTIVE BOX
Patient is a 44y old  Male who presents with a chief complaint of abdominal pain  urinary tract infection (25 Nov 2019 20:32)      INTERVAL HPI/OVERNIGHT EVENTS: no events     MEDICATIONS  (STANDING):  apixaban 2.5 milliGRAM(s) Oral every 12 hours  bisacodyl 5 milliGRAM(s) Oral at bedtime  meropenem  IVPB 1000 milliGRAM(s) IV Intermittent every 8 hours  multivitamin 1 Tablet(s) Oral daily  NIFEdipine XL 60 milliGRAM(s) Oral daily  pantoprazole    Tablet 40 milliGRAM(s) Oral before breakfast  polyethylene glycol 3350 17 Gram(s) Oral at bedtime  saline laxative (FLEET) Rectal Enema 1 Enema Rectal daily  senna 2 Tablet(s) Oral at bedtime  sodium chloride 0.9%. 1000 milliLiter(s) (80 mL/Hr) IV Continuous <Continuous>  tamsulosin 0.4 milliGRAM(s) Oral at bedtime    MEDICATIONS  (PRN):      Allergies    penicillin (Rash)    Vital Signs Last 24 Hrs  T(C): 35.6 (30 Nov 2019 01:05), Max: 36.4 (29 Nov 2019 17:06)  T(F): 96.1 (30 Nov 2019 01:05), Max: 97.6 (29 Nov 2019 17:06)  HR: 61 (30 Nov 2019 01:05) (58 - 61)  BP: 145/91 (30 Nov 2019 01:05) (120/75 - 145/91)  BP(mean): --  RR: 18 (30 Nov 2019 01:05) (17 - 18)  SpO2: 97% (30 Nov 2019 01:05) (95% - 98%)      PHYSICAL EXAM:  	General: patient in no acute distress, resting comfortably  	Cardio: S1/S2 +ve, regular rate and rhythm, no M/G/R  	Resp: clear to ausculation bilaterally, no rales or wheezes  	GI: abdomen soft, nontender, mildly distended, no guarding, BS +ve x 4  	Ext: no significant pedal edema  Neuro: quadriplegic      LABS:                                                   12.1   5.10  )-----------( 186      ( 28 Nov 2019 10:46 )             38.6   11-28    141  |  109<H>  |  12  ----------------------------<  78  4.2   |  30  |  0.58    Ca    8.6      28 Nov 2019 10:46  Phos  3.2     11-28  Mg     1.6     11-28          CAPILLARY BLOOD GLUCOSE          RADIOLOGY & ADDITIONAL TESTS:    Imaging Personally Reviewed:  [ X] YES  [ ] NO    Consultant(s) Notes Reviewed:  [ X] YES  [ ] NO    Care Discussed with Consultants/Other Providers [X ] YES  [ ] NO

## 2019-11-30 NOTE — PROGRESS NOTE ADULT - PROBLEM SELECTOR PLAN 1
Acute Cystitis   WBCs and leuk esterase in urine  c/w iv abx, id on board, switched to Merrem per ID  and will remain for 9 more  days as   he has no one to administer  the antibx at home and his aids are unable to do so . so he will remain In house for completion.  confirmed with ID   Urology saw patient  no interventions planned
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
## 2019-12-01 PROCEDURE — 99231 SBSQ HOSP IP/OBS SF/LOW 25: CPT

## 2019-12-01 PROCEDURE — 99232 SBSQ HOSP IP/OBS MODERATE 35: CPT

## 2019-12-01 RX ORDER — ACETAMINOPHEN 500 MG
650 TABLET ORAL ONCE
Refills: 0 | Status: COMPLETED | OUTPATIENT
Start: 2019-12-01 | End: 2019-12-01

## 2019-12-01 RX ADMIN — Medication 1 TABLET(S): at 12:23

## 2019-12-01 RX ADMIN — APIXABAN 2.5 MILLIGRAM(S): 2.5 TABLET, FILM COATED ORAL at 17:28

## 2019-12-01 RX ADMIN — Medication 650 MILLIGRAM(S): at 19:37

## 2019-12-01 RX ADMIN — Medication 60 MILLIGRAM(S): at 06:52

## 2019-12-01 RX ADMIN — MEROPENEM 100 MILLIGRAM(S): 1 INJECTION INTRAVENOUS at 23:11

## 2019-12-01 RX ADMIN — MEROPENEM 100 MILLIGRAM(S): 1 INJECTION INTRAVENOUS at 14:42

## 2019-12-01 RX ADMIN — Medication 5 MILLIGRAM(S): at 23:09

## 2019-12-01 RX ADMIN — PANTOPRAZOLE SODIUM 40 MILLIGRAM(S): 20 TABLET, DELAYED RELEASE ORAL at 08:00

## 2019-12-01 RX ADMIN — Medication 1 ENEMA: at 14:43

## 2019-12-01 RX ADMIN — TAMSULOSIN HYDROCHLORIDE 0.4 MILLIGRAM(S): 0.4 CAPSULE ORAL at 23:08

## 2019-12-01 RX ADMIN — APIXABAN 2.5 MILLIGRAM(S): 2.5 TABLET, FILM COATED ORAL at 06:52

## 2019-12-01 RX ADMIN — Medication 650 MILLIGRAM(S): at 20:30

## 2019-12-01 RX ADMIN — MEROPENEM 100 MILLIGRAM(S): 1 INJECTION INTRAVENOUS at 06:52

## 2019-12-01 RX ADMIN — SENNA PLUS 2 TABLET(S): 8.6 TABLET ORAL at 23:08

## 2019-12-01 NOTE — PROGRESS NOTE ADULT - SUBJECTIVE AND OBJECTIVE BOX
Patient is a 44y old  Male who presents with a chief complaint of abdominal pain  urinary tract infection (25 Nov 2019 20:32)      INTERVAL HPI/OVERNIGHT EVENTS: no events     MEDICATIONS  (STANDING):  apixaban 2.5 milliGRAM(s) Oral every 12 hours  bisacodyl 5 milliGRAM(s) Oral at bedtime  meropenem  IVPB 1000 milliGRAM(s) IV Intermittent every 8 hours  multivitamin 1 Tablet(s) Oral daily  NIFEdipine XL 60 milliGRAM(s) Oral daily  pantoprazole    Tablet 40 milliGRAM(s) Oral before breakfast  polyethylene glycol 3350 17 Gram(s) Oral at bedtime  saline laxative (FLEET) Rectal Enema 1 Enema Rectal daily  senna 2 Tablet(s) Oral at bedtime  sodium chloride 0.9%. 1000 milliLiter(s) (80 mL/Hr) IV Continuous <Continuous>  tamsulosin 0.4 milliGRAM(s) Oral at bedtime    MEDICATIONS  (PRN):      Allergies    penicillin (Rash)      Vital Signs Last 24 Hrs  T(C): 36.6 (30 Nov 2019 12:49), Max: 36.6 (30 Nov 2019 12:49)  T(F): 97.8 (30 Nov 2019 12:49), Max: 97.8 (30 Nov 2019 12:49)  HR: 88 (30 Nov 2019 12:49) (88 - 88)  BP: 124/74 (30 Nov 2019 12:49) (124/74 - 124/74)  BP(mean): --  RR: 17 (30 Nov 2019 12:49) (17 - 17)  SpO2: 98% (30 Nov 2019 12:49) (98% - 98%)    PHYSICAL EXAM:  	General: patient in no acute distress, resting comfortably  	Cardio: S1/S2 +ve, regular rate and rhythm, no M/G/R  	Resp: clear to ausculation bilaterally, no rales or wheezes  	GI: abdomen soft, nontender, mildly distended, no guarding, BS +ve x 4  	Ext: no significant pedal edema  Neuro: quadriplegic      LABS:                                                CAPILLARY BLOOD GLUCOSE          RADIOLOGY & ADDITIONAL TESTS:    Imaging Personally Reviewed:  [ X] YES  [ ] NO    Consultant(s) Notes Reviewed:  [ X] YES  [ ] NO    Care Discussed with Consultants/Other Providers [X ] YES  [ ] NO

## 2019-12-01 NOTE — PROGRESS NOTE ADULT - PROBLEM SELECTOR PLAN 1
Acute Cystitis   WBCs and leuk esterase in urine  c/w iv abx, id on board, switched to Merrem per ID  and will remain for 8 more  days as   he has no one to administer  the antibx at home and his aids are unable to do so . so he will remain In house for completion.  confirmed with ID   Urology saw patient  no interventions planned
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
## 2019-12-02 PROCEDURE — 99232 SBSQ HOSP IP/OBS MODERATE 35: CPT

## 2019-12-02 RX ORDER — ACETAMINOPHEN 500 MG
650 TABLET ORAL ONCE
Refills: 0 | Status: COMPLETED | OUTPATIENT
Start: 2019-12-02 | End: 2019-12-02

## 2019-12-02 RX ORDER — ACETAMINOPHEN 500 MG
650 TABLET ORAL EVERY 6 HOURS
Refills: 0 | Status: DISCONTINUED | OUTPATIENT
Start: 2019-12-02 | End: 2019-12-09

## 2019-12-02 RX ADMIN — Medication 650 MILLIGRAM(S): at 01:40

## 2019-12-02 RX ADMIN — PANTOPRAZOLE SODIUM 40 MILLIGRAM(S): 20 TABLET, DELAYED RELEASE ORAL at 06:37

## 2019-12-02 RX ADMIN — Medication 650 MILLIGRAM(S): at 02:40

## 2019-12-02 RX ADMIN — SODIUM CHLORIDE 80 MILLILITER(S): 9 INJECTION INTRAMUSCULAR; INTRAVENOUS; SUBCUTANEOUS at 06:38

## 2019-12-02 RX ADMIN — Medication 650 MILLIGRAM(S): at 14:30

## 2019-12-02 RX ADMIN — MEROPENEM 100 MILLIGRAM(S): 1 INJECTION INTRAVENOUS at 06:37

## 2019-12-02 RX ADMIN — TAMSULOSIN HYDROCHLORIDE 0.4 MILLIGRAM(S): 0.4 CAPSULE ORAL at 21:31

## 2019-12-02 RX ADMIN — Medication 1 ENEMA: at 15:22

## 2019-12-02 RX ADMIN — SODIUM CHLORIDE 80 MILLILITER(S): 9 INJECTION INTRAMUSCULAR; INTRAVENOUS; SUBCUTANEOUS at 17:56

## 2019-12-02 RX ADMIN — Medication 650 MILLIGRAM(S): at 13:42

## 2019-12-02 RX ADMIN — MEROPENEM 100 MILLIGRAM(S): 1 INJECTION INTRAVENOUS at 21:31

## 2019-12-02 RX ADMIN — SENNA PLUS 2 TABLET(S): 8.6 TABLET ORAL at 21:31

## 2019-12-02 RX ADMIN — APIXABAN 2.5 MILLIGRAM(S): 2.5 TABLET, FILM COATED ORAL at 17:56

## 2019-12-02 RX ADMIN — Medication 5 MILLIGRAM(S): at 21:33

## 2019-12-02 RX ADMIN — Medication 1 TABLET(S): at 13:41

## 2019-12-02 RX ADMIN — MEROPENEM 100 MILLIGRAM(S): 1 INJECTION INTRAVENOUS at 14:25

## 2019-12-02 RX ADMIN — APIXABAN 2.5 MILLIGRAM(S): 2.5 TABLET, FILM COATED ORAL at 06:37

## 2019-12-02 NOTE — PROGRESS NOTE ADULT - SUBJECTIVE AND OBJECTIVE BOX
Patient is a 44y old  Male who presents with a chief complaint of abdominal pain  urinary tract infection (25 Nov 2019 20:32)      INTERVAL HPI/OVERNIGHT EVENTS: no events     MEDICATIONS  (STANDING):  apixaban 2.5 milliGRAM(s) Oral every 12 hours  bisacodyl 5 milliGRAM(s) Oral at bedtime  meropenem  IVPB 1000 milliGRAM(s) IV Intermittent every 8 hours  multivitamin 1 Tablet(s) Oral daily  NIFEdipine XL 60 milliGRAM(s) Oral daily  pantoprazole    Tablet 40 milliGRAM(s) Oral before breakfast  polyethylene glycol 3350 17 Gram(s) Oral at bedtime  saline laxative (FLEET) Rectal Enema 1 Enema Rectal daily  senna 2 Tablet(s) Oral at bedtime  sodium chloride 0.9%. 1000 milliLiter(s) (80 mL/Hr) IV Continuous <Continuous>  tamsulosin 0.4 milliGRAM(s) Oral at bedtime    MEDICATIONS  (PRN):      Allergies    penicillin (Rash)    Vital Signs Last 24 Hrs  T(C): 36.7 (02 Dec 2019 06:56), Max: 37.2 (01 Dec 2019 23:38)  T(F): 98 (02 Dec 2019 06:56), Max: 99 (01 Dec 2019 23:38)  HR: 67 (02 Dec 2019 06:56) (63 - 74)  BP: 142/90 (02 Dec 2019 06:56) (131/84 - 158/82)  BP(mean): --  RR: 17 (02 Dec 2019 06:56) (17 - 18)  SpO2: 98% (02 Dec 2019 06:56) (95% - 98%)    PHYSICAL EXAM:  	General: patient in no acute distress, resting comfortably  	Cardio: S1/S2 +ve, regular rate and rhythm, no M/G/R  	Resp: clear to ausculation bilaterally, no rales or wheezes  	GI: abdomen soft, nontender, mildly distended, no guarding, BS +ve x 4  	Ext: no significant pedal edema  Neuro: quadriplegic      LABS:                                                CAPILLARY BLOOD GLUCOSE          RADIOLOGY & ADDITIONAL TESTS:    Imaging Personally Reviewed:  [ X] YES  [ ] NO    Consultant(s) Notes Reviewed:  [ X] YES  [ ] NO    Care Discussed with Consultants/Other Providers [X ] YES  [ ] NO
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
## 2019-12-02 NOTE — PROGRESS NOTE ADULT - PROBLEM SELECTOR PLAN 1
Acute Cystitis   WBCs and leuk esterase in urine  c/w iv abx, id on board, switched to Merrem per ID  and will remain for 7 more  days as   he has no one to administer  the antibx at home and his aids are unable to do so . so he will remain In house for completion.  confirmed with ID   Urology saw patient  no interventions planned

## 2019-12-03 LAB
ANION GAP SERPL CALC-SCNC: 7 MMOL/L — SIGNIFICANT CHANGE UP (ref 5–17)
BUN SERPL-MCNC: 16 MG/DL — SIGNIFICANT CHANGE UP (ref 7–23)
CALCIUM SERPL-MCNC: 9.1 MG/DL — SIGNIFICANT CHANGE UP (ref 8.5–10.1)
CHLORIDE SERPL-SCNC: 106 MMOL/L — SIGNIFICANT CHANGE UP (ref 96–108)
CO2 SERPL-SCNC: 29 MMOL/L — SIGNIFICANT CHANGE UP (ref 22–31)
CREAT SERPL-MCNC: 0.54 MG/DL — SIGNIFICANT CHANGE UP (ref 0.5–1.3)
GLUCOSE SERPL-MCNC: 77 MG/DL — SIGNIFICANT CHANGE UP (ref 70–99)
HCT VFR BLD CALC: 41.6 % — SIGNIFICANT CHANGE UP (ref 39–50)
HGB BLD-MCNC: 13.1 G/DL — SIGNIFICANT CHANGE UP (ref 13–17)
MAGNESIUM SERPL-MCNC: 1.8 MG/DL — SIGNIFICANT CHANGE UP (ref 1.6–2.6)
MCHC RBC-ENTMCNC: 26.1 PG — LOW (ref 27–34)
MCHC RBC-ENTMCNC: 31.5 GM/DL — LOW (ref 32–36)
MCV RBC AUTO: 83 FL — SIGNIFICANT CHANGE UP (ref 80–100)
NRBC # BLD: 0 /100 WBCS — SIGNIFICANT CHANGE UP (ref 0–0)
PHOSPHATE SERPL-MCNC: 3.5 MG/DL — SIGNIFICANT CHANGE UP (ref 2.5–4.5)
PLATELET # BLD AUTO: 217 K/UL — SIGNIFICANT CHANGE UP (ref 150–400)
POTASSIUM SERPL-MCNC: 4.2 MMOL/L — SIGNIFICANT CHANGE UP (ref 3.5–5.3)
POTASSIUM SERPL-SCNC: 4.2 MMOL/L — SIGNIFICANT CHANGE UP (ref 3.5–5.3)
RBC # BLD: 5.01 M/UL — SIGNIFICANT CHANGE UP (ref 4.2–5.8)
RBC # FLD: 18.8 % — HIGH (ref 10.3–14.5)
SODIUM SERPL-SCNC: 142 MMOL/L — SIGNIFICANT CHANGE UP (ref 135–145)
WBC # BLD: 4.76 K/UL — SIGNIFICANT CHANGE UP (ref 3.8–10.5)
WBC # FLD AUTO: 4.76 K/UL — SIGNIFICANT CHANGE UP (ref 3.8–10.5)

## 2019-12-03 PROCEDURE — 99232 SBSQ HOSP IP/OBS MODERATE 35: CPT

## 2019-12-03 RX ADMIN — Medication 650 MILLIGRAM(S): at 00:40

## 2019-12-03 RX ADMIN — Medication 1 ENEMA: at 21:20

## 2019-12-03 RX ADMIN — APIXABAN 2.5 MILLIGRAM(S): 2.5 TABLET, FILM COATED ORAL at 17:33

## 2019-12-03 RX ADMIN — MEROPENEM 100 MILLIGRAM(S): 1 INJECTION INTRAVENOUS at 05:38

## 2019-12-03 RX ADMIN — Medication 5 MILLIGRAM(S): at 21:21

## 2019-12-03 RX ADMIN — SENNA PLUS 2 TABLET(S): 8.6 TABLET ORAL at 21:21

## 2019-12-03 RX ADMIN — POLYETHYLENE GLYCOL 3350 17 GRAM(S): 17 POWDER, FOR SOLUTION ORAL at 21:21

## 2019-12-03 RX ADMIN — Medication 650 MILLIGRAM(S): at 09:57

## 2019-12-03 RX ADMIN — SODIUM CHLORIDE 80 MILLILITER(S): 9 INJECTION INTRAMUSCULAR; INTRAVENOUS; SUBCUTANEOUS at 17:36

## 2019-12-03 RX ADMIN — TAMSULOSIN HYDROCHLORIDE 0.4 MILLIGRAM(S): 0.4 CAPSULE ORAL at 21:21

## 2019-12-03 RX ADMIN — MEROPENEM 100 MILLIGRAM(S): 1 INJECTION INTRAVENOUS at 13:46

## 2019-12-03 RX ADMIN — APIXABAN 2.5 MILLIGRAM(S): 2.5 TABLET, FILM COATED ORAL at 05:41

## 2019-12-03 RX ADMIN — Medication 650 MILLIGRAM(S): at 01:39

## 2019-12-03 RX ADMIN — PANTOPRAZOLE SODIUM 40 MILLIGRAM(S): 20 TABLET, DELAYED RELEASE ORAL at 07:46

## 2019-12-03 RX ADMIN — MEROPENEM 100 MILLIGRAM(S): 1 INJECTION INTRAVENOUS at 21:21

## 2019-12-03 RX ADMIN — Medication 650 MILLIGRAM(S): at 10:50

## 2019-12-03 RX ADMIN — SODIUM CHLORIDE 80 MILLILITER(S): 9 INJECTION INTRAMUSCULAR; INTRAVENOUS; SUBCUTANEOUS at 05:39

## 2019-12-03 RX ADMIN — Medication 1 TABLET(S): at 12:07
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
## 2019-12-03 NOTE — PROGRESS NOTE ADULT - PROBLEM SELECTOR PLAN 1
Acute Cystitis   WBCs and leuk esterase in urine  c/w iv abx, id on board, switched to Merrem per ID  and will remain for 6 more  days as   he has no one to administer  the antibx at home and his aids are unable to do so . so he will remain In house for completion.  confirmed with ID   Urology saw patient  no interventions planned

## 2019-12-04 PROCEDURE — 99231 SBSQ HOSP IP/OBS SF/LOW 25: CPT

## 2019-12-04 PROCEDURE — 99232 SBSQ HOSP IP/OBS MODERATE 35: CPT

## 2019-12-04 RX ORDER — ACETAMINOPHEN 500 MG
650 TABLET ORAL EVERY 6 HOURS
Refills: 0 | Status: DISCONTINUED | OUTPATIENT
Start: 2019-12-04 | End: 2019-12-04

## 2019-12-04 RX ADMIN — APIXABAN 2.5 MILLIGRAM(S): 2.5 TABLET, FILM COATED ORAL at 05:45

## 2019-12-04 RX ADMIN — MEROPENEM 100 MILLIGRAM(S): 1 INJECTION INTRAVENOUS at 05:45

## 2019-12-04 RX ADMIN — POLYETHYLENE GLYCOL 3350 17 GRAM(S): 17 POWDER, FOR SOLUTION ORAL at 21:47

## 2019-12-04 RX ADMIN — APIXABAN 2.5 MILLIGRAM(S): 2.5 TABLET, FILM COATED ORAL at 17:10

## 2019-12-04 RX ADMIN — PANTOPRAZOLE SODIUM 40 MILLIGRAM(S): 20 TABLET, DELAYED RELEASE ORAL at 08:41

## 2019-12-04 RX ADMIN — MEROPENEM 100 MILLIGRAM(S): 1 INJECTION INTRAVENOUS at 14:24

## 2019-12-04 RX ADMIN — Medication 650 MILLIGRAM(S): at 05:45

## 2019-12-04 RX ADMIN — Medication 1 ENEMA: at 18:08

## 2019-12-04 RX ADMIN — Medication 60 MILLIGRAM(S): at 05:45

## 2019-12-04 RX ADMIN — Medication 1 TABLET(S): at 11:16

## 2019-12-04 RX ADMIN — TAMSULOSIN HYDROCHLORIDE 0.4 MILLIGRAM(S): 0.4 CAPSULE ORAL at 23:38

## 2019-12-04 RX ADMIN — Medication 650 MILLIGRAM(S): at 06:45

## 2019-12-04 RX ADMIN — Medication 5 MILLIGRAM(S): at 21:46

## 2019-12-04 RX ADMIN — MEROPENEM 100 MILLIGRAM(S): 1 INJECTION INTRAVENOUS at 21:46

## 2019-12-04 RX ADMIN — SENNA PLUS 2 TABLET(S): 8.6 TABLET ORAL at 21:47

## 2019-12-04 NOTE — PROGRESS NOTE ADULT - SUBJECTIVE AND OBJECTIVE BOX
Patient seen and examined bedside resting comfortably.  No complaints offered.   Denies nausea and vomiting. Tolerating diet.  Flatus/BM. +  Denies chest pain, dyspnea, cough.    T(F): 98 (12-04-19 @ 08:25), Max: 98 (12-04-19 @ 08:25)  HR: 70 (12-04-19 @ 08:25) (56 - 83)  BP: 96/57 (12-04-19 @ 08:25) (96/57 - 178/105)  RR: 18 (12-04-19 @ 08:25) (18 - 18)  SpO2: 98% (12-04-19 @ 08:25) (97% - 99%)    PHYSICAL EXAM:  General: NAD  Neuro:  Alert & oriented x 3  Abdomen: soft, NTND. Normactive BS  : wearing a collection bag for urine.    LABS:                        13.1   4.76  )-----------( 217      ( 03 Dec 2019 10:31 )             41.6     12-03    142  |  106  |  16  ----------------------------<  77  4.2   |  29  |  0.54    Ca    9.1      03 Dec 2019 10:32  Phos  3.5     12-03  Mg     1.8     12-03        I&O's Detail    03 Dec 2019 07:01  -  04 Dec 2019 07:00  --------------------------------------------------------  IN:    IV PiggyBack: 960 mL    Oral Fluid: 300 mL    sodium chloride 0.9%.: 1000 mL    Solution: 150 mL  Total IN: 2410 mL    OUT:    Voided: 1400 mL  Total OUT: 1400 mL    Total NET: 1010 mL          Impression: 44y Male admitted with COMPLICATED UTI - under tx.  WBC OK  afebrile    Plan:  -continue VTE prophylaxis   - continue IVAB per ID  - continue medical f/u  - per prior discussion with Dr. Bates, no  intervention at this tiem since pt is afebrile & WBC is nl.

## 2019-12-04 NOTE — PROGRESS NOTE ADULT - SUBJECTIVE AND OBJECTIVE BOX
Patient is a 44y old  Male who presents with a chief complaint of abdominal pain  urinary tract infection (25 Nov 2019 20:32)      INTERVAL HPI/OVERNIGHT EVENTS: no events     MEDICATIONS  (STANDING):  apixaban 2.5 milliGRAM(s) Oral every 12 hours  bisacodyl 5 milliGRAM(s) Oral at bedtime  meropenem  IVPB 1000 milliGRAM(s) IV Intermittent every 8 hours  multivitamin 1 Tablet(s) Oral daily  NIFEdipine XL 60 milliGRAM(s) Oral daily  pantoprazole    Tablet 40 milliGRAM(s) Oral before breakfast  polyethylene glycol 3350 17 Gram(s) Oral at bedtime  saline laxative (FLEET) Rectal Enema 1 Enema Rectal daily  senna 2 Tablet(s) Oral at bedtime  sodium chloride 0.9%. 1000 milliLiter(s) (80 mL/Hr) IV Continuous <Continuous>  tamsulosin 0.4 milliGRAM(s) Oral at bedtime    MEDICATIONS  (PRN):  acetaminophen   Tablet .. 650 milliGRAM(s) Oral every 6 hours PRN Mild Pain (1 - 3)      Allergies    penicillin (Rash)  Vital Signs Last 24 Hrs  Vital Signs Last 24 Hrs  T(C): 36.7 (04 Dec 2019 08:25), Max: 36.7 (04 Dec 2019 08:25)  T(F): 98 (04 Dec 2019 08:25), Max: 98 (04 Dec 2019 08:25)  HR: 70 (04 Dec 2019 08:25) (56 - 83)  BP: 96/57 (04 Dec 2019 08:25) (96/57 - 178/105)  BP(mean): --  RR: 18 (04 Dec 2019 08:25) (18 - 18)  SpO2: 98% (04 Dec 2019 08:25) (97% - 98%)    PHYSICAL EXAM:  	General: patient in no acute distress, resting comfortably  	Cardio: S1/S2 +ve, regular rate and rhythm, no M/G/R  	Resp: clear to ausculation bilaterally, no rales or wheezes  	GI: abdomen soft, nontender, mildly distended, no guarding, BS +ve x 4  	Ext: no significant pedal edema  Neuro: quadriplegic      LABS:                                                CAPILLARY BLOOD GLUCOSE          RADIOLOGY & ADDITIONAL TESTS:    Imaging Personally Reviewed:  [ X] YES  [ ] NO    Consultant(s) Notes Reviewed:  [ X] YES  [ ] NO    Care Discussed with Consultants/Other Providers [X ] YES  [ ] NO

## 2019-12-05 PROCEDURE — 99232 SBSQ HOSP IP/OBS MODERATE 35: CPT

## 2019-12-05 RX ADMIN — PANTOPRAZOLE SODIUM 40 MILLIGRAM(S): 20 TABLET, DELAYED RELEASE ORAL at 11:47

## 2019-12-05 RX ADMIN — APIXABAN 2.5 MILLIGRAM(S): 2.5 TABLET, FILM COATED ORAL at 05:19

## 2019-12-05 RX ADMIN — Medication 1 TABLET(S): at 11:47

## 2019-12-05 RX ADMIN — Medication 5 MILLIGRAM(S): at 21:49

## 2019-12-05 RX ADMIN — SODIUM CHLORIDE 80 MILLILITER(S): 9 INJECTION INTRAMUSCULAR; INTRAVENOUS; SUBCUTANEOUS at 17:03

## 2019-12-05 RX ADMIN — TAMSULOSIN HYDROCHLORIDE 0.4 MILLIGRAM(S): 0.4 CAPSULE ORAL at 21:46

## 2019-12-05 RX ADMIN — MEROPENEM 100 MILLIGRAM(S): 1 INJECTION INTRAVENOUS at 21:47

## 2019-12-05 RX ADMIN — MEROPENEM 100 MILLIGRAM(S): 1 INJECTION INTRAVENOUS at 05:19

## 2019-12-05 RX ADMIN — MEROPENEM 100 MILLIGRAM(S): 1 INJECTION INTRAVENOUS at 13:15

## 2019-12-05 RX ADMIN — APIXABAN 2.5 MILLIGRAM(S): 2.5 TABLET, FILM COATED ORAL at 17:03

## 2019-12-05 RX ADMIN — SENNA PLUS 2 TABLET(S): 8.6 TABLET ORAL at 21:48

## 2019-12-05 RX ADMIN — Medication 1 ENEMA: at 11:47

## 2019-12-05 NOTE — PROGRESS NOTE ADULT - SUBJECTIVE AND OBJECTIVE BOX
Patient is a 44y old  Male who presents with a chief complaint of abdominal pain  urinary tract infection (25 Nov 2019 20:32)      INTERVAL HPI/OVERNIGHT EVENTS: no events     MEDICATIONS  (STANDING):  apixaban 2.5 milliGRAM(s) Oral every 12 hours  bisacodyl 5 milliGRAM(s) Oral at bedtime  meropenem  IVPB 1000 milliGRAM(s) IV Intermittent every 8 hours  multivitamin 1 Tablet(s) Oral daily  NIFEdipine XL 60 milliGRAM(s) Oral daily  pantoprazole    Tablet 40 milliGRAM(s) Oral before breakfast  polyethylene glycol 3350 17 Gram(s) Oral at bedtime  saline laxative (FLEET) Rectal Enema 1 Enema Rectal daily  senna 2 Tablet(s) Oral at bedtime  sodium chloride 0.9%. 1000 milliLiter(s) (80 mL/Hr) IV Continuous <Continuous>  tamsulosin 0.4 milliGRAM(s) Oral at bedtime    MEDICATIONS  (PRN):  acetaminophen   Tablet .. 650 milliGRAM(s) Oral every 6 hours PRN Mild Pain (1 - 3)        Allergies    penicillin (Rash)  Vital Signs Last 24 Hrs  T(C): 36.1 (05 Dec 2019 01:45), Max: 36.4 (04 Dec 2019 18:12)  T(F): 97 (05 Dec 2019 01:45), Max: 97.5 (04 Dec 2019 18:12)  HR: 64 (05 Dec 2019 01:45) (63 - 64)  BP: 106/70 (05 Dec 2019 01:45) (106/70 - 110/74)  BP(mean): --  RR: 16 (05 Dec 2019 01:45) (16 - 20)  SpO2: 98% (04 Dec 2019 18:12) (98% - 98%)    PHYSICAL EXAM:  	General: patient in no acute distress, resting comfortably  	Cardio: S1/S2 +ve, regular rate and rhythm, no M/G/R  	Resp: clear to ausculation bilaterally, no rales or wheezes  	GI: abdomen soft, nontender, mildly distended, no guarding, BS +ve x 4  	Ext: no significant pedal edema  Neuro: quadriplegic      LABS:                                                CAPILLARY BLOOD GLUCOSE          RADIOLOGY & ADDITIONAL TESTS:    Imaging Personally Reviewed:  [ X] YES  [ ] NO    Consultant(s) Notes Reviewed:  [ X] YES  [ ] NO    Care Discussed with Consultants/Other Providers [X ] YES  [ ] NO

## 2019-12-05 NOTE — PROGRESS NOTE ADULT - PROBLEM SELECTOR PLAN 1
Acute Cystitis. resolved    WBCs and leuk esterase in urine  c/w iv abx, id on board, switched to Merrem per ID  and will remain for 6 more  days as   he has no one to administer  the antibx at home and his aids are unable to do so . so he will remain In house for completion.  confirmed with ID   Urology saw patient  no interventions planned

## 2019-12-05 NOTE — CHART NOTE - NSCHARTNOTEFT_GEN_A_CORE
Assessment: Pt seen for follow-up. Pt with complicated UTI due to acute cystitis.     Factors impacting intake: [x ] none [ ] nausea  [ ] vomiting [ ] diarrhea [ ] constipation  [ ]chewing problems [ ] swallowing issues  [ ] other:     Diet Prescription: Diet, Regular (11-23-19 @ 04:18)    Intake: Po intake % meals & tolerated well. Last BM 1(12/4). Po intake continues to meet nutritional needs. No GI distress. Pt on IV abx will provide González active 2 x day    Current Weight: Wt=81.2kg(12/2/19), Wt=11/22/19)  % Weight Change stable wt x 10 days    Physical appearance: No edema    Pertinent Medications: MEDICATIONS  (STANDING):  apixaban 2.5 milliGRAM(s) Oral every 12 hours  bisacodyl 5 milliGRAM(s) Oral at bedtime  meropenem  IVPB 1000 milliGRAM(s) IV Intermittent every 8 hours  multivitamin 1 Tablet(s) Oral daily  NIFEdipine XL 60 milliGRAM(s) Oral daily  pantoprazole    Tablet 40 milliGRAM(s) Oral before breakfast  polyethylene glycol 3350 17 Gram(s) Oral at bedtime  saline laxative (FLEET) Rectal Enema 1 Enema Rectal daily  senna 2 Tablet(s) Oral at bedtime  sodium chloride 0.9%. 1000 milliLiter(s) (80 mL/Hr) IV Continuous <Continuous>  tamsulosin 0.4 milliGRAM(s) Oral at bedtime    MEDICATIONS  (PRN):  acetaminophen   Tablet .. 650 milliGRAM(s) Oral every 6 hours PRN Mild Pain (1 - 3)    Pertinent Labs: 12-03 Na 142 mmol/L Glu 77 mg/dL K+ 4.2 mmol/L Cr 0.54 mg/dL BUN 16 mg/dL Phos 3.5 mg/dL Alb 3.0(11/25/19)   PAB n/a   Hgb n/a   Hct n/a   HgA1C n/a     24Hr FS:  Skin: intact    Estimated Needs:   [x ] no change since previous assessment (11/29/19)  [ ] recalculated:     Previous Nutrition Diagnosis: Nutrition diagnosis no... No active nutrition diagnosis identified at this time. Patient meeting estimated nutrient needs.      Nutrition Diagnosis is [x ] ongoing  [ ] resolved  [ ] improved  [ ] not applicable       New Nutrition Diagnosis: [x ] not applicable       Interventions:   Recommend  [ x] Continue: Regular diet   [ ] Change Diet To:  [ x] Nutrition Supplement: Provide González active 2 x day  [ ] Nutrition Support:  [ ] Other:     Monitoring and Evaluation:   [x ] PO intake [ x ] Tolerance to diet prescription [ x ] weights [ x ] labs[ x ] follow up per protocol  [ ] other:

## 2019-12-06 LAB
ANION GAP SERPL CALC-SCNC: 4 MMOL/L — LOW (ref 5–17)
BUN SERPL-MCNC: 15 MG/DL — SIGNIFICANT CHANGE UP (ref 7–23)
CALCIUM SERPL-MCNC: 8.7 MG/DL — SIGNIFICANT CHANGE UP (ref 8.5–10.1)
CHLORIDE SERPL-SCNC: 108 MMOL/L — SIGNIFICANT CHANGE UP (ref 96–108)
CO2 SERPL-SCNC: 29 MMOL/L — SIGNIFICANT CHANGE UP (ref 22–31)
CREAT SERPL-MCNC: 0.36 MG/DL — LOW (ref 0.5–1.3)
GLUCOSE SERPL-MCNC: 75 MG/DL — SIGNIFICANT CHANGE UP (ref 70–99)
HCT VFR BLD CALC: 40.3 % — SIGNIFICANT CHANGE UP (ref 39–50)
HGB BLD-MCNC: 12.6 G/DL — LOW (ref 13–17)
MCHC RBC-ENTMCNC: 26.3 PG — LOW (ref 27–34)
MCHC RBC-ENTMCNC: 31.3 GM/DL — LOW (ref 32–36)
MCV RBC AUTO: 84 FL — SIGNIFICANT CHANGE UP (ref 80–100)
NRBC # BLD: 0 /100 WBCS — SIGNIFICANT CHANGE UP (ref 0–0)
PLATELET # BLD AUTO: 212 K/UL — SIGNIFICANT CHANGE UP (ref 150–400)
POTASSIUM SERPL-MCNC: 4.1 MMOL/L — SIGNIFICANT CHANGE UP (ref 3.5–5.3)
POTASSIUM SERPL-SCNC: 4.1 MMOL/L — SIGNIFICANT CHANGE UP (ref 3.5–5.3)
RBC # BLD: 4.8 M/UL — SIGNIFICANT CHANGE UP (ref 4.2–5.8)
RBC # FLD: 18.6 % — HIGH (ref 10.3–14.5)
SODIUM SERPL-SCNC: 141 MMOL/L — SIGNIFICANT CHANGE UP (ref 135–145)
WBC # BLD: 4.61 K/UL — SIGNIFICANT CHANGE UP (ref 3.8–10.5)
WBC # FLD AUTO: 4.61 K/UL — SIGNIFICANT CHANGE UP (ref 3.8–10.5)

## 2019-12-06 PROCEDURE — 99232 SBSQ HOSP IP/OBS MODERATE 35: CPT

## 2019-12-06 RX ADMIN — TAMSULOSIN HYDROCHLORIDE 0.4 MILLIGRAM(S): 0.4 CAPSULE ORAL at 23:12

## 2019-12-06 RX ADMIN — Medication 1 TABLET(S): at 11:49

## 2019-12-06 RX ADMIN — APIXABAN 2.5 MILLIGRAM(S): 2.5 TABLET, FILM COATED ORAL at 06:11

## 2019-12-06 RX ADMIN — MEROPENEM 100 MILLIGRAM(S): 1 INJECTION INTRAVENOUS at 14:37

## 2019-12-06 RX ADMIN — SODIUM CHLORIDE 80 MILLILITER(S): 9 INJECTION INTRAMUSCULAR; INTRAVENOUS; SUBCUTANEOUS at 18:05

## 2019-12-06 RX ADMIN — PANTOPRAZOLE SODIUM 40 MILLIGRAM(S): 20 TABLET, DELAYED RELEASE ORAL at 07:58

## 2019-12-06 RX ADMIN — APIXABAN 2.5 MILLIGRAM(S): 2.5 TABLET, FILM COATED ORAL at 18:00

## 2019-12-06 RX ADMIN — MEROPENEM 100 MILLIGRAM(S): 1 INJECTION INTRAVENOUS at 06:10

## 2019-12-06 RX ADMIN — MEROPENEM 100 MILLIGRAM(S): 1 INJECTION INTRAVENOUS at 23:26

## 2019-12-06 RX ADMIN — SENNA PLUS 2 TABLET(S): 8.6 TABLET ORAL at 23:12

## 2019-12-06 RX ADMIN — Medication 1 ENEMA: at 14:41

## 2019-12-06 NOTE — PROGRESS NOTE ADULT - SUBJECTIVE AND OBJECTIVE BOX
Patient is a 44y old  Male who presents with a chief complaint of abdominal pain  urinary tract infection (25 Nov 2019 20:32)      INTERVAL HPI/OVERNIGHT EVENTS: no events     MEDICATIONS  (STANDING):  apixaban 2.5 milliGRAM(s) Oral every 12 hours  bisacodyl 5 milliGRAM(s) Oral at bedtime  meropenem  IVPB 1000 milliGRAM(s) IV Intermittent every 8 hours  multivitamin 1 Tablet(s) Oral daily  NIFEdipine XL 60 milliGRAM(s) Oral daily  pantoprazole    Tablet 40 milliGRAM(s) Oral before breakfast  polyethylene glycol 3350 17 Gram(s) Oral at bedtime  saline laxative (FLEET) Rectal Enema 1 Enema Rectal daily  senna 2 Tablet(s) Oral at bedtime  sodium chloride 0.9%. 1000 milliLiter(s) (80 mL/Hr) IV Continuous <Continuous>  tamsulosin 0.4 milliGRAM(s) Oral at bedtime    MEDICATIONS  (PRN):  acetaminophen   Tablet .. 650 milliGRAM(s) Oral every 6 hours PRN Mild Pain (1 - 3)          Allergies    penicillin (Rash)    Vital Signs Last 24 Hrs  T(C): 36.6 (05 Dec 2019 17:00), Max: 36.6 (05 Dec 2019 17:00)  T(F): 97.8 (05 Dec 2019 17:00), Max: 97.8 (05 Dec 2019 17:00)  HR: 57 (05 Dec 2019 17:00) (57 - 57)  BP: 150/85 (05 Dec 2019 17:00) (150/85 - 150/85)  BP(mean): --  RR: 17 (05 Dec 2019 17:00) (17 - 17)  SpO2: 98% (05 Dec 2019 17:00) (98% - 98%)    PHYSICAL EXAM:  	General: patient in no acute distress, resting comfortably  	Cardio: S1/S2 +ve, regular rate and rhythm, no M/G/R  	Resp: clear to ausculation bilaterally, no rales or wheezes  	GI: abdomen soft, nontender, mildly distended, no guarding, BS +ve x 4  	Ext: no significant pedal edema  Neuro: quadriplegic      LABS:                                                CAPILLARY BLOOD GLUCOSE          RADIOLOGY & ADDITIONAL TESTS:    Imaging Personally Reviewed:  [ X] YES  [ ] NO    Consultant(s) Notes Reviewed:  [ X] YES  [ ] NO    Care Discussed with Consultants/Other Providers [X ] YES  [ ] NO

## 2019-12-07 PROCEDURE — 99232 SBSQ HOSP IP/OBS MODERATE 35: CPT

## 2019-12-07 RX ADMIN — SODIUM CHLORIDE 80 MILLILITER(S): 9 INJECTION INTRAMUSCULAR; INTRAVENOUS; SUBCUTANEOUS at 08:07

## 2019-12-07 RX ADMIN — MEROPENEM 100 MILLIGRAM(S): 1 INJECTION INTRAVENOUS at 23:12

## 2019-12-07 RX ADMIN — MEROPENEM 100 MILLIGRAM(S): 1 INJECTION INTRAVENOUS at 14:20

## 2019-12-07 RX ADMIN — TAMSULOSIN HYDROCHLORIDE 0.4 MILLIGRAM(S): 0.4 CAPSULE ORAL at 23:12

## 2019-12-07 RX ADMIN — SENNA PLUS 2 TABLET(S): 8.6 TABLET ORAL at 23:12

## 2019-12-07 RX ADMIN — APIXABAN 2.5 MILLIGRAM(S): 2.5 TABLET, FILM COATED ORAL at 06:45

## 2019-12-07 RX ADMIN — MEROPENEM 100 MILLIGRAM(S): 1 INJECTION INTRAVENOUS at 06:45

## 2019-12-07 RX ADMIN — Medication 1 TABLET(S): at 12:20

## 2019-12-07 RX ADMIN — APIXABAN 2.5 MILLIGRAM(S): 2.5 TABLET, FILM COATED ORAL at 18:14

## 2019-12-07 RX ADMIN — Medication 1 ENEMA: at 12:20

## 2019-12-07 RX ADMIN — PANTOPRAZOLE SODIUM 40 MILLIGRAM(S): 20 TABLET, DELAYED RELEASE ORAL at 08:00

## 2019-12-07 NOTE — PROGRESS NOTE ADULT - SUBJECTIVE AND OBJECTIVE BOX
Patient seen and examined at bedside with no complaints.   Pt is voiding well with condom catheter.  Denies hematuria, dysuria.    Denies fever, chills, nausea/ vomiting.     Vital Signs Last 24 Hrs  T(F): 97 (12-07-19 @ 06:44), Max: 97 (12-07-19 @ 06:44)  HR: 54 (12-07-19 @ 06:44)  BP: 119/78 (12-07-19 @ 06:44)  RR: 18 (12-07-19 @ 06:44)  SpO2: 97% (12-07-19 @ 06:44)      GENERAL: Alert, NAD  CHEST/LUNG: respirations nonlabored  HEART: Regular rate and rhythm  ABDOMEN: +Bowel sounds, soft, Nontender, Nondistended  : circumcised phallus, condom catheter in place draining 1000 cc/24 hr clear yellow urine   EXTREMITIES:  no calf tenderness, No edema    I&O's Detail    06 Dec 2019 07:01  -  07 Dec 2019 07:00  --------------------------------------------------------  IN:    sodium chloride 0.9%.: 1000 mL  Total IN: 1000 mL    OUT:    Incontinent per Condom Catheter: 2000 mL  Total OUT: 2000 mL    Total NET: -1000 mL      07 Dec 2019 07:01  -  07 Dec 2019 15:37  --------------------------------------------------------  IN:    Oral Fluid: 640 mL    sodium chloride 0.9%.: 1000 mL  Total IN: 1640 mL    OUT:    Incontinent per Condom Catheter: 1000 mL  Total OUT: 1000 mL    Total NET: 640 mL          LABS:                        12.6   4.61  )-----------( 212      ( 06 Dec 2019 08:01 )             40.3     12-06    141  |  108  |  15  ----------------------------<  75  4.1   |  29  |  0.36<L>    Ca    8.7      06 Dec 2019 08:01      Impression: 43 y/o male admitted with MDR ESBL proteus UTI.  Creatinine and BUN WNL.  PVR=89cc    Plan  -Continue condom catheter.  -Continue abx per ID.    -IVF  -Continue medical management and supportive care  -DVT prophylaxis.  -Will discuss with Dr. Bates Patient seen and examined at bedside with no complaints.   Pt is voiding well with condom catheter.  Denies hematuria, dysuria.    Denies fever, chills, nausea/ vomiting.     Vital Signs Last 24 Hrs  T(F): 97 (12-07-19 @ 06:44), Max: 97 (12-07-19 @ 06:44)  HR: 54 (12-07-19 @ 06:44)  BP: 119/78 (12-07-19 @ 06:44)  RR: 18 (12-07-19 @ 06:44)  SpO2: 97% (12-07-19 @ 06:44)      GENERAL: Alert, NAD  CHEST/LUNG: respirations nonlabored  HEART: Regular rate and rhythm  ABDOMEN: +Bowel sounds, soft, Nontender, Nondistended  : circumcised phallus, condom catheter in place draining 1000 cc/24 hr clear yellow urine   EXTREMITIES:  no calf tenderness, No edema    I&O's Detail    06 Dec 2019 07:01  -  07 Dec 2019 07:00  --------------------------------------------------------  IN:    sodium chloride 0.9%.: 1000 mL  Total IN: 1000 mL    OUT:    Incontinent per Condom Catheter: 2000 mL  Total OUT: 2000 mL    Total NET: -1000 mL      07 Dec 2019 07:01  -  07 Dec 2019 15:37  --------------------------------------------------------  IN:    Oral Fluid: 640 mL    sodium chloride 0.9%.: 1000 mL  Total IN: 1640 mL    OUT:    Incontinent per Condom Catheter: 1000 mL  Total OUT: 1000 mL    Total NET: 640 mL          LABS:                        12.6   4.61  )-----------( 212      ( 06 Dec 2019 08:01 )             40.3     12-06    141  |  108  |  15  ----------------------------<  75  4.1   |  29  |  0.36<L>    Ca    8.7      06 Dec 2019 08:01      Impression: 43 y/o male admitted with MDR ESBL proteus UTI.  Creatinine and BUN WNL.  PVR=89cc    Plan  -Continue condom catheter.  -ID follow up for abx recs.    -Continue abx per ID.    -IVF  -Continue medical management and supportive care  -DVT prophylaxis.  -Will discuss with Dr. Bates

## 2019-12-07 NOTE — PROGRESS NOTE ADULT - SUBJECTIVE AND OBJECTIVE BOX
Patient is a 44y old  Male who presents with a chief complaint of abdominal pain  urinary tract infection (25 Nov 2019 20:32)      INTERVAL HPI/OVERNIGHT EVENTS: no events     MEDICATIONS  (STANDING):  apixaban 2.5 milliGRAM(s) Oral every 12 hours  bisacodyl 5 milliGRAM(s) Oral at bedtime  meropenem  IVPB 1000 milliGRAM(s) IV Intermittent every 8 hours  multivitamin 1 Tablet(s) Oral daily  NIFEdipine XL 60 milliGRAM(s) Oral daily  pantoprazole    Tablet 40 milliGRAM(s) Oral before breakfast  polyethylene glycol 3350 17 Gram(s) Oral at bedtime  saline laxative (FLEET) Rectal Enema 1 Enema Rectal daily  senna 2 Tablet(s) Oral at bedtime  sodium chloride 0.9%. 1000 milliLiter(s) (80 mL/Hr) IV Continuous <Continuous>  tamsulosin 0.4 milliGRAM(s) Oral at bedtime    MEDICATIONS  (PRN):  acetaminophen   Tablet .. 650 milliGRAM(s) Oral every 6 hours PRN Mild Pain (1 - 3)          Allergies    penicillin (Rash)    Vital Signs Last 24 Hrs  T(C): 36.1 (07 Dec 2019 06:44), Max: 36.1 (07 Dec 2019 06:44)  T(F): 97 (07 Dec 2019 06:44), Max: 97 (07 Dec 2019 06:44)  HR: 54 (07 Dec 2019 06:44) (50 - 54)  BP: 119/78 (07 Dec 2019 06:44) (119/78 - 134/89)  BP(mean): --  RR: 18 (07 Dec 2019 06:44) (17 - 18)  SpO2: 97% (07 Dec 2019 06:44) (97% - 100%)    PHYSICAL EXAM:  	General: patient in no acute distress, resting comfortably  	Cardio: S1/S2 +ve, regular rate and rhythm, no M/G/R  	Resp: clear to ausculation bilaterally, no rales or wheezes  	GI: abdomen soft, nontender, mildly distended, no guarding, BS +ve x 4  	Ext: no significant pedal edema  Neuro: quadriplegic      LABS:                                                CAPILLARY BLOOD GLUCOSE          RADIOLOGY & ADDITIONAL TESTS:    Imaging Personally Reviewed:  [ X] YES  [ ] NO    Consultant(s) Notes Reviewed:  [ X] YES  [ ] NO    Care Discussed with Consultants/Other Providers [X ] YES  [ ] NO

## 2019-12-08 PROCEDURE — 99232 SBSQ HOSP IP/OBS MODERATE 35: CPT

## 2019-12-08 PROCEDURE — 99231 SBSQ HOSP IP/OBS SF/LOW 25: CPT

## 2019-12-08 RX ADMIN — Medication 5 MILLIGRAM(S): at 22:07

## 2019-12-08 RX ADMIN — SENNA PLUS 2 TABLET(S): 8.6 TABLET ORAL at 22:07

## 2019-12-08 RX ADMIN — MEROPENEM 100 MILLIGRAM(S): 1 INJECTION INTRAVENOUS at 22:07

## 2019-12-08 RX ADMIN — PANTOPRAZOLE SODIUM 40 MILLIGRAM(S): 20 TABLET, DELAYED RELEASE ORAL at 11:24

## 2019-12-08 RX ADMIN — MEROPENEM 100 MILLIGRAM(S): 1 INJECTION INTRAVENOUS at 05:28

## 2019-12-08 RX ADMIN — APIXABAN 2.5 MILLIGRAM(S): 2.5 TABLET, FILM COATED ORAL at 17:09

## 2019-12-08 RX ADMIN — TAMSULOSIN HYDROCHLORIDE 0.4 MILLIGRAM(S): 0.4 CAPSULE ORAL at 22:07

## 2019-12-08 RX ADMIN — MEROPENEM 100 MILLIGRAM(S): 1 INJECTION INTRAVENOUS at 14:04

## 2019-12-08 RX ADMIN — POLYETHYLENE GLYCOL 3350 17 GRAM(S): 17 POWDER, FOR SOLUTION ORAL at 22:07

## 2019-12-08 RX ADMIN — APIXABAN 2.5 MILLIGRAM(S): 2.5 TABLET, FILM COATED ORAL at 05:28

## 2019-12-08 RX ADMIN — Medication 1 TABLET(S): at 11:24

## 2019-12-08 RX ADMIN — Medication 1 ENEMA: at 11:24
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
## 2019-12-08 NOTE — PROGRESS NOTE ADULT - SUBJECTIVE AND OBJECTIVE BOX
Patient seen and examined at bedside in no distress.  Voiding without issue.    T(F): 97 (12-08-19 @ 10:49), Max: 97.5 (12-07-19 @ 17:00)  HR: 57 (12-08-19 @ 10:49) (54 - 63)  BP: 109/71 (12-08-19 @ 10:49) (109/71 - 168/104)  RR: 18 (12-08-19 @ 10:49) (17 - 20)  SpO2: 99% (12-08-19 @ 10:49) (98% - 99%)    GENERAL: Alert, NAD  CHEST/LUNG: respirations nonlabored  HEART: S1S2 Regular rate and rhythm  ABDOMEN: Soft  : Condom catheter in place draining 2600cc/24hrs    LABS:  No new labs        A/P: 44M admitted with MDR ESBL proteus UTI  - ID noted; last day of antibiotics today  - continue medical management and supportive care

## 2019-12-08 NOTE — PROGRESS NOTE ADULT - SUBJECTIVE AND OBJECTIVE BOX
Patient is a 44y old  Male who presents with a chief complaint of abdominal pain  urinary tract infection (25 Nov 2019 20:32)      INTERVAL HPI/OVERNIGHT EVENTS: no events     MEDICATIONS  (STANDING):  apixaban 2.5 milliGRAM(s) Oral every 12 hours  bisacodyl 5 milliGRAM(s) Oral at bedtime  meropenem  IVPB 1000 milliGRAM(s) IV Intermittent every 8 hours  multivitamin 1 Tablet(s) Oral daily  NIFEdipine XL 60 milliGRAM(s) Oral daily  pantoprazole    Tablet 40 milliGRAM(s) Oral before breakfast  polyethylene glycol 3350 17 Gram(s) Oral at bedtime  saline laxative (FLEET) Rectal Enema 1 Enema Rectal daily  senna 2 Tablet(s) Oral at bedtime  sodium chloride 0.9%. 1000 milliLiter(s) (80 mL/Hr) IV Continuous <Continuous>  tamsulosin 0.4 milliGRAM(s) Oral at bedtime    MEDICATIONS  (PRN):  acetaminophen   Tablet .. 650 milliGRAM(s) Oral every 6 hours PRN Mild Pain (1 - 3)          Allergies    penicillin (Rash)    Vital Signs Last 24 Hrs  T(C): 36.1 (08 Dec 2019 05:24), Max: 36.4 (07 Dec 2019 17:00)  T(F): 97 (08 Dec 2019 05:24), Max: 97.5 (07 Dec 2019 17:00)  HR: 63 (08 Dec 2019 05:24) (54 - 63)  BP: 119/76 (08 Dec 2019 05:24) (119/76 - 168/104)  BP(mean): --  RR: 20 (08 Dec 2019 05:24) (17 - 20)  SpO2: 99% (08 Dec 2019 05:24) (98% - 99%)    PHYSICAL EXAM:  	General: patient in no acute distress, resting comfortably  	Cardio: S1/S2 +ve, regular rate and rhythm, no M/G/R  	Resp: clear to ausculation bilaterally, no rales or wheezes  	GI: abdomen soft, nontender, mildly distended, no guarding, BS +ve x 4  	Ext: no significant pedal edema  Neuro: quadriplegic      LABS:                                                CAPILLARY BLOOD GLUCOSE          RADIOLOGY & ADDITIONAL TESTS:    Imaging Personally Reviewed:  [ X] YES  [ ] NO    Consultant(s) Notes Reviewed:  [ X] YES  [ ] NO    Care Discussed with Consultants/Other Providers [X ] YES  [ ] NO

## 2019-12-08 NOTE — PROGRESS NOTE ADULT - PROBLEM SELECTOR PROBLEM 1
Calculus of kidney
Complicated UTI (urinary tract infection)
Calculus of kidney
Calculus of kidney

## 2019-12-08 NOTE — PROGRESS NOTE ADULT - PROBLEM SELECTOR PLAN 1
Acute Cystitis. resolved    WBCs and leuk esterase in urine  c/w iv abx, id on board, switched to Merrem per ID

## 2019-12-08 NOTE — PROGRESS NOTE ADULT - PROBLEM SELECTOR PROBLEM 2
Complicated UTI (urinary tract infection)
Calculus of kidney
Complicated UTI (urinary tract infection)
Complicated UTI (urinary tract infection)

## 2019-12-08 NOTE — PROGRESS NOTE ADULT - PROBLEM SELECTOR PROBLEM 3
Spastic quadriplegia
Spastic quadriplegia
Gastroesophageal reflux disease without esophagitis
Spastic quadriplegia

## 2019-12-09 ENCOUNTER — TRANSCRIPTION ENCOUNTER (OUTPATIENT)
Age: 44
End: 2019-12-09

## 2019-12-09 VITALS
RESPIRATION RATE: 18 BRPM | DIASTOLIC BLOOD PRESSURE: 73 MMHG | HEART RATE: 62 BPM | SYSTOLIC BLOOD PRESSURE: 120 MMHG | TEMPERATURE: 98 F | OXYGEN SATURATION: 96 %

## 2019-12-09 PROCEDURE — 99231 SBSQ HOSP IP/OBS SF/LOW 25: CPT

## 2019-12-09 PROCEDURE — 99239 HOSP IP/OBS DSCHRG MGMT >30: CPT

## 2019-12-09 RX ORDER — PANTOPRAZOLE SODIUM 20 MG/1
1 TABLET, DELAYED RELEASE ORAL
Qty: 30 | Refills: 0
Start: 2019-12-09 | End: 2020-01-07

## 2019-12-09 RX ORDER — TAMSULOSIN HYDROCHLORIDE 0.4 MG/1
1 CAPSULE ORAL
Qty: 0 | Refills: 0 | DISCHARGE
Start: 2019-12-09

## 2019-12-09 RX ORDER — POLYETHYLENE GLYCOL 3350 17 G/17G
17 POWDER, FOR SOLUTION ORAL
Qty: 1000 | Refills: 0
Start: 2019-12-09 | End: 2020-01-07

## 2019-12-09 RX ORDER — NIFEDIPINE 30 MG
1 TABLET, EXTENDED RELEASE 24 HR ORAL
Qty: 30 | Refills: 0
Start: 2019-12-09 | End: 2020-01-07

## 2019-12-09 RX ADMIN — Medication 1 TABLET(S): at 11:56

## 2019-12-09 RX ADMIN — APIXABAN 2.5 MILLIGRAM(S): 2.5 TABLET, FILM COATED ORAL at 06:13

## 2019-12-09 RX ADMIN — PANTOPRAZOLE SODIUM 40 MILLIGRAM(S): 20 TABLET, DELAYED RELEASE ORAL at 08:09

## 2019-12-09 RX ADMIN — MEROPENEM 100 MILLIGRAM(S): 1 INJECTION INTRAVENOUS at 06:13

## 2019-12-09 NOTE — PROGRESS NOTE ADULT - REASON FOR ADMISSION
abdominal pain
abdominal pain  urinary tract infection
abdominal pain
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
## 2019-12-09 NOTE — DISCHARGE NOTE NURSING/CASE MANAGEMENT/SOCIAL WORK - NSDCPEEMAIL_GEN_ALL_CORE
[facility] email [email or web address]

## 2019-12-09 NOTE — DISCHARGE NOTE PROVIDER - CARE PROVIDER_API CALL
James Bates)  Urology  [address], [address] [place] [address]  Phone: [phone]  Fax: [phone]  Follow Up Time:     pcp,   Phone: (   )    -  Fax: (   )    -  Follow Up Time:
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
## 2019-12-09 NOTE — DISCHARGE NOTE NURSING/CASE MANAGEMENT/SOCIAL WORK - NSDCPEWEB_GEN_ALL_CORE
[place] website --- [email or web address] Tobacco [email or web address] website --- [email or web address]

## 2019-12-09 NOTE — DISCHARGE NOTE PROVIDER - NSDCMRMEDTOKEN_GEN_ALL_CORE_FT
apixaban 5 mg oral tablet: 1 tab(s) orally 2 times a day   Colace 100 mg oral capsule: 1 cap(s) orally 2 times a day  Multiple Vitamins oral capsule: 1 cap(s) orally once a day  NIFEdipine 60 mg oral tablet, extended release: 1 tab(s) orally once a day  pantoprazole 40 mg oral delayed release tablet: 1 tab(s) orally once a day (before a meal)  polyethylene glycol 3350 oral powder for reconstitution: 17 gram(s) orally once a day (at bedtime)  senna 8.6 mg oral tablet: 1 tab(s) orally once a day (at bedtime), As Needed   tamsulosin 0.4 mg oral capsule: 1 cap(s) orally once a day (at bedtime)  tiZANidine 4 mg oral tablet: 1 tab(s) orally 2 times a day, As Needed

## 2019-12-09 NOTE — PROGRESS NOTE ADULT - SUBJECTIVE AND OBJECTIVE BOX
Patient seen and examined bedside resting comfortably.  No complaints offered.       T(F): 96.9 (12-09-19 @ 05:35), Max: 97.1 (12-08-19 @ 18:33)  HR: 57 (12-09-19 @ 05:35) (51 - 57)  BP: 119/80 (12-09-19 @ 05:35) (109/71 - 122/64)  RR: 18 (12-09-19 @ 05:35) (18 - 20)  SpO2: 98% (12-09-19 @ 05:35) (97% - 99%)    PHYSICAL EXAM:  General: NAD. Quadraplegia  Neuro:  Alert & oriented x 3  Abdomen: soft, NTND. Normactive BS  : Condom cath m place. Good urine output	    LABS:    none today        I&O's Detail    08 Dec 2019 07:01  -  09 Dec 2019 07:00  --------------------------------------------------------  IN:    Oral Fluid: 1060 mL    Solution: 100 mL  Total IN: 1160 mL    OUT:    Incontinent per Condom Catheter: 4400 mL    Voided: 700 mL  Total OUT: 5100 mL    Total NET: -3940 mL          Impression: 44y Male admitted with COMPLICATED UTI  quadraplegia      Plan:  - pt being discharged home today by medicine  - antibiotics per ID  - office f/u with Dr Bates  - pt was discussed with Dr. Bates

## 2019-12-09 NOTE — DISCHARGE NOTE NURSING/CASE MANAGEMENT/SOCIAL WORK - PATIENT PORTAL LINK FT
You can access the FollowMyHealth Patient Portal offered by [facility] by registering at the following website: [email or web address]. By joining [employer]’s FollowMyHealth portal, you will also be able to view your health information using other applications (apps) compatible with our system.

## 2019-12-14 NOTE — DIETITIAN INITIAL EVALUATION ADULT. - PROBLEM SELECTOR PROBLEM 7
Patient notified   Chronic deep vein thrombosis (DVT) of lower extremity, unspecified laterality, unspecified vein

## 2019-12-19 DIAGNOSIS — N20.0 CALCULUS OF KIDNEY: ICD-10-CM

## 2019-12-19 DIAGNOSIS — Z86.718 PERSONAL HISTORY OF OTHER VENOUS THROMBOSIS AND EMBOLISM: ICD-10-CM

## 2019-12-19 DIAGNOSIS — N39.0 URINARY TRACT INFECTION, SITE NOT SPECIFIED: ICD-10-CM

## 2019-12-19 DIAGNOSIS — G80.0 SPASTIC QUADRIPLEGIC CEREBRAL PALSY: ICD-10-CM

## 2019-12-19 DIAGNOSIS — N40.0 BENIGN PROSTATIC HYPERPLASIA WITHOUT LOWER URINARY TRACT SYMPTOMS: ICD-10-CM

## 2019-12-19 DIAGNOSIS — T83.511A INFECTION AND INFLAMMATORY REACTION DUE TO INDWELLING URETHRAL CATHETER, INITIAL ENCOUNTER: ICD-10-CM

## 2019-12-19 DIAGNOSIS — Z16.12 EXTENDED SPECTRUM BETA LACTAMASE (ESBL) RESISTANCE: ICD-10-CM

## 2019-12-19 DIAGNOSIS — Z90.5 ACQUIRED ABSENCE OF KIDNEY: ICD-10-CM

## 2019-12-19 DIAGNOSIS — Z95.828 PRESENCE OF OTHER VASCULAR IMPLANTS AND GRAFTS: ICD-10-CM
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
## 2019-12-19 DIAGNOSIS — K21.9 GASTRO-ESOPHAGEAL REFLUX DISEASE WITHOUT ESOPHAGITIS: ICD-10-CM

## 2019-12-19 DIAGNOSIS — Z79.01 LONG TERM (CURRENT) USE OF ANTICOAGULANTS: ICD-10-CM

## 2019-12-19 DIAGNOSIS — K56.41 FECAL IMPACTION: ICD-10-CM

## 2019-12-19 DIAGNOSIS — B96.20 UNSPECIFIED ESCHERICHIA COLI [E. COLI] AS THE CAUSE OF DISEASES CLASSIFIED ELSEWHERE: ICD-10-CM

## 2020-02-15 NOTE — H&P ADULT - NSICDXFAMILYHX_GEN_ALL_CORE_FT
Patient with B/L toenail thickening with white crusted appearance. Was given ointment in the past but never used it.  -150 mg once weekly (2-4 weeks)  -fluconazole FAMILY HISTORY:  No pertinent family history in first degree relatives

## 2020-05-23 ENCOUNTER — EMERGENCY (EMERGENCY)
Facility: HOSPITAL | Age: 45
LOS: 0 days | Discharge: HOME | End: 2020-05-23
Attending: EMERGENCY MEDICINE
Payer: MEDICAID

## 2020-05-23 VITALS
OXYGEN SATURATION: 97 % | HEART RATE: 80 BPM | RESPIRATION RATE: 16 BRPM | TEMPERATURE: 99 F | DIASTOLIC BLOOD PRESSURE: 90 MMHG | SYSTOLIC BLOOD PRESSURE: 140 MMHG

## 2020-05-23 VITALS
HEART RATE: 86 BPM | TEMPERATURE: 98 F | HEIGHT: 74 IN | DIASTOLIC BLOOD PRESSURE: 65 MMHG | RESPIRATION RATE: 16 BRPM | SYSTOLIC BLOOD PRESSURE: 93 MMHG | OXYGEN SATURATION: 95 %

## 2020-05-23 DIAGNOSIS — S11.90XS UNSPECIFIED OPEN WOUND OF UNSPECIFIED PART OF NECK, SEQUELA: Chronic | ICD-10-CM

## 2020-05-23 DIAGNOSIS — N40.0 BENIGN PROSTATIC HYPERPLASIA WITHOUT LOWER URINARY TRACT SYMPTOMS: ICD-10-CM

## 2020-05-23 DIAGNOSIS — Z90.5 ACQUIRED ABSENCE OF KIDNEY: Chronic | ICD-10-CM

## 2020-05-23 DIAGNOSIS — K59.00 CONSTIPATION, UNSPECIFIED: ICD-10-CM

## 2020-05-23 DIAGNOSIS — N39.0 URINARY TRACT INFECTION, SITE NOT SPECIFIED: ICD-10-CM

## 2020-05-23 DIAGNOSIS — Z87.442 PERSONAL HISTORY OF URINARY CALCULI: ICD-10-CM

## 2020-05-23 DIAGNOSIS — R10.9 UNSPECIFIED ABDOMINAL PAIN: ICD-10-CM

## 2020-05-23 DIAGNOSIS — N20.0 CALCULUS OF KIDNEY: Chronic | ICD-10-CM

## 2020-05-23 DIAGNOSIS — Z88.0 ALLERGY STATUS TO PENICILLIN: ICD-10-CM

## 2020-05-23 LAB
ALBUMIN SERPL ELPH-MCNC: 3.9 G/DL — SIGNIFICANT CHANGE UP (ref 3.3–5)
ALP SERPL-CCNC: 76 U/L — SIGNIFICANT CHANGE UP (ref 40–120)
ALT FLD-CCNC: 39 U/L — SIGNIFICANT CHANGE UP (ref 12–78)
ANION GAP SERPL CALC-SCNC: 4 MMOL/L — LOW (ref 5–17)
APPEARANCE UR: ABNORMAL
AST SERPL-CCNC: 38 U/L — HIGH (ref 15–37)
BACTERIA # UR AUTO: ABNORMAL
BASOPHILS # BLD AUTO: 0.03 K/UL — SIGNIFICANT CHANGE UP (ref 0–0.2)
BASOPHILS NFR BLD AUTO: 0.4 % — SIGNIFICANT CHANGE UP (ref 0–2)
BILIRUB SERPL-MCNC: 0.5 MG/DL — SIGNIFICANT CHANGE UP (ref 0.2–1.2)
BILIRUB UR-MCNC: NEGATIVE — SIGNIFICANT CHANGE UP
BUN SERPL-MCNC: 16 MG/DL — SIGNIFICANT CHANGE UP (ref 7–23)
CALCIUM SERPL-MCNC: 9.2 MG/DL — SIGNIFICANT CHANGE UP (ref 8.5–10.1)
CHLORIDE SERPL-SCNC: 105 MMOL/L — SIGNIFICANT CHANGE UP (ref 96–108)
CO2 SERPL-SCNC: 29 MMOL/L — SIGNIFICANT CHANGE UP (ref 22–31)
COLOR SPEC: YELLOW — SIGNIFICANT CHANGE UP
CREAT SERPL-MCNC: 0.67 MG/DL — SIGNIFICANT CHANGE UP (ref 0.5–1.3)
DIFF PNL FLD: ABNORMAL
EOSINOPHIL # BLD AUTO: 0.08 K/UL — SIGNIFICANT CHANGE UP (ref 0–0.5)
EOSINOPHIL NFR BLD AUTO: 1 % — SIGNIFICANT CHANGE UP (ref 0–6)
EPI CELLS # UR: SIGNIFICANT CHANGE UP
GLUCOSE SERPL-MCNC: 85 MG/DL — SIGNIFICANT CHANGE UP (ref 70–99)
GLUCOSE UR QL: NEGATIVE MG/DL — SIGNIFICANT CHANGE UP
HCT VFR BLD CALC: 46.8 % — SIGNIFICANT CHANGE UP (ref 39–50)
HGB BLD-MCNC: 15.3 G/DL — SIGNIFICANT CHANGE UP (ref 13–17)
IMM GRANULOCYTES NFR BLD AUTO: 0.1 % — SIGNIFICANT CHANGE UP (ref 0–1.5)
KETONES UR-MCNC: NEGATIVE — SIGNIFICANT CHANGE UP
LACTATE SERPL-SCNC: 0.9 MMOL/L — SIGNIFICANT CHANGE UP (ref 0.7–2)
LEUKOCYTE ESTERASE UR-ACNC: ABNORMAL
LIDOCAIN IGE QN: 120 U/L — SIGNIFICANT CHANGE UP (ref 73–393)
LYMPHOCYTES # BLD AUTO: 2.79 K/UL — SIGNIFICANT CHANGE UP (ref 1–3.3)
LYMPHOCYTES # BLD AUTO: 36.2 % — SIGNIFICANT CHANGE UP (ref 13–44)
MCHC RBC-ENTMCNC: 27.3 PG — SIGNIFICANT CHANGE UP (ref 27–34)
MCHC RBC-ENTMCNC: 32.7 GM/DL — SIGNIFICANT CHANGE UP (ref 32–36)
MCV RBC AUTO: 83.6 FL — SIGNIFICANT CHANGE UP (ref 80–100)
MONOCYTES # BLD AUTO: 0.58 K/UL — SIGNIFICANT CHANGE UP (ref 0–0.9)
MONOCYTES NFR BLD AUTO: 7.5 % — SIGNIFICANT CHANGE UP (ref 2–14)
NEUTROPHILS # BLD AUTO: 4.21 K/UL — SIGNIFICANT CHANGE UP (ref 1.8–7.4)
NEUTROPHILS NFR BLD AUTO: 54.8 % — SIGNIFICANT CHANGE UP (ref 43–77)
NITRITE UR-MCNC: POSITIVE
NRBC # BLD: 0 /100 WBCS — SIGNIFICANT CHANGE UP (ref 0–0)
PH UR: 8 — SIGNIFICANT CHANGE UP (ref 5–8)
PLATELET # BLD AUTO: 220 K/UL — SIGNIFICANT CHANGE UP (ref 150–400)
POTASSIUM SERPL-MCNC: 4.1 MMOL/L — SIGNIFICANT CHANGE UP (ref 3.5–5.3)
POTASSIUM SERPL-SCNC: 4.1 MMOL/L — SIGNIFICANT CHANGE UP (ref 3.5–5.3)
PROT SERPL-MCNC: 9 GM/DL — HIGH (ref 6–8.3)
PROT UR-MCNC: 30 MG/DL
RBC # BLD: 5.6 M/UL — SIGNIFICANT CHANGE UP (ref 4.2–5.8)
RBC # FLD: 17.1 % — HIGH (ref 10.3–14.5)
RBC CASTS # UR COMP ASSIST: ABNORMAL /HPF (ref 0–4)
SODIUM SERPL-SCNC: 138 MMOL/L — SIGNIFICANT CHANGE UP (ref 135–145)
SP GR SPEC: 1.01 — SIGNIFICANT CHANGE UP (ref 1.01–1.02)
UROBILINOGEN FLD QL: NEGATIVE MG/DL — SIGNIFICANT CHANGE UP
WBC # BLD: 7.7 K/UL — SIGNIFICANT CHANGE UP (ref 3.8–10.5)
WBC # FLD AUTO: 7.7 K/UL — SIGNIFICANT CHANGE UP (ref 3.8–10.5)
WBC UR QL: ABNORMAL

## 2020-05-23 PROCEDURE — 74177 CT ABD & PELVIS W/CONTRAST: CPT | Mod: 26

## 2020-05-23 PROCEDURE — 99285 EMERGENCY DEPT VISIT HI MDM: CPT

## 2020-05-23 RX ORDER — AZTREONAM 2 G
1 VIAL (EA) INJECTION
Qty: 14 | Refills: 0
Start: 2020-05-23 | End: 2020-05-29

## 2020-05-23 RX ORDER — MORPHINE SULFATE 50 MG/1
4 CAPSULE, EXTENDED RELEASE ORAL ONCE
Refills: 0 | Status: DISCONTINUED | OUTPATIENT
Start: 2020-05-23 | End: 2020-05-23

## 2020-05-23 RX ORDER — SODIUM CHLORIDE 9 MG/ML
1000 INJECTION INTRAMUSCULAR; INTRAVENOUS; SUBCUTANEOUS ONCE
Refills: 0 | Status: COMPLETED | OUTPATIENT
Start: 2020-05-23 | End: 2020-05-23

## 2020-05-23 RX ADMIN — SODIUM CHLORIDE 1000 MILLILITER(S): 9 INJECTION INTRAMUSCULAR; INTRAVENOUS; SUBCUTANEOUS at 12:32

## 2020-05-23 RX ADMIN — Medication 1 ENEMA: at 14:31

## 2020-05-23 RX ADMIN — MORPHINE SULFATE 4 MILLIGRAM(S): 50 CAPSULE, EXTENDED RELEASE ORAL at 12:32

## 2020-05-23 RX ADMIN — Medication 1 TABLET(S): at 14:27

## 2020-05-23 RX ADMIN — SODIUM CHLORIDE 1000 MILLILITER(S): 9 INJECTION INTRAMUSCULAR; INTRAVENOUS; SUBCUTANEOUS at 13:31
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
## 2020-05-23 NOTE — ED PROVIDER NOTE - PATIENT PORTAL LINK FT
[place]
You can access the FollowMyHealth Patient Portal offered by Health system by registering at the following website: [email or web address]. By joining [employer]’s FollowMyHealth portal, you will also be able to view your health information using other applications (apps) compatible with our system.
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
## 2020-05-24 LAB — SARS-COV-2 RNA SPEC QL NAA+PROBE: SIGNIFICANT CHANGE UP

## 2020-05-25 LAB
CULTURE RESULTS: SIGNIFICANT CHANGE UP
SPECIMEN SOURCE: SIGNIFICANT CHANGE UP

## 2020-09-29 ENCOUNTER — EMERGENCY (EMERGENCY)
Facility: HOSPITAL | Age: 45
LOS: 1 days | Discharge: HOME | End: 2020-09-29
Attending: EMERGENCY MEDICINE | Admitting: EMERGENCY MEDICINE
Payer: MEDICAID

## 2020-09-29 VITALS
TEMPERATURE: 99 F | SYSTOLIC BLOOD PRESSURE: 148 MMHG | HEIGHT: 74 IN | DIASTOLIC BLOOD PRESSURE: 95 MMHG | HEART RATE: 84 BPM | OXYGEN SATURATION: 94 % | RESPIRATION RATE: 18 BRPM

## 2020-09-29 DIAGNOSIS — Z90.5 ACQUIRED ABSENCE OF KIDNEY: Chronic | ICD-10-CM

## 2020-09-29 DIAGNOSIS — S11.90XS UNSPECIFIED OPEN WOUND OF UNSPECIFIED PART OF NECK, SEQUELA: Chronic | ICD-10-CM

## 2020-09-29 DIAGNOSIS — N20.0 CALCULUS OF KIDNEY: Chronic | ICD-10-CM

## 2020-09-29 LAB
ALBUMIN SERPL ELPH-MCNC: 4.2 G/DL — SIGNIFICANT CHANGE UP (ref 3.3–5)
ALP SERPL-CCNC: 72 U/L — SIGNIFICANT CHANGE UP (ref 40–120)
ALT FLD-CCNC: 28 U/L — SIGNIFICANT CHANGE UP (ref 4–41)
ANION GAP SERPL CALC-SCNC: 11 MMO/L — SIGNIFICANT CHANGE UP (ref 7–14)
APPEARANCE UR: SIGNIFICANT CHANGE UP
AST SERPL-CCNC: 27 U/L — SIGNIFICANT CHANGE UP (ref 4–40)
BACTERIA # UR AUTO: HIGH
BASOPHILS # BLD AUTO: 0.03 K/UL — SIGNIFICANT CHANGE UP (ref 0–0.2)
BASOPHILS NFR BLD AUTO: 0.4 % — SIGNIFICANT CHANGE UP (ref 0–2)
BILIRUB SERPL-MCNC: 0.3 MG/DL — SIGNIFICANT CHANGE UP (ref 0.2–1.2)
BILIRUB UR-MCNC: NEGATIVE — SIGNIFICANT CHANGE UP
BLOOD UR QL VISUAL: SIGNIFICANT CHANGE UP
BUN SERPL-MCNC: 14 MG/DL — SIGNIFICANT CHANGE UP (ref 7–23)
CALCIUM SERPL-MCNC: 9.5 MG/DL — SIGNIFICANT CHANGE UP (ref 8.4–10.5)
CHLORIDE SERPL-SCNC: 101 MMOL/L — SIGNIFICANT CHANGE UP (ref 98–107)
CO2 SERPL-SCNC: 25 MMOL/L — SIGNIFICANT CHANGE UP (ref 22–31)
COLOR SPEC: YELLOW — SIGNIFICANT CHANGE UP
CREAT SERPL-MCNC: 0.55 MG/DL — SIGNIFICANT CHANGE UP (ref 0.5–1.3)
EOSINOPHIL # BLD AUTO: 0.23 K/UL — SIGNIFICANT CHANGE UP (ref 0–0.5)
EOSINOPHIL NFR BLD AUTO: 2.9 % — SIGNIFICANT CHANGE UP (ref 0–6)
EPI CELLS # UR: SIGNIFICANT CHANGE UP
GLUCOSE SERPL-MCNC: 89 MG/DL — SIGNIFICANT CHANGE UP (ref 70–99)
GLUCOSE UR-MCNC: NEGATIVE — SIGNIFICANT CHANGE UP
HCT VFR BLD CALC: 42.5 % — SIGNIFICANT CHANGE UP (ref 39–50)
HGB BLD-MCNC: 14.3 G/DL — SIGNIFICANT CHANGE UP (ref 13–17)
IMM GRANULOCYTES NFR BLD AUTO: 0.3 % — SIGNIFICANT CHANGE UP (ref 0–1.5)
KETONES UR-MCNC: NEGATIVE — SIGNIFICANT CHANGE UP
LEUKOCYTE ESTERASE UR-ACNC: HIGH
LIDOCAIN IGE QN: 32.1 U/L — SIGNIFICANT CHANGE UP (ref 7–60)
LYMPHOCYTES # BLD AUTO: 2.21 K/UL — SIGNIFICANT CHANGE UP (ref 1–3.3)
LYMPHOCYTES # BLD AUTO: 28.2 % — SIGNIFICANT CHANGE UP (ref 13–44)
MCHC RBC-ENTMCNC: 28.8 PG — SIGNIFICANT CHANGE UP (ref 27–34)
MCHC RBC-ENTMCNC: 33.6 % — SIGNIFICANT CHANGE UP (ref 32–36)
MCV RBC AUTO: 85.7 FL — SIGNIFICANT CHANGE UP (ref 80–100)
MONOCYTES # BLD AUTO: 0.91 K/UL — HIGH (ref 0–0.9)
MONOCYTES NFR BLD AUTO: 11.6 % — SIGNIFICANT CHANGE UP (ref 2–14)
NEUTROPHILS # BLD AUTO: 4.43 K/UL — SIGNIFICANT CHANGE UP (ref 1.8–7.4)
NEUTROPHILS NFR BLD AUTO: 56.6 % — SIGNIFICANT CHANGE UP (ref 43–77)
NITRITE UR-MCNC: POSITIVE — SIGNIFICANT CHANGE UP
NRBC # FLD: 0 K/UL — SIGNIFICANT CHANGE UP (ref 0–0)
PH UR: 7.5 — SIGNIFICANT CHANGE UP (ref 5–8)
PLATELET # BLD AUTO: 173 K/UL — SIGNIFICANT CHANGE UP (ref 150–400)
PMV BLD: 11 FL — SIGNIFICANT CHANGE UP (ref 7–13)
POTASSIUM SERPL-MCNC: 3.9 MMOL/L — SIGNIFICANT CHANGE UP (ref 3.5–5.3)
POTASSIUM SERPL-SCNC: 3.9 MMOL/L — SIGNIFICANT CHANGE UP (ref 3.5–5.3)
PROT SERPL-MCNC: 7.9 G/DL — SIGNIFICANT CHANGE UP (ref 6–8.3)
PROT UR-MCNC: 50 — SIGNIFICANT CHANGE UP
RBC # BLD: 4.96 M/UL — SIGNIFICANT CHANGE UP (ref 4.2–5.8)
RBC # FLD: 16.1 % — HIGH (ref 10.3–14.5)
RBC CASTS # UR COMP ASSIST: HIGH (ref 0–?)
SARS-COV-2 RNA SPEC QL NAA+PROBE: SIGNIFICANT CHANGE UP
SODIUM SERPL-SCNC: 137 MMOL/L — SIGNIFICANT CHANGE UP (ref 135–145)
SP GR SPEC: 1.02 — SIGNIFICANT CHANGE UP (ref 1–1.04)
UROBILINOGEN FLD QL: NORMAL — SIGNIFICANT CHANGE UP
WBC # BLD: 7.83 K/UL — SIGNIFICANT CHANGE UP (ref 3.8–10.5)
WBC # FLD AUTO: 7.83 K/UL — SIGNIFICANT CHANGE UP (ref 3.8–10.5)
WBC UR QL: HIGH (ref 0–?)

## 2020-09-29 PROCEDURE — 74177 CT ABD & PELVIS W/CONTRAST: CPT | Mod: 26

## 2020-09-29 PROCEDURE — 99285 EMERGENCY DEPT VISIT HI MDM: CPT

## 2020-09-29 RX ORDER — ACETAMINOPHEN 500 MG
975 TABLET ORAL ONCE
Refills: 0 | Status: COMPLETED | OUTPATIENT
Start: 2020-09-29 | End: 2020-09-29

## 2020-09-29 RX ORDER — SODIUM CHLORIDE 9 MG/ML
1000 INJECTION INTRAMUSCULAR; INTRAVENOUS; SUBCUTANEOUS ONCE
Refills: 0 | Status: COMPLETED | OUTPATIENT
Start: 2020-09-29 | End: 2020-09-29

## 2020-09-29 RX ORDER — MORPHINE SULFATE 50 MG/1
2 CAPSULE, EXTENDED RELEASE ORAL ONCE
Refills: 0 | Status: DISCONTINUED | OUTPATIENT
Start: 2020-09-29 | End: 2020-09-29

## 2020-09-29 RX ORDER — CEFTRIAXONE 500 MG/1
1000 INJECTION, POWDER, FOR SOLUTION INTRAMUSCULAR; INTRAVENOUS ONCE
Refills: 0 | Status: COMPLETED | OUTPATIENT
Start: 2020-09-29 | End: 2020-09-29

## 2020-09-29 RX ORDER — MULTIVIT WITH MIN/MFOLATE/K2 340-15/3 G
1 POWDER (GRAM) ORAL ONCE
Refills: 0 | Status: COMPLETED | OUTPATIENT
Start: 2020-09-29 | End: 2020-09-29

## 2020-09-29 RX ORDER — MORPHINE SULFATE 50 MG/1
4 CAPSULE, EXTENDED RELEASE ORAL ONCE
Refills: 0 | Status: DISCONTINUED | OUTPATIENT
Start: 2020-09-29 | End: 2020-09-29

## 2020-09-29 RX ORDER — POLYETHYLENE GLYCOL 3350 17 G/17G
17 POWDER, FOR SOLUTION ORAL ONCE
Refills: 0 | Status: COMPLETED | OUTPATIENT
Start: 2020-09-29 | End: 2020-09-29

## 2020-09-29 RX ADMIN — CEFTRIAXONE 100 MILLIGRAM(S): 500 INJECTION, POWDER, FOR SOLUTION INTRAMUSCULAR; INTRAVENOUS at 23:26

## 2020-09-29 RX ADMIN — SODIUM CHLORIDE 1000 MILLILITER(S): 9 INJECTION INTRAMUSCULAR; INTRAVENOUS; SUBCUTANEOUS at 14:44

## 2020-09-29 RX ADMIN — MORPHINE SULFATE 4 MILLIGRAM(S): 50 CAPSULE, EXTENDED RELEASE ORAL at 17:46

## 2020-09-29 RX ADMIN — MORPHINE SULFATE 2 MILLIGRAM(S): 50 CAPSULE, EXTENDED RELEASE ORAL at 15:59

## 2020-09-29 RX ADMIN — SODIUM CHLORIDE 1000 MILLILITER(S): 9 INJECTION INTRAMUSCULAR; INTRAVENOUS; SUBCUTANEOUS at 15:53

## 2020-09-29 RX ADMIN — Medication 1 BOTTLE: at 23:26

## 2020-09-29 RX ADMIN — Medication 100 MILLIGRAM(S): at 16:36

## 2020-09-29 RX ADMIN — POLYETHYLENE GLYCOL 3350 17 GRAM(S): 17 POWDER, FOR SOLUTION ORAL at 15:59
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
## 2020-09-29 NOTE — ED ADULT NURSE NOTE - NSIMPLEMENTINTERV_GEN_ALL_ED
Implemented All Fall Risk Interventions:  [place] to call system. Call bell, personal items and telephone within reach. Instruct patient to call for assistance. Room bathroom lighting operational. Non-slip footwear when patient is off stretcher. Physically safe environment: no spills, clutter or unnecessary equipment. Stretcher in lowest position, wheels locked, appropriate side rails in place. Provide visual cue, wrist band, yellow gown, etc. Monitor gait and stability. Monitor for mental status changes and reorient to person, place, and time. Review medications for side effects contributing to fall risk. Reinforce activity limits and safety measures with patient and family.
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
## 2020-09-29 NOTE — ED PROVIDER NOTE - NSFOLLOWUPINSTRUCTIONS_ED_ALL_ED_FT
You were here for abdominal pain. We did labs and imaging. The labs showed that you likely have a urinary tract infection. The imaging showed that you had a lot of stool and urine that was backing up. We gave you a dose of antibiotic and some laxatives to help you get rid of the extra stool. When you get home, please continue to take your antibiotic, Keflex. If your pain worsens, please return to the ED. Please make a follow up appointment with your PCP when you get home.

## 2020-09-29 NOTE — ED PROVIDER NOTE - PATIENT PORTAL LINK FT
You can access the FollowMyHealth Patient Portal offered by [facility] by registering at the following website: [email or web address]. By joining [employer]’s FollowMyHealth portal, you will also be able to view your health information using other applications (apps) compatible with our system.

## 2020-09-29 NOTE — ED PROVIDER NOTE - ATTENDING CONTRIBUTION TO CARE
I performed a history and physical exam of the patient and discussed their management with the PA. I reviewed the PA's note and agree with the documented findings and plan of care. I have edited as appropriate. My medical decision making and observations are found above.  NAD, abd dstended and tender

## 2020-09-29 NOTE — ED PROVIDER NOTE - PSH
Calculus of kidney  bilateral nephrostomy tubes placed 1/2019 @[facility]  H/O right nephrectomy    Open wound of neck, sequela

## 2020-09-29 NOTE — ED ADULT NURSE NOTE - PSH
Calculus of kidney  bilateral nephrostomy tubes placed 1/2019 @[facility]  H/O right nephrectomy    Open wound of neck, sequela

## 2020-09-29 NOTE — ED PROVIDER NOTE - PRO INTERPRETER NEED 2
Exercises for Shoulder Flexibility: External Rotation    This stretch can help restore shoulder flexibility and relieve pain over time. When stretching, be sure to breathe deeply. Follow any special instructions from your doctor or physical therapist:  1.  a doorway. Grasp the doorjamb with the hand on the frozen side. Your arm should be bent.  2. With the other hand, hold the elbow on the frozen side firmly against your body.  3. Standing in the same spot, rotate your body away from the doorjamb. Stop when you feel the stretch in the shoulder. At first, try to hold the stretch for 5 seconds.  4. Work up to doing 3 sets of this stretch, 3 times a day. Work up to holding the stretch for 30 to 60 seconds.  Note: Keep your arms as still as you can. Over time, rotate your body a little more to enhance the stretch. But be careful not to twist your back.  Frozen shoulder  Frozen shoulder is another name for adhesive capsulitis, which causes restricted movement in the shoulder. If you have frozen shoulder, this stretch may cause discomfort, especially when you first get started. A few months may pass before you achieve the results you want. But once your shoulder heals, it rarely becomes frozen again. So stick to your stretching program. If you have any questions, be sure to ask your doctor.   Date Last Reviewed: 8/16/2015 © 2000-2017 [employer]. [address] [place] [address], [place] [address]. All rights reserved. This information is not intended as a substitute for professional medical care. Always follow your healthcare professional's instructions.        Exercises for Shoulder Flexibility: Internal Rotation    This stretch can help restore shoulder flexibility and relieve pain over time. When stretching, be sure to breathe deeply. Follow any special instructions from your healthcare provider or physical therapist.  5. While seated, move the arm on the side you want to stretch toward the middle of  your back. The palm of your hand should face out.  6. Cup your other hand under the hand thats behind your back. Gently push your cupped hand upward until you feel the stretch in the shoulder. Try to hold the stretch for 5 seconds.  7. Work up to doing 3 sets of this stretch, 3 times a day. Work up to holding the stretch for 30 to 60 seconds.  Note: Keep your back straight. Its OK if your hand cant reach the middle of your back. Instead, start the stretch with your hand as close as you can get it to the middle of your back.     Frozen shoulder  Frozen shoulder is another name for adhesive capsulitis. This causes restricted movement in the shoulder. If you have frozen shoulder, this stretch may cause discomfort, especially when you first get started. A few months may pass before you achieve the results you want. But once your shoulder heals, it rarely becomes frozen again. So stick to your stretching program. If you have any questions, be sure to ask your healthcare provider.   Date Last Reviewed: 10/14/2015  © 3274-8846 [employer]. [address] [place] [address]. All rights reserved. This information is not intended as a substitute for professional medical care. Always follow your healthcare professional's instructions.        Exercises for Shoulder Flexibility: Adduction (Reaching Across)    This stretch can help restore shoulder flexibility and relieve pain over time. When stretching, be sure to breathe deeply. And follow any special instructions from your doctor or physical therapist:  8. Put the hand from the side you want to stretch on your opposite shoulder. Your elbow should point away from your body. Try to raise your elbow as close to shoulder height as you can.  9. With your other hand, push the raised elbow toward the opposite shoulder. Avoid turning your head. Stop when you feel the stretch. Try to hold the stretch for 5 seconds.  10. Work up to doing 3 sets of this  stretch, 3 times a day. Work up to holding the stretch for 30 to 60 seconds.  Note: Be sure to push your elbow across your chest, not up toward your chin. Over time, try to push your elbow farther across your chest to enhance the stretch.  Frozen shoulder  Frozen shoulder is another name for adhesive capsulitis, which causes restricted movement in the shoulder. If you have frozen shoulder, this stretch may cause discomfort, especially when you first get started. A few months may pass before you achieve the results you want. Once your shoulder heals, it rarely becomes frozen again. So stick to your stretching program. If you have any questions, be sure to ask your doctor.   Date Last Reviewed: 8/16/2015  © 0076-3120 [employer]. [address] [place] [address], [place] [address]. All rights reserved. This information is not intended as a substitute for professional medical care. Always follow your healthcare professional's instructions.        Exercises for Shoulder Flexibility: Back Scratch    Improving your flexibility can reduce pain. Stretching exercises also can help increase your range of pain-free motion. Breathe normally when you exercise. Try to use smooth, fluid movements. Never force a stretch.  Note: Follow any special instructions you are given. If you feel pain, stop the exercise. If the pain continues after stopping, call your healthcare provider.  · Stand straight, placing the back of your hand on the side you want to stretch flat against your lower back.  · Throw one end of a towel over your shoulder. Grab it behind your back with your other hand.  · Pull down gently on the towel with your front arm. Let your back arm slide up as high as is comfortable. Youll feel a stretch in your shoulder. Hold the stretch for a few seconds.  · Repeat 3 to 5 times. Build up to holding each stretch for 30 to 60 seconds.  For your safety, check with your healthcare provider before starting an exercise program.    Date Last Reviewed: 8/26/2015  © 6574-8828 [employer]. [address] [place] [address]. All rights reserved. This information is not intended as a substitute for professional medical care. Always follow your healthcare professional's instructions.        Exercises for Shoulder Flexibility: Wall Walk    Improving your flexibility can reduce pain. Stretching exercises also can help increase your range of pain-free motion. Breathe normally when you exercise. Use smooth, fluid movements.  Note: Follow any special instructions you are given. If you feel pain, stop the exercise. If the pain continues after stopping, call your healthcare provider:  · Stand with your shoulder about 2 feet from the wall.  · Raise your arm to shoulder level and gently walk your fingers up the wall as high as you can.  · Hold for a few seconds. Then walk your fingers back down.  · Repeat 3 times. Move closer to the wall as you repeat.  · Build up to holding each stretch for 30 seconds.  Caution: Do this stretch only if your healthcare provider recommends it. Dont do it when you are first injured.       Date Last Reviewed: 8/16/2015  © 0934-9443 [employer]. [address] [place] [address]. All rights reserved. This information is not intended as a substitute for professional medical care. Always follow your healthcare professional's instructions.        Muscle Strain in the Extremities  A muscle strain is a stretching and tearing of muscle fibers. This causes pain, especially when you move that muscle. There may also be some swelling and bruising.  Home care  · Keep the hurt area raised to reduce pain and swelling. This is especially important during the first 48 hours.  · Apply an ice pack over the injured area for 15 to 20 minutes every 3 to 6 hours. You should do this for the first 24 to 48 hours. You can make an ice pack by filling a plastic bag that seals at the top with ice cubes and  then wrapping it with a thin towel. Be careful not to injure your skin with the ice treatments. Ice should never be applied directly to skin. Continue the use of ice packs for relief of pain and swelling as needed. After 48 hours, apply heat (warm shower or warm bath) for 15 to 20 minutes several times a day, or alternate ice and heat.  · You may use over-the-counter pain medicine to control pain, unless another medicine was prescribed. If you have chronic liver or kidney disease or ever had a stomach ulcer or GI bleeding, talk with your healthcare provider before using these medicines.  · For leg strains: If crutches have been recommended, dont put full weight on the hurt leg until you can do so without pain. You can return to sports when you are able to hop and run on the injured leg without pain.  Follow-up care  Follow up with your healthcare provider, or as advised.  When to seek medical advice  Call your healthcare provider right away if any of these occur:  · The toes of the injured leg become swollen, cold, blue, numb, or tingly  · Pain or swelling increases  Date Last Reviewed: 11/19/2015  © 4395-1977 [employer]. [address] [place] [address]. All rights reserved. This information is not intended as a substitute for professional medical care. Always follow your healthcare professional's instructions.        RICE     Rest an injury, elevate it, and use ice and compression as directed.   RICE stands for rest, ice, compression, and elevation. These can limit pain and swelling after an injury. RICE may be recommended to help treat fractures, sprains, strains, and bruises or bumps.   Home care  The following explain the details of RICE:  · Rest. Limit the use of the injured body part. This helps prevent further damage to the body part and gives it time to heal. In some cases, you may need a sling, brace, splint, or cast to help keep the body part still until it has healed.  · Ice.  Applying ice right after an injury helps relieve pain and swelling. Wrap a bag of ice in a thin towel. Then, place it over the injured area. Do this for 10 to 15 minutes every 3 to 4 hours. Continue for the next 1 to 3 days or until your symptoms improve. Never put ice directly on your skin or ice an area longer than 15 minutes at a time.  · Compression. Putting pressure on an injury helps reduce swelling and provides support. Wrap the injured area firmly with an elastic bandage/wrap. Make sure not to wrap the bandage too tightly or you will cut off blood flow to the injured area. If your bandage loosens, rewrap it.  · Elevation. Keeping an injury raised above the level of your heart reduces swelling, pain, and throbbing. For instance, if you have a broken leg, it may help to rest your leg on several pillows when sitting or lying down. Try to keep the injured area elevated for at least 2 to 3 hours per day.  Follow-up care  Follow up with your healthcare provider, or as advised.  When to seek medical advice  Call your healthcare provider right away if any of these occur:  · Fever of 100.4°F (38°C) or higher, or as directed by your healthcare provider  · Increased pain or swelling in the injured body part  · Injured body part becomes cold, blue, numb, or tingly  · Signs of infection. These include warmth in the skin, redness, drainage, or bad smell coming from the injured body part.  Date Last Reviewed: 1/18/2016  © 2704-9253 [employer]. [address] [place] [address], [place] [address]. All rights reserved. This information is not intended as a substitute for professional medical care. Always follow your healthcare professional's instructions.        Self-Care for Strains and Sprains  Most minor strains and sprains can be treated with self-care. Recovering from a strain or sprain may take 6 to 8 weeks. Your self-care goal is to reduce pain and immobilize the injury to speed healing.     A sprain injures  ligaments (tissue that connects bones to bones).        A strain injures muscles or tendons (tissue that connects muscles to bones).   Support the injured area  Wrapping the injured area provides support for short, necessary activities. Be careful not to wrap the area too tightly. This could cut off the blood supply.  · Support a wrist, elbow, or shoulder with a sling.  · Wrap an ankle or knee with an elastic bandage.  · Tape a finger or toe to the one next to it.  Use cold and heat  Cold reduces swelling. Both cold and heat reduce pain. Heat should not be used in the initial treatment of the injury. When using cold or heat, always place a towel between the pack and your skin.  · Apply ice or a cold pack 10 to 15 minutes every hour youre awake for the first 2 days.  · After the swelling goes down, use cold or heat to control pain. Dont use heat late in the day, since it can cause swelling when youre not active.  Rest and elevate  Rest and elevation help your injury heal faster.  · Raise the injured area above your heart level.  · Keep the injured area from moving.  · Limit the use of the joint or limb.  Use medicine  · Aspirin reduces pain and swelling. (Note: Dont give aspirin to a child 18 or younger unless prescribed by the doctor.)  · Aspirin substitutes, such as ibuprofen, can reduce pain. Some substitutes reduce swelling, too. Ask your pharmacist which substitutes you can use.  Call your doctor if:  · The injured joint wont move, or bones make a grating sound when they move.  · You cant put weight on the injured area, even after 24 hours.  · The injured body part is cold, blue, or numb.  · The joint or limb appears bent or crooked.  · Pain increases or doesnt improve in 4 days.  · When pressing along the injured area, you notice a spot that is especially painful.   Date Last Reviewed: 9/29/2015  © 6146-3213 [employer]. [address] [place] [address], [place] [address]. All rights reserved. This  information is not intended as a substitute for professional medical care. Always follow your healthcare professional's instructions.        Treating Strains and Sprains  Strains and sprains happen when muscles or other soft tissues near your bones stretch or tear. These injuries can cause bruising, swelling, and pain. To ease your discomfort and speed the healing of your strain or sprain, follow the tips below. Remember, a strain or sprain can take 6 to 8 weeks to heal.     Important Note: Do not give aspirin to children or teens without discussing it with your healthcare provider first.        Ice first, heat later  · Use ice for the first 24 to 48 hours after injury. Ice helps prevent swelling and reduce pain. Ice the injury for no more than 20 minutes at a time and allow at least  20 minutes between icing sessions.  · Apply heat after the first 72 hours, once the swelling has gone down. Heat relaxes muscles and increases blood flow. Soak the injured area in warm water or use a heating pad set on low for no more than 15 minutes at a time.  Wrap and elevate  · Wrap an injured limb firmly with an elastic bandage. This provides support and helps prevent swelling. Dont wear an elastic bandage overnight. Watch for tingling, numbness, or increased pain, and remove the bandage immediately if any of these occurs.  · Elevate the injured area to help reduce swelling and throbbing. Its best to raise an injured limb above the level of your heart.     Medicines  · Over-the-counter medicines such as acetaminophen or ibuprofen can help reduce pain. Some also help reduce swelling.  · Take medicine only as directed.  · Rest the area even if medicines are controlling the pain.  Rest  · Rest the injured area by not using it for 24 hours.  · When youre ready, return slowly to your normal activities. Rest the injured area often.  · Dont use or walk on an injured limb if it hurts.  Date Last Reviewed: 9/3/2015  © 4333-2655 [employer]. [address] [place] [address], [place] [address]. All rights reserved. This information is not intended as a substitute for professional medical care. Always follow your healthcare professional's instructions.         English

## 2020-09-30 VITALS
RESPIRATION RATE: 16 BRPM | HEART RATE: 64 BPM | SYSTOLIC BLOOD PRESSURE: 147 MMHG | TEMPERATURE: 98 F | DIASTOLIC BLOOD PRESSURE: 94 MMHG | OXYGEN SATURATION: 100 %

## 2020-09-30 RX ORDER — ACETAMINOPHEN 500 MG
650 TABLET ORAL ONCE
Refills: 0 | Status: COMPLETED | OUTPATIENT
Start: 2020-09-30 | End: 2020-09-30

## 2020-09-30 RX ORDER — LABETALOL HCL 100 MG
10 TABLET ORAL ONCE
Refills: 0 | Status: DISCONTINUED | OUTPATIENT
Start: 2020-09-30 | End: 2020-09-30

## 2020-09-30 RX ORDER — LACTULOSE 10 G/15ML
20 SOLUTION ORAL ONCE
Refills: 0 | Status: COMPLETED | OUTPATIENT
Start: 2020-09-30 | End: 2020-09-30

## 2020-09-30 RX ORDER — CEPHALEXIN 500 MG
1 CAPSULE ORAL
Qty: 14 | Refills: 0
Start: 2020-09-30 | End: 2020-10-06

## 2020-09-30 RX ADMIN — Medication 1 ENEMA: at 01:41

## 2020-09-30 RX ADMIN — LACTULOSE 20 GRAM(S): 10 SOLUTION ORAL at 01:02

## 2020-09-30 RX ADMIN — Medication 650 MILLIGRAM(S): at 01:02

## 2020-09-30 RX ADMIN — Medication 650 MILLIGRAM(S): at 01:38

## 2020-09-30 NOTE — PROVIDER CONTACT NOTE (OTHER) - ASSESSMENT
Pt requires ambulance transport back to residence.  SW contacted [facility] [employer] at [phone] to arrange transport-[facility]; conf # [id number].  [facility]; [facility] to contact [facility] with trip confirmation.
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
## 2020-09-30 NOTE — PROVIDER CONTACT NOTE (OTHER) - BACKGROUND
SW met with pt at bedside; confirmed address.  Also confirmed that pt's HHA are at home to assist in his care.  Pt reports he travels via ambulance; pt is quadriplegic, bedbound.

## 2020-09-30 NOTE — PROVIDER CONTACT NOTE (OTHER) - ACTION/TREATMENT ORDERED:
ETA 7:00 a.m. via [facility].  ETA 7:00 a.m. via [facility]. Addendum: as per [employer], [facility] is not in network with insurance,  Trip is scheduled with [facility],  time 10:00 a.m. conf # [id number].

## 2020-10-01 LAB
CULTURE RESULTS: SIGNIFICANT CHANGE UP
SPECIMEN SOURCE: SIGNIFICANT CHANGE UP

## 2020-10-01 NOTE — DIETITIAN INITIAL EVALUATION ADULT. - ADHERENCE
I spoke to patient  There was no abnormality on her ultrasound  n/a/no dietary restrictions PTA, Pt discharged on a regular diet last month. Confirms NKFA. States he takes a Multivitamin and vitamin D supplement PTA.

## 2020-10-02 NOTE — ED POST DISCHARGE NOTE - RESULT SUMMARY
NANDA Lal: UCX contaminated, pt diagnosed with UTI, hx of ESBL UTIs on EMR review. Pt called back, automated message "cannot receive calls at this time". Called the listed emergency contact number, automated message says "call can not go through".. Callback PA to attempt reaching patient again.

## 2020-10-29 ENCOUNTER — INPATIENT (INPATIENT)
Facility: HOSPITAL | Age: 45
LOS: 4 days | Discharge: HOME HEALTH | End: 2020-11-03
Attending: HOSPITALIST | Admitting: HOSPITALIST
Payer: MEDICAID

## 2020-10-29 VITALS
SYSTOLIC BLOOD PRESSURE: 138 MMHG | TEMPERATURE: 98 F | DIASTOLIC BLOOD PRESSURE: 95 MMHG | HEIGHT: 74 IN | OXYGEN SATURATION: 96 % | HEART RATE: 67 BPM | RESPIRATION RATE: 16 BRPM

## 2020-10-29 DIAGNOSIS — Z90.5 ACQUIRED ABSENCE OF KIDNEY: Chronic | ICD-10-CM

## 2020-10-29 DIAGNOSIS — N20.0 CALCULUS OF KIDNEY: Chronic | ICD-10-CM

## 2020-10-29 DIAGNOSIS — Z95.0 PRESENCE OF CARDIAC PACEMAKER: Chronic | ICD-10-CM

## 2020-10-29 DIAGNOSIS — N39.0 URINARY TRACT INFECTION, SITE NOT SPECIFIED: ICD-10-CM

## 2020-10-29 DIAGNOSIS — Z29.9 ENCOUNTER FOR PROPHYLACTIC MEASURES, UNSPECIFIED: ICD-10-CM

## 2020-10-29 DIAGNOSIS — T83.512A INFECTION AND INFLAMMATORY REACTION DUE TO NEPHROSTOMY CATHETER, INITIAL ENCOUNTER: ICD-10-CM

## 2020-10-29 DIAGNOSIS — S11.90XS UNSPECIFIED OPEN WOUND OF UNSPECIFIED PART OF NECK, SEQUELA: Chronic | ICD-10-CM

## 2020-10-29 LAB
ALBUMIN SERPL ELPH-MCNC: 4.1 G/DL — SIGNIFICANT CHANGE UP (ref 3.3–5)
ALP SERPL-CCNC: 68 U/L — SIGNIFICANT CHANGE UP (ref 40–120)
ALT FLD-CCNC: 28 U/L — SIGNIFICANT CHANGE UP (ref 4–41)
ANION GAP SERPL CALC-SCNC: 11 MMO/L — SIGNIFICANT CHANGE UP (ref 7–14)
APPEARANCE UR: SIGNIFICANT CHANGE UP
APPEARANCE UR: SIGNIFICANT CHANGE UP
AST SERPL-CCNC: 25 U/L — SIGNIFICANT CHANGE UP (ref 4–40)
BACTERIA # UR AUTO: HIGH
BACTERIA # UR AUTO: HIGH
BASOPHILS # BLD AUTO: 0.04 K/UL — SIGNIFICANT CHANGE UP (ref 0–0.2)
BASOPHILS NFR BLD AUTO: 0.6 % — SIGNIFICANT CHANGE UP (ref 0–2)
BILIRUB SERPL-MCNC: 0.3 MG/DL — SIGNIFICANT CHANGE UP (ref 0.2–1.2)
BILIRUB UR-MCNC: NEGATIVE — SIGNIFICANT CHANGE UP
BILIRUB UR-MCNC: NEGATIVE — SIGNIFICANT CHANGE UP
BLOOD UR QL VISUAL: HIGH
BLOOD UR QL VISUAL: SIGNIFICANT CHANGE UP
BUN SERPL-MCNC: 16 MG/DL — SIGNIFICANT CHANGE UP (ref 7–23)
CALCIUM SERPL-MCNC: 9.1 MG/DL — SIGNIFICANT CHANGE UP (ref 8.4–10.5)
CHLORIDE SERPL-SCNC: 104 MMOL/L — SIGNIFICANT CHANGE UP (ref 98–107)
CO2 SERPL-SCNC: 24 MMOL/L — SIGNIFICANT CHANGE UP (ref 22–31)
COLOR SPEC: SIGNIFICANT CHANGE UP
COLOR SPEC: SIGNIFICANT CHANGE UP
CREAT SERPL-MCNC: 0.52 MG/DL — SIGNIFICANT CHANGE UP (ref 0.5–1.3)
EOSINOPHIL # BLD AUTO: 0.36 K/UL — SIGNIFICANT CHANGE UP (ref 0–0.5)
EOSINOPHIL NFR BLD AUTO: 5.8 % — SIGNIFICANT CHANGE UP (ref 0–6)
EPI CELLS # UR: SIGNIFICANT CHANGE UP
GLUCOSE SERPL-MCNC: 79 MG/DL — SIGNIFICANT CHANGE UP (ref 70–99)
GLUCOSE UR-MCNC: NEGATIVE — SIGNIFICANT CHANGE UP
GLUCOSE UR-MCNC: NEGATIVE — SIGNIFICANT CHANGE UP
HCT VFR BLD CALC: 44.1 % — SIGNIFICANT CHANGE UP (ref 39–50)
HGB BLD-MCNC: 14.2 G/DL — SIGNIFICANT CHANGE UP (ref 13–17)
IMM GRANULOCYTES NFR BLD AUTO: 0.2 % — SIGNIFICANT CHANGE UP (ref 0–1.5)
KETONES UR-MCNC: NEGATIVE — SIGNIFICANT CHANGE UP
KETONES UR-MCNC: NEGATIVE — SIGNIFICANT CHANGE UP
LEUKOCYTE ESTERASE UR-ACNC: SIGNIFICANT CHANGE UP
LEUKOCYTE ESTERASE UR-ACNC: SIGNIFICANT CHANGE UP
LYMPHOCYTES # BLD AUTO: 1.97 K/UL — SIGNIFICANT CHANGE UP (ref 1–3.3)
LYMPHOCYTES # BLD AUTO: 31.5 % — SIGNIFICANT CHANGE UP (ref 13–44)
MAGNESIUM SERPL-MCNC: 2 MG/DL — SIGNIFICANT CHANGE UP (ref 1.6–2.6)
MCHC RBC-ENTMCNC: 28.1 PG — SIGNIFICANT CHANGE UP (ref 27–34)
MCHC RBC-ENTMCNC: 32.2 % — SIGNIFICANT CHANGE UP (ref 32–36)
MCV RBC AUTO: 87.2 FL — SIGNIFICANT CHANGE UP (ref 80–100)
MONOCYTES # BLD AUTO: 0.48 K/UL — SIGNIFICANT CHANGE UP (ref 0–0.9)
MONOCYTES NFR BLD AUTO: 7.7 % — SIGNIFICANT CHANGE UP (ref 2–14)
NEUTROPHILS # BLD AUTO: 3.4 K/UL — SIGNIFICANT CHANGE UP (ref 1.8–7.4)
NEUTROPHILS NFR BLD AUTO: 54.2 % — SIGNIFICANT CHANGE UP (ref 43–77)
NITRITE UR-MCNC: NEGATIVE — SIGNIFICANT CHANGE UP
NITRITE UR-MCNC: NEGATIVE — SIGNIFICANT CHANGE UP
NRBC # FLD: 0 K/UL — SIGNIFICANT CHANGE UP (ref 0–0)
PH UR: 6.5 — SIGNIFICANT CHANGE UP (ref 5–8)
PH UR: 6.5 — SIGNIFICANT CHANGE UP (ref 5–8)
PHOSPHATE SERPL-MCNC: 3.2 MG/DL — SIGNIFICANT CHANGE UP (ref 2.5–4.5)
PLATELET # BLD AUTO: 170 K/UL — SIGNIFICANT CHANGE UP (ref 150–400)
PMV BLD: 10.7 FL — SIGNIFICANT CHANGE UP (ref 7–13)
POTASSIUM SERPL-MCNC: 4.3 MMOL/L — SIGNIFICANT CHANGE UP (ref 3.5–5.3)
POTASSIUM SERPL-SCNC: 4.3 MMOL/L — SIGNIFICANT CHANGE UP (ref 3.5–5.3)
PROT SERPL-MCNC: 7.5 G/DL — SIGNIFICANT CHANGE UP (ref 6–8.3)
PROT UR-MCNC: 20 — SIGNIFICANT CHANGE UP
PROT UR-MCNC: 30 — SIGNIFICANT CHANGE UP
RBC # BLD: 5.06 M/UL — SIGNIFICANT CHANGE UP (ref 4.2–5.8)
RBC # FLD: 16 % — HIGH (ref 10.3–14.5)
RBC CASTS # UR COMP ASSIST: >50 — HIGH (ref 0–?)
RBC CASTS # UR COMP ASSIST: >50 — HIGH (ref 0–?)
SARS-COV-2 RNA SPEC QL NAA+PROBE: SIGNIFICANT CHANGE UP
SODIUM SERPL-SCNC: 139 MMOL/L — SIGNIFICANT CHANGE UP (ref 135–145)
SP GR SPEC: 1.02 — SIGNIFICANT CHANGE UP (ref 1–1.04)
SP GR SPEC: 1.02 — SIGNIFICANT CHANGE UP (ref 1–1.04)
SQUAMOUS # UR AUTO: SIGNIFICANT CHANGE UP
UROBILINOGEN FLD QL: NORMAL — SIGNIFICANT CHANGE UP
UROBILINOGEN FLD QL: NORMAL — SIGNIFICANT CHANGE UP
WBC # BLD: 6.26 K/UL — SIGNIFICANT CHANGE UP (ref 3.8–10.5)
WBC # FLD AUTO: 6.26 K/UL — SIGNIFICANT CHANGE UP (ref 3.8–10.5)
WBC UR QL: >50 — HIGH (ref 0–?)
WBC UR QL: >50 — HIGH (ref 0–?)

## 2020-10-29 PROCEDURE — 99284 EMERGENCY DEPT VISIT MOD MDM: CPT

## 2020-10-29 PROCEDURE — 71045 X-RAY EXAM CHEST 1 VIEW: CPT | Mod: 26

## 2020-10-29 PROCEDURE — 99223 1ST HOSP IP/OBS HIGH 75: CPT | Mod: GC

## 2020-10-29 PROCEDURE — 74177 CT ABD & PELVIS W/CONTRAST: CPT | Mod: 26

## 2020-10-29 PROCEDURE — 71260 CT THORAX DX C+: CPT | Mod: 26

## 2020-10-29 RX ORDER — MORPHINE SULFATE 50 MG/1
4 CAPSULE, EXTENDED RELEASE ORAL EVERY 4 HOURS
Refills: 0 | Status: DISCONTINUED | OUTPATIENT
Start: 2020-10-29 | End: 2020-10-30

## 2020-10-29 RX ORDER — POLYETHYLENE GLYCOL 3350 17 G/17G
17 POWDER, FOR SOLUTION ORAL DAILY
Refills: 0 | Status: DISCONTINUED | OUTPATIENT
Start: 2020-10-29 | End: 2020-10-30

## 2020-10-29 RX ORDER — ERTAPENEM SODIUM 1 G/1
1000 INJECTION, POWDER, LYOPHILIZED, FOR SOLUTION INTRAMUSCULAR; INTRAVENOUS ONCE
Refills: 0 | Status: COMPLETED | OUTPATIENT
Start: 2020-10-29 | End: 2020-10-29

## 2020-10-29 RX ORDER — MORPHINE SULFATE 50 MG/1
4 CAPSULE, EXTENDED RELEASE ORAL ONCE
Refills: 0 | Status: DISCONTINUED | OUTPATIENT
Start: 2020-10-29 | End: 2020-10-29

## 2020-10-29 RX ORDER — INFLUENZA VIRUS VACCINE 15; 15; 15; 15 UG/.5ML; UG/.5ML; UG/.5ML; UG/.5ML
0.5 SUSPENSION INTRAMUSCULAR ONCE
Refills: 0 | Status: COMPLETED | OUTPATIENT
Start: 2020-10-29 | End: 2020-10-29

## 2020-10-29 RX ORDER — IBUPROFEN 200 MG
400 TABLET ORAL EVERY 6 HOURS
Refills: 0 | Status: DISCONTINUED | OUTPATIENT
Start: 2020-10-29 | End: 2020-11-03

## 2020-10-29 RX ADMIN — ERTAPENEM SODIUM 120 MILLIGRAM(S): 1 INJECTION, POWDER, LYOPHILIZED, FOR SOLUTION INTRAMUSCULAR; INTRAVENOUS at 18:02

## 2020-10-29 RX ADMIN — MORPHINE SULFATE 4 MILLIGRAM(S): 50 CAPSULE, EXTENDED RELEASE ORAL at 20:58

## 2020-10-29 RX ADMIN — MORPHINE SULFATE 4 MILLIGRAM(S): 50 CAPSULE, EXTENDED RELEASE ORAL at 16:03

## 2020-10-29 RX ADMIN — MORPHINE SULFATE 4 MILLIGRAM(S): 50 CAPSULE, EXTENDED RELEASE ORAL at 22:19
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
## 2020-10-29 NOTE — ED PROVIDER NOTE - PROGRESS NOTE DETAILS
Dr. Tapia; Patient transported to CT earlier and refused, prolonging his work-up.  The importance of this imaging was discussed with him and he now accepts.  CT called to expedite study. Urine consistent with infection, although chronically nitrate positive the patient's symptoms are consistent with active infection.  Patient signed out pending CT with plan for admission with coverage for pneumonia if consolidation is present, admission with ertapenem for coverage for proteus given extensive resistance is noted in prior urine cultures. Jayashree Modi, resident MD: no intra-abdominal pathologies on CT. will treat with ertapenam and admit for further IV abx. pt is aware of plan.

## 2020-10-29 NOTE — H&P ADULT - PROBLEM SELECTOR PLAN 3
Patient on bowel regiment at home: Senna, Colace and Miralax    Plan:   - continue Miralax and senna Cough, congestion and rhinorrhea  Patient afebrile, no leukocytosis, no significant signs of pneumonia on CXR or CT chest  Patient refused lung exam  Saturating well on room air  COVID PCR negative     Plan:   Treat symptomatically   - Mucinex for congestion  - Tessalon Perle for cough   - Will monitor vitals and labs for signs of worsening infection

## 2020-10-29 NOTE — H&P ADULT - NSHPLABSRESULTS_GEN_ALL_CORE
14.2   6.26  )-----------( 170      ( 29 Oct 2020 14:07 )             44.1     Auto Eosinophil # 0.36  / Auto Eosinophil % 5.8   / Auto Neutrophil # 3.40  / Auto Neutrophil % 54.2  / BANDS % x        10-29    139  |  104  |  16  ----------------------------<  79  4.3   |  24  |  0.52    Ca    9.1      29 Oct 2020 14:07  Mg     2.0     10-29  Phos  3.2     10-29  TPro  7.5  /  Alb  4.1  /  TBili  0.3  /  DBili  x   /  AST  25  /  ALT  28  /  AlkPhos  68  10-29          Urinalysis Basic - ( 29 Oct 2020 16:10 )    Color: DARK YELLOW / Appearance: TURBID / S.016 / pH: 6.5  Gluc: NEGATIVE / Ketone: NEGATIVE  / Bili: NEGATIVE / Urobili: NORMAL   Blood: LARGE / Protein: 20 / Nitrite: NEGATIVE   Leuk Esterase: LARGE / RBC: >50 / WBC >50   Sq Epi: OCC / Non Sq Epi: x / Bacteria: MANY      EXAM:  XR CHEST PORTABLE URGENT 1V    PROCEDURE DATE:  Oct 29 2020   INTERPRETATION:  CLINICAL INDICATION: back pain    EXAM:  Single frontal chest from 10/29/2020 at 1308. Compared to prior study from 2019.    Projection lordotic.    IMPRESSION:  Implanted loop recorder again seen over medial left hemithorax.  Markedly underinflated but otherwise grossly clear lungs. No pleural effusions or pneumothorax.  Prominent appearing cardiac and mediastinal silhouettes however inaccurately assessed on the projection.  Trachea midline.  Unremarkable osseous structures.      EXAM:  CT ABDOMEN AND PELVIS IC    EXAM:  CT CHEST IC    PROCEDURE DATE:  Oct 29 2020   INTERPRETATION:  CLINICAL INFORMATION: Respiratory illness. Nondiagnostic x-ray.COMPARISON: None.    FINDINGS:  CHEST:  LUNGS AND LARGE AIRWAYS: Patent central airways. Small pneumatoceles in the right upper lobe. Dependent atelectasis. No evidence of focal pneumonia.  PLEURA: No pleural effusion.  VESSELS: Within normal limits.  HEART: Heart size is normal. No pericardial effusion.  MEDIASTINUM AND LANDON: No lymphadenopathy.  CHEST WALL AND LOWER NECK: Within normal limits.    ABDOMEN AND PELVIS:  LIVER: Within normal limits.  BILE DUCTS: Normal caliber.  GALLBLADDER: Within normal limits.  SPLEEN: Within normal limits.  PANCREAS: Within normal limits.  ADRENALS: Within normal limits.  KIDNEYS/URETERS: Right nephrectomy. Nonobstructing left renal stones. No hydronephrosis.    BLADDER: Nonspecific bladder wall thickening.  REPRODUCTIVE ORGANS: Prostate within normal limits.    BOWEL: No bowel obstruction. Appendix is normal. Large amount of stool in the colon and rectum.  PERITONEUM: No ascites.  VESSELS: IVC filter.  RETROPERITONEUM/LYMPH NODES: No lymphadenopathy.  ABDOMINAL WALL: Within normal limits.  BONES: Within normal limits.    IMPRESSION:  *  Minor atelectasis in both lungs. No evidence of focal pneumonia.  *  Postoperative changes status post right nephrectomy.  *  Nonspecific thickening of the bladder wall which may reflect chronic changes of bladder outlet obstruction. Cystitis may have similar appearance. Correlate with urinalysis. I have personally reviewed this patient's labs below:    14.2   6.26  )-----------( 170      ( 29 Oct 2020 14:07 )             44.1     Auto Eosinophil # 0.36  / Auto Eosinophil % 5.8   / Auto Neutrophil # 3.40  / Auto Neutrophil % 54.2  / BANDS % x        10-29    139  |  104  |  16  ----------------------------<  79  4.3   |  24  |  0.52    Ca    9.1      29 Oct 2020 14:07  Mg     2.0     10-29  Phos  3.2     10-29  TPro  7.5  /  Alb  4.1  /  TBili  0.3  /  DBili  x   /  AST  25  /  ALT  28  /  AlkPhos  68  10-29          Urinalysis Basic - ( 29 Oct 2020 16:10 )    Color: DARK YELLOW / Appearance: TURBID / S.016 / pH: 6.5  Gluc: NEGATIVE / Ketone: NEGATIVE  / Bili: NEGATIVE / Urobili: NORMAL   Blood: LARGE / Protein: 20 / Nitrite: NEGATIVE   Leuk Esterase: LARGE / RBC: >50 / WBC >50   Sq Epi: OCC / Non Sq Epi: x / Bacteria: MANY      I have personally reviewed this patient's CXR and my independent interpretation is underinflated lungs, ILR left sided, no focal consolidation    Imaging reviewed below:    EXAM:  XR CHEST PORTABLE URGENT 1V    PROCEDURE DATE:  Oct 29 2020   INTERPRETATION:  CLINICAL INDICATION: back pain    EXAM:  Single frontal chest from 10/29/2020 at 1308. Compared to prior study from 2019.    Projection lordotic.    IMPRESSION:  Implanted loop recorder again seen over medial left hemithorax.  Markedly underinflated but otherwise grossly clear lungs. No pleural effusions or pneumothorax.  Prominent appearing cardiac and mediastinal silhouettes however inaccurately assessed on the projection.  Trachea midline.  Unremarkable osseous structures.      EXAM:  CT ABDOMEN AND PELVIS IC    EXAM:  CT CHEST IC    PROCEDURE DATE:  Oct 29 2020   INTERPRETATION:  CLINICAL INFORMATION: Respiratory illness. Nondiagnostic x-ray.COMPARISON: None.    IMPRESSION:  *  Minor atelectasis in both lungs. No evidence of focal pneumonia.  *  Postoperative changes status post right nephrectomy.  *  Nonspecific thickening of the bladder wall which may reflect chronic changes of bladder outlet obstruction. Cystitis may have similar appearance. Correlate with urinalysis.        Summation or prior records below:  UCx 2019 with proteus ESBL sensitive to ertapenem     CT A/P 2020  IMPRESSION:  Bilateral lower lobe consolidation with volume loss and air bronchograms, progressed from 2020.  The findings may reflect atelectasis.  Superimposed pneumonia should be excluded clinically.    Mild bladder wall thickening.  Cystitis should be excluded clinically.    Large amount of stool in the rectosigmoid with mild wall thickening and surrounding inflammatory change.  The rectum is distended with stool to 8 cm.  Correlate clinically for fecal impaction and stercoral colitis.    Grossly stable soft tissue attenuation extending from the skin surface to the ischial tuberosities bilaterally, greater trochanters of the femurs bilaterally and sacrum.  Decubitus ulcer should be excluded clinically.  No soft tissue gas or bony destruction is visualized.
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
## 2020-10-29 NOTE — ED PROVIDER NOTE - PHYSICAL EXAMINATION
Reji CALIX MD PGY3:   PHYSICAL EXAM:    GENERAL: NAD, well-developed  HEENT:  Atraumatic, Normocephalic  CHEST/LUNG: Chest rise equal bilaterally. CTAB. Coughing in room   HEART: Regular rate and rhythm. No murmurs or rubs.   ABDOMEN: Distended, soft, nontender  EXTREMITIES:  2+ Peripheral Pulses.  PSYCH: A&Ox3  SKIN: No obvious rashes or lesions
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
## 2020-10-29 NOTE — H&P ADULT - PROBLEM SELECTOR PLAN 4
On Tizanidine 4 mg bid for muscle spasticity    Plan:   - continue Tizanidine 4 mg bid Chronic back pain from paraplegia   On Tizanidine 4 mg bid for muscle spasticity    Plan:   - continue Tizanidine 4 mg bid  - Ibuprofen PRN for pain
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
## 2020-10-29 NOTE — ED PROVIDER NOTE - PSH
Calculus of kidney  bilateral nephrostomy tubes placed 1/2019 @[facility]  H/O right nephrectomy    Open wound of neck, sequela
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
## 2020-10-29 NOTE — H&P ADULT - NSHPPHYSICALEXAM_GEN_ALL_CORE
PHYSICAL EXAM:  GENERAL: NAD, lying in bed comfortably  HEAD:  Atraumatic, Normocephalic  EYES: EOMI, PERRLA, conjunctiva and sclera clear  ENT: Moist mucous membranes  NECK: Supple, No JVD  CHEST/LUNG: Clear to auscultation bilaterally; No rales, rhonchi, wheezing, or rubs. Unlabored respirations  HEART: Regular rate and rhythm; No murmurs, rubs, or gallops  ABDOMEN: Bowel sounds present; Soft, Nontender, Nondistended. No hepatomegally  EXTREMITIES:  2+ Peripheral Pulses, brisk capillary refill. No clubbing, cyanosis, or edema  NERVOUS SYSTEM:  Alert & Oriented X3, speech clear. No deficits   MSK: FROM all 4 extremities, full and equal strength  SKIN: No rashes or lesions PHYSICAL EXAM:  GENERAL: NAD, lying in bed comfortably, quadriplegic, decerebrate UE.  Patient refused lung and abdomen exam.    HEAD:  Atraumatic, Normocephalic  EYES: EOMI  ENT: (+)Rhinorrhea and congestion Moist mucous membranes.   NECK: Supple,   CHEST/LUNG: On room air. Unlabored respirations  HEART: no LE edema present   ABDOMEN: Abdomen distended, nontender to palpation.  : chronic lindsey draining red colored urine   EXTREMITIES:  No LE edema, paralysis of upper and lower extremities b/l. Decerebrate UE (hyperextended)  NERVOUS SYSTEM:  Alert & Oriented X3, speech clear. Paralysis of UE and LE, decorticate UE (hyperextended)  MSK: paralysis of all extremities  SKIN: stage II sacral ulcer PHYSICAL EXAM:    ICU Vital Signs Last 24 Hrs  T(C): 36.3 (29 Oct 2020 20:54), Max: 36.5 (29 Oct 2020 11:41)  T(F): 97.3 (29 Oct 2020 20:54), Max: 97.7 (29 Oct 2020 11:41)  HR: 63 (29 Oct 2020 20:54) (63 - 73)  BP: 137/94 (29 Oct 2020 20:54) (132/91 - 148/90)  RR: 18 (29 Oct 2020 20:54) (16 - 18)  SpO2: 100% (29 Oct 2020 20:54) (96% - 100%)    GENERAL: NAD, lying in bed comfortably, quadriplegic, decerebrate UE.  Patient refused lung and abdomen exam.    HEAD:  Atraumatic, Normocephalic  EYES: EOMI  ENT: (+)Rhinorrhea and congestion Moist mucous membranes.   NECK: Supple,   CHEST/LUNG: On room air. Unlabored respirations  HEART: no LE edema present   ABDOMEN: Abdomen distended, nontender to palpation.  : chronic lindsey draining red colored urine   EXTREMITIES:  No LE edema, paralysis of upper and lower extremities b/l. Decerebrate UE (hyperextended)  NERVOUS SYSTEM:  Alert & Oriented X3, speech clear. Paralysis of UE and LE, decorticate UE (hyperextended)  MSK: paralysis of all extremities  SKIN: stage II sacral ulcer PHYSICAL EXAM:    ICU Vital Signs Last 24 Hrs  T(C): 36.3 (29 Oct 2020 20:54), Max: 36.5 (29 Oct 2020 11:41)  T(F): 97.3 (29 Oct 2020 20:54), Max: 97.7 (29 Oct 2020 11:41)  HR: 63 (29 Oct 2020 20:54) (63 - 73)  BP: 137/94 (29 Oct 2020 20:54) (132/91 - 148/90)  RR: 18 (29 Oct 2020 20:54) (16 - 18)  SpO2: 100% (29 Oct 2020 20:54) (96% - 100%)    GENERAL: NAD, lying in bed comfortably, quadriplegic, decerebrate UE.  Patient refused lung, abdomen,  exam.    HEAD:  Atraumatic, Normocephalic  EYES: EOMI  ENT: (+)Rhinorrhea and congestion Moist mucous membranes.   NECK: Supple,   CHEST/LUNG: On room air. Unlabored respirations  HEART: no LE edema present   ABDOMEN: Abdomen significantly distended, nontender to palpation.  : condom catheter draining red colored urine; no clots present. Refused rest of exam  EXTREMITIES:  No LE edema, paralysis of upper and lower extremities b/l. Decerebrate UE (hyperextended)  NERVOUS SYSTEM:  Alert & Oriented X3, speech clear. Paralysis of UE and LE, decorticate UE (hyperextended)  MSK: paralysis of all extremities  SKIN: superficial ulcer on R buttock PHYSICAL EXAM:    ICU Vital Signs Last 24 Hrs  T(C): 36.3 (29 Oct 2020 20:54), Max: 36.5 (29 Oct 2020 11:41)  T(F): 97.3 (29 Oct 2020 20:54), Max: 97.7 (29 Oct 2020 11:41)  HR: 63 (29 Oct 2020 20:54) (63 - 73)  BP: 137/94 (29 Oct 2020 20:54) (132/91 - 148/90)  RR: 18 (29 Oct 2020 20:54) (16 - 18)  SpO2: 100% (29 Oct 2020 20:54) (96% - 100%)    GENERAL: NAD, lying in bed comfortably, quadriplegic, decerebrate UE.     HEAD:  Atraumatic, Normocephalic  EYES: EOMI, conjunctiva clear  ENT: (+)Rhinorrhea and congestion Moist mucous membranes.   NECK: Supple, no JVD  CHEST/LUNG: On room air. Unlabored respirations  HEART: nl S1 S2, no murmurs, rubs or gallops, no edema  ABDOMEN: Abdomen significantly distended, nontender to palpation, +BS  : condom catheter draining red colored urine; no clots present. Refused rest of exam  EXTREMITIES:  No LE edema, paralysis of upper and lower extremities b/l. Decerebrate UE (hyperextended)  NERVOUS SYSTEM:  Alert & Oriented X3, speech clear. Paralysis of UE and LE, decorticate UE (hyperextended)  MSK: paralysis of all extremities  SKIN: superficial ulcer on R buttock
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
## 2020-10-29 NOTE — H&P ADULT - PROBLEM SELECTOR PLAN 1
U/A x2 positive for RBC, WBC, protein, leuk esterase, many bacteria. negative nitrites.   Hx of ESBL proteus UTI on 11/23/19  s/p ertapenem 1gx1 in ED    Plan:   - follow urine cx  - continue ertapenem 1 g daily until urine cx results U/A x2 positive for RBC, WBC, protein, leuk esterase, many bacteria. negative nitrites.   Hx of ESBL proteus UTI on 11/23/19  s/p ertapenem 1gx1 in ED    Plan:   - follow urine cx  - continue ertapenem 1 g daily until urine cx results  For patients at risk for MDR infection, can continue daily ertapenem pending culture and susceptibility results for UTI. U/A x2 positive for RBC, WBC, protein, leuk esterase, many bacteria. negative nitrites.   Patient has chronic lindsey in place   Hx of ESBL proteus UTI on 11/23/19  s/p ertapenem 1gx1 in ED    Plan:   - follow urine cx  - continue ertapenem 1 g daily until urine cx results  For patients at risk for MDR infection, can continue daily ertapenem pending culture and susceptibility results for UTI. U/A x2 positive for RBC, WBC, protein, leuk esterase, many bacteria. negative nitrites.   Patient has chronic lindsey in place   Hx of ESBL proteus UTI on 11/23/19  s/p ertapenem 1gx1 in ED    Plan:   - follow urine cx  - continue ertapenem 1 g daily until urine cx results  - For patients at risk for MDR infection, can continue daily ertapenem pending culture and susceptibility results for UTI.  - Consult ID AM U/A x2 positive for RBC, WBC, protein, leuk esterase, many bacteria. negative nitrites.   Patient has chronic condom cath and multiple admissions for UTI in past  Hx of ESBL proteus UTI on 11/23/19  s/p ertapenem 1gx1 in ED  Has subjective chills and abdominal discomfort (though may be related to constipation/distension), no clear dysuria, polyuria but with hematuria. May have chronic colonization of urine. Frequently has contaminated samples as well. Unclear if truly with acute infection given afebrile, no leukocytosis, no suprapubic tenderness but given chills/abdominal pain will treat with ertapenem for now, pending ID consult    Plan:   - follow urine cx  - continue ertapenem 1 g daily until urine cx results  - For patients at risk for MDR infection, can continue daily ertapenem pending culture and susceptibility results for UTI.  - Consult ID AM

## 2020-10-29 NOTE — H&P ADULT - PROBLEM SELECTOR PROBLEM 1
Urinary tract infection associated with nephrostomy catheter, initial encounter Urinary tract infection with hematuria, site unspecified

## 2020-10-29 NOTE — H&P ADULT - NSHPREVIEWOFSYSTEMS_GEN_ALL_CORE
CONSTITUTIONAL:  No weight loss, fever, chills, weakness or fatigue.  HEENT:  Eyes:  No visual loss, blurred vision, double vision or yellow sclerae. Ears, Nose, Throat:  No hearing loss, sneezing, congestion, runny nose or sore throat.  SKIN:  No rash or itching.  CARDIOVASCULAR:  No chest pain, chest pressure or chest discomfort. No palpitations.  RESPIRATORY:  No shortness of breath, cough or sputum.  GASTROINTESTINAL:  No anorexia, nausea, vomiting or diarrhea. No abdominal pain or blood.  GENITOURINARY:  Denies hematuria, dysuria.   NEUROLOGICAL:  No headache, dizziness, syncope, paralysis, ataxia, numbness or tingling in the extremities. No change in bowel or bladder control.  MUSCULOSKELETAL:  No muscle, back pain, joint pain or stiffness.  HEMATOLOGIC:  No anemia, bleeding or bruising.  LYMPHATICS:  No enlarged nodes.   PSYCHIATRIC:  No history of depression or anxiety.  ENDOCRINOLOGIC:  No reports of sweating, cold or heat intolerance. No polyuria or polydipsia.  ALLERGIES:  No history of asthma, hives, eczema or rhinitis. CONSTITUTIONAL:  (+) chills No weight loss, fever, weakness or fatigue.  HEENT:  Eyes:  No visual loss, blurred vision, double vision or yellow sclerae.   Ears, Nose, Throat: (+) congestion No hearing loss, sneezing, runny nose or sore throat.  SKIN:  No rash or itching.  CARDIOVASCULAR:  No chest pain, chest pressure or chest discomfort. No palpitations.  RESPIRATORY: (+) cough No shortness of breath, or sputum.  GASTROINTESTINAL:  No anorexia, nausea, vomiting or diarrhea. No abdominal pain or blood.  GENITOURINARY:  Denies hematuria, dysuria.   NEUROLOGICAL: (+) paralysis No headache, dizziness, syncope,  ataxia, numbness or tingling in the extremities. No change in bowel or bladder control.  MUSCULOSKELETAL:  (+) back pain No muscle, joint pain or stiffness. CONSTITUTIONAL:  (+) chills No weight loss, fever, weakness or fatigue.  HEENT:  Eyes:  No visual loss, blurred vision, double vision or yellow sclerae.   Ears, Nose, Throat: (+) congestion (+) runny nose No hearing loss, sneezing,  or sore throat.  SKIN:  No rash or itching.  CARDIOVASCULAR:  No chest pain, chest pressure or chest discomfort. No palpitations.  RESPIRATORY: (+) cough No shortness of breath, or sputum.  GASTROINTESTINAL:  (+) abdominal discomfort No anorexia, nausea, vomiting or diarrhea. No abdominal pain or blood.  GENITOURINARY:  (+) hematuria Denies dysuria.   NEUROLOGICAL: (+) paralysis No headache, dizziness, syncope,  ataxia, numbness or tingling in the extremities. No change in bowel or bladder control.  MUSCULOSKELETAL:  (+) back pain ROS:    Constitutional: [ ] fevers [x ] chills   HEENT: [ ] dry eyes [ ] eye irritation [ ] postnasal drip [ x] nasal congestion  CV: [ ] chest pain [ ] orthopnea [ ] palpitations [ ] murmur  Resp: [ x] cough [ ] shortness of breath [ ] dyspnea  GI: [ ] nausea [ ] vomiting [ ] diarrhea [ ] constipation [ x] abd pain [ ] dysphagia   : [ ] dysuria [ ] nocturia [x ] hematuria [ ] increased urinary frequency  Musculoskeletal: [ x] back pain [ ] myalgias [ ] arthralgias [ ] fracture  Skin: [ ] rash [ ] itch  Neurological: [ ] headache [ ] dizziness [ ] syncope [x ] paralysis [ ] numbness  Psychiatric: [ ] anxiety [ ] depression  Endocrine: [ ] diabetes [ ] thyroid problem  Hematologic/Lymphatic: [ ] anemia [ ] bleeding problem  Allergic/Immunologic: [ ] itchy eyes [ ] nasal discharge [ ] hives [ ] angioedema  [x ] All other systems negative

## 2020-10-29 NOTE — H&P ADULT - PROBLEM SELECTOR PLAN 6
Hx of DVT 4/17/19  On Elliquis 5 mg bid according to patient    Plan:   - confirm med rec with pharmacy in AM  - continue elliquis 5 mg bid for now

## 2020-10-29 NOTE — H&P ADULT - PROBLEM SELECTOR PLAN 2
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
## 2020-10-30 DIAGNOSIS — R82.90 UNSPECIFIED ABNORMAL FINDINGS IN URINE: ICD-10-CM

## 2020-10-30 DIAGNOSIS — Z86.718 PERSONAL HISTORY OF OTHER VENOUS THROMBOSIS AND EMBOLISM: ICD-10-CM

## 2020-10-30 DIAGNOSIS — R10.84 GENERALIZED ABDOMINAL PAIN: ICD-10-CM

## 2020-10-30 DIAGNOSIS — J06.9 ACUTE UPPER RESPIRATORY INFECTION, UNSPECIFIED: ICD-10-CM

## 2020-10-30 DIAGNOSIS — G82.20 PARAPLEGIA, UNSPECIFIED: ICD-10-CM

## 2020-10-30 DIAGNOSIS — N40.0 BENIGN PROSTATIC HYPERPLASIA WITHOUT LOWER URINARY TRACT SYMPTOMS: ICD-10-CM

## 2020-10-30 DIAGNOSIS — N39.0 URINARY TRACT INFECTION, SITE NOT SPECIFIED: ICD-10-CM

## 2020-10-30 DIAGNOSIS — K59.00 CONSTIPATION, UNSPECIFIED: ICD-10-CM

## 2020-10-30 LAB
B PERT DNA SPEC QL NAA+PROBE: SIGNIFICANT CHANGE UP
C PNEUM DNA SPEC QL NAA+PROBE: SIGNIFICANT CHANGE UP
FLUAV H1 2009 PAND RNA SPEC QL NAA+PROBE: SIGNIFICANT CHANGE UP
FLUAV H1 RNA SPEC QL NAA+PROBE: SIGNIFICANT CHANGE UP
FLUAV H3 RNA SPEC QL NAA+PROBE: SIGNIFICANT CHANGE UP
FLUAV SUBTYP SPEC NAA+PROBE: SIGNIFICANT CHANGE UP
FLUBV RNA SPEC QL NAA+PROBE: SIGNIFICANT CHANGE UP
HADV DNA SPEC QL NAA+PROBE: SIGNIFICANT CHANGE UP
HCOV PNL SPEC NAA+PROBE: SIGNIFICANT CHANGE UP
HMPV RNA SPEC QL NAA+PROBE: SIGNIFICANT CHANGE UP
HPIV1 RNA SPEC QL NAA+PROBE: SIGNIFICANT CHANGE UP
HPIV2 RNA SPEC QL NAA+PROBE: SIGNIFICANT CHANGE UP
HPIV3 RNA SPEC QL NAA+PROBE: SIGNIFICANT CHANGE UP
HPIV4 RNA SPEC QL NAA+PROBE: SIGNIFICANT CHANGE UP
RAPID RVP RESULT: DETECTED
RV+EV RNA SPEC QL NAA+PROBE: DETECTED
SARS-COV-2 RNA SPEC QL NAA+PROBE: SIGNIFICANT CHANGE UP

## 2020-10-30 PROCEDURE — 99254 IP/OBS CNSLTJ NEW/EST MOD 60: CPT

## 2020-10-30 PROCEDURE — 99233 SBSQ HOSP IP/OBS HIGH 50: CPT

## 2020-10-30 PROCEDURE — 99222 1ST HOSP IP/OBS MODERATE 55: CPT | Mod: GC

## 2020-10-30 RX ORDER — APIXABAN 2.5 MG/1
5 TABLET, FILM COATED ORAL
Refills: 0 | Status: DISCONTINUED | OUTPATIENT
Start: 2020-10-30 | End: 2020-11-03

## 2020-10-30 RX ORDER — GLYCERIN ADULT
1 SUPPOSITORY, RECTAL RECTAL ONCE
Refills: 0 | Status: COMPLETED | OUTPATIENT
Start: 2020-10-30 | End: 2020-10-30

## 2020-10-30 RX ORDER — ERTAPENEM SODIUM 1 G/1
1000 INJECTION, POWDER, LYOPHILIZED, FOR SOLUTION INTRAMUSCULAR; INTRAVENOUS EVERY 24 HOURS
Refills: 0 | Status: DISCONTINUED | OUTPATIENT
Start: 2020-10-30 | End: 2020-11-03

## 2020-10-30 RX ORDER — DOCUSATE SODIUM 100 MG
1 CAPSULE ORAL
Qty: 0 | Refills: 0 | DISCHARGE

## 2020-10-30 RX ORDER — SENNA PLUS 8.6 MG/1
2 TABLET ORAL AT BEDTIME
Refills: 0 | Status: DISCONTINUED | OUTPATIENT
Start: 2020-10-30 | End: 2020-11-03

## 2020-10-30 RX ORDER — TAMSULOSIN HYDROCHLORIDE 0.4 MG/1
0.4 CAPSULE ORAL AT BEDTIME
Refills: 0 | Status: DISCONTINUED | OUTPATIENT
Start: 2020-10-30 | End: 2020-11-03

## 2020-10-30 RX ORDER — TIZANIDINE 4 MG/1
4 TABLET ORAL
Refills: 0 | Status: DISCONTINUED | OUTPATIENT
Start: 2020-10-30 | End: 2020-11-03

## 2020-10-30 RX ORDER — POLYETHYLENE GLYCOL 3350 17 G/17G
17 POWDER, FOR SOLUTION ORAL
Refills: 0 | Status: DISCONTINUED | OUTPATIENT
Start: 2020-10-30 | End: 2020-11-03

## 2020-10-30 RX ADMIN — Medication 100 MILLIGRAM(S): at 23:25

## 2020-10-30 RX ADMIN — Medication 100 MILLIGRAM(S): at 06:55

## 2020-10-30 RX ADMIN — Medication 400 MILLIGRAM(S): at 12:00

## 2020-10-30 RX ADMIN — ERTAPENEM SODIUM 120 MILLIGRAM(S): 1 INJECTION, POWDER, LYOPHILIZED, FOR SOLUTION INTRAMUSCULAR; INTRAVENOUS at 18:23

## 2020-10-30 RX ADMIN — APIXABAN 5 MILLIGRAM(S): 2.5 TABLET, FILM COATED ORAL at 06:56

## 2020-10-30 RX ADMIN — SENNA PLUS 2 TABLET(S): 8.6 TABLET ORAL at 21:30

## 2020-10-30 RX ADMIN — Medication 600 MILLIGRAM(S): at 10:10

## 2020-10-30 RX ADMIN — Medication 400 MILLIGRAM(S): at 21:09

## 2020-10-30 RX ADMIN — TIZANIDINE 4 MILLIGRAM(S): 4 TABLET ORAL at 20:10

## 2020-10-30 RX ADMIN — Medication 1 SUPPOSITORY(S): at 11:20

## 2020-10-30 RX ADMIN — Medication 400 MILLIGRAM(S): at 03:41

## 2020-10-30 RX ADMIN — Medication 400 MILLIGRAM(S): at 11:20

## 2020-10-30 RX ADMIN — TAMSULOSIN HYDROCHLORIDE 0.4 MILLIGRAM(S): 0.4 CAPSULE ORAL at 21:30

## 2020-10-30 RX ADMIN — TIZANIDINE 4 MILLIGRAM(S): 4 TABLET ORAL at 10:10

## 2020-10-30 RX ADMIN — Medication 400 MILLIGRAM(S): at 20:09

## 2020-10-30 RX ADMIN — Medication 400 MILLIGRAM(S): at 04:31

## 2020-10-30 RX ADMIN — POLYETHYLENE GLYCOL 3350 17 GRAM(S): 17 POWDER, FOR SOLUTION ORAL at 11:21

## 2020-10-30 RX ADMIN — APIXABAN 5 MILLIGRAM(S): 2.5 TABLET, FILM COATED ORAL at 18:25

## 2020-10-30 NOTE — CHART NOTE - NSCHARTNOTEFT_GEN_A_CORE
House ID called for consult for concern of MDR UTI, UA x2 positive. Will f/u      Yfn Enciso  pager- [phone]

## 2020-10-30 NOTE — PROGRESS NOTE ADULT - SUBJECTIVE AND OBJECTIVE BOX
[facility] Division of Hospital Medicine  Clarissa Teixeira MD  Pager [phone]    Patient is a 45y old  Male who presents with a chief complaint of Abdominal pain      SUBJECTIVE / OVERNIGHT EVENTS: reports still abd fullness/discomfort; last BM 3 days ago; pt states it was large volume      MEDICATIONS  (STANDING):  apixaban 5 milliGRAM(s) Oral two times a day  ertapenem  IVPB 1000 milliGRAM(s) IV Intermittent every 24 hours  influenza   Vaccine 0.5 milliLiter(s) IntraMuscular once  polyethylene glycol 3350 17 Gram(s) Oral two times a day  senna 2 Tablet(s) Oral at bedtime  tamsulosin 0.4 milliGRAM(s) Oral at bedtime  tiZANidine 4 milliGRAM(s) Oral <User Schedule>    MEDICATIONS  (PRN):  benzonatate 100 milliGRAM(s) Oral three times a day PRN Cough  guaiFENesin  milliGRAM(s) Oral every 12 hours PRN congestion  ibuprofen  Tablet. 400 milliGRAM(s) Oral every 6 hours PRN Temp greater or equal to 38C (100.4F), Mild Pain (1 - 3), Moderate Pain (4 - 6)      PHYSICAL EXAM:  Vital Signs Last 24 Hrs  T(F): 97.5 (29 Oct 2020 23:15), Max: 97.5 (29 Oct 2020 23:15)  HR: 65 (29 Oct 2020 23:15) (63 - 73)  BP: 129/87 (29 Oct 2020 23:15) (129/87 - 137/94)  RR: 18 (29 Oct 2020 23:15) (16 - 18)  SpO2: 100% (29 Oct 2020 23:15) (98% - 100%)    CONSTITUTIONAL: NAD  RESPIRATORY: Normal respiratory effort; lungs are clear to auscultation ant  CARDIOVASCULAR: Regular rate and rhythm; No lower extremity edema;   ABDOMEN: Nontender to palpation, normoactive bowel sounds, softly distended  MUSCULOSKELETAL: no joint swelling or tenderness to palpation  PSYCH: affect appropriate  NEUROLOGY: b/l LE flaccid       LABS:

## 2020-10-30 NOTE — PHARMACOTHERAPY INTERVENTION NOTE - COMMENTS
Medication history is complete. Medication list updated in Outpatient Medication Record ([facility]). Please call spectra [phone] if you have any questions.

## 2020-10-30 NOTE — CONSULT NOTE ADULT - SUBJECTIVE AND OBJECTIVE BOX
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 14:07)    10-29    139  |  104  |  16  ----------------------------<  79  4.3   |  24  |  0.52    Ca    9.1      29 Oct 2020 14:07  Phos  3.2     10-29  Mg     2.0     10-29    TPro  7.5  /  Alb  4.1  /  TBili  0.3  /  DBili  x   /  AST  25  /  ALT  28  /  AlkPhos  68  10-29    LIVER FUNCTIONS - ( 29 Oct 2020 14:07 )  Alb: 4.1 g/dL / Pro: 7.5 g/dL / ALK PHOS: 68 u/L / ALT: 28 u/L / AST: 25 u/L / GGT: x             Urinalysis Basic - ( 29 Oct 2020 16:10 )    Color: DARK YELLOW / Appearance: TURBID / S.016 / pH: 6.5  Gluc: NEGATIVE / Ketone: NEGATIVE  / Bili: NEGATIVE / Urobili: NORMAL   Blood: LARGE / Protein: 20 / Nitrite: NEGATIVE   Leuk Esterase: LARGE / RBC: >50 / WBC >50   Sq Epi: OCC / Non Sq Epi: x / Bacteria: MANY                              14.2   6.26  )-----------( 170      ( 29 Oct 2020 14:07 )             44.1
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
## 2020-10-31 DIAGNOSIS — B34.8 OTHER VIRAL INFECTIONS OF UNSPECIFIED SITE: ICD-10-CM

## 2020-10-31 PROCEDURE — 99232 SBSQ HOSP IP/OBS MODERATE 35: CPT

## 2020-10-31 RX ORDER — KETOROLAC TROMETHAMINE 30 MG/ML
15 SYRINGE (ML) INJECTION ONCE
Refills: 0 | Status: DISCONTINUED | OUTPATIENT
Start: 2020-10-31 | End: 2020-10-31

## 2020-10-31 RX ADMIN — Medication 15 MILLIGRAM(S): at 04:40

## 2020-10-31 RX ADMIN — Medication 600 MILLIGRAM(S): at 21:31

## 2020-10-31 RX ADMIN — POLYETHYLENE GLYCOL 3350 17 GRAM(S): 17 POWDER, FOR SOLUTION ORAL at 17:47

## 2020-10-31 RX ADMIN — TIZANIDINE 4 MILLIGRAM(S): 4 TABLET ORAL at 21:09

## 2020-10-31 RX ADMIN — Medication 600 MILLIGRAM(S): at 07:18

## 2020-10-31 RX ADMIN — POLYETHYLENE GLYCOL 3350 17 GRAM(S): 17 POWDER, FOR SOLUTION ORAL at 07:18

## 2020-10-31 RX ADMIN — ERTAPENEM SODIUM 120 MILLIGRAM(S): 1 INJECTION, POWDER, LYOPHILIZED, FOR SOLUTION INTRAMUSCULAR; INTRAVENOUS at 17:43

## 2020-10-31 RX ADMIN — TAMSULOSIN HYDROCHLORIDE 0.4 MILLIGRAM(S): 0.4 CAPSULE ORAL at 21:09

## 2020-10-31 RX ADMIN — TIZANIDINE 4 MILLIGRAM(S): 4 TABLET ORAL at 09:10

## 2020-10-31 RX ADMIN — SENNA PLUS 2 TABLET(S): 8.6 TABLET ORAL at 21:09

## 2020-10-31 RX ADMIN — APIXABAN 5 MILLIGRAM(S): 2.5 TABLET, FILM COATED ORAL at 07:17

## 2020-10-31 RX ADMIN — APIXABAN 5 MILLIGRAM(S): 2.5 TABLET, FILM COATED ORAL at 17:43

## 2020-10-31 RX ADMIN — Medication 400 MILLIGRAM(S): at 02:44

## 2020-10-31 RX ADMIN — Medication 15 MILLIGRAM(S): at 04:25

## 2020-10-31 NOTE — PROGRESS NOTE ADULT - SUBJECTIVE AND OBJECTIVE BOX
[facility] Division of Hospital Medicine  Clarissa Teixeira MD  Pager [phone]    Patient is a 45y old  Male who presents with a chief complaint of Abdominal pain       SUBJECTIVE / OVERNIGHT EVENTS: pt reports +large BM this AM;       MEDICATIONS  (STANDING):  apixaban 5 milliGRAM(s) Oral two times a day  ertapenem  IVPB 1000 milliGRAM(s) IV Intermittent every 24 hours  influenza   Vaccine 0.5 milliLiter(s) IntraMuscular once  polyethylene glycol 3350 17 Gram(s) Oral two times a day  senna 2 Tablet(s) Oral at bedtime  tamsulosin 0.4 milliGRAM(s) Oral at bedtime  tiZANidine 4 milliGRAM(s) Oral <User Schedule>    MEDICATIONS  (PRN):  benzonatate 100 milliGRAM(s) Oral three times a day PRN Cough  guaiFENesin  milliGRAM(s) Oral every 12 hours PRN congestion  ibuprofen  Tablet. 400 milliGRAM(s) Oral every 6 hours PRN Temp greater or equal to 38C (100.4F), Mild Pain (1 - 3), Moderate Pain (4 - 6)        PHYSICAL EXAM:  Vital Signs Last 24 Hrs  T(F): 99 (31 Oct 2020 04:01), Max: 99 (31 Oct 2020 04:01)  HR: 57 (31 Oct 2020 04:01) (57 - 87)  BP: 159/98 (31 Oct 2020 04:01) (108/82 - 159/98)  RR: 17 (31 Oct 2020 04:01) (16 - 18)  SpO2: 99% (31 Oct 2020 04:01) (99% - 100%)    CONSTITUTIONAL: NAD  RESPIRATORY: Normal respiratory effort; b/l AE  CARDIOVASCULAR: Regular rate and rhythm; No lower extremity edema;   ABDOMEN: Nontender to palpation, normoactive bowel sounds  MUSCULOSKELETAL: no joint swelling or tenderness to palpation  PSYCH:  affect appropriate  NEUROLOGY: no gross sensory deficits       LABS:                        14.2   6.26  )-----------( 170      ( 29 Oct 2020 14:07 )             44.1     10-    139  |  104  |  16  ----------------------------<  79  4.3   |  24  |  0.52    Ca    9.1      29 Oct 2020 14:07  Phos  3.2     10-  Mg     2.0     10-29    TPro  7.5  /  Alb  4.1  /  TBili  0.3  /  DBili  x   /  AST  25  /  ALT  28  /  AlkPhos  68  10          Urinalysis Basic - ( 29 Oct 2020 16:10 )    Color: DARK YELLOW / Appearance: TURBID / S.016 / pH: 6.5  Gluc: NEGATIVE / Ketone: NEGATIVE  / Bili: NEGATIVE / Urobili: NORMAL   Blood: LARGE / Protein: 20 / Nitrite: NEGATIVE   Leuk Esterase: LARGE / RBC: >50 / WBC >50   Sq Epi: OCC / Non Sq Epi: x / Bacteria: MANY        Culture - Urine (collected 29 Oct 2020 18:30)  Source: .Urine Catheterized  Preliminary Report (31 Oct 2020 07:15):    >100,000 CFU/ml Gram Negative Rods    Culture - Urine (collected 29 Oct 2020 13:39)  Source: .Urine Catheterized  Final Report (31 Oct 2020 09:28):    >=3 organisms. Probable collection contamination.    Culture - Urine (collected 29 Oct 2020 12:41)  Source: .Urine Clean Catch (Midstream)  Final Report (31 Oct 2020 09:19):    >=3 organisms. Probable collection contamination.

## 2020-10-31 NOTE — PROVIDER CONTACT NOTE (OTHER) - ASSESSMENT
Pt has not had a bowel movement today, Pt was given enema yesterday and had a large bowel movement. Pt abdomen is distended. Bladder scanned Pt 306 mL.

## 2020-11-01 ENCOUNTER — TRANSCRIPTION ENCOUNTER (OUTPATIENT)
Age: 45
End: 2020-11-01

## 2020-11-01 PROCEDURE — 99232 SBSQ HOSP IP/OBS MODERATE 35: CPT

## 2020-11-01 RX ORDER — GLYCERIN ADULT
1 SUPPOSITORY, RECTAL RECTAL
Refills: 0 | Status: DISCONTINUED | OUTPATIENT
Start: 2020-11-01 | End: 2020-11-03

## 2020-11-01 RX ADMIN — SENNA PLUS 2 TABLET(S): 8.6 TABLET ORAL at 22:03

## 2020-11-01 RX ADMIN — Medication 600 MILLIGRAM(S): at 09:29

## 2020-11-01 RX ADMIN — TIZANIDINE 4 MILLIGRAM(S): 4 TABLET ORAL at 22:02

## 2020-11-01 RX ADMIN — TIZANIDINE 4 MILLIGRAM(S): 4 TABLET ORAL at 08:30

## 2020-11-01 RX ADMIN — Medication 400 MILLIGRAM(S): at 15:00

## 2020-11-01 RX ADMIN — APIXABAN 5 MILLIGRAM(S): 2.5 TABLET, FILM COATED ORAL at 18:09

## 2020-11-01 RX ADMIN — Medication 600 MILLIGRAM(S): at 22:02

## 2020-11-01 RX ADMIN — Medication 400 MILLIGRAM(S): at 02:31

## 2020-11-01 RX ADMIN — Medication 400 MILLIGRAM(S): at 14:05

## 2020-11-01 RX ADMIN — TAMSULOSIN HYDROCHLORIDE 0.4 MILLIGRAM(S): 0.4 CAPSULE ORAL at 22:03

## 2020-11-01 RX ADMIN — APIXABAN 5 MILLIGRAM(S): 2.5 TABLET, FILM COATED ORAL at 05:45

## 2020-11-01 RX ADMIN — Medication 400 MILLIGRAM(S): at 01:40

## 2020-11-01 RX ADMIN — ERTAPENEM SODIUM 120 MILLIGRAM(S): 1 INJECTION, POWDER, LYOPHILIZED, FOR SOLUTION INTRAMUSCULAR; INTRAVENOUS at 18:10

## 2020-11-01 RX ADMIN — POLYETHYLENE GLYCOL 3350 17 GRAM(S): 17 POWDER, FOR SOLUTION ORAL at 18:10

## 2020-11-01 NOTE — DISCHARGE NOTE PROVIDER - NSDCMRMEDTOKEN_GEN_ALL_CORE_FT
apixaban 5 mg oral tablet: 1 tab(s) orally 2 times a day  Colace 100 mg oral capsule: 1 cap(s) orally once a day, As Needed  Dulcolax Laxative 5 mg oral delayed release tablet: 2 tab(s) orally once a day  famotidine 20 mg oral tablet: 1 tab(s) orally once a day (at bedtime)  ibuprofen 200 mg oral tablet: 1 tab(s) orally every 6 hours, As Needed  Multiple Vitamins oral tablet: 1 tab(s) orally once a day  tamsulosin 0.4 mg oral capsule: 1 cap(s) orally once a day (at bedtime)  tiZANidine 4 mg oral tablet: 1 tab(s) orally 2 times a day, As Needed  Vitamin D3 1000 intl units (25 mcg) oral tablet: 1 tab(s) orally once a day   apixaban 5 mg oral tablet: 1 tab(s) orally 2 times a day  cephalexin 500 mg oral capsule: 1 cap(s) orally every 6 hours   Colace 100 mg oral capsule: 1 cap(s) orally once a day, As Needed  Dulcolax Laxative 5 mg oral delayed release tablet: 2 tab(s) orally once a day  famotidine 20 mg oral tablet: 1 tab(s) orally once a day (at bedtime)  ibuprofen 200 mg oral tablet: 1 tab(s) orally every 6 hours, As Needed  Multiple Vitamins oral tablet: 1 tab(s) orally once a day  senna oral tablet: 2 tab(s) orally once a day (at bedtime)  tamsulosin 0.4 mg oral capsule: 1 cap(s) orally once a day (at bedtime)  tiZANidine 4 mg oral tablet: 1 tab(s) orally 2 times a day, As Needed  Vitamin D3 1000 intl units (25 mcg) oral tablet: 1 tab(s) orally once a day   apixaban 5 mg oral tablet: 1 tab(s) orally 2 times a day  cephalexin 500 mg oral capsule: 1 cap(s) orally every 8 hours through 11/4  Colace 100 mg oral capsule: 1 cap(s) orally once a day, As Needed  Dulcolax Laxative 5 mg oral delayed release tablet: 2 tab(s) orally once a day  famotidine 20 mg oral tablet: 1 tab(s) orally once a day (at bedtime)  ibuprofen 200 mg oral tablet: 1 tab(s) orally every 6 hours, As Needed  Multiple Vitamins oral tablet: 1 tab(s) orally once a day  senna oral tablet: 2 tab(s) orally once a day (at bedtime)  tamsulosin 0.4 mg oral capsule: 1 cap(s) orally once a day (at bedtime)  tiZANidine 4 mg oral tablet: 1 tab(s) orally 2 times a day, As Needed  Vitamin D3 1000 intl units (25 mcg) oral tablet: 1 tab(s) orally once a day
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
## 2020-11-01 NOTE — DISCHARGE NOTE PROVIDER - NSDCFUADDAPPT_GEN_ALL_CORE_FT
Please follow up with your primary care provider within 1 week of discharge from the hospital. If you do not have a primary care provider please follow up with the [facility], call [phone]

## 2020-11-01 NOTE — DISCHARGE NOTE PROVIDER - NSDCCPCAREPLAN_GEN_ALL_CORE_FT
PRINCIPAL DISCHARGE DIAGNOSIS  Diagnosis: UTI (urinary tract infection)  Assessment and Plan of Treatment: You were started on antibiotics for a urinary infection. To help prevent future infections maintain healthy hygiene and avoid taking long baths. Wipe from front to back and empty your bladder frequently by keeping yourself properly hydrated. Monitor for signs/symptoms of infection, such as, fever/chills, burning/pain with urination, urinary frequency/hesitancy, cloudy urine, or blood in urine and follow-up with your primary care provider as outpatient for further care.      SECONDARY DISCHARGE DIAGNOSES  Diagnosis: Rhinovirus  Assessment and Plan of Treatment: Continue Mucinex as prescribed.  Monitor for signs and symptoms of infection, fevers, chills.    Diagnosis: Benign prostatic hyperplasia, unspecified whether lower urinary tract symptoms present  Assessment and Plan of Treatment: Continue Flomax as prescribed.    Diagnosis: History of DVT (deep vein thrombosis)  Assessment and Plan of Treatment: Continue Eliquis as prescribed.     PRINCIPAL DISCHARGE DIAGNOSIS  Diagnosis: UTI (urinary tract infection)  Assessment and Plan of Treatment: You were started on antibiotics for a urinary infection. Complete antibiotics as prescribed. To help prevent future infections maintain healthy hygiene and avoid taking long baths. Wipe from front to back and empty your bladder frequently by keeping yourself properly hydrated. Monitor for signs/symptoms of infection, such as, fever/chills, burning/pain with urination, urinary frequency/hesitancy, cloudy urine, or blood in urine and follow-up with your primary care provider as outpatient for further care.      SECONDARY DISCHARGE DIAGNOSES  Diagnosis: History of DVT (deep vein thrombosis)  Assessment and Plan of Treatment: Continue Eliquis as prescribed.    Diagnosis: Benign prostatic hyperplasia, unspecified whether lower urinary tract symptoms present  Assessment and Plan of Treatment: Continue Flomax as prescribed.    Diagnosis: Constipation, unspecified constipation type  Assessment and Plan of Treatment: You were given enemas during your hospital stay. Cotninue good bowel regimen. Please follow up with your primary care provider within 1 week of discharge from the hospital/    Diagnosis: Paraplegia  Assessment and Plan of Treatment: Please follow up with your primary care provider within 1 week of discharge from the hospital.    Diagnosis: Rhinovirus  Assessment and Plan of Treatment: Continue Mucinex as prescribed.  Monitor for signs and symptoms of infection, fevers, chills.     PRINCIPAL DISCHARGE DIAGNOSIS  Diagnosis: UTI (urinary tract infection)  Assessment and Plan of Treatment: You were started on antibiotics for a urinary infection. Complete antibiotics as prescribed. To help prevent future infections maintain healthy hygiene and avoid taking long baths. Wipe from front to back and empty your bladder frequently by keeping yourself properly hydrated. Monitor for signs/symptoms of infection, such as, fever/chills, burning/pain with urination, urinary frequency/hesitancy, cloudy urine, or blood in urine and follow-up with your primary care provider as outpatient for further care.      SECONDARY DISCHARGE DIAGNOSES  Diagnosis: Constipation, unspecified constipation type  Assessment and Plan of Treatment: You were given enemas during your hospital stay. Cotninue good bowel regimen. Please follow up with your primary care provider within 1 week of discharge from the hospital/    Diagnosis: Paraplegia  Assessment and Plan of Treatment: Please follow up with your primary care provider within 1 week of discharge from the hospital.    Diagnosis: History of DVT (deep vein thrombosis)  Assessment and Plan of Treatment: Continue Eliquis as prescribed.    Diagnosis: Benign prostatic hyperplasia, unspecified whether lower urinary tract symptoms present  Assessment and Plan of Treatment: Continue Flomax as prescribed.    Diagnosis: Rhinovirus  Assessment and Plan of Treatment: Continue Mucinex as prescribed.  Monitor for signs and symptoms of infection, fevers, chills.

## 2020-11-01 NOTE — DISCHARGE NOTE PROVIDER - REASON FOR ADMISSION
Abdominal pain Urinary tract infection due to Klebsiella pnemoniae  Viral infection due to Entero/rhinovirus

## 2020-11-01 NOTE — DISCHARGE NOTE PROVIDER - NSFTFHOMEHTHYNRD_GEN_ALL_CORE
Yes
pt with RLL opacity which could represent PNA vs pulmonary metastasis; no respiratoyr symptoms per pt report, lungs CTA  will treat with zosyn as above

## 2020-11-01 NOTE — PROGRESS NOTE ADULT - SUBJECTIVE AND OBJECTIVE BOX
[facility] Division of Hospital Medicine  Clarissa Teixeira MD  Pager [phone]    Patient is a 45y old  Male who presents with a chief complaint of Abdominal pain       SUBJECTIVE / OVERNIGHT EVENTS: pt states he takes an enema home every few days for BM; reports improved abd discomfort      MEDICATIONS  (STANDING):  apixaban 5 milliGRAM(s) Oral two times a day  ertapenem  IVPB 1000 milliGRAM(s) IV Intermittent every 24 hours  influenza   Vaccine 0.5 milliLiter(s) IntraMuscular once  polyethylene glycol 3350 17 Gram(s) Oral two times a day  senna 2 Tablet(s) Oral at bedtime  tamsulosin 0.4 milliGRAM(s) Oral at bedtime  tiZANidine 4 milliGRAM(s) Oral <User Schedule>    MEDICATIONS  (PRN):  benzonatate 100 milliGRAM(s) Oral three times a day PRN Cough  guaiFENesin  milliGRAM(s) Oral every 12 hours PRN congestion  ibuprofen  Tablet. 400 milliGRAM(s) Oral every 6 hours PRN Temp greater or equal to 38C (100.4F), Mild Pain (1 - 3), Moderate Pain (4 - 6)        PHYSICAL EXAM:  Vital Signs Last 24 Hrs  T(F): 98.8 (31 Oct 2020 21:35), Max: 98.8 (31 Oct 2020 21:35)  HR: 65 (31 Oct 2020 21:35) (62 - 65)  BP: 126/68 (31 Oct 2020 21:35) (126/68 - 156/88)  RR: 16 (31 Oct 2020 21:35) (16 - 18)  SpO2: 99% (31 Oct 2020 21:35) (99% - 100%)    CONSTITUTIONAL: NAD  RESPIRATORY: Normal respiratory effort; lungs are clear to auscultation ant  CARDIOVASCULAR: Regular rate and rhythm; No lower extremity edema;   ABDOMEN: Nontender to palpation, normoactive bowel sounds  MUSCULOSKELETAL:  no joint swelling or tenderness to palpation  PSYCH:  affect appropriate  NEUROLOGY:  no gross sensory deficits       LABS:              Culture - Urine (collected 29 Oct 2020 18:30)  Source: .Urine Catheterized  Preliminary Report (31 Oct 2020 14:38):    >100,000 CFU/ml Klebsiella pneumoniae

## 2020-11-02 PROCEDURE — 99232 SBSQ HOSP IP/OBS MODERATE 35: CPT

## 2020-11-02 RX ORDER — CEPHALEXIN 500 MG
1 CAPSULE ORAL
Qty: 8 | Refills: 0
Start: 2020-11-02 | End: 2020-11-03

## 2020-11-02 RX ORDER — SENNA PLUS 8.6 MG/1
2 TABLET ORAL
Qty: 0 | Refills: 0 | DISCHARGE
Start: 2020-11-02

## 2020-11-02 RX ADMIN — Medication 400 MILLIGRAM(S): at 19:20

## 2020-11-02 RX ADMIN — APIXABAN 5 MILLIGRAM(S): 2.5 TABLET, FILM COATED ORAL at 17:11

## 2020-11-02 RX ADMIN — ERTAPENEM SODIUM 120 MILLIGRAM(S): 1 INJECTION, POWDER, LYOPHILIZED, FOR SOLUTION INTRAMUSCULAR; INTRAVENOUS at 17:11

## 2020-11-02 RX ADMIN — APIXABAN 5 MILLIGRAM(S): 2.5 TABLET, FILM COATED ORAL at 09:41

## 2020-11-02 RX ADMIN — SENNA PLUS 2 TABLET(S): 8.6 TABLET ORAL at 21:14

## 2020-11-02 RX ADMIN — Medication 100 MILLIGRAM(S): at 21:13

## 2020-11-02 RX ADMIN — TIZANIDINE 4 MILLIGRAM(S): 4 TABLET ORAL at 20:20

## 2020-11-02 RX ADMIN — Medication 100 MILLIGRAM(S): at 17:19

## 2020-11-02 RX ADMIN — TAMSULOSIN HYDROCHLORIDE 0.4 MILLIGRAM(S): 0.4 CAPSULE ORAL at 21:13

## 2020-11-02 RX ADMIN — Medication 400 MILLIGRAM(S): at 18:37

## 2020-11-02 RX ADMIN — TIZANIDINE 4 MILLIGRAM(S): 4 TABLET ORAL at 09:40

## 2020-11-02 NOTE — PROGRESS NOTE ADULT - SUBJECTIVE AND OBJECTIVE BOX
Follow Up:   uti    Interval History/ROS: notes subjective improvement    Allergies  penicillin (Rash)    ANTIMICROBIALS:  ertapenem  IVPB 1000 every 24 hours      OTHER MEDS:  MEDICATIONS  (STANDING):  apixaban 5 two times a day  benzonatate 100 three times a day PRN  glycerin Suppository - Adult 1 <User Schedule>  guaiFENesin  every 12 hours PRN  ibuprofen  Tablet. 400 every 6 hours PRN  influenza   Vaccine 0.5 once  polyethylene glycol 3350 17 two times a day  senna 2 at bedtime  tamsulosin 0.4 at bedtime  tiZANidine 4 <User Schedule>      Vital Signs Last 24 Hrs  T(C): 36.7 (02 Nov 2020 15:22), Max: 36.7 (02 Nov 2020 15:22)  T(F): 98.1 (02 Nov 2020 15:22), Max: 98.1 (02 Nov 2020 15:22)  HR: 62 (02 Nov 2020 15:22) (62 - 75)  BP: 109/72 (02 Nov 2020 15:22) (109/72 - 119/84)  BP(mean): --  RR: 18 (02 Nov 2020 15:22) (18 - 18)  SpO2: 97% (02 Nov 2020 15:22) (97% - 98%)    PHYSICAL EXAM:  General: WN/WD NAD, Non-toxic  Neurology: no movement below neck  Respiratory: Clear to auscultation bilaterally  CV: RRR, S1S2, no murmurs, rubs or gallops  Abdominal: Soft, Non-tender, sl distended, normal bowel sounds  Extremities: atrophic  Line Sites: Clear  Skin: No rash    WBC Count: 6.26 (10-29 @ 14:07)      Creatinine: 0.52 (10-29 @ 14:07)          MICROBIOLOGY:  .Urine Catheterized  10-29-20   >100,000 CFU/ml Klebsiella pneumoniae  --  Klebsiella pneumoniae  ltuCulture - Urine (10.29.20 @ 17:53)   - Levofloxacin: S <=0.5   - Meropenem: S <=1   - Nitrofurantoin: R >64 Should not be used to treat pyelonephritis   - Piperacillin/Tazobactam: S <=8   - Tigecycline: I 4   - Tobramycin: S <=2   - Trimethoprim/Sulfamethoxazole: S <=0.5/9.5   - Amikacin: S <=16   - Amoxicillin/Clavulanic Acid: S <=8/4   - Ampicillin: R >16 These ampicillin results predict results for amoxicillin   - Ampicillin/Sulbactam: I 16/8 Enterobacter, Citrobacter, and Serratia may develop resistance during prolonged therapy (3-4 days)   - Aztreonam: S <=4   - Cefazolin: S <=2 (MIC_CL_COM_ENTERIC_CEFAZU) For uncomplicated UTI with K. pneumoniae, E. coli, or P. mirablis: TWAN <=16 is sensitive and TWAN >=32 is resistant. This also predicts results for oral agents cefaclor, cefdinir, cefpodoxime, cefprozil, cefuroxime axetil, cephalexin and locarbef for uncomplicated UTI. Note that some isolates may be susceptible to these agents while testing resistant to cefazolin.   - Cefepime: S <=2   - Cefoxitin: R >16   - Ciprofloxacin: S <=0.25   - Ceftriaxone: S <=1 Enterobacter, Citrobacter, and Serratia may develop resistance during prolonged therapy   - Ertapenem: S <=0.5   - Gentamicin: S <=2   - Imipenem: S <=1       .Urine Catheterized  10-29-20   >=3 organisms. Probable collection contamination.  --  --      .Urine Clean Catch (Midstream)  10-29-20   >=3 organisms. Probable collection contamination.  --  --          v    Rapid RVP Result: Detected (10-30 @ 17:02)        RADIOLOGY:    Ludin García MD; Division of Infectious Disease; Pager: [phone]; nights and weekends: [phone]

## 2020-11-02 NOTE — PROGRESS NOTE ADULT - SUBJECTIVE AND OBJECTIVE BOX
Patient is a 45y old  Male who presents with a chief complaint of Abdominal pain (01 Nov 2020 15:58)        SUBJECTIVE / OVERNIGHT EVENTS:      MEDICATIONS  (STANDING):  apixaban 5 milliGRAM(s) Oral two times a day  ertapenem  IVPB 1000 milliGRAM(s) IV Intermittent every 24 hours  glycerin Suppository - Adult 1 Suppository(s) Rectal <User Schedule>  influenza   Vaccine 0.5 milliLiter(s) IntraMuscular once  polyethylene glycol 3350 17 Gram(s) Oral two times a day  senna 2 Tablet(s) Oral at bedtime  tamsulosin 0.4 milliGRAM(s) Oral at bedtime  tiZANidine 4 milliGRAM(s) Oral <User Schedule>    MEDICATIONS  (PRN):  benzonatate 100 milliGRAM(s) Oral three times a day PRN Cough  guaiFENesin  milliGRAM(s) Oral every 12 hours PRN congestion  ibuprofen  Tablet. 400 milliGRAM(s) Oral every 6 hours PRN Temp greater or equal to 38C (100.4F), Mild Pain (1 - 3), Moderate Pain (4 - 6)      Vital Signs Last 24 Hrs  T(C): 36.6 (01 Nov 2020 22:21), Max: 36.6 (01 Nov 2020 22:21)  T(F): 97.8 (01 Nov 2020 22:21), Max: 97.8 (01 Nov 2020 22:21)  HR: 75 (01 Nov 2020 22:21) (75 - 75)  BP: 119/84 (01 Nov 2020 22:21) (119/84 - 119/84)  RR: 18 (01 Nov 2020 22:21) (18 - 18)  SpO2: 98% (01 Nov 2020 22:21) (98% - 98%)          PHYSICAL EXAM  GENERAL: NAD, well-developed  HEAD:  Atraumatic, Normocephalic  EYES: EOMI, PERRLA, conjunctiva and sclera clear  NECK: Supple, No JVD  CHEST/LUNG: Clear to auscultation bilaterally; No wheeze  HEART: Regular rate and rhythm; No murmurs, rubs, or gallops  ABDOMEN: Soft, Nontender, Nondistended; Bowel sounds present  EXTREMITIES:  2+ Peripheral Pulses, No clubbing, cyanosis, or edema  PSYCH: AAOx3  SKIN: No rashes or lesions          Consultant(s) Notes Reviewed:    Care Discussed with Consultants/Other Providers:   Patient is a 45y old  Male who presents with a chief complaint of Abdominal pain (01 Nov 2020 15:58)    SUBJECTIVE / OVERNIGHT EVENTS:  Patient reported feeling better today. No fever, chills, chest pain, SOB, n/v or abdominal pain.    MEDICATIONS  (STANDING):  apixaban 5 milliGRAM(s) Oral two times a day  ertapenem  IVPB 1000 milliGRAM(s) IV Intermittent every 24 hours  glycerin Suppository - Adult 1 Suppository(s) Rectal <User Schedule>  influenza   Vaccine 0.5 milliLiter(s) IntraMuscular once  polyethylene glycol 3350 17 Gram(s) Oral two times a day  senna 2 Tablet(s) Oral at bedtime  tamsulosin 0.4 milliGRAM(s) Oral at bedtime  tiZANidine 4 milliGRAM(s) Oral <User Schedule>    MEDICATIONS  (PRN):  benzonatate 100 milliGRAM(s) Oral three times a day PRN Cough  guaiFENesin  milliGRAM(s) Oral every 12 hours PRN congestion  ibuprofen  Tablet. 400 milliGRAM(s) Oral every 6 hours PRN Temp greater or equal to 38C (100.4F), Mild Pain (1 - 3), Moderate Pain (4 - 6)      Vital Signs Last 24 Hrs  T(C): 36.6 (01 Nov 2020 22:21), Max: 36.6 (01 Nov 2020 22:21)  T(F): 97.8 (01 Nov 2020 22:21), Max: 97.8 (01 Nov 2020 22:21)  HR: 75 (01 Nov 2020 22:21) (75 - 75)  BP: 119/84 (01 Nov 2020 22:21) (119/84 - 119/84)  RR: 18 (01 Nov 2020 22:21) (18 - 18)  SpO2: 98% (01 Nov 2020 22:21) (98% - 98%)          PHYSICAL EXAM   CONSTITUTIONAL: NAD  RESPIRATORY: Normal respiratory effort; lungs are clear to auscultation ant  CARDIOVASCULAR: Regular rate and rhythm; No lower extremity edema;   ABDOMEN: Nontender to palpation, normoactive bowel sounds  MUSCULOSKELETAL:  no joint swelling or tenderness to palpation; quadriplegic  PSYCH:  affect appropriate  NEUROLOGY:  no gross sensory deficits           Consultant(s) Notes Reviewed:    Care Discussed with Consultants/Other Providers:

## 2020-11-03 ENCOUNTER — TRANSCRIPTION ENCOUNTER (OUTPATIENT)
Age: 45
End: 2020-11-03

## 2020-11-03 VITALS
TEMPERATURE: 98 F | RESPIRATION RATE: 17 BRPM | HEART RATE: 57 BPM | DIASTOLIC BLOOD PRESSURE: 87 MMHG | OXYGEN SATURATION: 99 % | SYSTOLIC BLOOD PRESSURE: 125 MMHG

## 2020-11-03 PROCEDURE — 99239 HOSP IP/OBS DSCHRG MGMT >30: CPT

## 2020-11-03 PROCEDURE — 99232 SBSQ HOSP IP/OBS MODERATE 35: CPT

## 2020-11-03 RX ORDER — CEPHALEXIN 500 MG
500 CAPSULE ORAL EVERY 8 HOURS
Refills: 0 | Status: DISCONTINUED | OUTPATIENT
Start: 2020-11-03 | End: 2020-11-03

## 2020-11-03 RX ORDER — CEPHALEXIN 500 MG
1 CAPSULE ORAL
Qty: 5 | Refills: 0
Start: 2020-11-03 | End: 2020-11-04

## 2020-11-03 RX ADMIN — Medication 500 MILLIGRAM(S): at 09:26

## 2020-11-03 RX ADMIN — TIZANIDINE 4 MILLIGRAM(S): 4 TABLET ORAL at 09:18

## 2020-11-03 RX ADMIN — Medication 400 MILLIGRAM(S): at 09:18

## 2020-11-03 RX ADMIN — Medication 400 MILLIGRAM(S): at 09:55

## 2020-11-03 RX ADMIN — APIXABAN 5 MILLIGRAM(S): 2.5 TABLET, FILM COATED ORAL at 09:18

## 2020-11-03 NOTE — PROGRESS NOTE ADULT - PROBLEM SELECTOR PLAN 4
supportive care      DISPO: Patient medically stable to be discharged home today with resumption of home aide services. Spent 31 min coordinating discharge plan
supportive care  improving
supportive care  improving
supportive care      DISPO: Patient medically stable to be discharged home today, pending resumption of home aide services

## 2020-11-03 NOTE — DISCHARGE NOTE NURSING/CASE MANAGEMENT/SOCIAL WORK - NSDCFUADDAPPT_GEN_ALL_CORE_FT
Please follow up with your primary care provider within 1 week of discharge from the hospital. If you do not have a primary care provider please follow up with the [facility], call [phone]

## 2020-11-03 NOTE — PROGRESS NOTE ADULT - PROBLEM SELECTOR PROBLEM 1
Generalized abdominal pain

## 2020-11-03 NOTE — PROGRESS NOTE ADULT - PROBLEM SELECTOR PLAN 3
apprec ID eval  cont ertapenem for now  ucx GNR
apprec ID eval  cont ertapenem for now  ucx k pneumo
apprec ID eval. Urine cx growing pansensitive Klebsiella PNA  -cephalexin 500mg q8h to complete total abx course of 7 days; end date tomorrow
unclear this represents infection, as pt afeb, nl WBC, no dysuria  likely pt does not empty bladder due to large stool burden as well as due to quadriplegia  ID eval to determine need to treat or not
apprec ID eval. Urine cx growing pansensitive Klebsiella PNA  - d/c ertapenem --> transition to cephalexin 500mg q6h to complete total abx course of 7 days

## 2020-11-03 NOTE — PROGRESS NOTE ADULT - SUBJECTIVE AND OBJECTIVE BOX
Patient is a 45y old  Male who presents with a chief complaint of Abdominal pain (03 Nov 2020 12:56)    SUBJECTIVE / OVERNIGHT EVENTS:  Patient felt okay. No acute complaints. Discharge cancelled yesterday due to unable to get available home aide when pt arrived home. Pt without fever, chills, chest pain or SOB.     MEDICATIONS  (STANDING):  apixaban 5 milliGRAM(s) Oral two times a day  cephalexin 500 milliGRAM(s) Oral every 8 hours  glycerin Suppository - Adult 1 Suppository(s) Rectal <User Schedule>  polyethylene glycol 3350 17 Gram(s) Oral two times a day  senna 2 Tablet(s) Oral at bedtime  tamsulosin 0.4 milliGRAM(s) Oral at bedtime  tiZANidine 4 milliGRAM(s) Oral <User Schedule>    MEDICATIONS  (PRN):  benzonatate 100 milliGRAM(s) Oral three times a day PRN Cough  guaiFENesin  milliGRAM(s) Oral every 12 hours PRN congestion  ibuprofen  Tablet. 400 milliGRAM(s) Oral every 6 hours PRN Temp greater or equal to 38C (100.4F), Mild Pain (1 - 3), Moderate Pain (4 - 6)      Vital Signs Last 24 Hrs  T(C): 36.9 (03 Nov 2020 12:30), Max: 36.9 (03 Nov 2020 12:30)  T(F): 98.5 (03 Nov 2020 12:30), Max: 98.5 (03 Nov 2020 12:30)  HR: 57 (03 Nov 2020 12:30) (57 - 60)  BP: 125/87 (03 Nov 2020 12:30) (125/87 - 128/80)  RR: 17 (03 Nov 2020 12:30) (17 - 18)  SpO2: 99% (03 Nov 2020 12:30) (97% - 99%)        PHYSICAL EXAM   CONSTITUTIONAL: NAD  RESPIRATORY: Normal respiratory effort; lungs are clear to auscultation ant  CARDIOVASCULAR: Regular rate and rhythm; No lower extremity edema;   ABDOMEN: Nontender to palpation, normoactive bowel sounds  MUSCULOSKELETAL:  no joint swelling or tenderness to palpation; quadriplegic  PSYCH:  affect appropriate  NEUROLOGY:  no gross sensory deficits

## 2020-11-03 NOTE — PROGRESS NOTE ADULT - PROBLEM SELECTOR PLAN 2
likely as a result of quadriplegia and immobility  cont aggressive bowel regimen
likely as a result of quadriplegia and immobility  cont aggressive bowel regimen  glycerine suppository
likely as a result of quadriplegia and immobility  cont aggressive bowel regimen  glycerine suppository  pt has been refusing miralax
likely as a result of quadriplegia and immobility  needs aggressive bowel regimen
likely as a result of quadriplegia and immobility  cont aggressive bowel regimen  glycerine suppository  pt has been refusing miralax

## 2020-11-03 NOTE — PROGRESS NOTE ADULT - SUBJECTIVE AND OBJECTIVE BOX
Follow Up:  uti    Interval History/ROS: reports feeling at baseline    Allergies  penicillin (Rash)    ANTIMICROBIALS:  cephalexin 500 every 8 hours      OTHER MEDS:  MEDICATIONS  (STANDING):  apixaban 5 two times a day  benzonatate 100 three times a day PRN  glycerin Suppository - Adult 1 <User Schedule>  guaiFENesin  every 12 hours PRN  ibuprofen  Tablet. 400 every 6 hours PRN  polyethylene glycol 3350 17 two times a day  senna 2 at bedtime  tamsulosin 0.4 at bedtime  tiZANidine 4 <User Schedule>    Vital Signs Last 24 Hrs  T(C): 36.9 (03 Nov 2020 12:30), Max: 36.9 (03 Nov 2020 12:30)  T(F): 98.5 (03 Nov 2020 12:30), Max: 98.5 (03 Nov 2020 12:30)  HR: 57 (03 Nov 2020 12:30) (57 - 62)  BP: 125/87 (03 Nov 2020 12:30) (109/72 - 128/80)  BP(mean): --  RR: 17 (03 Nov 2020 12:30) (17 - 18)  SpO2: 99% (03 Nov 2020 12:30) (97% - 99%)    PHYSICAL EXAM:  General: WN/WD NAD, Non-toxic  Neurology: quadraplegic  Respiratory: Clear to auscultation bilaterally  CV: RRR, S1S2, no murmurs, rubs or gallops  Line Sites: Clear  Skin: No rash            MICROBIOLOGY:  .Urine Catheterized  10-29-20   >100,000 CFU/ml Klebsiella pneumoniae  --  Klebsiella pneumoniae      .Urine Catheterized  10-29-20   >=3 organisms. Probable collection contamination.  --  --      .Urine Clean Catch (Midstream)  10-29-20   >=3 organisms. Probable collection contamination.  --  --    Rapid RVP Result: Detected (10-30 @ 17:02)        RADIOLOGY:    Ludin García MD; Division of Infectious Disease; Pager: [phone]; nights and weekends: [phone]

## 2020-11-03 NOTE — PROGRESS NOTE ADULT - REASON FOR ADMISSION
Abdominal pain 2/2 Klebsiella UTI
Abdominal pain
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
## 2020-11-03 NOTE — DISCHARGE NOTE NURSING/CASE MANAGEMENT/SOCIAL WORK - PATIENT PORTAL LINK FT
You can access the FollowMyHealth Patient Portal offered by [facility] by registering at the following website: [email or web address]. By joining [facility]’s FollowMyHealth portal, you will also be able to view your health information using other applications (apps) compatible with our system.

## 2020-11-03 NOTE — PROGRESS NOTE ADULT - PROBLEM SELECTOR PLAN 1
Resolved. Due to constipation which was alleviated with aggressive bowel regimen
cont aggressive bowel regimen  large BM this AM  cont to monitor  abd discomfort improved
cont aggressive bowel regimen  last BM 10/30 post enema  cont to monitor  abd discomfort improved
denies nausea  last BM 3 days ago  large stool burden on CT  GI eval for aggressive bowel regimen
cont aggressive bowel regimen  last BM 10/30 post enema  cont to monitor  abd discomfort improved

## 2020-12-10 NOTE — PROGRESS NOTE ADULT - SUBJECTIVE AND OBJECTIVE BOX
Patient is a 44y old  Male who presents with a chief complaint of abdominal pain  urinary tract infection (25 Nov 2019 20:32)      INTERVAL HPI/OVERNIGHT EVENTS: no events   MEDICATIONS  (STANDING):  apixaban 2.5 milliGRAM(s) Oral every 12 hours  bisacodyl 5 milliGRAM(s) Oral at bedtime  meropenem  IVPB 1000 milliGRAM(s) IV Intermittent every 8 hours  multivitamin 1 Tablet(s) Oral daily  NIFEdipine XL 60 milliGRAM(s) Oral daily  pantoprazole    Tablet 40 milliGRAM(s) Oral before breakfast  polyethylene glycol 3350 17 Gram(s) Oral at bedtime  saline laxative (FLEET) Rectal Enema 1 Enema Rectal daily  senna 2 Tablet(s) Oral at bedtime  sodium chloride 0.9%. 1000 milliLiter(s) (80 mL/Hr) IV Continuous <Continuous>  tamsulosin 0.4 milliGRAM(s) Oral at bedtime    MEDICATIONS  (PRN):  acetaminophen   Tablet .. 650 milliGRAM(s) Oral every 6 hours PRN Mild Pain (1 - 3)      Allergies    penicillin (Rash)  Vital Signs Last 24 Hrs  T(C): 36.1 (03 Dec 2019 06:15), Max: 37 (02 Dec 2019 13:06)  T(F): 97 (03 Dec 2019 06:15), Max: 98.6 (02 Dec 2019 13:06)  HR: 70 (03 Dec 2019 06:15) (58 - 98)  BP: 125/84 (03 Dec 2019 06:15) (125/84 - 168/98)  BP(mean): --  RR: 18 (03 Dec 2019 06:15) (17 - 20)  SpO2: 97% (03 Dec 2019 06:15) (97% - 98%)    PHYSICAL EXAM:  	General: patient in no acute distress, resting comfortably  	Cardio: S1/S2 +ve, regular rate and rhythm, no M/G/R  	Resp: clear to ausculation bilaterally, no rales or wheezes  	GI: abdomen soft, nontender, mildly distended, no guarding, BS +ve x 4  	Ext: no significant pedal edema  Neuro: quadriplegic      LABS:                                                CAPILLARY BLOOD GLUCOSE          RADIOLOGY & ADDITIONAL TESTS:    Imaging Personally Reviewed:  [ X] YES  [ ] NO    Consultant(s) Notes Reviewed:  [ X] YES  [ ] NO    Care Discussed with Consultants/Other Providers [X ] YES  [ ] NO [id number]

## 2021-01-11 ENCOUNTER — INPATIENT (INPATIENT)
Facility: HOSPITAL | Age: 46
LOS: 4 days | Discharge: HOME HEALTH | End: 2021-01-16
Attending: INTERNAL MEDICINE | Admitting: INTERNAL MEDICINE
Payer: MEDICAID

## 2021-01-11 VITALS
WEIGHT: 179.9 LBS | RESPIRATION RATE: 17 BRPM | HEIGHT: 74 IN | DIASTOLIC BLOOD PRESSURE: 83 MMHG | SYSTOLIC BLOOD PRESSURE: 127 MMHG | OXYGEN SATURATION: 97 % | HEART RATE: 95 BPM | TEMPERATURE: 101 F

## 2021-01-11 DIAGNOSIS — N20.0 CALCULUS OF KIDNEY: Chronic | ICD-10-CM

## 2021-01-11 DIAGNOSIS — N20.0 CALCULUS OF KIDNEY: ICD-10-CM

## 2021-01-11 DIAGNOSIS — N30.00 ACUTE CYSTITIS WITHOUT HEMATURIA: ICD-10-CM

## 2021-01-11 DIAGNOSIS — I82.509 CHRONIC EMBOLISM AND THROMBOSIS OF UNSPECIFIED DEEP VEINS OF UNSPECIFIED LOWER EXTREMITY: ICD-10-CM

## 2021-01-11 DIAGNOSIS — R53.2 FUNCTIONAL QUADRIPLEGIA: ICD-10-CM

## 2021-01-11 DIAGNOSIS — Z95.0 PRESENCE OF CARDIAC PACEMAKER: Chronic | ICD-10-CM

## 2021-01-11 DIAGNOSIS — Z90.5 ACQUIRED ABSENCE OF KIDNEY: Chronic | ICD-10-CM

## 2021-01-11 DIAGNOSIS — S11.90XS UNSPECIFIED OPEN WOUND OF UNSPECIFIED PART OF NECK, SEQUELA: Chronic | ICD-10-CM

## 2021-01-11 LAB
ALBUMIN SERPL ELPH-MCNC: 3.8 G/DL — SIGNIFICANT CHANGE UP (ref 3.3–5)
ALP SERPL-CCNC: 84 U/L — SIGNIFICANT CHANGE UP (ref 40–120)
ALT FLD-CCNC: 38 U/L — SIGNIFICANT CHANGE UP (ref 12–78)
ANION GAP SERPL CALC-SCNC: 4 MMOL/L — LOW (ref 5–17)
APPEARANCE UR: ABNORMAL
APTT BLD: 39.9 SEC — HIGH (ref 27.5–35.5)
AST SERPL-CCNC: 28 U/L — SIGNIFICANT CHANGE UP (ref 15–37)
BACTERIA # UR AUTO: ABNORMAL
BASOPHILS # BLD AUTO: 0.03 K/UL — SIGNIFICANT CHANGE UP (ref 0–0.2)
BASOPHILS NFR BLD AUTO: 0.4 % — SIGNIFICANT CHANGE UP (ref 0–2)
BILIRUB SERPL-MCNC: 0.3 MG/DL — SIGNIFICANT CHANGE UP (ref 0.2–1.2)
BILIRUB UR-MCNC: NEGATIVE — SIGNIFICANT CHANGE UP
BUN SERPL-MCNC: 20 MG/DL — SIGNIFICANT CHANGE UP (ref 7–23)
CALCIUM SERPL-MCNC: 9.4 MG/DL — SIGNIFICANT CHANGE UP (ref 8.5–10.1)
CHLORIDE SERPL-SCNC: 108 MMOL/L — SIGNIFICANT CHANGE UP (ref 96–108)
CO2 SERPL-SCNC: 28 MMOL/L — SIGNIFICANT CHANGE UP (ref 22–31)
COLOR SPEC: YELLOW — SIGNIFICANT CHANGE UP
CREAT SERPL-MCNC: 0.84 MG/DL — SIGNIFICANT CHANGE UP (ref 0.5–1.3)
DIFF PNL FLD: ABNORMAL
EOSINOPHIL # BLD AUTO: 0.15 K/UL — SIGNIFICANT CHANGE UP (ref 0–0.5)
EOSINOPHIL NFR BLD AUTO: 2 % — SIGNIFICANT CHANGE UP (ref 0–6)
EPI CELLS # UR: SIGNIFICANT CHANGE UP
FLUAV AG NPH QL: SIGNIFICANT CHANGE UP COUNTS
FLUBV AG NPH QL: SIGNIFICANT CHANGE UP COUNTS
GLUCOSE SERPL-MCNC: 91 MG/DL — SIGNIFICANT CHANGE UP (ref 70–99)
GLUCOSE UR QL: NEGATIVE MG/DL — SIGNIFICANT CHANGE UP
HCT VFR BLD CALC: 49.8 % — SIGNIFICANT CHANGE UP (ref 39–50)
HGB BLD-MCNC: 15.9 G/DL — SIGNIFICANT CHANGE UP (ref 13–17)
IMM GRANULOCYTES NFR BLD AUTO: 0.1 % — SIGNIFICANT CHANGE UP (ref 0–1.5)
INR BLD: 1.51 RATIO — HIGH (ref 0.88–1.16)
KETONES UR-MCNC: NEGATIVE — SIGNIFICANT CHANGE UP
LACTATE SERPL-SCNC: 1.5 MMOL/L — SIGNIFICANT CHANGE UP (ref 0.7–2)
LEUKOCYTE ESTERASE UR-ACNC: ABNORMAL
LYMPHOCYTES # BLD AUTO: 2.86 K/UL — SIGNIFICANT CHANGE UP (ref 1–3.3)
LYMPHOCYTES # BLD AUTO: 38.8 % — SIGNIFICANT CHANGE UP (ref 13–44)
MCHC RBC-ENTMCNC: 28.3 PG — SIGNIFICANT CHANGE UP (ref 27–34)
MCHC RBC-ENTMCNC: 31.9 GM/DL — LOW (ref 32–36)
MCV RBC AUTO: 88.8 FL — SIGNIFICANT CHANGE UP (ref 80–100)
MONOCYTES # BLD AUTO: 0.52 K/UL — SIGNIFICANT CHANGE UP (ref 0–0.9)
MONOCYTES NFR BLD AUTO: 7.1 % — SIGNIFICANT CHANGE UP (ref 2–14)
NEUTROPHILS # BLD AUTO: 3.8 K/UL — SIGNIFICANT CHANGE UP (ref 1.8–7.4)
NEUTROPHILS NFR BLD AUTO: 51.6 % — SIGNIFICANT CHANGE UP (ref 43–77)
NITRITE UR-MCNC: POSITIVE
NRBC # BLD: 0 /100 WBCS — SIGNIFICANT CHANGE UP (ref 0–0)
PH UR: 8 — SIGNIFICANT CHANGE UP (ref 5–8)
PLATELET # BLD AUTO: 205 K/UL — SIGNIFICANT CHANGE UP (ref 150–400)
POTASSIUM SERPL-MCNC: 4.3 MMOL/L — SIGNIFICANT CHANGE UP (ref 3.5–5.3)
POTASSIUM SERPL-SCNC: 4.3 MMOL/L — SIGNIFICANT CHANGE UP (ref 3.5–5.3)
PROT SERPL-MCNC: 8.3 GM/DL — SIGNIFICANT CHANGE UP (ref 6–8.3)
PROT UR-MCNC: 30 MG/DL
PROTHROM AB SERPL-ACNC: 17.2 SEC — HIGH (ref 10.6–13.6)
RBC # BLD: 5.61 M/UL — SIGNIFICANT CHANGE UP (ref 4.2–5.8)
RBC # FLD: 15.9 % — HIGH (ref 10.3–14.5)
RBC CASTS # UR COMP ASSIST: >50 /HPF (ref 0–4)
RSV RNA NPH QL NAA+NON-PROBE: SIGNIFICANT CHANGE UP COUNTS
SARS-COV-2 RNA SPEC QL NAA+PROBE: SIGNIFICANT CHANGE UP COUNTS
SODIUM SERPL-SCNC: 140 MMOL/L — SIGNIFICANT CHANGE UP (ref 135–145)
SP GR SPEC: 1.01 — SIGNIFICANT CHANGE UP (ref 1.01–1.02)
UROBILINOGEN FLD QL: NEGATIVE MG/DL — SIGNIFICANT CHANGE UP
WBC # BLD: 7.37 K/UL — SIGNIFICANT CHANGE UP (ref 3.8–10.5)
WBC # FLD AUTO: 7.37 K/UL — SIGNIFICANT CHANGE UP (ref 3.8–10.5)
WBC UR QL: ABNORMAL

## 2021-01-11 PROCEDURE — 99222 1ST HOSP IP/OBS MODERATE 55: CPT

## 2021-01-11 PROCEDURE — 74177 CT ABD & PELVIS W/CONTRAST: CPT | Mod: 26,MA

## 2021-01-11 PROCEDURE — 99285 EMERGENCY DEPT VISIT HI MDM: CPT

## 2021-01-11 PROCEDURE — 93010 ELECTROCARDIOGRAM REPORT: CPT

## 2021-01-11 RX ORDER — CHOLECALCIFEROL (VITAMIN D3) 125 MCG
1000 CAPSULE ORAL DAILY
Refills: 0 | Status: DISCONTINUED | OUTPATIENT
Start: 2021-01-11 | End: 2021-01-16

## 2021-01-11 RX ORDER — TAMSULOSIN HYDROCHLORIDE 0.4 MG/1
0.4 CAPSULE ORAL AT BEDTIME
Refills: 0 | Status: DISCONTINUED | OUTPATIENT
Start: 2021-01-11 | End: 2021-01-16

## 2021-01-11 RX ORDER — ACETAMINOPHEN 500 MG
650 TABLET ORAL ONCE
Refills: 0 | Status: COMPLETED | OUTPATIENT
Start: 2021-01-11 | End: 2021-01-11

## 2021-01-11 RX ORDER — MORPHINE SULFATE 50 MG/1
4 CAPSULE, EXTENDED RELEASE ORAL ONCE
Refills: 0 | Status: DISCONTINUED | OUTPATIENT
Start: 2021-01-11 | End: 2021-01-11

## 2021-01-11 RX ORDER — POLYETHYLENE GLYCOL 3350 17 G/17G
17 POWDER, FOR SOLUTION ORAL DAILY
Refills: 0 | Status: DISCONTINUED | OUTPATIENT
Start: 2021-01-11 | End: 2021-01-13

## 2021-01-11 RX ORDER — SENNA PLUS 8.6 MG/1
2 TABLET ORAL AT BEDTIME
Refills: 0 | Status: DISCONTINUED | OUTPATIENT
Start: 2021-01-11 | End: 2021-01-16

## 2021-01-11 RX ORDER — CEFTRIAXONE 500 MG/1
1000 INJECTION, POWDER, FOR SOLUTION INTRAMUSCULAR; INTRAVENOUS ONCE
Refills: 0 | Status: COMPLETED | OUTPATIENT
Start: 2021-01-11 | End: 2021-01-11

## 2021-01-11 RX ORDER — SODIUM CHLORIDE 9 MG/ML
1000 INJECTION INTRAMUSCULAR; INTRAVENOUS; SUBCUTANEOUS ONCE
Refills: 0 | Status: COMPLETED | OUTPATIENT
Start: 2021-01-11 | End: 2021-01-11

## 2021-01-11 RX ORDER — CYCLOBENZAPRINE HYDROCHLORIDE 10 MG/1
10 TABLET, FILM COATED ORAL ONCE
Refills: 0 | Status: COMPLETED | OUTPATIENT
Start: 2021-01-11 | End: 2021-01-11

## 2021-01-11 RX ADMIN — SODIUM CHLORIDE 1000 MILLILITER(S): 9 INJECTION INTRAMUSCULAR; INTRAVENOUS; SUBCUTANEOUS at 16:39

## 2021-01-11 RX ADMIN — SENNA PLUS 2 TABLET(S): 8.6 TABLET ORAL at 21:38

## 2021-01-11 RX ADMIN — CYCLOBENZAPRINE HYDROCHLORIDE 10 MILLIGRAM(S): 10 TABLET, FILM COATED ORAL at 18:33

## 2021-01-11 RX ADMIN — TAMSULOSIN HYDROCHLORIDE 0.4 MILLIGRAM(S): 0.4 CAPSULE ORAL at 21:38

## 2021-01-11 RX ADMIN — SODIUM CHLORIDE 1000 MILLILITER(S): 9 INJECTION INTRAMUSCULAR; INTRAVENOUS; SUBCUTANEOUS at 17:38

## 2021-01-11 RX ADMIN — Medication 1 TABLET(S): at 21:38

## 2021-01-11 RX ADMIN — MORPHINE SULFATE 4 MILLIGRAM(S): 50 CAPSULE, EXTENDED RELEASE ORAL at 20:58

## 2021-01-11 RX ADMIN — Medication 650 MILLIGRAM(S): at 16:48

## 2021-01-11 RX ADMIN — MORPHINE SULFATE 4 MILLIGRAM(S): 50 CAPSULE, EXTENDED RELEASE ORAL at 16:48

## 2021-01-11 RX ADMIN — CEFTRIAXONE 100 MILLIGRAM(S): 500 INJECTION, POWDER, FOR SOLUTION INTRAMUSCULAR; INTRAVENOUS at 16:45

## 2021-01-11 RX ADMIN — CEFTRIAXONE 1000 MILLIGRAM(S): 500 INJECTION, POWDER, FOR SOLUTION INTRAMUSCULAR; INTRAVENOUS at 17:21
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
## 2021-01-11 NOTE — H&P ADULT - PROBLEM SELECTOR PLAN 2
hx of kidney stones, significant RBCs in the urine  Will hold elliquis for now.  US renal to look for stones
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
## 2021-01-11 NOTE — H&P ADULT - NSHPLABSRESULTS_GEN_ALL_CORE
Recent Vitals  T(C): 38.3 (21 @ 15:49), Max: 38.3 (21 @ 15:49)  HR: 85 (21 @ 18:30) (75 - 96)  BP: 151/104 (21 @ 18:30) (101/62 - 163/114)  RR: 23 (21 @ 18:30) (14 - 25)  SpO2: 91% (21 @ 18:30) (90% - 97%)                        15.9   7.37  )-----------( 205      ( 2021 16:35 )             49.8         140  |  108  |  20  ----------------------------<  91  4.3   |  28  |  0.84    Ca    9.4      2021 16:35    TPro  8.3  /  Alb  3.8  /  TBili  0.3  /  DBili  x   /  AST  28  /  ALT  38  /  AlkPhos  84      PT/INR - ( 2021 16:35 )   PT: 17.2 sec;   INR: 1.51 ratio         PTT - ( 2021 16:35 )  PTT:39.9 sec  LIVER FUNCTIONS - ( 2021 16:35 )  Alb: 3.8 g/dL / Pro: 8.3 gm/dL / ALK PHOS: 84 U/L / ALT: 38 U/L / AST: 28 U/L / GGT: x           Urinalysis Basic - ( 2021 20:26 )    Color: Yellow / Appearance: very cloudy / S.010 / pH: x  Gluc: x / Ketone: Negative  / Bili: Negative / Urobili: Negative mg/dL   Blood: x / Protein: 30 mg/dL / Nitrite: Positive   Leuk Esterase: Moderate / RBC: >50 /HPF / WBC 11-   Sq Epi: x / Non Sq Epi: Few / Bacteria: Many        Home Medications:  apixaban 5 mg oral tablet: 1 tab(s) orally 2 times a day (2021 16:12)  Colace 100 mg oral capsule: 1 cap(s) orally once a day, As Needed (2021 16:12)  Dulcolax Laxative 5 mg oral delayed release tablet: 2 tab(s) orally once a day (2021 16:12)  famotidine 20 mg oral tablet: 1 tab(s) orally once a day (at bedtime) (2021 16:12)  ibuprofen 200 mg oral tablet: 1 tab(s) orally every 6 hours, As Needed (2021 16:12)  Multiple Vitamins oral tablet: 1 tab(s) orally once a day (2021 16:12)  senna oral tablet: 2 tab(s) orally once a day (at bedtime) (2021 16:12)  tamsulosin 0.4 mg oral capsule: 1 cap(s) orally once a day (at bedtime) (2021 16:12)  tiZANidine 4 mg oral tablet: 1 tab(s) orally 2 times a day, As Needed (2021 16:12)  Vitamin D3 1000 intl units (25 mcg) oral tablet: 1 tab(s) orally once a day (2021 16:12)
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
## 2021-01-11 NOTE — ED ADULT NURSE REASSESSMENT NOTE - NS ED NURSE REASSESS COMMENT FT1
Patient refusing rectal temp. Unable to straight cath urine sample due to penile pressure ulcer. [place] cath placed as per patient request. Dr. Elizondo aware of behavior
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
## 2021-01-11 NOTE — H&P ADULT - PROBLEM SELECTOR PLAN 1
WBCs and leuk esterase in urine  Will start on levaquin.  Continue for now  Deescalate antibiotic when cultures return
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
## 2021-01-11 NOTE — ED ADULT NURSE REASSESSMENT NOTE - NS ED NURSE REASSESS COMMENT FT1
Patient requesting muscle relaxer. Dr. Elizondo made aware and Flexeril given as ordered. Awaiting CT at this time
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
## 2021-01-11 NOTE — ED PROVIDER NOTE - PROGRESS NOTE DETAILS
normal laboratory ED w/u. patient with clnical story consistnet with UTI. CT evidence of UTI. as patient is quadriplegic he is at a hish risk of complications from UTI. will admit for furhter managemnt in patient

## 2021-01-11 NOTE — ED ADULT NURSE REASSESSMENT NOTE - NS ED NURSE REASSESS COMMENT FT1
ANM Jenny made aware of behavior,  in waiting room willing to assist with appeasing behavior and needs. ANM unable to approve  to come to bedside to assist patient as no visitors in ED. will continue to monitor
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
## 2021-01-11 NOTE — ED ADULT NURSE NOTE - CHIEF COMPLAINT QUOTE
46 y/o male BIBA with c/c of upper back pain. pt is paralyzed from the neck go down and uses a [place] catheter. Pt has chronic back pain problems but has been having more sever pain lately. pt believes that he has a uti ( last uti was 12/2020) evident by elevated temp & hematuria. LBM was this morning small amount, pt denies any chest pain, chills or body aches.

## 2021-01-11 NOTE — ED ADULT NURSE NOTE - NSIMPLEMENTINTERV_GEN_ALL_ED
Implemented All Fall with Harm Risk Interventions:  [place] to call system. Call bell, personal items and telephone within reach. Instruct patient to call for assistance. Room bathroom lighting operational. Non-slip footwear when patient is off stretcher. Physically safe environment: no spills, clutter or unnecessary equipment. Stretcher in lowest position, wheels locked, appropriate side rails in place. Provide visual cue, wrist band, yellow gown, etc. Monitor gait and stability. Monitor for mental status changes and reorient to person, place, and time. Review medications for side effects contributing to fall risk. Reinforce activity limits and safety measures with patient and family. Provide visual clues: red socks.

## 2021-01-12 PROCEDURE — 99233 SBSQ HOSP IP/OBS HIGH 50: CPT

## 2021-01-12 RX ORDER — APIXABAN 2.5 MG/1
5 TABLET, FILM COATED ORAL EVERY 12 HOURS
Refills: 0 | Status: DISCONTINUED | OUTPATIENT
Start: 2021-01-12 | End: 2021-01-16

## 2021-01-12 RX ORDER — MORPHINE SULFATE 50 MG/1
2 CAPSULE, EXTENDED RELEASE ORAL EVERY 6 HOURS
Refills: 0 | Status: DISCONTINUED | OUTPATIENT
Start: 2021-01-12 | End: 2021-01-15

## 2021-01-12 RX ORDER — MORPHINE SULFATE 50 MG/1
4 CAPSULE, EXTENDED RELEASE ORAL ONCE
Refills: 0 | Status: DISCONTINUED | OUTPATIENT
Start: 2021-01-12 | End: 2021-01-12

## 2021-01-12 RX ORDER — CYCLOBENZAPRINE HYDROCHLORIDE 10 MG/1
5 TABLET, FILM COATED ORAL THREE TIMES A DAY
Refills: 0 | Status: DISCONTINUED | OUTPATIENT
Start: 2021-01-12 | End: 2021-01-16

## 2021-01-12 RX ADMIN — MORPHINE SULFATE 2 MILLIGRAM(S): 50 CAPSULE, EXTENDED RELEASE ORAL at 10:46

## 2021-01-12 RX ADMIN — TAMSULOSIN HYDROCHLORIDE 0.4 MILLIGRAM(S): 0.4 CAPSULE ORAL at 21:45

## 2021-01-12 RX ADMIN — MORPHINE SULFATE 4 MILLIGRAM(S): 50 CAPSULE, EXTENDED RELEASE ORAL at 01:45

## 2021-01-12 RX ADMIN — APIXABAN 5 MILLIGRAM(S): 2.5 TABLET, FILM COATED ORAL at 23:38

## 2021-01-12 RX ADMIN — SENNA PLUS 2 TABLET(S): 8.6 TABLET ORAL at 21:45

## 2021-01-12 RX ADMIN — MORPHINE SULFATE 2 MILLIGRAM(S): 50 CAPSULE, EXTENDED RELEASE ORAL at 21:45

## 2021-01-12 NOTE — PATIENT PROFILE ADULT - MEDICATIONS/VISITS
"[facility]  Discharge Summary      Admit Date: 10/17/2017    Discharge Date and Time:  10/17/2017 11:18 AM    Attending Physician: Karl Layton MD     Reason for Admission: Surveillance colonoscopy    Procedures Performed: Procedure(s) (LRB):  COLONOSCOPY (N/A)    Hospital Course (synopsis of major diagnoses, care, treatment, and services provided during the course of the hospital stay): Outpatient colonoscopy     Consults: none    Significant Diagnostic Studies: Colonoscopy    Final Diagnoses:    Principal Problem: <principal problem not specified>   Secondary Diagnoses: Surveillance colonoscopy    Discharged Condition: good    Disposition: Home or Self Care    Follow Up/Patient Instructions: Follow-up with referring physician             Resume previous diet and activity.    Medications:  Reconciled Home Medications:   Current Discharge Medication List      CONTINUE these medications which have NOT CHANGED    Details   lancets Misc Refills: 6      liraglutide (VICTOZA 2-MAYCOL) 0.6 mg/0.1 mL (18 mg/3 mL) PnIj Inject 0.12 mg into the skin every evening.      lisinopril (PRINIVIL,ZESTRIL) 2.5 MG tablet Take 2.5 mg by mouth once daily.        NOVOTWIST 32 x 1/5 " Ndle Refills: 1      pantoprazole (PROTONIX) 40 MG tablet Take 1 tablet (40 mg total) by mouth daily as needed.  Qty: 90 tablet, Refills: 2    Associated Diagnoses: Gastroesophageal reflux disease, esophagitis presence not specified      pravastatin (PRAVACHOL) 80 MG tablet Take 1 tablet (80 mg total) by mouth once daily.  Qty: 90 tablet, Refills: 1    Associated Diagnoses: Controlled type 2 diabetes mellitus without complication, without long-term current use of insulin      vitamin D 185 MG Tab Take 185 mg by mouth once daily.      XIGDUO XR 5-1,000 mg TBph Refills: 6           No discharge procedures on file.      "
no
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
## 2021-01-12 NOTE — PROGRESS NOTE ADULT - SUBJECTIVE AND OBJECTIVE BOX
Patient is a 45y old  Male who presents with a chief complaint of fever, back pain (2021 20:43)      INTERVAL HPI/OVERNIGHT EVENTS:  Pt was seen and examined, no acute events.     MEDICATIONS  (STANDING):  cholecalciferol 1000 Unit(s) Oral daily  levoFLOXacin IVPB      levoFLOXacin IVPB 750 milliGRAM(s) IV Intermittent every 24 hours  multivitamin 1 Tablet(s) Oral daily  senna 2 Tablet(s) Oral at bedtime  tamsulosin 0.4 milliGRAM(s) Oral at bedtime    MEDICATIONS  (PRN):  morphine  - Injectable 2 milliGRAM(s) IV Push every 6 hours PRN Severe Pain (7 - 10)  polyethylene glycol 3350 17 Gram(s) Oral daily PRN Constipation      Allergies  penicillin (Rash)      Vital Signs Last 24 Hrs  T(C): 36.5 (2021 17:57), Max: 36.7 (2021 23:59)  T(F): 97.7 (2021 17:57), Max: 98.1 (2021 23:59)  HR: 67 (2021 17:57) (65 - 87)  BP: 120/81 (2021 17:57) (120/81 - 154/105)  BP(mean): --  RR: 18 (2021 17:57) (17 - 18)  SpO2: 99% (2021 17:57) (95% - 99%)      PHYSICAL EXAM:  GENERAL: NAD  HEAD: Atraumatic    EYES: P)ERRLA  NERVOUS SYSTEM:  Awake, alert  CHEST/LUNG: Clear  HEART: RRR  ABDOMEN: Soft  EXTREMITIES:  no edema      LABS:                        15.9   7.37  )-----------( 205      ( 2021 16:35 )             49.8     11    140  |  108  |  20  ----------------------------<  91  4.3   |  28  |  0.84    Ca    9.4      2021 16:35    TPro  8.3  /  Alb  3.8  /  TBili  0.3  /  DBili  x   /  AST  28  /  ALT  38  /  AlkPhos  84  11    PT/INR - ( 2021 16:35 )   PT: 17.2 sec;   INR: 1.51 ratio         PTT - ( 2021 16:35 )  PTT:39.9 sec  Urinalysis Basic - ( 2021 20:26 )    Color: Yellow / Appearance: very cloudy / S.010 / pH: x  Gluc: x / Ketone: Negative  / Bili: Negative / Urobili: Negative mg/dL   Blood: x / Protein: 30 mg/dL / Nitrite: Positive   Leuk Esterase: Moderate / RBC: >50 /HPF / WBC 11-25   Sq Epi: x / Non Sq Epi: Few / Bacteria: Many      CAPILLARY BLOOD GLUCOSE      RADIOLOGY & ADDITIONAL TESTS:    Imaging Personally Reviewed:  [ ] YES  [ ] NO    Consultant(s) Notes Reviewed:  [ ] YES  [ ] NO    Care Discussed with Consultants/Other Providers [ ] YES  [ ] NO

## 2021-01-13 LAB
ALBUMIN SERPL ELPH-MCNC: 3.8 G/DL — SIGNIFICANT CHANGE UP (ref 3.3–5)
ALP SERPL-CCNC: 80 U/L — SIGNIFICANT CHANGE UP (ref 40–120)
ALT FLD-CCNC: 36 U/L — SIGNIFICANT CHANGE UP (ref 12–78)
ANION GAP SERPL CALC-SCNC: 4 MMOL/L — LOW (ref 5–17)
AST SERPL-CCNC: 30 U/L — SIGNIFICANT CHANGE UP (ref 15–37)
BILIRUB SERPL-MCNC: 0.6 MG/DL — SIGNIFICANT CHANGE UP (ref 0.2–1.2)
BUN SERPL-MCNC: 16 MG/DL — SIGNIFICANT CHANGE UP (ref 7–23)
CALCIUM SERPL-MCNC: 9.3 MG/DL — SIGNIFICANT CHANGE UP (ref 8.5–10.1)
CHLORIDE SERPL-SCNC: 103 MMOL/L — SIGNIFICANT CHANGE UP (ref 96–108)
CO2 SERPL-SCNC: 28 MMOL/L — SIGNIFICANT CHANGE UP (ref 22–31)
CREAT SERPL-MCNC: 0.64 MG/DL — SIGNIFICANT CHANGE UP (ref 0.5–1.3)
CULTURE RESULTS: SIGNIFICANT CHANGE UP
GLUCOSE SERPL-MCNC: 83 MG/DL — SIGNIFICANT CHANGE UP (ref 70–99)
HCT VFR BLD CALC: 50.3 % — HIGH (ref 39–50)
HGB BLD-MCNC: 16.8 G/DL — SIGNIFICANT CHANGE UP (ref 13–17)
MCHC RBC-ENTMCNC: 28.6 PG — SIGNIFICANT CHANGE UP (ref 27–34)
MCHC RBC-ENTMCNC: 33.4 GM/DL — SIGNIFICANT CHANGE UP (ref 32–36)
MCV RBC AUTO: 85.7 FL — SIGNIFICANT CHANGE UP (ref 80–100)
NRBC # BLD: 0 /100 WBCS — SIGNIFICANT CHANGE UP (ref 0–0)
PLATELET # BLD AUTO: 180 K/UL — SIGNIFICANT CHANGE UP (ref 150–400)
POTASSIUM SERPL-MCNC: 4.3 MMOL/L — SIGNIFICANT CHANGE UP (ref 3.5–5.3)
POTASSIUM SERPL-SCNC: 4.3 MMOL/L — SIGNIFICANT CHANGE UP (ref 3.5–5.3)
PROT SERPL-MCNC: 8.2 GM/DL — SIGNIFICANT CHANGE UP (ref 6–8.3)
RBC # BLD: 5.87 M/UL — HIGH (ref 4.2–5.8)
RBC # FLD: 15.8 % — HIGH (ref 10.3–14.5)
SARS-COV-2 IGG SERPL QL IA: NEGATIVE — SIGNIFICANT CHANGE UP
SARS-COV-2 IGM SERPL IA-ACNC: <0.1 INDEX — SIGNIFICANT CHANGE UP
SODIUM SERPL-SCNC: 135 MMOL/L — SIGNIFICANT CHANGE UP (ref 135–145)
SPECIMEN SOURCE: SIGNIFICANT CHANGE UP
WBC # BLD: 8.39 K/UL — SIGNIFICANT CHANGE UP (ref 3.8–10.5)
WBC # FLD AUTO: 8.39 K/UL — SIGNIFICANT CHANGE UP (ref 3.8–10.5)

## 2021-01-13 PROCEDURE — 76775 US EXAM ABDO BACK WALL LIM: CPT | Mod: 26

## 2021-01-13 PROCEDURE — 99233 SBSQ HOSP IP/OBS HIGH 50: CPT

## 2021-01-13 RX ORDER — SALICYLIC ACID 0.5 %
1 CLEANSER (GRAM) TOPICAL THREE TIMES A DAY
Refills: 0 | Status: DISCONTINUED | OUTPATIENT
Start: 2021-01-13 | End: 2021-01-16

## 2021-01-13 RX ORDER — POLYETHYLENE GLYCOL 3350 17 G/17G
17 POWDER, FOR SOLUTION ORAL DAILY
Refills: 0 | Status: DISCONTINUED | OUTPATIENT
Start: 2021-01-13 | End: 2021-01-16

## 2021-01-13 RX ADMIN — MORPHINE SULFATE 2 MILLIGRAM(S): 50 CAPSULE, EXTENDED RELEASE ORAL at 18:12

## 2021-01-13 RX ADMIN — Medication 10 MILLIGRAM(S): at 00:15

## 2021-01-13 RX ADMIN — APIXABAN 5 MILLIGRAM(S): 2.5 TABLET, FILM COATED ORAL at 11:39

## 2021-01-13 RX ADMIN — CYCLOBENZAPRINE HYDROCHLORIDE 5 MILLIGRAM(S): 10 TABLET, FILM COATED ORAL at 00:15

## 2021-01-13 RX ADMIN — Medication 1 APPLICATION(S): at 15:35

## 2021-01-13 RX ADMIN — Medication 1000 UNIT(S): at 11:39

## 2021-01-13 RX ADMIN — Medication 1 TABLET(S): at 11:39

## 2021-01-13 RX ADMIN — APIXABAN 5 MILLIGRAM(S): 2.5 TABLET, FILM COATED ORAL at 22:21

## 2021-01-13 RX ADMIN — TAMSULOSIN HYDROCHLORIDE 0.4 MILLIGRAM(S): 0.4 CAPSULE ORAL at 22:21

## 2021-01-13 RX ADMIN — CYCLOBENZAPRINE HYDROCHLORIDE 5 MILLIGRAM(S): 10 TABLET, FILM COATED ORAL at 22:38

## 2021-01-13 RX ADMIN — Medication 1 APPLICATION(S): at 22:38

## 2021-01-13 RX ADMIN — SENNA PLUS 2 TABLET(S): 8.6 TABLET ORAL at 22:21
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
## 2021-01-13 NOTE — PROGRESS NOTE ADULT - SUBJECTIVE AND OBJECTIVE BOX
I am only taking care of patient for today 2021    Patient is a 45y old  Male who presents with a chief complaint of fever, back pain (2021 20:43)      INTERVAL HPI/OVERNIGHT EVENTS:  Pt was seen and examined, no acute events.     MEDICATIONS  (STANDING):  apixaban 5 milliGRAM(s) Oral every 12 hours  cholecalciferol 1000 Unit(s) Oral daily  levoFLOXacin IVPB      levoFLOXacin IVPB 750 milliGRAM(s) IV Intermittent every 24 hours  multivitamin 1 Tablet(s) Oral daily  senna 2 Tablet(s) Oral at bedtime  tamsulosin 0.4 milliGRAM(s) Oral at bedtime    MEDICATIONS  (PRN):  cyclobenzaprine 5 milliGRAM(s) Oral three times a day PRN Muscle Spasm  morphine  - Injectable 2 milliGRAM(s) IV Push every 6 hours PRN Severe Pain (7 - 10)  polyethylene glycol 3350 17 Gram(s) Oral daily PRN Constipation    Allergies  penicillin (Rash)      MEDICATIONS  (STANDING):  apixaban 5 milliGRAM(s) Oral every 12 hours  cholecalciferol 1000 Unit(s) Oral daily  levoFLOXacin IVPB      levoFLOXacin IVPB 750 milliGRAM(s) IV Intermittent every 24 hours  multivitamin 1 Tablet(s) Oral daily  senna 2 Tablet(s) Oral at bedtime  tamsulosin 0.4 milliGRAM(s) Oral at bedtime    MEDICATIONS  (PRN):  cyclobenzaprine 5 milliGRAM(s) Oral three times a day PRN Muscle Spasm  morphine  - Injectable 2 milliGRAM(s) IV Push every 6 hours PRN Severe Pain (7 - 10)  polyethylene glycol 3350 17 Gram(s) Oral daily PRN Constipation        PHYSICAL EXAM:  GENERAL: NAD  HEAD: Atraumatic    EYES: P)ERRLA  NERVOUS SYSTEM:  Awake, alert  CHEST/LUNG: Clear  HEART: RRR  ABDOMEN: Soft  EXTREMITIES:  no edema      LABS:                                           16.8   8.39  )-----------( 180      ( 2021 07:14 )             50.3       135  |  103  |  16  ----------------------------<  83  4.3   |  28  |  0.64    Ca    9.3      2021 07:14    TPro  8.2  /  Alb  3.8  /  TBili  0.6  /  DBili  x   /  AST  30  /  ALT  36  /  AlkPhos  80  -         PTT - ( 2021 16:35 )  PTT:39.9 sec  Urinalysis Basic - ( 2021 20:26 )    Color: Yellow / Appearance: very cloudy / S.010 / pH: x  Gluc: x / Ketone: Negative  / Bili: Negative / Urobili: Negative mg/dL   Blood: x / Protein: 30 mg/dL / Nitrite: Positive   Leuk Esterase: Moderate / RBC: >50 /HPF / WBC 11-25   Sq Epi: x / Non Sq Epi: Few / Bacteria: Many      CAPILLARY BLOOD GLUCOSE      Culture - Blood (collected 2021 02:37)  Source: .Blood Blood-Peripheral  Preliminary Report (2021 03:01):    No growth to date.    Culture - Blood (collected 2021 02:37)  Source: .Blood Blood-Peripheral  Preliminary Report (2021 03:01):    No growth to date.      RADIOLOGY & ADDITIONAL TESTS:  < from: US Renal (21 @ 08:42) >  IMPRESSION:    Status post right nephrectomy.  No hydronephrosis.        < end of copied text >    Imaging Personally Reviewed:  [ ] YES  [ ] NO    Consultant(s) Notes Reviewed:  [ ] YES  [ ] NO    Care Discussed with Consultants/Other Providers [ ] YES  [ ] NO I am only taking care of patient for today 2021    Patient is a 45y old  Male who presents with a chief complaint of fever, back pain (2021 20:43)      INTERVAL HPI/OVERNIGHT EVENTS:  Pt was seen and examined, no acute events.     MEDICATIONS  (STANDING):  apixaban 5 milliGRAM(s) Oral every 12 hours  cholecalciferol 1000 Unit(s) Oral daily  levoFLOXacin IVPB      levoFLOXacin IVPB 750 milliGRAM(s) IV Intermittent every 24 hours  multivitamin 1 Tablet(s) Oral daily  senna 2 Tablet(s) Oral at bedtime  tamsulosin 0.4 milliGRAM(s) Oral at bedtime    MEDICATIONS  (PRN):  cyclobenzaprine 5 milliGRAM(s) Oral three times a day PRN Muscle Spasm  morphine  - Injectable 2 milliGRAM(s) IV Push every 6 hours PRN Severe Pain (7 - 10)  polyethylene glycol 3350 17 Gram(s) Oral daily PRN Constipation    Allergies  penicillin (Rash)          PHYSICAL EXAM:  GENERAL: NAD  HEAD: Atraumatic    EYES: P)ERRLA  NERVOUS SYSTEM:  Awake, alert  CHEST/LUNG: Clear  HEART: RRR  ABDOMEN: Soft  EXTREMITIES:  no edema      LABS:                                           16.8   8.39  )-----------( 180      ( 2021 07:14 )             50.3   01-13    135  |  103  |  16  ----------------------------<  83  4.3   |  28  |  0.64    Ca    9.3      2021 07:14    TPro  8.2  /  Alb  3.8  /  TBili  0.6  /  DBili  x   /  AST  30  /  ALT  36  /  AlkPhos  80  01-13         PTT - ( 2021 16:35 )  PTT:39.9 sec  Urinalysis Basic - ( 2021 20:26 )    Color: Yellow / Appearance: very cloudy / S.010 / pH: x  Gluc: x / Ketone: Negative  / Bili: Negative / Urobili: Negative mg/dL   Blood: x / Protein: 30 mg/dL / Nitrite: Positive   Leuk Esterase: Moderate / RBC: >50 /HPF / WBC 11-25   Sq Epi: x / Non Sq Epi: Few / Bacteria: Many      CAPILLARY BLOOD GLUCOSE      Culture - Blood (collected 2021 02:37)  Source: .Blood Blood-Peripheral  Preliminary Report (2021 03:01):    No growth to date.    Culture - Blood (collected 2021 02:37)  Source: .Blood Blood-Peripheral  Preliminary Report (2021 03:01):    No growth to date.      RADIOLOGY & ADDITIONAL TESTS:  < from: US Renal (21 @ 08:42) >  IMPRESSION:    Status post right nephrectomy.  No hydronephrosis.        < end of copied text >    Imaging Personally Reviewed:  [ ] YES  [ ] NO    Consultant(s) Notes Reviewed:  [ ] YES  [ ] NO    Care Discussed with Consultants/Other Providers [ ] YES  [ ] NO

## 2021-01-14 LAB
ANION GAP SERPL CALC-SCNC: 5 MMOL/L — SIGNIFICANT CHANGE UP (ref 5–17)
BUN SERPL-MCNC: 15 MG/DL — SIGNIFICANT CHANGE UP (ref 7–23)
CALCIUM SERPL-MCNC: 8.8 MG/DL — SIGNIFICANT CHANGE UP (ref 8.5–10.1)
CHLORIDE SERPL-SCNC: 106 MMOL/L — SIGNIFICANT CHANGE UP (ref 96–108)
CO2 SERPL-SCNC: 27 MMOL/L — SIGNIFICANT CHANGE UP (ref 22–31)
CREAT SERPL-MCNC: 0.54 MG/DL — SIGNIFICANT CHANGE UP (ref 0.5–1.3)
GLUCOSE SERPL-MCNC: 78 MG/DL — SIGNIFICANT CHANGE UP (ref 70–99)
HCT VFR BLD CALC: 45.3 % — SIGNIFICANT CHANGE UP (ref 39–50)
HGB BLD-MCNC: 14.8 G/DL — SIGNIFICANT CHANGE UP (ref 13–17)
MAGNESIUM SERPL-MCNC: 1.8 MG/DL — SIGNIFICANT CHANGE UP (ref 1.6–2.6)
MCHC RBC-ENTMCNC: 28.4 PG — SIGNIFICANT CHANGE UP (ref 27–34)
MCHC RBC-ENTMCNC: 32.7 GM/DL — SIGNIFICANT CHANGE UP (ref 32–36)
MCV RBC AUTO: 86.8 FL — SIGNIFICANT CHANGE UP (ref 80–100)
NRBC # BLD: 0 /100 WBCS — SIGNIFICANT CHANGE UP (ref 0–0)
PHOSPHATE SERPL-MCNC: 3.5 MG/DL — SIGNIFICANT CHANGE UP (ref 2.5–4.5)
PLATELET # BLD AUTO: 174 K/UL — SIGNIFICANT CHANGE UP (ref 150–400)
POTASSIUM SERPL-MCNC: 3.8 MMOL/L — SIGNIFICANT CHANGE UP (ref 3.5–5.3)
POTASSIUM SERPL-SCNC: 3.8 MMOL/L — SIGNIFICANT CHANGE UP (ref 3.5–5.3)
RBC # BLD: 5.22 M/UL — SIGNIFICANT CHANGE UP (ref 4.2–5.8)
RBC # FLD: 15.6 % — HIGH (ref 10.3–14.5)
SODIUM SERPL-SCNC: 138 MMOL/L — SIGNIFICANT CHANGE UP (ref 135–145)
WBC # BLD: 5.55 K/UL — SIGNIFICANT CHANGE UP (ref 3.8–10.5)
WBC # FLD AUTO: 5.55 K/UL — SIGNIFICANT CHANGE UP (ref 3.8–10.5)

## 2021-01-14 PROCEDURE — 99222 1ST HOSP IP/OBS MODERATE 55: CPT

## 2021-01-14 PROCEDURE — 99232 SBSQ HOSP IP/OBS MODERATE 35: CPT

## 2021-01-14 RX ADMIN — Medication 1 APPLICATION(S): at 14:54

## 2021-01-14 RX ADMIN — Medication 1 TABLET(S): at 12:43

## 2021-01-14 RX ADMIN — SENNA PLUS 2 TABLET(S): 8.6 TABLET ORAL at 21:37

## 2021-01-14 RX ADMIN — Medication 1 APPLICATION(S): at 05:29

## 2021-01-14 RX ADMIN — APIXABAN 5 MILLIGRAM(S): 2.5 TABLET, FILM COATED ORAL at 21:37

## 2021-01-14 RX ADMIN — POLYETHYLENE GLYCOL 3350 17 GRAM(S): 17 POWDER, FOR SOLUTION ORAL at 12:43

## 2021-01-14 RX ADMIN — TAMSULOSIN HYDROCHLORIDE 0.4 MILLIGRAM(S): 0.4 CAPSULE ORAL at 21:37

## 2021-01-14 RX ADMIN — APIXABAN 5 MILLIGRAM(S): 2.5 TABLET, FILM COATED ORAL at 10:20

## 2021-01-14 RX ADMIN — Medication 1000 UNIT(S): at 12:43

## 2021-01-14 RX ADMIN — Medication 1 APPLICATION(S): at 21:45
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
## 2021-01-14 NOTE — PROGRESS NOTE ADULT - SUBJECTIVE AND OBJECTIVE BOX
afebrile/     REVIEW OF SYSTEMS:  GEN: no fever,    no chills  RESP: no SOB,   no cough  CVS: no chest pain,   no palpitations  GI: no abdominal pain,   no nausea,   no vomiting,   no constipation,   no diarrhea  : no dysuria,   no frequency  NEURO: no headache,   no dizziness  PSYCH: no depression,   not anxious  Derm : no rash    MEDICATIONS  (STANDING):  apixaban 5 milliGRAM(s) Oral every 12 hours  cholecalciferol 1000 Unit(s) Oral daily  levoFLOXacin IVPB      levoFLOXacin IVPB 750 milliGRAM(s) IV Intermittent every 24 hours  multivitamin 1 Tablet(s) Oral daily  polyethylene glycol 3350 17 Gram(s) Oral daily  senna 2 Tablet(s) Oral at bedtime  tamsulosin 0.4 milliGRAM(s) Oral at bedtime  vitamin A &amp; D Ointment 1 Application(s) Topical three times a day    MEDICATIONS  (PRN):  cyclobenzaprine 5 milliGRAM(s) Oral three times a day PRN Muscle Spasm  morphine  - Injectable 2 milliGRAM(s) IV Push every 6 hours PRN Severe Pain (7 - 10)      Vital Signs Last 24 Hrs  T(C): 36.8 (13 Jan 2021 23:21), Max: 36.8 (13 Jan 2021 15:52)  T(F): 98.3 (13 Jan 2021 23:21), Max: 98.3 (13 Jan 2021 23:21)  HR: 65 (13 Jan 2021 23:21) (65 - 86)  BP: 158/79 (13 Jan 2021 23:21) (128/82 - 162/107)  BP(mean): --  RR: 18 (13 Jan 2021 23:21) (18 - 18)  SpO2: 96% (13 Jan 2021 23:21) (95% - 99%)  CAPILLARY BLOOD GLUCOSE        I&O's Summary    13 Jan 2021 07:01  -  14 Jan 2021 07:00  --------------------------------------------------------  IN: 720 mL / OUT: 0 mL / NET: 720 mL        PHYSICAL EXAM:  HEAD:  Atraumatic, Normocephalic  NECK: Supple, No   JVD  CHEST/LUNG:   no     rales,     no,    rhonchi  HEART: Regular rate and rhythm;         murmur  ABDOMEN: Soft, Nontender, ;   EXTREMITIES:    no    edema  NEUROLOGY:  alert    LABS:                        14.8   5.55  )-----------( 174      ( 14 Jan 2021 08:13 )             45.3     01-14    138  |  106  |  15  ----------------------------<  78  3.8   |  27  |  0.54    Ca    8.8      14 Jan 2021 08:13  Phos  3.5     01-14  Mg     1.8     01-14    TPro  8.2  /  Alb  3.8  /  TBili  0.6  /  DBili  x   /  AST  30  /  ALT  36  /  AlkPhos  80  01-13                            Consultant(s) Notes Reviewed:      Care Discussed with Consultants/Other Providers:

## 2021-01-15 ENCOUNTER — TRANSCRIPTION ENCOUNTER (OUTPATIENT)
Age: 46
End: 2021-01-15

## 2021-01-15 PROCEDURE — 99232 SBSQ HOSP IP/OBS MODERATE 35: CPT

## 2021-01-15 RX ORDER — APIXABAN 2.5 MG/1
1 TABLET, FILM COATED ORAL
Qty: 0 | Refills: 0 | DISCHARGE
Start: 2021-01-15

## 2021-01-15 RX ORDER — APIXABAN 2.5 MG/1
1 TABLET, FILM COATED ORAL
Qty: 0 | Refills: 0 | DISCHARGE

## 2021-01-15 RX ORDER — CIPROFLOXACIN LACTATE 400MG/40ML
1 VIAL (ML) INTRAVENOUS
Qty: 2 | Refills: 0
Start: 2021-01-15 | End: 2021-01-16

## 2021-01-15 RX ADMIN — Medication 1000 UNIT(S): at 13:23

## 2021-01-15 RX ADMIN — APIXABAN 5 MILLIGRAM(S): 2.5 TABLET, FILM COATED ORAL at 22:42

## 2021-01-15 RX ADMIN — APIXABAN 5 MILLIGRAM(S): 2.5 TABLET, FILM COATED ORAL at 11:28

## 2021-01-15 RX ADMIN — TAMSULOSIN HYDROCHLORIDE 0.4 MILLIGRAM(S): 0.4 CAPSULE ORAL at 22:42

## 2021-01-15 RX ADMIN — Medication 1 TABLET(S): at 11:28

## 2021-01-15 RX ADMIN — POLYETHYLENE GLYCOL 3350 17 GRAM(S): 17 POWDER, FOR SOLUTION ORAL at 11:28

## 2021-01-15 RX ADMIN — Medication 1 APPLICATION(S): at 07:02

## 2021-01-15 RX ADMIN — SENNA PLUS 2 TABLET(S): 8.6 TABLET ORAL at 22:43

## 2021-01-15 RX ADMIN — Medication 1 APPLICATION(S): at 13:24

## 2021-01-15 RX ADMIN — CYCLOBENZAPRINE HYDROCHLORIDE 5 MILLIGRAM(S): 10 TABLET, FILM COATED ORAL at 16:03

## 2021-01-15 RX ADMIN — Medication 5 MILLIGRAM(S): at 22:42

## 2021-01-15 RX ADMIN — Medication 1 APPLICATION(S): at 21:55

## 2021-01-15 RX ADMIN — Medication 1 ENEMA: at 11:28

## 2021-01-15 NOTE — PROGRESS NOTE ADULT - SUBJECTIVE AND OBJECTIVE BOX
resting/ c/o constipation    REVIEW OF SYSTEMS:  GEN: no fever,    no chills  RESP: no SOB,   no cough  CVS: no chest pain,   no palpitations  GI: no abdominal pain,   no nausea,   no vomiting,   no constipation,   no diarrhea  : no dysuria,   no frequency  NEURO: no headache,   no dizziness  PSYCH: no depression,   not anxious  Derm : no rash    MEDICATIONS  (STANDING):  apixaban 5 milliGRAM(s) Oral every 12 hours  bisacodyl 5 milliGRAM(s) Oral at bedtime  cholecalciferol 1000 Unit(s) Oral daily  levoFLOXacin  Tablet 750 milliGRAM(s) Oral every 24 hours  multivitamin 1 Tablet(s) Oral daily  polyethylene glycol 3350 17 Gram(s) Oral daily  saline laxative (FLEET) Rectal Enema 1 Enema Rectal once  senna 2 Tablet(s) Oral at bedtime  tamsulosin 0.4 milliGRAM(s) Oral at bedtime  vitamin A &amp; D Ointment 1 Application(s) Topical three times a day    MEDICATIONS  (PRN):  cyclobenzaprine 5 milliGRAM(s) Oral three times a day PRN Muscle Spasm      Vital Signs Last 24 Hrs  T(C): 36.6 (15 Tom 2021 00:12), Max: 36.8 (14 Jan 2021 17:33)  T(F): 97.8 (15 Tom 2021 00:12), Max: 98.3 (14 Jan 2021 17:33)  HR: 73 (15 Tom 2021 00:12) (67 - 73)  BP: 114/77 (15 Tom 2021 00:12) (114/77 - 143/99)  BP(mean): --  RR: 18 (15 Tom 2021 00:12) (16 - 18)  SpO2: 98% (15 Tom 2021 00:12) (98% - 98%)  CAPILLARY BLOOD GLUCOSE        I&O's Summary    14 Jan 2021 07:01  -  15 Tom 2021 07:00  --------------------------------------------------------  IN: 150 mL / OUT: 2400 mL / NET: -2250 mL        PHYSICAL EXAM:  HEAD:  Atraumatic, Normocephalic  NECK: Supple, No   JVD  CHEST/LUNG:   no     rales,     no,    rhonchi  HEART: Regular rate and rhythm;         murmur  ABDOMEN: Soft, Nontender, ;   EXTREMITIES:   no     edema  NEUROLOGY:  alert    LABS:                        14.8   5.55  )-----------( 174      ( 14 Jan 2021 08:13 )             45.3     01-14    138  |  106  |  15  ----------------------------<  78  3.8   |  27  |  0.54    Ca    8.8      14 Jan 2021 08:13  Phos  3.5     01-14  Mg     1.8     01-14                              Consultant(s) Notes Reviewed:      Care Discussed with Consultants/Other Providers:

## 2021-01-15 NOTE — PHARMACOTHERAPY INTERVENTION NOTE - COMMENTS
Per policy, converted levofloxacin from IV to PO. No leukocytosis, afebrile, vitals stable, taking PO meds.
Modified duration of levofloxacin for UTI to complete 7 days total, as consistent with ID recommendation.

## 2021-01-15 NOTE — DISCHARGE NOTE NURSING/CASE MANAGEMENT/SOCIAL WORK - CAREGIVER ADDRESS
Quality 130: Documentation Of Current Medications In The Medical Record: Current Medications Documented sa Quality 131: Pain Assessment And Follow-Up: Pain assessment using a standardized tool is documented as negative, no follow-up plan required Detail Level: Detailed Quality 110: Preventive Care And Screening: Influenza Immunization: Influenza Immunization Administered during Influenza season

## 2021-01-15 NOTE — DISCHARGE NOTE NURSING/CASE MANAGEMENT/SOCIAL WORK - PATIENT PORTAL LINK FT
You can access the FollowMyHealth Patient Portal offered by [facility] by registering at the following website: [email or web address]. By joining [facility]’s FollowMyHealth portal, you will also be able to view your health information using other applications (apps) compatible with our system.
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
## 2021-01-15 NOTE — DISCHARGE NOTE PROVIDER - NSDCMRMEDTOKEN_GEN_ALL_CORE_FT
apixaban 5 mg oral tablet: 1 tab(s) orally 2 times a day  cephalexin 500 mg oral capsule: 1 cap(s) orally every 8 hours through 11/4  Colace 100 mg oral capsule: 1 cap(s) orally once a day, As Needed  Dulcolax Laxative 5 mg oral delayed release tablet: 2 tab(s) orally once a day  famotidine 20 mg oral tablet: 1 tab(s) orally once a day (at bedtime)  ibuprofen 200 mg oral tablet: 1 tab(s) orally every 6 hours, As Needed  Multiple Vitamins oral tablet: 1 tab(s) orally once a day  senna oral tablet: 2 tab(s) orally once a day (at bedtime)  tamsulosin 0.4 mg oral capsule: 1 cap(s) orally once a day (at bedtime)  tiZANidine 4 mg oral tablet: 1 tab(s) orally 2 times a day, As Needed  Vitamin D3 1000 intl units (25 mcg) oral tablet: 1 tab(s) orally once a day   apixaban 5 mg oral tablet: 1 tab(s) orally every 12 hours  cephalexin 500 mg oral capsule: 1 cap(s) orally every 8 hours through 11/4  Colace 100 mg oral capsule: 1 cap(s) orally once a day, As Needed  Dulcolax Laxative 5 mg oral delayed release tablet: 2 tab(s) orally once a day  famotidine 20 mg oral tablet: 1 tab(s) orally once a day (at bedtime)  ibuprofen 200 mg oral tablet: 1 tab(s) orally every 6 hours, As Needed  Multiple Vitamins oral tablet: 1 tab(s) orally once a day  senna oral tablet: 2 tab(s) orally once a day (at bedtime)  tamsulosin 0.4 mg oral capsule: 1 cap(s) orally once a day (at bedtime)  tiZANidine 4 mg oral tablet: 1 tab(s) orally 2 times a day, As Needed  vitamin A &amp; D topical ointment: 1 application topically 3 times a day  Vitamin D3 1000 intl units (25 mcg) oral tablet: 1 tab(s) orally once a day   apixaban 5 mg oral tablet: 1 tab(s) orally every 12 hours  Colace 100 mg oral capsule: 1 cap(s) orally once a day, As Needed  Dulcolax Laxative 5 mg oral delayed release tablet: 2 tab(s) orally once a day  famotidine 20 mg oral tablet: 1 tab(s) orally once a day (at bedtime)  ibuprofen 200 mg oral tablet: 1 tab(s) orally every 6 hours, As Needed  levoFLOXacin 750 mg oral tablet: 1 tab(s) orally every 24 hours  Multiple Vitamins oral tablet: 1 tab(s) orally once a day  senna oral tablet: 2 tab(s) orally once a day (at bedtime)  tamsulosin 0.4 mg oral capsule: 1 cap(s) orally once a day (at bedtime)  tiZANidine 4 mg oral tablet: 1 tab(s) orally 2 times a day, As Needed  vitamin A &amp; D topical ointment: 1 application topically 3 times a day  Vitamin D3 1000 intl units (25 mcg) oral tablet: 1 tab(s) orally once a day
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
## 2021-01-15 NOTE — DISCHARGE NOTE NURSING/CASE MANAGEMENT/SOCIAL WORK - NSDPLANG ASIS_GEN_ALL_CORE
Provider E-Visit time total (minutes): 10    Presents with increased cough, productive, congestion. Has h/o COPD. Has had frequent URI.   Will call in Doxycycline. Continue regular inhalers.   If not better needs recheck in clinic.    No

## 2021-01-15 NOTE — DISCHARGE NOTE PROVIDER - NSDCCPCAREPLAN_GEN_ALL_CORE_FT
PRINCIPAL DISCHARGE DIAGNOSIS  Diagnosis: UTI (urinary tract infection)  Assessment and Plan of Treatment: on Levaquin

## 2021-01-16 VITALS
OXYGEN SATURATION: 100 % | DIASTOLIC BLOOD PRESSURE: 90 MMHG | HEART RATE: 90 BPM | RESPIRATION RATE: 18 BRPM | TEMPERATURE: 98 F | SYSTOLIC BLOOD PRESSURE: 145 MMHG

## 2021-01-16 RX ORDER — CIPROFLOXACIN LACTATE 400MG/40ML
1 VIAL (ML) INTRAVENOUS
Qty: 2 | Refills: 0
Start: 2021-01-16 | End: 2021-01-17
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
## 2021-01-22 DIAGNOSIS — N39.0 URINARY TRACT INFECTION, SITE NOT SPECIFIED: ICD-10-CM

## 2021-01-22 DIAGNOSIS — Z95.0 PRESENCE OF CARDIAC PACEMAKER: ICD-10-CM

## 2021-01-22 DIAGNOSIS — Z90.5 ACQUIRED ABSENCE OF KIDNEY: ICD-10-CM

## 2021-01-22 DIAGNOSIS — I82.509 CHRONIC EMBOLISM AND THROMBOSIS OF UNSPECIFIED DEEP VEINS OF UNSPECIFIED LOWER EXTREMITY: ICD-10-CM

## 2021-01-22 DIAGNOSIS — K59.00 CONSTIPATION, UNSPECIFIED: ICD-10-CM

## 2021-01-22 DIAGNOSIS — N40.0 BENIGN PROSTATIC HYPERPLASIA WITHOUT LOWER URINARY TRACT SYMPTOMS: ICD-10-CM

## 2021-01-22 DIAGNOSIS — K21.9 GASTRO-ESOPHAGEAL REFLUX DISEASE WITHOUT ESOPHAGITIS: ICD-10-CM

## 2021-01-22 DIAGNOSIS — Z16.12 EXTENDED SPECTRUM BETA LACTAMASE (ESBL) RESISTANCE: ICD-10-CM

## 2021-01-22 DIAGNOSIS — N20.0 CALCULUS OF KIDNEY: ICD-10-CM

## 2021-01-22 DIAGNOSIS — Z88.0 ALLERGY STATUS TO PENICILLIN: ICD-10-CM

## 2021-01-22 DIAGNOSIS — R53.2 FUNCTIONAL QUADRIPLEGIA: ICD-10-CM

## 2021-01-22 DIAGNOSIS — Z79.01 LONG TERM (CURRENT) USE OF ANTICOAGULANTS: ICD-10-CM

## 2021-02-01 ENCOUNTER — EMERGENCY (EMERGENCY)
Facility: HOSPITAL | Age: 46
LOS: 0 days | Discharge: HOME | End: 2021-02-01
Attending: EMERGENCY MEDICINE
Payer: MEDICAID

## 2021-02-01 VITALS
OXYGEN SATURATION: 98 % | DIASTOLIC BLOOD PRESSURE: 81 MMHG | RESPIRATION RATE: 18 BRPM | HEIGHT: 73 IN | TEMPERATURE: 99 F | SYSTOLIC BLOOD PRESSURE: 117 MMHG | HEART RATE: 92 BPM | WEIGHT: 179.9 LBS

## 2021-02-01 VITALS
RESPIRATION RATE: 17 BRPM | HEART RATE: 61 BPM | OXYGEN SATURATION: 97 % | DIASTOLIC BLOOD PRESSURE: 62 MMHG | SYSTOLIC BLOOD PRESSURE: 101 MMHG | TEMPERATURE: 98 F

## 2021-02-01 DIAGNOSIS — S11.90XS UNSPECIFIED OPEN WOUND OF UNSPECIFIED PART OF NECK, SEQUELA: ICD-10-CM

## 2021-02-01 DIAGNOSIS — Z88.0 ALLERGY STATUS TO PENICILLIN: ICD-10-CM

## 2021-02-01 DIAGNOSIS — N40.0 BENIGN PROSTATIC HYPERPLASIA WITHOUT LOWER URINARY TRACT SYMPTOMS: ICD-10-CM
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
## 2021-02-01 DIAGNOSIS — G89.29 OTHER CHRONIC PAIN: ICD-10-CM

## 2021-02-01 DIAGNOSIS — Z95.0 PRESENCE OF CARDIAC PACEMAKER: ICD-10-CM

## 2021-02-01 DIAGNOSIS — S11.90XS UNSPECIFIED OPEN WOUND OF UNSPECIFIED PART OF NECK, SEQUELA: Chronic | ICD-10-CM

## 2021-02-01 DIAGNOSIS — S14.109S UNSPECIFIED INJURY AT UNSPECIFIED LEVEL OF CERVICAL SPINAL CORD, SEQUELA: ICD-10-CM

## 2021-02-01 DIAGNOSIS — Z90.5 ACQUIRED ABSENCE OF KIDNEY: Chronic | ICD-10-CM

## 2021-02-01 DIAGNOSIS — Z90.5 ACQUIRED ABSENCE OF KIDNEY: ICD-10-CM

## 2021-02-01 DIAGNOSIS — K21.9 GASTRO-ESOPHAGEAL REFLUX DISEASE WITHOUT ESOPHAGITIS: ICD-10-CM

## 2021-02-01 DIAGNOSIS — Z96.0 PRESENCE OF UROGENITAL IMPLANTS: ICD-10-CM

## 2021-02-01 DIAGNOSIS — N20.0 CALCULUS OF KIDNEY: Chronic | ICD-10-CM
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
## 2021-02-01 DIAGNOSIS — Z95.0 PRESENCE OF CARDIAC PACEMAKER: Chronic | ICD-10-CM

## 2021-02-01 DIAGNOSIS — K59.00 CONSTIPATION, UNSPECIFIED: ICD-10-CM

## 2021-02-01 DIAGNOSIS — M54.9 DORSALGIA, UNSPECIFIED: ICD-10-CM

## 2021-02-01 DIAGNOSIS — N39.0 URINARY TRACT INFECTION, SITE NOT SPECIFIED: ICD-10-CM

## 2021-02-01 DIAGNOSIS — R30.0 DYSURIA: ICD-10-CM

## 2021-02-01 DIAGNOSIS — Z87.442 PERSONAL HISTORY OF URINARY CALCULI: ICD-10-CM

## 2021-02-01 LAB
APPEARANCE UR: ABNORMAL
BACTERIA # UR AUTO: ABNORMAL
BILIRUB UR-MCNC: NEGATIVE — SIGNIFICANT CHANGE UP
COLOR SPEC: YELLOW — SIGNIFICANT CHANGE UP
DIFF PNL FLD: ABNORMAL
EPI CELLS # UR: SIGNIFICANT CHANGE UP
GLUCOSE UR QL: NEGATIVE MG/DL — SIGNIFICANT CHANGE UP
KETONES UR-MCNC: NEGATIVE — SIGNIFICANT CHANGE UP
LEUKOCYTE ESTERASE UR-ACNC: ABNORMAL
NITRITE UR-MCNC: POSITIVE
PH UR: 9 — HIGH (ref 5–8)
PROT UR-MCNC: 30 MG/DL
RBC CASTS # UR COMP ASSIST: ABNORMAL /HPF (ref 0–4)
SP GR SPEC: 1.01 — SIGNIFICANT CHANGE UP (ref 1.01–1.02)
TRI-PHOS CRY UR QL COMP ASSIST: ABNORMAL
UROBILINOGEN FLD QL: 1 MG/DL
WBC UR QL: ABNORMAL

## 2021-02-01 PROCEDURE — 99283 EMERGENCY DEPT VISIT LOW MDM: CPT

## 2021-02-01 RX ORDER — CEPHALEXIN 500 MG
500 CAPSULE ORAL EVERY 12 HOURS
Refills: 0 | Status: DISCONTINUED | OUTPATIENT
Start: 2021-02-01 | End: 2021-02-01

## 2021-02-01 RX ORDER — AZTREONAM 2 G
1 VIAL (EA) INJECTION
Qty: 14 | Refills: 0
Start: 2021-02-01 | End: 2021-02-07

## 2021-02-01 RX ORDER — OXYCODONE HYDROCHLORIDE 5 MG/1
10 TABLET ORAL EVERY 12 HOURS
Refills: 0 | Status: DISCONTINUED | OUTPATIENT
Start: 2021-02-01 | End: 2021-02-01

## 2021-02-01 RX ADMIN — OXYCODONE HYDROCHLORIDE 10 MILLIGRAM(S): 5 TABLET ORAL at 05:16

## 2021-02-01 RX ADMIN — Medication 1 TABLET(S): at 06:53
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
## 2021-02-01 NOTE — ED PROVIDER NOTE - PROGRESS NOTE DETAILS
Patient denies allergy to PCN.  He states he does not remember being allergic to it or having a reaction.

## 2021-02-01 NOTE — ED PROVIDER NOTE - CLINICAL SUMMARY MEDICAL DECISION MAKING FREE TEXT BOX
Patient with dysuria UA + for UTI.  Patient quadriplegic but in no severe distress vitals stable.  Abx given.

## 2021-02-01 NOTE — ED ADULT TRIAGE NOTE - CHIEF COMPLAINT QUOTE
BIBEMS, c/o dysuria and intermittent lower abd. pain for 4-5 days. denies cp, denies abd. pain, denies sob, denies fevers, denies chills, denies body aches. hx UTI quadriplegia

## 2021-02-01 NOTE — ED ADULT NURSE REASSESSMENT NOTE - NS ED NURSE REASSESS COMMENT FT1
Pt received in bed sleeping comfortably. Condom catheter in place as per pt's request. Pt is discharged, awaiting transportation. Bedside report received from LYNN Reddy at 0705.
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
## 2021-02-01 NOTE — ED ADULT NURSE REASSESSMENT NOTE - NS ED NURSE REASSESS COMMENT FT1
Pt is ready for discharge. Ambulance service arrived at 0900 and pt informed staff that there is currently no home health aide to receive patient in home. Social work has been contacted to help coordinate care.

## 2021-02-01 NOTE — ED ADULT NURSE REASSESSMENT NOTE - NS ED NURSE REASSESS COMMENT FT1
Coordinator Marco from [facility] was contacted to arrange for home aide to be present when pt arrives. Marco stated that aide Mike Chavis arrived at 0945. Awaiting ambulance service for pt transport. Pt  in bed, awake and cooperative, eating breakfast.

## 2021-02-01 NOTE — ED PROVIDER NOTE - OBJECTIVE STATEMENT
This patient is a 45 year old man hx of quadriplegia who presents to the ER c/o dysuria x 4 days.  Patient denies abdominal pain, fever and hematuria.  He does report chronic back pain for which he states he is due for his chronic pain medication.

## 2021-02-01 NOTE — ED ADULT NURSE REASSESSMENT NOTE - NS ED NURSE REASSESS COMMENT FT1
[facility] called to confirm home health aide at pts home to receive pt.  advocation ot pt concerns of care being received, pt stresses he does not wish to have certain healthcare aides. SW made aware and escalated./ pt ambulance set up for transportation home
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
## 2021-02-01 NOTE — ED PROVIDER NOTE - PATIENT PORTAL LINK FT
You can access the FollowMyHealth Patient Portal offered by [facility] by registering at the following website: [email or web address]. By joining [employer]’s FollowMyHealth portal, you will also be able to view your health information using other applications (apps) compatible with our system.

## 2021-02-03 LAB
-  AMIKACIN: SIGNIFICANT CHANGE UP
-  AMOXICILLIN/CLAVULANIC ACID: SIGNIFICANT CHANGE UP
-  AMPICILLIN/SULBACTAM: SIGNIFICANT CHANGE UP
-  AMPICILLIN: SIGNIFICANT CHANGE UP
-  AZTREONAM: SIGNIFICANT CHANGE UP
-  CEFAZOLIN: SIGNIFICANT CHANGE UP
-  CEFEPIME: SIGNIFICANT CHANGE UP
-  CEFOXITIN: SIGNIFICANT CHANGE UP
-  CEFTRIAXONE: SIGNIFICANT CHANGE UP
-  CIPROFLOXACIN: SIGNIFICANT CHANGE UP
-  ERTAPENEM: SIGNIFICANT CHANGE UP
-  GENTAMICIN: SIGNIFICANT CHANGE UP
-  LEVOFLOXACIN: SIGNIFICANT CHANGE UP
-  MEROPENEM: SIGNIFICANT CHANGE UP
-  NITROFURANTOIN: SIGNIFICANT CHANGE UP
-  PIPERACILLIN/TAZOBACTAM: SIGNIFICANT CHANGE UP
-  TOBRAMYCIN: SIGNIFICANT CHANGE UP
-  TRIMETHOPRIM/SULFAMETHOXAZOLE: SIGNIFICANT CHANGE UP
CULTURE RESULTS: SIGNIFICANT CHANGE UP
METHOD TYPE: SIGNIFICANT CHANGE UP
ORGANISM # SPEC MICROSCOPIC CNT: SIGNIFICANT CHANGE UP
ORGANISM # SPEC MICROSCOPIC CNT: SIGNIFICANT CHANGE UP
SPECIMEN SOURCE: SIGNIFICANT CHANGE UP

## 2021-02-03 NOTE — ED POST DISCHARGE NOTE - RESULT SUMMARY
+  urine culture, on bactrim but resistant, left a message for patient - should switch to augmentin vs keflex

## 2021-02-28 ENCOUNTER — INPATIENT (INPATIENT)
Facility: HOSPITAL | Age: 46
LOS: 10 days | Discharge: NURSING, REHAB OR LONG-TERM CARE | End: 2021-03-11
Attending: INTERNAL MEDICINE | Admitting: INTERNAL MEDICINE
Payer: MEDICAID

## 2021-02-28 VITALS
OXYGEN SATURATION: 98 % | HEART RATE: 84 BPM | WEIGHT: 179.9 LBS | TEMPERATURE: 100 F | SYSTOLIC BLOOD PRESSURE: 123 MMHG | DIASTOLIC BLOOD PRESSURE: 86 MMHG | RESPIRATION RATE: 18 BRPM | HEIGHT: 73 IN

## 2021-02-28 DIAGNOSIS — N20.0 CALCULUS OF KIDNEY: Chronic | ICD-10-CM

## 2021-02-28 DIAGNOSIS — Z95.0 PRESENCE OF CARDIAC PACEMAKER: Chronic | ICD-10-CM

## 2021-02-28 DIAGNOSIS — Z90.5 ACQUIRED ABSENCE OF KIDNEY: Chronic | ICD-10-CM

## 2021-02-28 DIAGNOSIS — S11.90XS UNSPECIFIED OPEN WOUND OF UNSPECIFIED PART OF NECK, SEQUELA: Chronic | ICD-10-CM

## 2021-02-28 DIAGNOSIS — N30.01 ACUTE CYSTITIS WITH HEMATURIA: ICD-10-CM

## 2021-02-28 LAB
ALBUMIN SERPL ELPH-MCNC: 3.4 G/DL — SIGNIFICANT CHANGE UP (ref 3.3–5)
ALP SERPL-CCNC: 84 U/L — SIGNIFICANT CHANGE UP (ref 40–120)
ALT FLD-CCNC: 34 U/L — SIGNIFICANT CHANGE UP (ref 12–78)
ANION GAP SERPL CALC-SCNC: 5 MMOL/L — SIGNIFICANT CHANGE UP (ref 5–17)
APPEARANCE UR: ABNORMAL
APTT BLD: 35.6 SEC — HIGH (ref 27.5–35.5)
AST SERPL-CCNC: 21 U/L — SIGNIFICANT CHANGE UP (ref 15–37)
BACTERIA # UR AUTO: ABNORMAL
BILIRUB SERPL-MCNC: 0.6 MG/DL — SIGNIFICANT CHANGE UP (ref 0.2–1.2)
BILIRUB UR-MCNC: NEGATIVE — SIGNIFICANT CHANGE UP
BUN SERPL-MCNC: 15 MG/DL — SIGNIFICANT CHANGE UP (ref 7–23)
CALCIUM SERPL-MCNC: 8.9 MG/DL — SIGNIFICANT CHANGE UP (ref 8.5–10.1)
CHLORIDE SERPL-SCNC: 108 MMOL/L — SIGNIFICANT CHANGE UP (ref 96–108)
CO2 SERPL-SCNC: 28 MMOL/L — SIGNIFICANT CHANGE UP (ref 22–31)
COLOR SPEC: YELLOW — SIGNIFICANT CHANGE UP
CREAT SERPL-MCNC: 0.64 MG/DL — SIGNIFICANT CHANGE UP (ref 0.5–1.3)
DIFF PNL FLD: ABNORMAL
FLUAV AG NPH QL: SIGNIFICANT CHANGE UP
FLUBV AG NPH QL: SIGNIFICANT CHANGE UP
GLUCOSE SERPL-MCNC: 106 MG/DL — HIGH (ref 70–99)
GLUCOSE UR QL: NEGATIVE MG/DL — SIGNIFICANT CHANGE UP
HCT VFR BLD CALC: 46 % — SIGNIFICANT CHANGE UP (ref 39–50)
HGB BLD-MCNC: 15.5 G/DL — SIGNIFICANT CHANGE UP (ref 13–17)
INR BLD: 1.23 RATIO — HIGH (ref 0.88–1.16)
KETONES UR-MCNC: NEGATIVE — SIGNIFICANT CHANGE UP
LEUKOCYTE ESTERASE UR-ACNC: ABNORMAL
MCHC RBC-ENTMCNC: 28.7 PG — SIGNIFICANT CHANGE UP (ref 27–34)
MCHC RBC-ENTMCNC: 33.7 GM/DL — SIGNIFICANT CHANGE UP (ref 32–36)
MCV RBC AUTO: 85.2 FL — SIGNIFICANT CHANGE UP (ref 80–100)
NITRITE UR-MCNC: POSITIVE
NRBC # BLD: 0 /100 WBCS — SIGNIFICANT CHANGE UP (ref 0–0)
PH UR: 7 — SIGNIFICANT CHANGE UP (ref 5–8)
PLATELET # BLD AUTO: 182 K/UL — SIGNIFICANT CHANGE UP (ref 150–400)
POTASSIUM SERPL-MCNC: 4 MMOL/L — SIGNIFICANT CHANGE UP (ref 3.5–5.3)
POTASSIUM SERPL-SCNC: 4 MMOL/L — SIGNIFICANT CHANGE UP (ref 3.5–5.3)
PROT SERPL-MCNC: 7.8 GM/DL — SIGNIFICANT CHANGE UP (ref 6–8.3)
PROT UR-MCNC: 30 MG/DL
PROTHROM AB SERPL-ACNC: 14.1 SEC — HIGH (ref 10.6–13.6)
RBC # BLD: 5.4 M/UL — SIGNIFICANT CHANGE UP (ref 4.2–5.8)
RBC # FLD: 15.3 % — HIGH (ref 10.3–14.5)
RBC CASTS # UR COMP ASSIST: ABNORMAL /HPF (ref 0–4)
SARS-COV-2 IGG SERPL QL IA: NEGATIVE — SIGNIFICANT CHANGE UP
SARS-COV-2 IGM SERPL IA-ACNC: 0.07 INDEX — SIGNIFICANT CHANGE UP
SARS-COV-2 RNA SPEC QL NAA+PROBE: SIGNIFICANT CHANGE UP
SODIUM SERPL-SCNC: 141 MMOL/L — SIGNIFICANT CHANGE UP (ref 135–145)
SP GR SPEC: 1.01 — SIGNIFICANT CHANGE UP (ref 1.01–1.02)
TRI-PHOS CRY UR QL COMP ASSIST: ABNORMAL
UROBILINOGEN FLD QL: NEGATIVE MG/DL — SIGNIFICANT CHANGE UP
WBC # BLD: 6.17 K/UL — SIGNIFICANT CHANGE UP (ref 3.8–10.5)
WBC # FLD AUTO: 6.17 K/UL — SIGNIFICANT CHANGE UP (ref 3.8–10.5)
WBC UR QL: ABNORMAL

## 2021-02-28 PROCEDURE — 99285 EMERGENCY DEPT VISIT HI MDM: CPT

## 2021-02-28 PROCEDURE — 71045 X-RAY EXAM CHEST 1 VIEW: CPT | Mod: 26

## 2021-02-28 PROCEDURE — 93010 ELECTROCARDIOGRAM REPORT: CPT

## 2021-02-28 RX ORDER — CEFTRIAXONE 500 MG/1
1000 INJECTION, POWDER, FOR SOLUTION INTRAMUSCULAR; INTRAVENOUS EVERY 24 HOURS
Refills: 0 | Status: COMPLETED | OUTPATIENT
Start: 2021-03-01 | End: 2021-03-03

## 2021-02-28 RX ORDER — SALICYLIC ACID 0.5 %
1 CLEANSER (GRAM) TOPICAL THREE TIMES A DAY
Refills: 0 | Status: DISCONTINUED | OUTPATIENT
Start: 2021-02-28 | End: 2021-03-11

## 2021-02-28 RX ORDER — TAMSULOSIN HYDROCHLORIDE 0.4 MG/1
0.4 CAPSULE ORAL AT BEDTIME
Refills: 0 | Status: DISCONTINUED | OUTPATIENT
Start: 2021-02-28 | End: 2021-03-11

## 2021-02-28 RX ORDER — INFLUENZA VIRUS VACCINE 15; 15; 15; 15 UG/.5ML; UG/.5ML; UG/.5ML; UG/.5ML
0.5 SUSPENSION INTRAMUSCULAR ONCE
Refills: 0 | Status: DISCONTINUED | OUTPATIENT
Start: 2021-02-28 | End: 2021-03-11

## 2021-02-28 RX ORDER — MORPHINE SULFATE 50 MG/1
4 CAPSULE, EXTENDED RELEASE ORAL ONCE
Refills: 0 | Status: DISCONTINUED | OUTPATIENT
Start: 2021-02-28 | End: 2021-02-28

## 2021-02-28 RX ORDER — APIXABAN 2.5 MG/1
5 TABLET, FILM COATED ORAL EVERY 12 HOURS
Refills: 0 | Status: DISCONTINUED | OUTPATIENT
Start: 2021-02-28 | End: 2021-03-11

## 2021-02-28 RX ORDER — CEFTRIAXONE 500 MG/1
1000 INJECTION, POWDER, FOR SOLUTION INTRAMUSCULAR; INTRAVENOUS ONCE
Refills: 0 | Status: COMPLETED | OUTPATIENT
Start: 2021-02-28 | End: 2021-02-28

## 2021-02-28 RX ORDER — PANTOPRAZOLE SODIUM 20 MG/1
40 TABLET, DELAYED RELEASE ORAL
Refills: 0 | Status: DISCONTINUED | OUTPATIENT
Start: 2021-02-28 | End: 2021-03-11

## 2021-02-28 RX ORDER — SODIUM CHLORIDE 9 MG/ML
1000 INJECTION INTRAMUSCULAR; INTRAVENOUS; SUBCUTANEOUS
Refills: 0 | Status: DISCONTINUED | OUTPATIENT
Start: 2021-02-28 | End: 2021-03-07

## 2021-02-28 RX ORDER — SENNA PLUS 8.6 MG/1
2 TABLET ORAL AT BEDTIME
Refills: 0 | Status: DISCONTINUED | OUTPATIENT
Start: 2021-02-28 | End: 2021-03-11

## 2021-02-28 RX ORDER — FAMOTIDINE 10 MG/ML
1 INJECTION INTRAVENOUS
Qty: 0 | Refills: 0 | DISCHARGE

## 2021-02-28 RX ORDER — MEROPENEM 1 G/30ML
1000 INJECTION INTRAVENOUS ONCE
Refills: 0 | Status: DISCONTINUED | OUTPATIENT
Start: 2021-02-28 | End: 2021-02-28

## 2021-02-28 RX ORDER — IBUPROFEN 200 MG
1 TABLET ORAL
Qty: 0 | Refills: 0 | DISCHARGE

## 2021-02-28 RX ORDER — SODIUM CHLORIDE 9 MG/ML
1000 INJECTION INTRAMUSCULAR; INTRAVENOUS; SUBCUTANEOUS ONCE
Refills: 0 | Status: COMPLETED | OUTPATIENT
Start: 2021-02-28 | End: 2021-02-28

## 2021-02-28 RX ADMIN — SODIUM CHLORIDE 70 MILLILITER(S): 9 INJECTION INTRAMUSCULAR; INTRAVENOUS; SUBCUTANEOUS at 21:25

## 2021-02-28 RX ADMIN — TAMSULOSIN HYDROCHLORIDE 0.4 MILLIGRAM(S): 0.4 CAPSULE ORAL at 21:24

## 2021-02-28 RX ADMIN — CEFTRIAXONE 100 MILLIGRAM(S): 500 INJECTION, POWDER, FOR SOLUTION INTRAMUSCULAR; INTRAVENOUS at 14:46

## 2021-02-28 RX ADMIN — Medication 1 APPLICATION(S): at 21:25

## 2021-02-28 RX ADMIN — SODIUM CHLORIDE 1000 MILLILITER(S): 9 INJECTION INTRAMUSCULAR; INTRAVENOUS; SUBCUTANEOUS at 13:18

## 2021-02-28 RX ADMIN — SODIUM CHLORIDE 1000 MILLILITER(S): 9 INJECTION INTRAMUSCULAR; INTRAVENOUS; SUBCUTANEOUS at 14:58

## 2021-02-28 RX ADMIN — Medication 5 MILLIGRAM(S): at 21:24

## 2021-02-28 RX ADMIN — MORPHINE SULFATE 4 MILLIGRAM(S): 50 CAPSULE, EXTENDED RELEASE ORAL at 13:18

## 2021-02-28 RX ADMIN — SENNA PLUS 2 TABLET(S): 8.6 TABLET ORAL at 21:24

## 2021-02-28 RX ADMIN — APIXABAN 5 MILLIGRAM(S): 2.5 TABLET, FILM COATED ORAL at 17:29

## 2021-02-28 NOTE — ED ADULT NURSE NOTE - NSFALLRSKPASTHIST_ED_ALL_ED
Telephone Encounter by Nataliia Blue RN at 02/21/17 11:15 AM     Author:  Nataliia Blue RN Service:  (none) Author Type:  Registered Nurse     Filed:  02/21/17 11:16 AM Encounter Date:  2/20/2017 Status:  Signed     :  Nataliia Blue RN (Registered Nurse)            Left message on answering machine to call back[MH1.1T], along with hours of operation, and to please choose the option to speak with nursing staff.[MH1.1M]       Revision History        User Key Date/Time User Provider Type Action    > MH1.1 02/21/17 11:16 AM Nataliia Blue RN Registered Nurse Sign    M - Manual, T - Template             no
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
## 2021-02-28 NOTE — H&P ADULT - NSHPPHYSICALEXAM_GEN_ALL_CORE
PHYSICAL EXAM:  GENERAL: NAD,   HEAD:  Atraumatic, Normocephalic  EYES: PERRLA, conjunctiva and sclera clear  ENMT: Intact  NECK: Supple, No JVD, Normal thyroid  NERVOUS SYSTEM:  Alert & Oriented X3; Motor Strength 0/5 B/L   CHEST/LUNG: Clear bilaterally; No rales, rhonchi, wheezing, or rubs  HEART: Regular rate and rhythm; No murmurs, rubs, or gallops  ABDOMEN: Soft, Nontender, + Mildly distended; Bowel sounds present  EXTREMITIES:  Contracted;  No clubbing, cyanosis, or edema  LYMPH: No lymphadenopathy noted  SKIN: No rashes or lesions
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
## 2021-02-28 NOTE — ED PROVIDER NOTE - CLINICAL SUMMARY MEDICAL DECISION MAKING FREE TEXT BOX
pamela clinically presents with URI. abdominal exam benign. neurologic status at baseline. will require admission as patient is paraplegic and at high risk of developing a complicated infection

## 2021-02-28 NOTE — ED ADULT NURSE NOTE - NSIMPLEMENTINTERV_GEN_ALL_ED
Implemented All Fall Risk Interventions:  [place] to call system. Call bell, personal items and telephone within reach. Instruct patient to call for assistance. Room bathroom lighting operational. Non-slip footwear when patient is off stretcher. Physically safe environment: no spills, clutter or unnecessary equipment. Stretcher in lowest position, wheels locked, appropriate side rails in place. Provide visual cue, wrist band, yellow gown, etc. Monitor gait and stability. Monitor for mental status changes and reorient to person, place, and time. Review medications for side effects contributing to fall risk. Reinforce activity limits and safety measures with patient and family.

## 2021-02-28 NOTE — H&P ADULT - NSHPLABSRESULTS_GEN_ALL_CORE
LABS:                        15.5   6.17  )-----------( 182      ( 2021 12:51 )             46.0         141  |  108  |  15  ----------------------------<  106<H>  4.0   |  28  |  0.64    Ca    8.9      2021 12:51    TPro  7.8  /  Alb  3.4  /  TBili  0.6  /  DBili  x   /  AST  21  /  ALT  34  /  AlkPhos  84      PT/INR - ( 2021 12:51 )   PT: 14.1 sec;   INR: 1.23 ratio         PTT - ( 2021 12:51 )  PTT:35.6 sec  Urinalysis Basic - ( 2021 13:32 )    Color: Yellow / Appearance: very cloudy / S.010 / pH: x  Gluc: x / Ketone: Negative  / Bili: Negative / Urobili: Negative mg/dL   Blood: x / Protein: 30 mg/dL / Nitrite: Positive   Leuk Esterase: Moderate / RBC: 6-10 /HPF / WBC 6-10   Sq Epi: x / Non Sq Epi: x / Bacteria: Many      CAPILLARY BLOOD GLUCOSE        < from: Xray Chest 1 View- PORTABLE-Urgent (Xray Chest 1 View- PORTABLE-Urgent .) (21 @ 12:42) >    INTERPRETATION:  AP chest.    Clinical indications: Respiratory abnormality.    IMPRESSION: The cardiac silhouette is enlarged. The lungs are grosslyclear. The bones are intact. There is a cardiac loop recorder present.          < end of copied text >
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
## 2021-03-01 DIAGNOSIS — S21.339A PUNCTURE WOUND WITHOUT FOREIGN BODY OF UNSPECIFIED FRONT WALL OF THORAX WITH PENETRATION INTO THORACIC CAVITY, INITIAL ENCOUNTER: ICD-10-CM

## 2021-03-01 DIAGNOSIS — N40.0 BENIGN PROSTATIC HYPERPLASIA WITHOUT LOWER URINARY TRACT SYMPTOMS: ICD-10-CM

## 2021-03-01 DIAGNOSIS — K59.00 CONSTIPATION, UNSPECIFIED: ICD-10-CM

## 2021-03-01 DIAGNOSIS — N20.0 CALCULUS OF KIDNEY: ICD-10-CM

## 2021-03-01 DIAGNOSIS — K21.9 GASTRO-ESOPHAGEAL REFLUX DISEASE WITHOUT ESOPHAGITIS: ICD-10-CM

## 2021-03-01 DIAGNOSIS — N39.0 URINARY TRACT INFECTION, SITE NOT SPECIFIED: ICD-10-CM

## 2021-03-01 LAB
ALBUMIN SERPL ELPH-MCNC: 2.7 G/DL — LOW (ref 3.3–5)
ALP SERPL-CCNC: 66 U/L — SIGNIFICANT CHANGE UP (ref 40–120)
ALT FLD-CCNC: 21 U/L — SIGNIFICANT CHANGE UP (ref 12–78)
ANION GAP SERPL CALC-SCNC: 5 MMOL/L — SIGNIFICANT CHANGE UP (ref 5–17)
AST SERPL-CCNC: 20 U/L — SIGNIFICANT CHANGE UP (ref 15–37)
BILIRUB SERPL-MCNC: 0.5 MG/DL — SIGNIFICANT CHANGE UP (ref 0.2–1.2)
BUN SERPL-MCNC: 10 MG/DL — SIGNIFICANT CHANGE UP (ref 7–23)
CALCIUM SERPL-MCNC: 8.2 MG/DL — LOW (ref 8.5–10.1)
CHLORIDE SERPL-SCNC: 107 MMOL/L — SIGNIFICANT CHANGE UP (ref 96–108)
CO2 SERPL-SCNC: 27 MMOL/L — SIGNIFICANT CHANGE UP (ref 22–31)
CREAT SERPL-MCNC: 0.51 MG/DL — SIGNIFICANT CHANGE UP (ref 0.5–1.3)
GLUCOSE SERPL-MCNC: 81 MG/DL — SIGNIFICANT CHANGE UP (ref 70–99)
HCT VFR BLD CALC: 40.8 % — SIGNIFICANT CHANGE UP (ref 39–50)
HGB BLD-MCNC: 13.4 G/DL — SIGNIFICANT CHANGE UP (ref 13–17)
MCHC RBC-ENTMCNC: 28.5 PG — SIGNIFICANT CHANGE UP (ref 27–34)
MCHC RBC-ENTMCNC: 32.8 GM/DL — SIGNIFICANT CHANGE UP (ref 32–36)
MCV RBC AUTO: 86.6 FL — SIGNIFICANT CHANGE UP (ref 80–100)
NRBC # BLD: 0 /100 WBCS — SIGNIFICANT CHANGE UP (ref 0–0)
PLATELET # BLD AUTO: 156 K/UL — SIGNIFICANT CHANGE UP (ref 150–400)
POTASSIUM SERPL-MCNC: 3.6 MMOL/L — SIGNIFICANT CHANGE UP (ref 3.5–5.3)
POTASSIUM SERPL-SCNC: 3.6 MMOL/L — SIGNIFICANT CHANGE UP (ref 3.5–5.3)
PROT SERPL-MCNC: 6.3 GM/DL — SIGNIFICANT CHANGE UP (ref 6–8.3)
RBC # BLD: 4.71 M/UL — SIGNIFICANT CHANGE UP (ref 4.2–5.8)
RBC # FLD: 15.2 % — HIGH (ref 10.3–14.5)
SODIUM SERPL-SCNC: 139 MMOL/L — SIGNIFICANT CHANGE UP (ref 135–145)
WBC # BLD: 5 K/UL — SIGNIFICANT CHANGE UP (ref 3.8–10.5)
WBC # FLD AUTO: 5 K/UL — SIGNIFICANT CHANGE UP (ref 3.8–10.5)

## 2021-03-01 RX ORDER — ACETAMINOPHEN 500 MG
1000 TABLET ORAL ONCE
Refills: 0 | Status: COMPLETED | OUTPATIENT
Start: 2021-03-01 | End: 2021-03-01

## 2021-03-01 RX ORDER — LACTULOSE 10 G/15ML
10 SOLUTION ORAL THREE TIMES A DAY
Refills: 0 | Status: DISCONTINUED | OUTPATIENT
Start: 2021-03-01 | End: 2021-03-11

## 2021-03-01 RX ORDER — OXYCODONE HYDROCHLORIDE 5 MG/1
5 TABLET ORAL ONCE
Refills: 0 | Status: DISCONTINUED | OUTPATIENT
Start: 2021-03-01 | End: 2021-03-01

## 2021-03-01 RX ORDER — CYCLOBENZAPRINE HYDROCHLORIDE 10 MG/1
5 TABLET, FILM COATED ORAL ONCE
Refills: 0 | Status: COMPLETED | OUTPATIENT
Start: 2021-03-01 | End: 2021-03-01

## 2021-03-01 RX ORDER — MAGNESIUM HYDROXIDE 400 MG/1
30 TABLET, CHEWABLE ORAL ONCE
Refills: 0 | Status: COMPLETED | OUTPATIENT
Start: 2021-03-01 | End: 2021-03-02

## 2021-03-01 RX ORDER — ACETAMINOPHEN 500 MG
650 TABLET ORAL EVERY 6 HOURS
Refills: 0 | Status: DISCONTINUED | OUTPATIENT
Start: 2021-03-01 | End: 2021-03-11

## 2021-03-01 RX ORDER — ASCORBIC ACID 60 MG
500 TABLET,CHEWABLE ORAL
Refills: 0 | Status: DISCONTINUED | OUTPATIENT
Start: 2021-03-01 | End: 2021-03-11

## 2021-03-01 RX ADMIN — SENNA PLUS 2 TABLET(S): 8.6 TABLET ORAL at 21:25

## 2021-03-01 RX ADMIN — Medication 5 MILLIGRAM(S): at 21:25

## 2021-03-01 RX ADMIN — Medication 1 APPLICATION(S): at 05:36

## 2021-03-01 RX ADMIN — Medication 1 TABLET(S): at 11:14

## 2021-03-01 RX ADMIN — CYCLOBENZAPRINE HYDROCHLORIDE 5 MILLIGRAM(S): 10 TABLET, FILM COATED ORAL at 00:54

## 2021-03-01 RX ADMIN — LACTULOSE 10 GRAM(S): 10 SOLUTION ORAL at 22:59

## 2021-03-01 RX ADMIN — PANTOPRAZOLE SODIUM 40 MILLIGRAM(S): 20 TABLET, DELAYED RELEASE ORAL at 07:49

## 2021-03-01 RX ADMIN — OXYCODONE HYDROCHLORIDE 5 MILLIGRAM(S): 5 TABLET ORAL at 02:09

## 2021-03-01 RX ADMIN — Medication 1 APPLICATION(S): at 11:38

## 2021-03-01 RX ADMIN — TAMSULOSIN HYDROCHLORIDE 0.4 MILLIGRAM(S): 0.4 CAPSULE ORAL at 21:25

## 2021-03-01 RX ADMIN — APIXABAN 5 MILLIGRAM(S): 2.5 TABLET, FILM COATED ORAL at 17:11

## 2021-03-01 RX ADMIN — APIXABAN 5 MILLIGRAM(S): 2.5 TABLET, FILM COATED ORAL at 05:33

## 2021-03-01 RX ADMIN — Medication 1 APPLICATION(S): at 21:25

## 2021-03-01 RX ADMIN — Medication 400 MILLIGRAM(S): at 00:54

## 2021-03-01 RX ADMIN — CEFTRIAXONE 100 MILLIGRAM(S): 500 INJECTION, POWDER, FOR SOLUTION INTRAMUSCULAR; INTRAVENOUS at 11:13

## 2021-03-01 RX ADMIN — Medication 650 MILLIGRAM(S): at 22:59

## 2021-03-01 NOTE — CONSULT NOTE ADULT - ASSESSMENT
urinary tract infection   constipation and abdominal distention deferred to PMD   await culture ; appears to be the same as nafisa   proteus usu in alkaline urine   will start vit culture   likely has calculi   consider gu eval
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
## 2021-03-01 NOTE — CHART NOTE - NSCHARTNOTEFT_GEN_A_CORE
Medicine PA Note     Pt requesting Tizanidine 4 mg for spasm which he normally takes at home. Hospital doesn't carry it and will give flexeril 5 mg for spasm and oxycodone for pain.  Patient was able to sleep throughout the night without complaints.  Will continue to monitor the patient    [facility]

## 2021-03-02 LAB
CULTURE RESULTS: SIGNIFICANT CHANGE UP
SPECIMEN SOURCE: SIGNIFICANT CHANGE UP

## 2021-03-02 RX ORDER — HYDRALAZINE HCL 50 MG
5 TABLET ORAL ONCE
Refills: 0 | Status: COMPLETED | OUTPATIENT
Start: 2021-03-02 | End: 2021-03-02

## 2021-03-02 RX ORDER — BACLOFEN 100 %
5 POWDER (GRAM) MISCELLANEOUS THREE TIMES A DAY
Refills: 0 | Status: DISCONTINUED | OUTPATIENT
Start: 2021-03-02 | End: 2021-03-11

## 2021-03-02 RX ADMIN — PANTOPRAZOLE SODIUM 40 MILLIGRAM(S): 20 TABLET, DELAYED RELEASE ORAL at 06:11

## 2021-03-02 RX ADMIN — Medication 5 MILLIGRAM(S): at 21:31

## 2021-03-02 RX ADMIN — APIXABAN 5 MILLIGRAM(S): 2.5 TABLET, FILM COATED ORAL at 17:30

## 2021-03-02 RX ADMIN — Medication 1 ENEMA: at 15:43

## 2021-03-02 RX ADMIN — Medication 5 MILLIGRAM(S): at 00:57

## 2021-03-02 RX ADMIN — SODIUM CHLORIDE 70 MILLILITER(S): 9 INJECTION INTRAMUSCULAR; INTRAVENOUS; SUBCUTANEOUS at 21:29

## 2021-03-02 RX ADMIN — Medication 500 MILLIGRAM(S): at 17:30

## 2021-03-02 RX ADMIN — Medication 5 MILLIGRAM(S): at 23:56

## 2021-03-02 RX ADMIN — APIXABAN 5 MILLIGRAM(S): 2.5 TABLET, FILM COATED ORAL at 06:11

## 2021-03-02 RX ADMIN — Medication 1 TABLET(S): at 11:24

## 2021-03-02 RX ADMIN — Medication 650 MILLIGRAM(S): at 16:23

## 2021-03-02 RX ADMIN — Medication 1 APPLICATION(S): at 11:32

## 2021-03-02 RX ADMIN — TAMSULOSIN HYDROCHLORIDE 0.4 MILLIGRAM(S): 0.4 CAPSULE ORAL at 21:31

## 2021-03-02 RX ADMIN — Medication 500 MILLIGRAM(S): at 06:11

## 2021-03-02 RX ADMIN — CEFTRIAXONE 100 MILLIGRAM(S): 500 INJECTION, POWDER, FOR SOLUTION INTRAMUSCULAR; INTRAVENOUS at 11:24

## 2021-03-02 RX ADMIN — MAGNESIUM HYDROXIDE 30 MILLILITER(S): 400 TABLET, CHEWABLE ORAL at 06:10

## 2021-03-02 RX ADMIN — SENNA PLUS 2 TABLET(S): 8.6 TABLET ORAL at 21:31

## 2021-03-02 RX ADMIN — Medication 1 APPLICATION(S): at 06:11

## 2021-03-02 RX ADMIN — Medication 1 APPLICATION(S): at 21:31

## 2021-03-02 NOTE — PROGRESS NOTE ADULT - ASSESSMENT
urinary tract infection proteus in urine culture   stable on ctx   clinically improving   sensitivities pending   case discuss with primary physician  follow susceptibilities

## 2021-03-03 LAB
-  AMIKACIN: SIGNIFICANT CHANGE UP
-  AMOXICILLIN/CLAVULANIC ACID: SIGNIFICANT CHANGE UP
-  AMPICILLIN/SULBACTAM: SIGNIFICANT CHANGE UP
-  AMPICILLIN: SIGNIFICANT CHANGE UP
-  AZTREONAM: SIGNIFICANT CHANGE UP
-  CEFAZOLIN: SIGNIFICANT CHANGE UP
-  CEFEPIME: SIGNIFICANT CHANGE UP
-  CEFOXITIN: SIGNIFICANT CHANGE UP
-  CEFTRIAXONE: SIGNIFICANT CHANGE UP
-  CIPROFLOXACIN: SIGNIFICANT CHANGE UP
-  ERTAPENEM: SIGNIFICANT CHANGE UP
-  GENTAMICIN: SIGNIFICANT CHANGE UP
-  LEVOFLOXACIN: SIGNIFICANT CHANGE UP
-  MEROPENEM: SIGNIFICANT CHANGE UP
-  NITROFURANTOIN: SIGNIFICANT CHANGE UP
-  PIPERACILLIN/TAZOBACTAM: SIGNIFICANT CHANGE UP
-  TOBRAMYCIN: SIGNIFICANT CHANGE UP
-  TRIMETHOPRIM/SULFAMETHOXAZOLE: SIGNIFICANT CHANGE UP
ANION GAP SERPL CALC-SCNC: 6 MMOL/L — SIGNIFICANT CHANGE UP (ref 5–17)
BUN SERPL-MCNC: 10 MG/DL — SIGNIFICANT CHANGE UP (ref 7–23)
CALCIUM SERPL-MCNC: 8 MG/DL — LOW (ref 8.5–10.1)
CHLORIDE SERPL-SCNC: 108 MMOL/L — SIGNIFICANT CHANGE UP (ref 96–108)
CO2 SERPL-SCNC: 26 MMOL/L — SIGNIFICANT CHANGE UP (ref 22–31)
CREAT SERPL-MCNC: 0.54 MG/DL — SIGNIFICANT CHANGE UP (ref 0.5–1.3)
CULTURE RESULTS: SIGNIFICANT CHANGE UP
GLUCOSE SERPL-MCNC: 89 MG/DL — SIGNIFICANT CHANGE UP (ref 70–99)
HCT VFR BLD CALC: 39.6 % — SIGNIFICANT CHANGE UP (ref 39–50)
HGB BLD-MCNC: 13.1 G/DL — SIGNIFICANT CHANGE UP (ref 13–17)
MCHC RBC-ENTMCNC: 28.8 PG — SIGNIFICANT CHANGE UP (ref 27–34)
MCHC RBC-ENTMCNC: 33.1 GM/DL — SIGNIFICANT CHANGE UP (ref 32–36)
MCV RBC AUTO: 87 FL — SIGNIFICANT CHANGE UP (ref 80–100)
METHOD TYPE: SIGNIFICANT CHANGE UP
NRBC # BLD: 0 /100 WBCS — SIGNIFICANT CHANGE UP (ref 0–0)
ORGANISM # SPEC MICROSCOPIC CNT: SIGNIFICANT CHANGE UP
ORGANISM # SPEC MICROSCOPIC CNT: SIGNIFICANT CHANGE UP
PLATELET # BLD AUTO: 166 K/UL — SIGNIFICANT CHANGE UP (ref 150–400)
POTASSIUM SERPL-MCNC: 3.9 MMOL/L — SIGNIFICANT CHANGE UP (ref 3.5–5.3)
POTASSIUM SERPL-SCNC: 3.9 MMOL/L — SIGNIFICANT CHANGE UP (ref 3.5–5.3)
RBC # BLD: 4.55 M/UL — SIGNIFICANT CHANGE UP (ref 4.2–5.8)
RBC # FLD: 15.2 % — HIGH (ref 10.3–14.5)
SODIUM SERPL-SCNC: 140 MMOL/L — SIGNIFICANT CHANGE UP (ref 135–145)
SPECIMEN SOURCE: SIGNIFICANT CHANGE UP
WBC # BLD: 5.78 K/UL — SIGNIFICANT CHANGE UP (ref 3.8–10.5)
WBC # FLD AUTO: 5.78 K/UL — SIGNIFICANT CHANGE UP (ref 3.8–10.5)

## 2021-03-03 RX ADMIN — Medication 5 MILLIGRAM(S): at 21:27

## 2021-03-03 RX ADMIN — Medication 500 MILLIGRAM(S): at 17:16

## 2021-03-03 RX ADMIN — Medication 1 TABLET(S): at 12:09

## 2021-03-03 RX ADMIN — SENNA PLUS 2 TABLET(S): 8.6 TABLET ORAL at 21:27

## 2021-03-03 RX ADMIN — Medication 5 MILLIGRAM(S): at 21:28

## 2021-03-03 RX ADMIN — Medication 1 APPLICATION(S): at 05:50

## 2021-03-03 RX ADMIN — APIXABAN 5 MILLIGRAM(S): 2.5 TABLET, FILM COATED ORAL at 17:16

## 2021-03-03 RX ADMIN — TAMSULOSIN HYDROCHLORIDE 0.4 MILLIGRAM(S): 0.4 CAPSULE ORAL at 21:27

## 2021-03-03 RX ADMIN — Medication 500 MILLIGRAM(S): at 05:50

## 2021-03-03 RX ADMIN — CEFTRIAXONE 100 MILLIGRAM(S): 500 INJECTION, POWDER, FOR SOLUTION INTRAMUSCULAR; INTRAVENOUS at 12:10

## 2021-03-03 RX ADMIN — Medication 1 APPLICATION(S): at 13:52

## 2021-03-03 RX ADMIN — SODIUM CHLORIDE 70 MILLILITER(S): 9 INJECTION INTRAMUSCULAR; INTRAVENOUS; SUBCUTANEOUS at 12:11

## 2021-03-03 RX ADMIN — APIXABAN 5 MILLIGRAM(S): 2.5 TABLET, FILM COATED ORAL at 05:50

## 2021-03-03 RX ADMIN — Medication 1 APPLICATION(S): at 21:29

## 2021-03-03 RX ADMIN — PANTOPRAZOLE SODIUM 40 MILLIGRAM(S): 20 TABLET, DELAYED RELEASE ORAL at 08:24

## 2021-03-03 NOTE — PROGRESS NOTE ADULT - ASSESSMENT
urinary tract infection proteus in urine culture   stable on ctx   clinically improving   culture result reviewed  pt pen allergic tolerated ceftriaxone   plan po vantin 200 mg po q 12 end date 3/7/21 with probiotics    urinary tract infection proteus in urine culture   stable on ctx   clinically improving   culture result reviewed  pt pen allergic tolerated ceftriaxone   plan po vantin 200 mg po q 12 end date 3/7/21 with probiotics   clinically stable from infectious disease standpoint. Will sign off please reconsult if needed. Thank you

## 2021-03-04 ENCOUNTER — TRANSCRIPTION ENCOUNTER (OUTPATIENT)
Age: 46
End: 2021-03-04

## 2021-03-04 LAB
ANION GAP SERPL CALC-SCNC: 4 MMOL/L — LOW (ref 5–17)
BUN SERPL-MCNC: 7 MG/DL — SIGNIFICANT CHANGE UP (ref 7–23)
CALCIUM SERPL-MCNC: 8.2 MG/DL — LOW (ref 8.5–10.1)
CHLORIDE SERPL-SCNC: 108 MMOL/L — SIGNIFICANT CHANGE UP (ref 96–108)
CO2 SERPL-SCNC: 28 MMOL/L — SIGNIFICANT CHANGE UP (ref 22–31)
CREAT SERPL-MCNC: 0.49 MG/DL — LOW (ref 0.5–1.3)
GLUCOSE SERPL-MCNC: 79 MG/DL — SIGNIFICANT CHANGE UP (ref 70–99)
HCT VFR BLD CALC: 40.7 % — SIGNIFICANT CHANGE UP (ref 39–50)
HGB BLD-MCNC: 13 G/DL — SIGNIFICANT CHANGE UP (ref 13–17)
MCHC RBC-ENTMCNC: 28.3 PG — SIGNIFICANT CHANGE UP (ref 27–34)
MCHC RBC-ENTMCNC: 31.9 GM/DL — LOW (ref 32–36)
MCV RBC AUTO: 88.5 FL — SIGNIFICANT CHANGE UP (ref 80–100)
NRBC # BLD: 0 /100 WBCS — SIGNIFICANT CHANGE UP (ref 0–0)
PLATELET # BLD AUTO: 167 K/UL — SIGNIFICANT CHANGE UP (ref 150–400)
POTASSIUM SERPL-MCNC: 3.9 MMOL/L — SIGNIFICANT CHANGE UP (ref 3.5–5.3)
POTASSIUM SERPL-SCNC: 3.9 MMOL/L — SIGNIFICANT CHANGE UP (ref 3.5–5.3)
RBC # BLD: 4.6 M/UL — SIGNIFICANT CHANGE UP (ref 4.2–5.8)
RBC # FLD: 15.5 % — HIGH (ref 10.3–14.5)
SODIUM SERPL-SCNC: 140 MMOL/L — SIGNIFICANT CHANGE UP (ref 135–145)
WBC # BLD: 4.63 K/UL — SIGNIFICANT CHANGE UP (ref 3.8–10.5)
WBC # FLD AUTO: 4.63 K/UL — SIGNIFICANT CHANGE UP (ref 3.8–10.5)

## 2021-03-04 RX ORDER — TIZANIDINE 4 MG/1
1 TABLET ORAL
Qty: 0 | Refills: 0 | DISCHARGE

## 2021-03-04 RX ORDER — CHOLECALCIFEROL (VITAMIN D3) 125 MCG
1 CAPSULE ORAL
Qty: 0 | Refills: 0 | DISCHARGE

## 2021-03-04 RX ORDER — DOCUSATE SODIUM 100 MG
1 CAPSULE ORAL
Qty: 0 | Refills: 0 | DISCHARGE

## 2021-03-04 RX ORDER — CEFPODOXIME PROXETIL 100 MG
1 TABLET ORAL
Qty: 10 | Refills: 0
Start: 2021-03-04 | End: 2021-03-08

## 2021-03-04 RX ORDER — ASCORBIC ACID 60 MG
1 TABLET,CHEWABLE ORAL
Qty: 180 | Refills: 0
Start: 2021-03-04 | End: 2021-06-01

## 2021-03-04 RX ADMIN — Medication 500 MILLIGRAM(S): at 17:36

## 2021-03-04 RX ADMIN — Medication 1 TABLET(S): at 11:45

## 2021-03-04 RX ADMIN — Medication 5 MILLIGRAM(S): at 21:10

## 2021-03-04 RX ADMIN — Medication 1 APPLICATION(S): at 21:10

## 2021-03-04 RX ADMIN — SENNA PLUS 2 TABLET(S): 8.6 TABLET ORAL at 21:09

## 2021-03-04 RX ADMIN — Medication 1 APPLICATION(S): at 05:35

## 2021-03-04 RX ADMIN — SODIUM CHLORIDE 70 MILLILITER(S): 9 INJECTION INTRAMUSCULAR; INTRAVENOUS; SUBCUTANEOUS at 16:50

## 2021-03-04 RX ADMIN — APIXABAN 5 MILLIGRAM(S): 2.5 TABLET, FILM COATED ORAL at 05:35

## 2021-03-04 RX ADMIN — Medication 500 MILLIGRAM(S): at 05:35

## 2021-03-04 RX ADMIN — Medication 5 MILLIGRAM(S): at 21:09

## 2021-03-04 RX ADMIN — TAMSULOSIN HYDROCHLORIDE 0.4 MILLIGRAM(S): 0.4 CAPSULE ORAL at 21:09

## 2021-03-04 RX ADMIN — PANTOPRAZOLE SODIUM 40 MILLIGRAM(S): 20 TABLET, DELAYED RELEASE ORAL at 09:51

## 2021-03-04 RX ADMIN — Medication 1 APPLICATION(S): at 14:16

## 2021-03-04 RX ADMIN — SODIUM CHLORIDE 70 MILLILITER(S): 9 INJECTION INTRAMUSCULAR; INTRAVENOUS; SUBCUTANEOUS at 05:35

## 2021-03-04 RX ADMIN — APIXABAN 5 MILLIGRAM(S): 2.5 TABLET, FILM COATED ORAL at 17:36

## 2021-03-04 NOTE — DISCHARGE NOTE PROVIDER - NSDCMRMEDTOKEN_GEN_ALL_CORE_FT
apixaban 5 mg oral tablet: 1 tab(s) orally every 12 hours  ascorbic acid 500 mg oral tablet: 1 tab(s) orally 2 times a day  cefpodoxime 100 mg oral tablet: 1 tab(s) orally every 12 hours  famotidine 20 mg oral tablet: 1 tab(s) orally once a day (at bedtime)  ibuprofen 200 mg oral tablet: 1 tab(s) orally every 6 hours, As Needed  tamsulosin 0.4 mg oral capsule: 1 cap(s) orally once a day (at bedtime)  vitamin A &amp; D topical ointment: 1 application topically 3 times a day   amLODIPine 10 mg oral tablet: 1 tab(s) orally once a day  apixaban 5 mg oral tablet: 1 tab(s) orally every 12 hours  ascorbic acid 500 mg oral tablet: 1 tab(s) orally 2 times a day  cefpodoxime 100 mg oral tablet: 1 tab(s) orally every 12 hours  famotidine 20 mg oral tablet: 1 tab(s) orally once a day (at bedtime)  ibuprofen 200 mg oral tablet: 1 tab(s) orally every 6 hours, As Needed  tamsulosin 0.4 mg oral capsule: 1 cap(s) orally once a day (at bedtime)  vitamin A &amp; D topical ointment: 1 application topically 3 times a day
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
## 2021-03-04 NOTE — DISCHARGE NOTE PROVIDER - NSDCCPCAREPLAN_GEN_ALL_CORE_FT
PRINCIPAL DISCHARGE DIAGNOSIS  Diagnosis: Recurrent UTI  Assessment and Plan of Treatment: Medication: Vantin for 5 days  Medical followup

## 2021-03-04 NOTE — DISCHARGE NOTE PROVIDER - NSDCCPGOAL_GEN_ALL_CORE_FT
To get better and follow your care plan as instructed.  Please follow up with your primary care physician regarding your hospitalization in 2 weeks.

## 2021-03-05 RX ORDER — CEFPODOXIME PROXETIL 100 MG
100 TABLET ORAL EVERY 12 HOURS
Refills: 0 | Status: COMPLETED | OUTPATIENT
Start: 2021-03-05 | End: 2021-03-09

## 2021-03-05 RX ADMIN — PANTOPRAZOLE SODIUM 40 MILLIGRAM(S): 20 TABLET, DELAYED RELEASE ORAL at 06:06

## 2021-03-05 RX ADMIN — Medication 5 MILLIGRAM(S): at 21:57

## 2021-03-05 RX ADMIN — APIXABAN 5 MILLIGRAM(S): 2.5 TABLET, FILM COATED ORAL at 06:06

## 2021-03-05 RX ADMIN — Medication 1 APPLICATION(S): at 06:03

## 2021-03-05 RX ADMIN — Medication 500 MILLIGRAM(S): at 17:23

## 2021-03-05 RX ADMIN — Medication 500 MILLIGRAM(S): at 06:06

## 2021-03-05 RX ADMIN — Medication 1 TABLET(S): at 12:16

## 2021-03-05 RX ADMIN — SENNA PLUS 2 TABLET(S): 8.6 TABLET ORAL at 21:57

## 2021-03-05 RX ADMIN — SODIUM CHLORIDE 70 MILLILITER(S): 9 INJECTION INTRAMUSCULAR; INTRAVENOUS; SUBCUTANEOUS at 06:06

## 2021-03-05 RX ADMIN — Medication 1 APPLICATION(S): at 21:56

## 2021-03-05 RX ADMIN — TAMSULOSIN HYDROCHLORIDE 0.4 MILLIGRAM(S): 0.4 CAPSULE ORAL at 21:57

## 2021-03-05 RX ADMIN — Medication 1 APPLICATION(S): at 14:04

## 2021-03-05 RX ADMIN — APIXABAN 5 MILLIGRAM(S): 2.5 TABLET, FILM COATED ORAL at 17:22

## 2021-03-05 RX ADMIN — SODIUM CHLORIDE 70 MILLILITER(S): 9 INJECTION INTRAMUSCULAR; INTRAVENOUS; SUBCUTANEOUS at 17:25

## 2021-03-06 LAB
ANION GAP SERPL CALC-SCNC: 6 MMOL/L — SIGNIFICANT CHANGE UP (ref 5–17)
BUN SERPL-MCNC: 11 MG/DL — SIGNIFICANT CHANGE UP (ref 7–23)
CALCIUM SERPL-MCNC: 8.4 MG/DL — LOW (ref 8.5–10.1)
CHLORIDE SERPL-SCNC: 110 MMOL/L — HIGH (ref 96–108)
CO2 SERPL-SCNC: 26 MMOL/L — SIGNIFICANT CHANGE UP (ref 22–31)
CREAT SERPL-MCNC: 0.56 MG/DL — SIGNIFICANT CHANGE UP (ref 0.5–1.3)
CULTURE RESULTS: SIGNIFICANT CHANGE UP
GLUCOSE SERPL-MCNC: 111 MG/DL — HIGH (ref 70–99)
HCT VFR BLD CALC: 39.5 % — SIGNIFICANT CHANGE UP (ref 39–50)
HGB BLD-MCNC: 13.1 G/DL — SIGNIFICANT CHANGE UP (ref 13–17)
MCHC RBC-ENTMCNC: 28.7 PG — SIGNIFICANT CHANGE UP (ref 27–34)
MCHC RBC-ENTMCNC: 33.2 GM/DL — SIGNIFICANT CHANGE UP (ref 32–36)
MCV RBC AUTO: 86.4 FL — SIGNIFICANT CHANGE UP (ref 80–100)
NRBC # BLD: 0 /100 WBCS — SIGNIFICANT CHANGE UP (ref 0–0)
PLATELET # BLD AUTO: 174 K/UL — SIGNIFICANT CHANGE UP (ref 150–400)
POTASSIUM SERPL-MCNC: 3.7 MMOL/L — SIGNIFICANT CHANGE UP (ref 3.5–5.3)
POTASSIUM SERPL-SCNC: 3.7 MMOL/L — SIGNIFICANT CHANGE UP (ref 3.5–5.3)
RBC # BLD: 4.57 M/UL — SIGNIFICANT CHANGE UP (ref 4.2–5.8)
RBC # FLD: 15.6 % — HIGH (ref 10.3–14.5)
SODIUM SERPL-SCNC: 142 MMOL/L — SIGNIFICANT CHANGE UP (ref 135–145)
SPECIMEN SOURCE: SIGNIFICANT CHANGE UP
WBC # BLD: 5.44 K/UL — SIGNIFICANT CHANGE UP (ref 3.8–10.5)
WBC # FLD AUTO: 5.44 K/UL — SIGNIFICANT CHANGE UP (ref 3.8–10.5)

## 2021-03-06 RX ADMIN — APIXABAN 5 MILLIGRAM(S): 2.5 TABLET, FILM COATED ORAL at 06:11

## 2021-03-06 RX ADMIN — SODIUM CHLORIDE 70 MILLILITER(S): 9 INJECTION INTRAMUSCULAR; INTRAVENOUS; SUBCUTANEOUS at 06:11

## 2021-03-06 RX ADMIN — PANTOPRAZOLE SODIUM 40 MILLIGRAM(S): 20 TABLET, DELAYED RELEASE ORAL at 06:11

## 2021-03-06 RX ADMIN — SENNA PLUS 2 TABLET(S): 8.6 TABLET ORAL at 21:26

## 2021-03-06 RX ADMIN — Medication 5 MILLIGRAM(S): at 21:26

## 2021-03-06 RX ADMIN — Medication 1 APPLICATION(S): at 06:12

## 2021-03-06 RX ADMIN — Medication 100 MILLIGRAM(S): at 06:11

## 2021-03-06 RX ADMIN — Medication 500 MILLIGRAM(S): at 06:11

## 2021-03-06 RX ADMIN — TAMSULOSIN HYDROCHLORIDE 0.4 MILLIGRAM(S): 0.4 CAPSULE ORAL at 21:26

## 2021-03-06 RX ADMIN — Medication 500 MILLIGRAM(S): at 17:07

## 2021-03-06 RX ADMIN — Medication 1 TABLET(S): at 11:30

## 2021-03-06 RX ADMIN — Medication 100 MILLIGRAM(S): at 17:07

## 2021-03-06 RX ADMIN — Medication 1 APPLICATION(S): at 11:32

## 2021-03-06 RX ADMIN — APIXABAN 5 MILLIGRAM(S): 2.5 TABLET, FILM COATED ORAL at 17:07

## 2021-03-06 RX ADMIN — Medication 1 APPLICATION(S): at 21:26

## 2021-03-07 DIAGNOSIS — I10 ESSENTIAL (PRIMARY) HYPERTENSION: ICD-10-CM

## 2021-03-07 LAB
ANION GAP SERPL CALC-SCNC: 6 MMOL/L — SIGNIFICANT CHANGE UP (ref 5–17)
BUN SERPL-MCNC: 12 MG/DL — SIGNIFICANT CHANGE UP (ref 7–23)
CALCIUM SERPL-MCNC: 8.7 MG/DL — SIGNIFICANT CHANGE UP (ref 8.5–10.1)
CHLORIDE SERPL-SCNC: 108 MMOL/L — SIGNIFICANT CHANGE UP (ref 96–108)
CO2 SERPL-SCNC: 27 MMOL/L — SIGNIFICANT CHANGE UP (ref 22–31)
CREAT SERPL-MCNC: 0.52 MG/DL — SIGNIFICANT CHANGE UP (ref 0.5–1.3)
GLUCOSE SERPL-MCNC: 73 MG/DL — SIGNIFICANT CHANGE UP (ref 70–99)
HCT VFR BLD CALC: 41 % — SIGNIFICANT CHANGE UP (ref 39–50)
HGB BLD-MCNC: 13.4 G/DL — SIGNIFICANT CHANGE UP (ref 13–17)
MCHC RBC-ENTMCNC: 28.2 PG — SIGNIFICANT CHANGE UP (ref 27–34)
MCHC RBC-ENTMCNC: 32.7 GM/DL — SIGNIFICANT CHANGE UP (ref 32–36)
MCV RBC AUTO: 86.3 FL — SIGNIFICANT CHANGE UP (ref 80–100)
NRBC # BLD: 0 /100 WBCS — SIGNIFICANT CHANGE UP (ref 0–0)
PLATELET # BLD AUTO: 178 K/UL — SIGNIFICANT CHANGE UP (ref 150–400)
POTASSIUM SERPL-MCNC: 3.9 MMOL/L — SIGNIFICANT CHANGE UP (ref 3.5–5.3)
POTASSIUM SERPL-SCNC: 3.9 MMOL/L — SIGNIFICANT CHANGE UP (ref 3.5–5.3)
RBC # BLD: 4.75 M/UL — SIGNIFICANT CHANGE UP (ref 4.2–5.8)
RBC # FLD: 15.6 % — HIGH (ref 10.3–14.5)
SODIUM SERPL-SCNC: 141 MMOL/L — SIGNIFICANT CHANGE UP (ref 135–145)
WBC # BLD: 5.37 K/UL — SIGNIFICANT CHANGE UP (ref 3.8–10.5)
WBC # FLD AUTO: 5.37 K/UL — SIGNIFICANT CHANGE UP (ref 3.8–10.5)

## 2021-03-07 RX ORDER — AMLODIPINE BESYLATE 2.5 MG/1
10 TABLET ORAL DAILY
Refills: 0 | Status: DISCONTINUED | OUTPATIENT
Start: 2021-03-07 | End: 2021-03-11

## 2021-03-07 RX ADMIN — PANTOPRAZOLE SODIUM 40 MILLIGRAM(S): 20 TABLET, DELAYED RELEASE ORAL at 05:43

## 2021-03-07 RX ADMIN — AMLODIPINE BESYLATE 10 MILLIGRAM(S): 2.5 TABLET ORAL at 17:06

## 2021-03-07 RX ADMIN — SENNA PLUS 2 TABLET(S): 8.6 TABLET ORAL at 21:50

## 2021-03-07 RX ADMIN — Medication 500 MILLIGRAM(S): at 17:06

## 2021-03-07 RX ADMIN — APIXABAN 5 MILLIGRAM(S): 2.5 TABLET, FILM COATED ORAL at 05:42

## 2021-03-07 RX ADMIN — Medication 5 MILLIGRAM(S): at 15:15

## 2021-03-07 RX ADMIN — Medication 100 MILLIGRAM(S): at 05:43

## 2021-03-07 RX ADMIN — Medication 500 MILLIGRAM(S): at 05:43

## 2021-03-07 RX ADMIN — Medication 5 MILLIGRAM(S): at 21:51

## 2021-03-07 RX ADMIN — Medication 1 APPLICATION(S): at 21:51

## 2021-03-07 RX ADMIN — Medication 1 TABLET(S): at 11:24

## 2021-03-07 RX ADMIN — Medication 1 APPLICATION(S): at 11:25

## 2021-03-07 RX ADMIN — Medication 1 APPLICATION(S): at 07:04

## 2021-03-07 RX ADMIN — LACTULOSE 10 GRAM(S): 10 SOLUTION ORAL at 15:20

## 2021-03-07 RX ADMIN — Medication 100 MILLIGRAM(S): at 17:06

## 2021-03-07 RX ADMIN — TAMSULOSIN HYDROCHLORIDE 0.4 MILLIGRAM(S): 0.4 CAPSULE ORAL at 21:51

## 2021-03-07 RX ADMIN — APIXABAN 5 MILLIGRAM(S): 2.5 TABLET, FILM COATED ORAL at 17:06

## 2021-03-08 DIAGNOSIS — F43.20 ADJUSTMENT DISORDER, UNSPECIFIED: ICD-10-CM

## 2021-03-08 PROCEDURE — 99231 SBSQ HOSP IP/OBS SF/LOW 25: CPT

## 2021-03-08 RX ADMIN — Medication 5 MILLIGRAM(S): at 21:20

## 2021-03-08 RX ADMIN — Medication 100 MILLIGRAM(S): at 18:52

## 2021-03-08 RX ADMIN — APIXABAN 5 MILLIGRAM(S): 2.5 TABLET, FILM COATED ORAL at 05:55

## 2021-03-08 RX ADMIN — Medication 5 MILLIGRAM(S): at 21:18

## 2021-03-08 RX ADMIN — LACTULOSE 10 GRAM(S): 10 SOLUTION ORAL at 21:19

## 2021-03-08 RX ADMIN — Medication 500 MILLIGRAM(S): at 05:58

## 2021-03-08 RX ADMIN — TAMSULOSIN HYDROCHLORIDE 0.4 MILLIGRAM(S): 0.4 CAPSULE ORAL at 21:19

## 2021-03-08 RX ADMIN — APIXABAN 5 MILLIGRAM(S): 2.5 TABLET, FILM COATED ORAL at 18:52

## 2021-03-08 RX ADMIN — Medication 1 TABLET(S): at 12:22

## 2021-03-08 RX ADMIN — SENNA PLUS 2 TABLET(S): 8.6 TABLET ORAL at 21:18

## 2021-03-08 RX ADMIN — PANTOPRAZOLE SODIUM 40 MILLIGRAM(S): 20 TABLET, DELAYED RELEASE ORAL at 05:55

## 2021-03-08 RX ADMIN — Medication 1 APPLICATION(S): at 21:19

## 2021-03-08 RX ADMIN — Medication 500 MILLIGRAM(S): at 18:52

## 2021-03-08 RX ADMIN — Medication 5 MILLIGRAM(S): at 18:53

## 2021-03-08 RX ADMIN — Medication 100 MILLIGRAM(S): at 05:55

## 2021-03-08 RX ADMIN — Medication 1 APPLICATION(S): at 12:27

## 2021-03-08 RX ADMIN — Medication 1 APPLICATION(S): at 05:59
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
## 2021-03-08 NOTE — DIETITIAN INITIAL EVALUATION ADULT. - ORAL INTAKE PTA/DIET HISTORY
Pt reports good appetite and PO intake PTA. Per H&P pt has aides 24 hours/day, 7 days/week PTA. Reports no diet restrictions at home.

## 2021-03-08 NOTE — BEHAVIORAL HEALTH ASSESSMENT NOTE - OTHER
severe debility / low quality of life in a young patient see HPI atrophied muscles of extremities n/a

## 2021-03-08 NOTE — DIETITIAN INITIAL EVALUATION ADULT. - OTHER INFO
Pt reports good appetite and PO intake since admission (02/28). Per flow sheets consuming % most meals during LOS. Denies difficulty chewing/swallowing. Pt denies nausea, vomiting, diarrhea, or constipation. States weight stable. Pt requests Ensure Enlive x 1/day (provides 350 kcal, 20 g protein). Made aware RD remains available.
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
## 2021-03-08 NOTE — DIETITIAN INITIAL EVALUATION ADULT. - OTHER CALCULATIONS
Ht (cm): 185.4cm   Wt (kg): 80.4kg   BMI: 23.4     IBW: 83.4kg +/- 10% %IBW: 96% UBW: 81.6kg %UBW: 99%

## 2021-03-08 NOTE — BEHAVIORAL HEALTH ASSESSMENT NOTE - SUICIDE PROTECTIVE FACTORS
Responsibility to family and others/Identifies reasons for living/Has future plans/Cultural, spiritual and/or moral attitudes against suicide/Positive therapeutic relationships/Zoroastrian beliefs

## 2021-03-08 NOTE — BEHAVIORAL HEALTH ASSESSMENT NOTE - RISK ASSESSMENT
Low Acute Suicide Risk Chronic risk factors: single, male gender, severe debility impacting quality of life at a young age. Protective factors: young; medication and treatment compliant; no psych history, no suicide attempts; no self-injurious behavior; no hx of aggression/violence; no substance use reported; denies access to guns; stable domicile; no legal issues; motivated for help; access to health services. No acute risk factors identified

## 2021-03-08 NOTE — BEHAVIORAL HEALTH ASSESSMENT NOTE - NSBHCHARTREVIEWLAB_PSY_A_CORE FT
03-07    141  |  108  |  12  ----------------------------<  73  3.9   |  27  |  0.52    Ca    8.7      07 Mar 2021 08:50

## 2021-03-08 NOTE — BEHAVIORAL HEALTH ASSESSMENT NOTE - SUMMARY
Patient does not have acute psychiatric symptoms or complaints. His narrative regarding his experience with  services was linear and logical. Patient is in a very difficult situation given his young age and great debility. Patient has capacity to make his medical decisions including engaging in discharge planning.

## 2021-03-08 NOTE — DIETITIAN INITIAL EVALUATION ADULT. - PERTINENT MEDS FT
MEDICATIONS  (STANDING):  amLODIPine   Tablet 10 milliGRAM(s) Oral daily  apixaban 5 milliGRAM(s) Oral every 12 hours  ascorbic acid 500 milliGRAM(s) Oral two times a day  bisacodyl 5 milliGRAM(s) Oral at bedtime  cefpodoxime 100 milliGRAM(s) Oral every 12 hours  influenza   Vaccine 0.5 milliLiter(s) IntraMuscular once  multivitamin 1 Tablet(s) Oral daily  pantoprazole    Tablet 40 milliGRAM(s) Oral before breakfast  senna 2 Tablet(s) Oral at bedtime  tamsulosin 0.4 milliGRAM(s) Oral at bedtime  vitamin A &amp; D Ointment 1 Application(s) Topical three times a day    MEDICATIONS  (PRN):  acetaminophen   Tablet .. 650 milliGRAM(s) Oral every 6 hours PRN Mild Pain (1 - 3)  baclofen 5 milliGRAM(s) Oral three times a day PRN muscle spasticity  lactulose Syrup 10 Gram(s) Oral three times a day PRN Constipation

## 2021-03-09 LAB
FLUAV AG NPH QL: SIGNIFICANT CHANGE UP
FLUBV AG NPH QL: SIGNIFICANT CHANGE UP
SARS-COV-2 RNA SPEC QL NAA+PROBE: SIGNIFICANT CHANGE UP

## 2021-03-09 RX ADMIN — Medication 5 MILLIGRAM(S): at 21:38

## 2021-03-09 RX ADMIN — TAMSULOSIN HYDROCHLORIDE 0.4 MILLIGRAM(S): 0.4 CAPSULE ORAL at 21:42

## 2021-03-09 RX ADMIN — Medication 500 MILLIGRAM(S): at 06:54

## 2021-03-09 RX ADMIN — LACTULOSE 10 GRAM(S): 10 SOLUTION ORAL at 21:38

## 2021-03-09 RX ADMIN — APIXABAN 5 MILLIGRAM(S): 2.5 TABLET, FILM COATED ORAL at 06:56

## 2021-03-09 RX ADMIN — Medication 100 MILLIGRAM(S): at 06:54

## 2021-03-09 RX ADMIN — Medication 500 MILLIGRAM(S): at 17:25

## 2021-03-09 RX ADMIN — Medication 1 APPLICATION(S): at 21:39

## 2021-03-09 RX ADMIN — Medication 1 APPLICATION(S): at 06:54

## 2021-03-09 RX ADMIN — Medication 1 TABLET(S): at 11:57

## 2021-03-09 RX ADMIN — APIXABAN 5 MILLIGRAM(S): 2.5 TABLET, FILM COATED ORAL at 17:25

## 2021-03-09 RX ADMIN — SENNA PLUS 2 TABLET(S): 8.6 TABLET ORAL at 21:38

## 2021-03-09 RX ADMIN — Medication 1 APPLICATION(S): at 15:24

## 2021-03-09 RX ADMIN — Medication 100 MILLIGRAM(S): at 17:25

## 2021-03-09 RX ADMIN — PANTOPRAZOLE SODIUM 40 MILLIGRAM(S): 20 TABLET, DELAYED RELEASE ORAL at 08:21

## 2021-03-10 ENCOUNTER — TRANSCRIPTION ENCOUNTER (OUTPATIENT)
Age: 46
End: 2021-03-10

## 2021-03-10 RX ORDER — AMLODIPINE BESYLATE 2.5 MG/1
1 TABLET ORAL
Qty: 0 | Refills: 0 | DISCHARGE
Start: 2021-03-10

## 2021-03-10 RX ADMIN — Medication 1 APPLICATION(S): at 21:45

## 2021-03-10 RX ADMIN — Medication 5 MILLIGRAM(S): at 21:46

## 2021-03-10 RX ADMIN — APIXABAN 5 MILLIGRAM(S): 2.5 TABLET, FILM COATED ORAL at 17:10

## 2021-03-10 RX ADMIN — LACTULOSE 10 GRAM(S): 10 SOLUTION ORAL at 21:46

## 2021-03-10 RX ADMIN — Medication 500 MILLIGRAM(S): at 06:32

## 2021-03-10 RX ADMIN — SENNA PLUS 2 TABLET(S): 8.6 TABLET ORAL at 21:35

## 2021-03-10 RX ADMIN — Medication 1 TABLET(S): at 11:11

## 2021-03-10 RX ADMIN — Medication 500 MILLIGRAM(S): at 17:09

## 2021-03-10 RX ADMIN — TAMSULOSIN HYDROCHLORIDE 0.4 MILLIGRAM(S): 0.4 CAPSULE ORAL at 21:34

## 2021-03-10 RX ADMIN — Medication 1 APPLICATION(S): at 06:33

## 2021-03-10 RX ADMIN — APIXABAN 5 MILLIGRAM(S): 2.5 TABLET, FILM COATED ORAL at 06:33

## 2021-03-10 NOTE — DISCHARGE NOTE NURSING/CASE MANAGEMENT/SOCIAL WORK - PATIENT PORTAL LINK FT
You can access the FollowMyHealth Patient Portal offered by [facility] by registering at the following website: [email or web address]. By joining [facility]’s FollowMyHealth portal, you will also be able to view your health information using other applications (apps) compatible with our system.

## 2021-03-11 VITALS
HEART RATE: 75 BPM | RESPIRATION RATE: 18 BRPM | DIASTOLIC BLOOD PRESSURE: 87 MMHG | TEMPERATURE: 99 F | SYSTOLIC BLOOD PRESSURE: 133 MMHG | OXYGEN SATURATION: 97 %

## 2021-03-11 RX ADMIN — Medication 500 MILLIGRAM(S): at 05:56

## 2021-03-11 RX ADMIN — Medication 1 TABLET(S): at 11:45

## 2021-03-11 RX ADMIN — Medication 1 APPLICATION(S): at 11:47

## 2021-03-11 RX ADMIN — Medication 1 APPLICATION(S): at 05:57

## 2021-03-11 RX ADMIN — LACTULOSE 10 GRAM(S): 10 SOLUTION ORAL at 11:46

## 2021-03-11 RX ADMIN — PANTOPRAZOLE SODIUM 40 MILLIGRAM(S): 20 TABLET, DELAYED RELEASE ORAL at 05:56

## 2021-03-11 RX ADMIN — Medication 500 MILLIGRAM(S): at 17:21

## 2021-03-11 RX ADMIN — APIXABAN 5 MILLIGRAM(S): 2.5 TABLET, FILM COATED ORAL at 17:21

## 2021-03-11 RX ADMIN — APIXABAN 5 MILLIGRAM(S): 2.5 TABLET, FILM COATED ORAL at 05:56

## 2021-03-11 NOTE — PROGRESS NOTE ADULT - REASON FOR ADMISSION
Abdominal pain.

## 2021-03-11 NOTE — PROGRESS NOTE ADULT - SUBJECTIVE AND OBJECTIVE BOX
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 sounds present  EXTREMITIES:   No clubbing, cyanosis, or edema; Contracted   LYMPH: No lymphadenopathy noted  SKIN: No rashes or lesions          LABS:                        13.4   5.37  )-----------( 178      ( 07 Mar 2021 08:50 )             41.0     03-07    141  |  108  |  12  ----------------------------<  73  3.9   |  27  |  0.52    Ca    8.7      07 Mar 2021 08:50          CAPILLARY BLOOD GLUCOSE              RADIOLOGY & ADDITIONAL TESTS:        Consultant(s) Notes Reviewed:  [ ] YES  [ ] NO  
Patient is a 45y old  Male who presents with a chief complaint of Abdominal pain. (08 Mar 2021 12:31)      SUBJECTIVE/OBJECTIVE:    Without complaints. Patient resting comfortably.     MEDICATIONS  (STANDING):  amLODIPine   Tablet 10 milliGRAM(s) Oral daily  apixaban 5 milliGRAM(s) Oral every 12 hours  ascorbic acid 500 milliGRAM(s) Oral two times a day  bisacodyl 5 milliGRAM(s) Oral at bedtime  cefpodoxime 100 milliGRAM(s) Oral every 12 hours  influenza   Vaccine 0.5 milliLiter(s) IntraMuscular once  multivitamin 1 Tablet(s) Oral daily  pantoprazole    Tablet 40 milliGRAM(s) Oral before breakfast  senna 2 Tablet(s) Oral at bedtime  tamsulosin 0.4 milliGRAM(s) Oral at bedtime  vitamin A &amp; D Ointment 1 Application(s) Topical three times a day    MEDICATIONS  (PRN):  acetaminophen   Tablet .. 650 milliGRAM(s) Oral every 6 hours PRN Mild Pain (1 - 3)  baclofen 5 milliGRAM(s) Oral three times a day PRN muscle spasticity  lactulose Syrup 10 Gram(s) Oral three times a day PRN Constipation      Allergies    penicillin (Rash)    Intolerances          Vital Signs Last 24 Hrs  T(C): 36.3 (09 Mar 2021 06:58), Max: 36.3 (09 Mar 2021 06:58)  T(F): 97.3 (09 Mar 2021 06:58), Max: 97.3 (09 Mar 2021 06:58)  HR: 52 (09 Mar 2021 06:58) (52 - 69)  BP: 115/78 (09 Mar 2021 06:58) (115/78 - 130/85)  BP(mean): --  RR: 16 (09 Mar 2021 06:58) (16 - 18)  SpO2: 100% (09 Mar 2021 06:58) (96% - 100%)                    PHYSICAL EXAM:  GENERAL: NAD,   HEAD:  Atraumatic, Normocephalic  EYES: EOMI, PERRLA, conjunctiva and sclera clear  ENMT: Intact  NECK: Supple,   NERVOUS SYSTEM:  Alert & Oriented X3; Motor Strength 0/5 Quadriplegic   CHEST/LUNG: Clear bilaterally; No rales, rhonchi, wheezing, or rubs  HEART: Regular rate and rhythm; No murmurs, rubs, or gallops  ABDOMEN: Soft, Nontender, Nondistended; Bowel sounds present  EXTREMITIES:   No clubbing, cyanosis, or edema; Contracted   LYMPH: No lymphadenopathy noted  SKIN: No rashes or lesions    LABS:              CAPILLARY BLOOD GLUCOSE              RADIOLOGY & ADDITIONAL TESTS:        Consultant(s) Notes Reviewed:  [ ] YES  [ ] NO  
Patient is a 45y old  Male who presents with a chief complaint of Abdominal pain. (09 Mar 2021 10:41)      SUBJECTIVE/OBJECTIVE:    Without complaints. Patient resting comfortably.     MEDICATIONS  (STANDING):  amLODIPine   Tablet 10 milliGRAM(s) Oral daily  apixaban 5 milliGRAM(s) Oral every 12 hours  ascorbic acid 500 milliGRAM(s) Oral two times a day  bisacodyl 5 milliGRAM(s) Oral at bedtime  influenza   Vaccine 0.5 milliLiter(s) IntraMuscular once  multivitamin 1 Tablet(s) Oral daily  pantoprazole    Tablet 40 milliGRAM(s) Oral before breakfast  senna 2 Tablet(s) Oral at bedtime  tamsulosin 0.4 milliGRAM(s) Oral at bedtime  vitamin A &amp; D Ointment 1 Application(s) Topical three times a day    MEDICATIONS  (PRN):  acetaminophen   Tablet .. 650 milliGRAM(s) Oral every 6 hours PRN Mild Pain (1 - 3)  baclofen 5 milliGRAM(s) Oral three times a day PRN muscle spasticity  lactulose Syrup 10 Gram(s) Oral three times a day PRN Constipation      Allergies    penicillin (Rash)    Intolerances          Vital Signs Last 24 Hrs  T(C): 36.3 (10 Mar 2021 23:51), Max: 36.8 (10 Mar 2021 18:12)  T(F): 97.4 (10 Mar 2021 23:51), Max: 98.2 (10 Mar 2021 18:12)  HR: 77 (10 Mar 2021 23:51) (68 - 77)  BP: 131/95 (10 Mar 2021 23:51) (107/73 - 131/95)  BP(mean): --  RR: 16 (10 Mar 2021 23:51) (16 - 17)  SpO2: 98% (10 Mar 2021 23:51) (98% - 98%)            PHYSICAL EXAM:  GENERAL: NAD,   HEAD:  Atraumatic, Normocephalic  EYES: EOMI, PERRLA, conjunctiva and sclera clear  ENMT: Intact  NECK: Supple,   NERVOUS SYSTEM:  Alert & Oriented X3; Motor Strength 0/5 Quadriplegic   CHEST/LUNG: Clear bilaterally; No rales, rhonchi, wheezing, or rubs  HEART: Regular rate and rhythm; No murmurs, rubs, or gallops  ABDOMEN: Soft, Nontender, Nondistended; Bowel sounds present  EXTREMITIES:   No clubbing, cyanosis, or edema; Contracted   LYMPH: No lymphadenopathy noted  SKIN: No rashes or lesions      LABS:              CAPILLARY BLOOD GLUCOSE              RADIOLOGY & ADDITIONAL TESTS:        Consultant(s) Notes Reviewed:  [ xxx] YES  [ ] NO

## 2021-03-11 NOTE — PROGRESS NOTE ADULT - PROBLEM SELECTOR PLAN 2
Added Norvasc  Monitoring BP

## 2021-03-11 NOTE — PROGRESS NOTE ADULT - ATTENDING COMMENTS
ID following; urinary tract infection proteus in urine culture   stable on ctx   clinically improving   culture result reviewed  pt pen allergic tolerated ceftriaxone   plan po vantin 200 mg po q 12 end date 3/7/21 with probiotics       Psych evaluation noted.  Still refusing Rehab, informed the hospital may get a guardian to make decision for patient  Patient agreed to go to Rehab for 2-3 days  Discharge planning for Rehab  Medically stable.
ID following; urinary tract infection proteus in urine culture   stable on ctx   clinically improving   culture result reviewed  pt pen allergic tolerated ceftriaxone   plan po vantin 200 mg po q 12 end date 3/7/21 with probiotics     EFRAIN noted; Notes    Notes: Pt. is ready for discharge as per Dr. Phillips.  SW became aware today that  pts agency that provided the split shift 24/7 is no longer providing services  due to unsafe situation for their staff due to patients attitude, guests,  possible illegal substance use as well.  Agency contacted APS and case given to  Suzette from [facility] [phone].  Message left for Suzette.  Pt has a Health Home,  [facility] and SW spoke to pts , [facility] [phone] who confirmed same.  She has not  visited pt since everything is virtual due to COVID.  Pt was visited by Dr. Phillips and at this time refuses to [facility] where his homecare issues could be  sorted out and his needs could be met.  Issues escalated to  Director.    Patient adamantly refusing Rehab.  Discharge home unsafe, no aides  Kicked up to Administration.   Continue current care and treatment.
ID following; urinary tract infection proteus in urine culture   stable on ctx   clinically improving   culture result reviewed  pt pen allergic tolerated ceftriaxone   plan po vantin 200 mg po q 12 end date 3/7/21 with probiotics     EFRAIN noted; Notes    Notes: Pt. is ready for discharge as per Dr. Phillips.  SW became aware today that  pts agency that provided the split shift 24/7 is no longer providing services  due to unsafe situation for their staff due to patients attitude, guests,  possible illegal substance use as well.  Agency contacted APS and case given to  Suzette from [facility] [phone].  Message left for Suzette.  Pt has a Health Home,  [facility] and SW spoke to pts , [facility] [phone] who confirmed same.  She has not  visited pt since everything is virtual due to COVID.  Pt was visited by Dr. Phillips and at this time refuses to go to [facility] where his homecare issues could be  sorted out and his needs could be met.  Issues escalated to  Director.      Discharge planning on hold   Continue current care and treatment.
ID consult noted;  urinary tract infection   constipation and abdominal distention deferred to PMD   await culture ; appears to be the same as nafisa   proteus usu in alkaline urine   will start vit culture   likely has calculi   consider gu eval       Enema today  Continue current care and treatment.
ID following; urinary tract infection proteus in urine culture   stable on ctx   clinically improving   culture result reviewed  pt pen allergic tolerated ceftriaxone   plan po vantin 200 mg po q 12 end date 3/7/21 with probiotics       Psych evaluation noted.  Still refusing Rehab, informed the hospital may get a guardian to make decision for patient  Patient agreed to go to Rehab for 2-3 days  Discharge planning for Rehab  Waiting for bed  Medically stable.
ID following; urinary tract infection proteus in urine culture   stable on ctx   clinically improving   culture result reviewed  pt pen allergic tolerated ceftriaxone   plan po vantin 200 mg po q 12 end date 3/7/21 with probiotics     Discharge planning  Continue current care and treatment.
ID following; urinary tract infection proteus in urine culture   stable on ctx   clinically improving   culture result reviewed  pt pen allergic tolerated ceftriaxone   plan po vantin 200 mg po q 12 end date 3/7/21 with probiotics     SW;  Notes: Pt does not have any HHA's at home to care for him and pt is still  adamently refusing to consider rehab/SNF placement. Pt does not have a safe d/c  plan at this time. CM director aware      Patient adamantly refusing Rehab.  Discharge home unsafe, no aides  Continue current care and treatment.
ID consult.  Continue current care and treatment.
ID following; urinary tract infection proteus in urine culture   stable on ctx   clinically improving   culture result reviewed  pt pen allergic tolerated ceftriaxone   plan po vantin 200 mg po q 12 end date 3/7/21 with probiotics     SW;  Notes: Pt does not have any HHA's at home to care for him and pt is still  adamently refusing to consider rehab/SNF placement. Pt does not have a safe d/c  plan at this time. CM director aware      Psych evaluation  Still refusing Rehab, informed the hospital may get a guardian to make decision for patient  To speak with patient's sister  Discharge home unsafe, no aides  Continue current care and treatment.
ID following; urinary tract infection proteus in urine culture   stable on ctx   clinically improving   culture result reviewed  pt pen allergic tolerated ceftriaxone   plan po vantin 200 mg po q 12 end date 3/7/21 with probiotics     SW;  Notes: Pt does not have any HHA's at home to care for him and pt is still  adamently refusing to consider rehab/SNF placement. Pt does not have a safe d/c  plan at this time. CM director aware      Patient adamantly refusing Rehab.  Discharge home unsafe, no aides  Continue current care and treatment.

## 2021-03-11 NOTE — PROGRESS NOTE ADULT - PROVIDER SPECIALTY LIST ADULT
Internal Medicine
Infectious Disease
Internal Medicine
Infectious Disease
Internal Medicine

## 2021-03-11 NOTE — PROGRESS NOTE ADULT - PROBLEM SELECTOR PROBLEM 1
Acute cystitis with hematuria

## 2021-03-11 NOTE — PROGRESS NOTE ADULT - PROBLEM SELECTOR PLAN 1
IV Rocephin  IVF  Urine and blood culture
IV Rocephin switched to po Vantin  IVF discontinued  Urine and blood culture noted
IV Rocephin  IVF  Urine and blood culture
IV Rocephin switched to po Vantin  IVF discontinued  Urine and blood culture noted

## 2021-03-12 ENCOUNTER — EMERGENCY (EMERGENCY)
Facility: HOSPITAL | Age: 46
LOS: 0 days | Discharge: HOME | End: 2021-03-12
Attending: EMERGENCY MEDICINE
Payer: MEDICAID

## 2021-03-12 VITALS
SYSTOLIC BLOOD PRESSURE: 116 MMHG | TEMPERATURE: 98 F | HEART RATE: 98 BPM | HEIGHT: 73 IN | DIASTOLIC BLOOD PRESSURE: 83 MMHG | WEIGHT: 169.98 LBS | OXYGEN SATURATION: 99 % | RESPIRATION RATE: 19 BRPM

## 2021-03-12 VITALS
SYSTOLIC BLOOD PRESSURE: 165 MMHG | OXYGEN SATURATION: 97 % | DIASTOLIC BLOOD PRESSURE: 98 MMHG | RESPIRATION RATE: 20 BRPM | TEMPERATURE: 98 F | HEART RATE: 88 BPM

## 2021-03-12 DIAGNOSIS — K59.00 CONSTIPATION, UNSPECIFIED: ICD-10-CM

## 2021-03-12 DIAGNOSIS — Z95.0 PRESENCE OF CARDIAC PACEMAKER: Chronic | ICD-10-CM

## 2021-03-12 DIAGNOSIS — N20.0 CALCULUS OF KIDNEY: Chronic | ICD-10-CM

## 2021-03-12 DIAGNOSIS — N40.0 BENIGN PROSTATIC HYPERPLASIA WITHOUT LOWER URINARY TRACT SYMPTOMS: ICD-10-CM

## 2021-03-12 DIAGNOSIS — Z95.0 PRESENCE OF CARDIAC PACEMAKER: ICD-10-CM

## 2021-03-12 DIAGNOSIS — Z90.5 ACQUIRED ABSENCE OF KIDNEY: Chronic | ICD-10-CM
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
## 2021-03-12 DIAGNOSIS — T83.011A BREAKDOWN (MECHANICAL) OF INDWELLING URETHRAL CATHETER, INITIAL ENCOUNTER: ICD-10-CM

## 2021-03-12 DIAGNOSIS — Z88.0 ALLERGY STATUS TO PENICILLIN: ICD-10-CM
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
## 2021-03-12 DIAGNOSIS — Z87.442 PERSONAL HISTORY OF URINARY CALCULI: ICD-10-CM

## 2021-03-12 DIAGNOSIS — G82.20 PARAPLEGIA, UNSPECIFIED: ICD-10-CM

## 2021-03-12 DIAGNOSIS — S11.90XS UNSPECIFIED OPEN WOUND OF UNSPECIFIED PART OF NECK, SEQUELA: Chronic | ICD-10-CM

## 2021-03-12 PROCEDURE — 99284 EMERGENCY DEPT VISIT MOD MDM: CPT

## 2021-03-12 RX ORDER — FAMOTIDINE 10 MG/ML
20 INJECTION INTRAVENOUS ONCE
Refills: 0 | Status: COMPLETED | OUTPATIENT
Start: 2021-03-12 | End: 2021-03-12

## 2021-03-12 RX ORDER — IBUPROFEN 200 MG
400 TABLET ORAL ONCE
Refills: 0 | Status: COMPLETED | OUTPATIENT
Start: 2021-03-12 | End: 2021-03-12

## 2021-03-12 RX ORDER — METHOCARBAMOL 500 MG/1
750 TABLET, FILM COATED ORAL ONCE
Refills: 0 | Status: COMPLETED | OUTPATIENT
Start: 2021-03-12 | End: 2021-03-12

## 2021-03-12 RX ORDER — ACETAMINOPHEN 500 MG
650 TABLET ORAL ONCE
Refills: 0 | Status: COMPLETED | OUTPATIENT
Start: 2021-03-12 | End: 2021-03-12

## 2021-03-12 RX ORDER — APIXABAN 2.5 MG/1
5 TABLET, FILM COATED ORAL ONCE
Refills: 0 | Status: COMPLETED | OUTPATIENT
Start: 2021-03-12 | End: 2021-03-12

## 2021-03-12 RX ORDER — AMLODIPINE BESYLATE 2.5 MG/1
10 TABLET ORAL ONCE
Refills: 0 | Status: COMPLETED | OUTPATIENT
Start: 2021-03-12 | End: 2021-03-12

## 2021-03-12 RX ADMIN — Medication 400 MILLIGRAM(S): at 20:08

## 2021-03-12 RX ADMIN — Medication 30 MILLILITER(S): at 08:11

## 2021-03-12 RX ADMIN — FAMOTIDINE 20 MILLIGRAM(S): 10 INJECTION INTRAVENOUS at 20:08

## 2021-03-12 RX ADMIN — METHOCARBAMOL 750 MILLIGRAM(S): 500 TABLET, FILM COATED ORAL at 20:08

## 2021-03-12 RX ADMIN — APIXABAN 5 MILLIGRAM(S): 2.5 TABLET, FILM COATED ORAL at 20:08

## 2021-03-12 RX ADMIN — Medication 650 MILLIGRAM(S): at 06:11

## 2021-03-12 RX ADMIN — Medication 400 MILLIGRAM(S): at 20:09

## 2021-03-12 RX ADMIN — Medication 650 MILLIGRAM(S): at 12:10

## 2021-03-12 RX ADMIN — AMLODIPINE BESYLATE 10 MILLIGRAM(S): 2.5 TABLET ORAL at 20:08

## 2021-03-12 NOTE — ED PROVIDER NOTE - OBJECTIVE STATEMENT
Pertinent PMH/PSH/FHx/SHx and Review of Systems contained within:  Patient presents to the ED for catheter issue and rehab problem.  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  Patient says that center was dismal, his catheter was initially removed and he was placed in diaper.  When he expressed his chagrin, he was offered another catheter placement but he refused stating that he wanted to leave the facility.  Attending physician on staff aware and allowed for patient to be returned to [facility].     ROS: No fever/chills, No headache/photophobia/eye pain/changes in vision, No ear pain/sore throat/dysphagia, No chest pain/palpitations, no SOB/cough/wheeze/stridor, No abdominal pain, No N/V/D/melena, no dysuria/frequency/discharge, No neck/back pain, no rash, no changes in neurological status/function. Pertinent PMH/PSH/FHx/SHx and Review of Systems contained within:  Patient presents to the ED for catheter issue and rehab problem.  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  Patient says that center was dismal, his catheter was initially removed and he was placed in diaper.  When he expressed his chagrin, he was offered another catheter placement but he refused stating that he wanted to leave the facility.  Attending physician on staff at [place] aware and allowed for patient to be returned to [facility].      ROS: No fever/chills, No headache/photophobia/eye pain/changes in vision, No ear pain/sore throat/dysphagia, No chest pain/palpitations, no SOB/cough/wheeze/stridor, No abdominal pain, No N/V/D/melena, no dysuria/frequency/discharge, No neck/back pain, no rash, no changes in neurological status/function.

## 2021-03-12 NOTE — ED PROVIDER NOTE - PHYSICAL EXAMINATION
Gen: Alert, NAD, well appearing  Head: NC, AT, EOMI, normal lids/conjunctiva  ENT: normal hearing  Neck: +supple, +Trachea midline  Pulm: Bilateral BS, normal resp effort, no wheeze/stridor/retractions  CV: RRR, no M/R/G, +dist pulses  Abd: soft, NT/ND  Mskel: no edema/erythema/cyanosis  Skin: no rash, warm/dry  Neuro: AAOx3

## 2021-03-12 NOTE — ED ADULT NURSE REASSESSMENT NOTE - NS ED NURSE REASSESS COMMENT FT1
report rec from [facility]. pt in stable condition. safety maintained. 0 s/s of distress noted. awaiting SW. will continue to monitor.

## 2021-03-12 NOTE — ED PROVIDER NOTE - PSH
Calculus of kidney  bilateral nephrostomy tubes placed 1/2019 @[facility]  H/O right nephrectomy  3/2019  Open wound of neck, sequela    Pacemaker  Micra 4/17/19

## 2021-03-12 NOTE — ED ADULT NURSE NOTE - PSH
Calculus of kidney  bilateral nephrostomy tubes placed 1/2019 @[facility]  H/O right nephrectomy  3/2019  Open wound of neck, sequela    Pacemaker  Micra 4/17/19

## 2021-03-12 NOTE — ED ADULT NURSE NOTE - OBJECTIVE STATEMENT
Patient A&o x3 came in with complaints of not liking the nursing home he was in and needing to be changed and have a condom cath put on.

## 2021-03-12 NOTE — ED ADULT NURSE NOTE - NSIMPLEMENTINTERV_GEN_ALL_ED
Implemented All Fall with Harm Risk Interventions:  [place] to call system. Call bell, personal items and telephone within reach. Instruct patient to call for assistance. Room bathroom lighting operational. Non-slip footwear when patient is off stretcher. Physically safe environment: no spills, clutter or unnecessary equipment. Stretcher in lowest position, wheels locked, appropriate side rails in place. Provide visual cue, wrist band, yellow gown, etc. Monitor gait and stability. Monitor for mental status changes and reorient to person, place, and time. Review medications for side effects contributing to fall risk. Reinforce activity limits and safety measures with patient and family. Provide visual clues: red socks.

## 2021-03-12 NOTE — ED ADULT TRIAGE NOTE - CHIEF COMPLAINT QUOTE
pt recently discharged today admitted for UTI sent to [facility] rehab. pt condom cath fell off, pt states nursing staff refuse to replace it and placed him in diaper. Pt upset, refuse to stay in facility.

## 2021-03-12 NOTE — ED ADULT NURSE NOTE - CHIEF COMPLAINT QUOTE
pt recently discharged today admitted for UTI sent to [facility] rehab. pt condom cath fell off, pt states nursing staff refuse to replace it and placed him in diaper. Pt upset, refuse to stay in facility.

## 2021-03-12 NOTE — ED PROVIDER NOTE - CLINICAL SUMMARY MEDICAL DECISION MAKING FREE TEXT BOX
Patient returned to ER due to difficulty with new rehab, does not wish to return.  VSS.  Condom cath placed.  Patient more comfortable.  He is pending SW consult this morning for placement.  Patient prefers to go home.  Patient signed out to incoming physician Dr. Molina.  All decisions regarding the progression of care will be made at their discretion. Patient returned to ER due to difficulty with new rehab, does not wish to return.  VSS.  Condom cath placed.  Patient more comfortable.  He is pending SW consult this morning for placement.  Patient prefers to go home.  Patient signed out to incoming physician Dr. Molina.  All decisions regarding the progression of care will be made at their discretion.    Pt seen by SOcial work - will not be able to arrange new facility for pt to go to - will dc back to original NH as per social work.

## 2021-03-12 NOTE — ED PROVIDER NOTE - PATIENT PORTAL LINK FT
You can access the FollowMyHealth Patient Portal offered by [facility] by registering at the following website: [email or web address]. By joining [facility]’s FollowMyHealth portal, you will also be able to view your health information using other applications (apps) compatible with our system.
34

## 2021-03-18 DIAGNOSIS — Z88.0 ALLERGY STATUS TO PENICILLIN: ICD-10-CM

## 2021-03-18 DIAGNOSIS — F43.20 ADJUSTMENT DISORDER, UNSPECIFIED: ICD-10-CM

## 2021-03-18 DIAGNOSIS — N30.01 ACUTE CYSTITIS WITH HEMATURIA: ICD-10-CM

## 2021-03-18 DIAGNOSIS — N20.0 CALCULUS OF KIDNEY: ICD-10-CM

## 2021-03-18 DIAGNOSIS — Z90.5 ACQUIRED ABSENCE OF KIDNEY: ICD-10-CM

## 2021-03-18 DIAGNOSIS — Z95.0 PRESENCE OF CARDIAC PACEMAKER: ICD-10-CM

## 2021-03-18 DIAGNOSIS — I10 ESSENTIAL (PRIMARY) HYPERTENSION: ICD-10-CM

## 2021-03-18 DIAGNOSIS — K59.00 CONSTIPATION, UNSPECIFIED: ICD-10-CM

## 2021-03-18 DIAGNOSIS — G82.50 QUADRIPLEGIA, UNSPECIFIED: ICD-10-CM

## 2021-03-18 DIAGNOSIS — N40.1 BENIGN PROSTATIC HYPERPLASIA WITH LOWER URINARY TRACT SYMPTOMS: ICD-10-CM

## 2021-03-18 DIAGNOSIS — B96.4 PROTEUS (MIRABILIS) (MORGANII) AS THE CAUSE OF DISEASES CLASSIFIED ELSEWHERE: ICD-10-CM

## 2021-03-18 DIAGNOSIS — K21.9 GASTRO-ESOPHAGEAL REFLUX DISEASE WITHOUT ESOPHAGITIS: ICD-10-CM

## 2021-04-28 NOTE — PROGRESS NOTE ADULT - PROBLEM SELECTOR PROBLEM 4
Patient was contacted and informed.     She understood and will call the office with any additional questions or concerns.   
Rhinovirus

## 2021-06-06 NOTE — PROGRESS NOTE ADULT - PROBLEM/PLAN-7
DISPLAY PLAN FREE TEXT
DISCHARGE

## 2021-07-23 NOTE — ED ADULT NURSE NOTE - AREA
Patient called requesting a call back from [employer] regarding his referral. Patient can be reached at [phone]. lower

## 2021-10-15 NOTE — ED ADULT TRIAGE NOTE - PAIN RATING/NUMBER SCALE (0-10): REST
INTERDISCIPLINARY ROUNDING    October 15, 2021 at 2:10 PM EDT    Anticipated Discharge Date:       Anticipated Discharge Disposition:    Patient Mobility or PT/OT ordered: N/A    Readmission Risk              Risk of Unplanned Readmission:  23           Discussed patient goal for the day, patient clinical progression, and barriers to discharge. The following Goal(s) of the Day/Commitment(s) have been identified:  Referral - Hospice and Referral - Palliative Care  Team ICU quality rounds done this am outside of room. Pt remains ICU care and monitoring and full code. Continue to follow and offer support to family. Wife not interested in speaking to hospice/pall care at this time. She has been updated by physicians re: very poor prognosis per notes .     Divina Every  October 15, 2021 0

## 2021-12-26 NOTE — ED PROVIDER NOTE - CPE EDP ENMT NORM
Treatment plan: Use of prednisone as directed for 5 days. Use of benadryl every 6-8 hours, or use claritin/zyrtec/allergra during the day and benadryl at night.       Over the counter fever reducer, over the counter cough/cold symptoms as directed, rest, push fluids, stay home as described below:     patients with symptoms that match Covid 19 must follow CDC isolation guidelines. *(CDC isolation criteria updated 7/22/2020)    Patient cannot return to normal public activities (work, school etc) until ALL THREE isolation criteria* are met:  1) Fevers have fully resolved for 24 HOURS without requiring fever-reducing meds  2) Any symptoms have BEGUN TO IMPROVE  3) 10 days have passed since onset of symptoms    If patient still having symptoms, or has questions about returning to work prior to the 10-day minimum, they should contact their primary doctor or their [facility] health department.      Return to the Emergency department if any difficulties breathing, shortness of breath, confusion, altered mental status, uncontrollable fever, etc.     Patient Education     Hives (Adult)  Hives are pink or red bumps on the skin. These bumps are also known as wheals. The bumps can itch, burn, or sting. Hives can occur anywhere on the body. They vary in size and shape and can form in clusters. Individual hives can appear and go away quickly. New hives may develop as old ones fade. Hives are common and usually harmless. They are not contagious. Occasionally, hives are a sign of a serious allergy.   Hives are often caused by an allergic reaction. They may occur from:   · Certain foods, such as shellfish, nuts, tomatoes, or berries  · Contact with something in the environment, such as pollens, animals, or mold  · Certain medicines  · Sun or cold air  · Viral infections, such as a cold, the flu, or strep throat  If the hives continue to come and go over many weeks without any other symptoms (chronic hives), the cause may be very  hard to figure out.   You may be prescribed medicines to ease swelling and itching. Follow all instructions when using these medicines. The hives will usually fade in a few days. But they can last for weeks or months.   Home care   Follow these tips:  · Try to find the cause of the hives and eliminate it. Discuss possible causes with your healthcare provider. Your healthcare provider may ask you to keep track of the food you eat and your lifestyle to help find the cause of the hives.  · Don’t scratch the hives. Scratching will delay healing. To reduce itching, apply cool, wet compresses to the skin.  · Dress in soft, loose cotton clothing.  · Don’t bathe in hot water. This can make the itching worse.  · Apply an ice pack or cool pack wrapped in a thin towel to your skin. This will help reduce redness and itching. But if your hives were caused by exposure to cold, then do not apply more cold to them.  · You may use over-the counter antihistamines to reduce itching. Some older antihistamines, such as diphenhydramine and chlorpheniramine, are inexpensive. But they need to be taken often and may make you sleepy. They are best used at bedtime. Don’t use diphenhydramine if you have glaucoma or have trouble urinating because of an enlarged prostate. Newer antihistamines, such as loratadine, cetirizine, levocetirizine, and fexofenadine, are generally more expensive. But they tend to have fewer side effects. They can be taken less often.  · Another type of antihistamine is used to treat heartburn. This type includes nizatidine, famotidine, and cimetidine. These are sometimes used along with the above antihistamines if a single medicine is not working.  · If the hives are severe and you do not respond well to other medicines, you may be given a steroid, such as prednisone, to take for a short time. Follow all instructions carefully when taking this medicine. Tell your healthcare provider about any side effects.  Follow-up  care   Follow up with your healthcare provider if your symptoms don't get better in 2 days. Ask your provider about allergy testing if you have had a severe reaction or have had several episodes of hives. Allergy testing may help figure out what you are allergic to. You may need blood tests, a urine test, or skin tests.   When to seek medical advice   Call your healthcare provider right away if any of these occur:   · Fever of 100.4°F (38.0°C) or higher, or as directed by your healthcare provider  · Redness, swelling, or pain  · Foul-smelling fluid coming from the rash  Call 911  Call 911 if any of the following occur:   · Swelling of the face, throat, or tongue  · Trouble breathing or swallowing  · Dizziness, weakness, or fainting  Michael last reviewed this educational content on 6/1/2019  © 3289-2166 [employer]. All rights reserved. This information is not intended as a substitute for professional medical care. Always follow your healthcare professional's instructions.            normal...

## 2022-03-29 NOTE — PROGRESS NOTE ADULT - PROBLEM SELECTOR PROBLEM 4
1 tablespoon daily of metamucil/citrucle/benefiber  For 2 weeks, strictly cut out dairy Acute respiratory failure with hypoxia and hypercapnia

## 2022-08-12 NOTE — ED PROVIDER NOTE - NS ED MD TWO NIGHTS YN
Patient here for excision of bilateral chest wall SCD's and removal of left chest port. Patient examined and history and physical reviewed. Patient marked for surgery, all questions answered, consent signed. No interval changes in medical condition. Ready for surgery. Babs Wooten PA-C  Plastics and Reconstructive Surgery  [address] [address]  [address] [address]  [phone] Yes

## 2022-10-03 NOTE — DISCHARGE NOTE PROVIDER - NSDCHOSPICE_GEN_A_CORE
[address]  coding opportunities       Chart reviewed, no opportunity found: CHART REVIEWED, NO OPPORTUNITY FOUND        Patients Insurance     Medicare Insurance: Medicare No clear

## 2022-10-27 NOTE — PROGRESS NOTE ADULT - SUBJECTIVE AND OBJECTIVE BOX
Patient is a 44y old  Male who presents with a chief complaint of abdominal pain  urinary tract infection (25 Nov 2019 20:32)      INTERVAL HPI/OVERNIGHT EVENTS: no events     MEDICATIONS  (STANDING):  apixaban 2.5 milliGRAM(s) Oral every 12 hours  bisacodyl 5 milliGRAM(s) Oral at bedtime  meropenem  IVPB 1000 milliGRAM(s) IV Intermittent every 8 hours  multivitamin 1 Tablet(s) Oral daily  NIFEdipine XL 60 milliGRAM(s) Oral daily  pantoprazole    Tablet 40 milliGRAM(s) Oral before breakfast  polyethylene glycol 3350 17 Gram(s) Oral at bedtime  saline laxative (FLEET) Rectal Enema 1 Enema Rectal daily  senna 2 Tablet(s) Oral at bedtime  sodium chloride 0.9%. 1000 milliLiter(s) (80 mL/Hr) IV Continuous <Continuous>  tamsulosin 0.4 milliGRAM(s) Oral at bedtime    MEDICATIONS  (PRN):      Allergies    penicillin (Rash)    IntolerancesICU Vital Signs Last 24 Hrs  T(C): --  T(F): --  HR: --  BP: --  BP(mean): --  ABP: --  ABP(mean): --  RR: --  SpO2: --    PHYSICAL EXAM:  	General: patient in no acute distress, resting comfortably  	Cardio: S1/S2 +ve, regular rate and rhythm, no M/G/R  	Resp: clear to ausculation bilaterally, no rales or wheezes  	GI: abdomen soft, nontender, mildly distended, no guarding, BS +ve x 4  	Ext: no significant pedal edema  Neuro: quadriplegic      LABS:                                 12.1   5.10  )-----------( 186      ( 28 Nov 2019 10:46 )             38.6   11-28    141  |  109<H>  |  12  ----------------------------<  78  4.2   |  30  |  0.58    Ca    8.6      28 Nov 2019 10:46  Phos  3.2     11-28  Mg     1.6     11-28          CAPILLARY BLOOD GLUCOSE          RADIOLOGY & ADDITIONAL TESTS:    Imaging Personally Reviewed:  [ X] YES  [ ] NO    Consultant(s) Notes Reviewed:  [ X] YES  [ ] NO    Care Discussed with Consultants/Other Providers [X ] YES  [ ] NO Protocol For Photochemotherapy: Tar And Broad Band Uvb (Goeckerman Treatment): The patient received Photochemotherapy: Tar and Broad Band UVB (Goeckerman treatment).

## 2022-12-01 NOTE — DIETITIAN INITIAL EVALUATION ADULT. - 25 CAL
1927
This was a shared visit with the ROBERTO. I reviewed and verified the documentation and independently performed the documented:

## 2022-12-22 NOTE — H&P PST ADULT - MUSCULOSKELETAL
details… Azathioprine Pregnancy And Lactation Text: This medication is Pregnancy Category D and isn't considered safe during pregnancy. It is unknown if this medication is excreted in breast milk. detailed exam no calf tenderness

## 2023-02-08 ENCOUNTER — NON-APPOINTMENT (OUTPATIENT)
Age: 48
End: 2023-02-08

## 2023-06-06 NOTE — PROGRESS NOTE ADULT - PROBLEM SELECTOR PLAN 2
Satisfactory triple phosphate crystals in the urine  CT shows nonobstructing stone  Light IVF hydration

## 2023-06-11 NOTE — ED ADULT NURSE REASSESSMENT NOTE - NS ED NURSE REASSESS COMMENT FT1
Prior to discharge, [facility] was contacted to assure there was a home health aide present upon patients arrival. Coordinator Marco was spoken to and affirmed there was someone in the home to receive pt. Strong peripheral pulses

## 2023-06-29 NOTE — ED ADULT NURSE NOTE - CADM POA URETHRAL CATHETER
Student's Name:   Calli Waller Birth Date  2009  Sex  female  Race/Ethnicity   School/Grade Level/ID#     Address:   [address] [facility] [address]  Parent/Guardian             Telephone#                                            Home:                               Work:   IMMUNIZATIONS: To be completed by health care provider. The mo/da/yr for every dose administered is required. If a specific vaccine is medically contraindicated, a separate written statement must be attached by the health care responsible for completing the health examination explaining the medical reason for the contraindication.      Immunization History   Administered Date(s) Administered   • COVID Pfizer 12Y+ (Requires Dilution) 06/14/2021, 07/05/2021   • DTaP(INFANRIX) 09/10/2010   • DTaP/HIB/IPV 2009, 2009, 03/06/2010   • DTaP/IPV 07/10/2014   • Hep A, ped/adol, 2 dose 07/24/2019   • Hep B, Unspecified Formulation 2009, 2009, 03/16/2010   • Hepatitis A - Adult 06/25/2018   • Hib (PRP-OMP) 09/10/2010   • Influenza, quadrivalent, preserve-free 12/15/2018   • MMR 06/27/2011, 07/10/2014   • Meningococcal Conjugate MCV4O (Menveo) 07/29/2020   • Novel Influenza [id number], Unspecified Formulation 2009   • Pneumococcal Conjugate 7 Valent 2009, 2009, 03/16/2010, 06/04/2010   • Rotavirus - monovalent 2009, 2009   • Tdap 07/29/2020   • Varicella 08/15/2011, 07/10/2014   Pended Date(s) Pended   • HPV 9-Valent 06/29/2023      Immunizations given 6/29/2023: The vaccines marked as \"pended\" above were administered today.      Health care provider (MD, DO, APN, PA, school health professional, health official) verifying above immunization history must sign below. If adding dates to the above immunization history section, put your initials by date(s) and sign here.)  Signature                                                                 Title                                                 Date  _____________________________________________________________________________________  Signature                                                                 Title                                                Date  _____________________________________________________________________________________   ALTERNATIVE PROOF OF IMMUNITY   1. Clinical diagnosis (measles, mumps, hepatitis B) is allowed when verified by physician and supported with lab confirmation.  Attach copy of lab result.    * MEASLES (Rubeola)  MO   DA  YR          **MUMPS  MO   DA  YR             HEPATITIS B  MO   DA   YR           VARICELLA  MO DA  YR      2. History of varicella (chickenpox) disease is acceptable if verified by health care provider, school health professional or health official.  Person signing below verifies that the parent/guardian’s description of varicella disease history is indicative of past infection and is accepting such history as documentation of disease.  Date of Disease                                  Signature                                                           Title   3. Laboratory Evidence of Immunity (check one)      __ Measles*         __ Mumps**        __ Rubella        __Varicella    (Attach copy of lab result)         *All measles cases diagnosed on or after July 1, 2002, must be confirmed by laboratory evidence.      **All mumps cases diagnosed on or after July 1, 2013, must be confirmed by laboratory evidence.     Completion of Alternative 1 or 3 MUST be accompanied by Labs & Physician Signature: ___________________________  Physician Statements of Immunity MUST be submitted to [facility] for review.     Certificates of [place] Exemption to Immunizations or Physician Medical Statements of Medical Contraindication Are Reviewed   and Maintained by the School Authority     (11/2015)                                         (COMPLETE BOTH SIDES)                                  Approved [facility]  3/2016      HEALTH HISTORY      TO BE COMPLETED AND SIGNED BY PARENT/GUARDIAN AND VERIFIED BY HEALTH CARE PROVIDER  ALLERGIES: (Food, drug, insect, other) has No Known Allergies.   MEDICATION: (List all prescribed or taken on a regular basis.) has a current medication list which includes the following prescription(s): Melatonin Gummies 2.5 MG Chew Tab, naproxen sodium (ALEVE) 220 MG tablet, hydroCORTisone (CORTIZONE) 2.5 % cream, and ibuprofen (MOTRIN) 200 MG tablet.   Diagnosis of asthma?  Child wakes during night coughing? Yes    No  Yes    No  Loss of function of one of paired  organs?(eye/ear/kidney/testicle) Yes    No    Birth defects? Yes    No  Hospitalizations? Yes    No    Developmental delay? Yes    No   When? What for? Yes    No    Blood disorders? Hemophilia,  Sickle Cell, Other? Explain. Yes    No  Surgery? (List all.)  When? What for? Yes    No    Diabetes? Yes    No  Serious injury or illness? Yes    No    Head injury/Concussion/Passed out? Yes    No  TB skin test positive (past/present)? * Yes    No *If yes, refer to local [facility] dept   Seizures? What are they like?  Yes    No  TB disease (past or present)? * Yes    No *If yes, refer to [facility]   Heart problem/Shortness of breath?  Yes    No  Tobacco use (type, frequency)?  Yes    No    Heart murmur/High blood pressure? Yes    No  Alcohol/Drug use?  Yes    No    Dizziness or chest pain with exercise? Yes    No  Family history of sudden death  before age 50? (Cause?) Yes    No    Eye/Vision problems? ______           __Glasses          __Contacts  Last exam by eye doctor ______  Other concerns? (crossed eye, drooping lids, squinting, difficulty reading) Dental?   __ Braces     __ Bridge     __ Plate   __Other   Ear/Hearing problems? Yes    No  Information may be shared with appropriate personnel for health and educational purposes.   Bone/Joint problem/injury/scoliosis? Yes    No  Parent/Guardian  Signature                                                Date   PHYSICAL EXAMINATION REQUIREMENTS   Entire section below to be completed by MD//ADANN/PA Head Circumference if <2-3 years old - /78   Ht 5' 3.19\" (1.605 m)   Wt 54.2 kg (119 lb 6.1 oz)   LMP 06/05/2023 (Exact Date)   BMI 21.02 kg/m²   BSA 1.56 m²    69 %ile (Z= 0.50) based on CDC (Girls, 2-20 Years) BMI-for-age based on BMI available as of 6/29/2023.   DIABETES SCREENING (NOT REQUIRED FOR ) BMI>85% age/sex: No     And any two of the following: Family History: No  Ethnic Minority: No    Signs of Insulin Resistance (hypertension, dyslipidemia, polycystic ovarian syndrome, acanthosis nigricans): No  At Risk: No   LEAD RISK QUESTIONNAIRE Required for children age 6 months through 6 years enrolled in licensed or public school operated day care, , nursery school and/or . (Blood test required if resides in [place] or high risk zip code.)  Questionnaire Administered? No  Blood Test Indicated? No   Blood Test Date/Result:    TB SKIN OR BLOOD TEST Recommended only for children in high-risk groups including children immunosuppressed due to HIV infection or other conditions, frequent travel to or born in high prevalence countries or those exposed to adults in high-risk categories. See CDC guidelines. http://www.cdc.gov/tb/publications/factsheets/testing/TB_testing.htm.  Test Needed: No     Test performed: No                          Skin Test:    Date Read              /      /             Result:   Positive__      Negative__        mm________                          Blood Test: Date Reported       /      /               Result:  Positive__      Negative__      Value________  LAB TESTS (recommended) Date/Result  Date/Results   Hemoglobin or Hematocrit 12.7 (7/29/2020) Sickle Cell (when indicated)    Urinalysis NA Developmental Screening Tool Normal - ASQ        SYSTEM REVIEW Normal  Comments/Follow-up/Needs  Normal Comments/Follow-up/Needs   Skin  Yes  Endocrine Yes     Ears Yes                  Screening Result Gastrointestinal Yes    Eyes Yes                  Screening Result: Genito-Urinary Yes                                      LMP: 6/5/2023   Nose Yes  Neurologic Yes    Throat Yes  Musculoskeletal Yes    Mouth/Dental Yes  Spinal Exam Yes    Cardiovascular/HTN Yes  Nutritional status Yes    Respiratory Yes Diagnosis of Asthma: No   Mental Health Yes    Currently Prescribed Asthma Medication:        No  Quick-relief medication (e.g. Short Acting Beta Antagonist)        No  Controller medication (e.g. inhaled corticosteroid) Other     NEEDS/MODIFICATIONS required in the school setting: No restrictions DIETARY Needs/Restrictions: No restrictions   SPECIAL INSTRUCTIONS/DEVICES e.g. safety glasses, glass eye, chest protector for arrhythmia, pacemaker, prosthetic device, dental bridge, false teeth, athletic support/cup: No restrictions   MENTAL HEALTH/OTHER Is there anything else the school should know about this student?  If you would like to discuss this student’s health with school or school health personnel:  Not needed   EMERGENCY ACTION needed while at school due to child’s health condition (e.g. ,seizures, asthma, insect sting, food, peanut allergy, bleeding problem, diabetes, heart problem)?   No   On the basis of the examination on this day, I approve this child’s participation in                                (If No or Modified please attach explanation.)  PHYSICAL EDUCATION:  Yes                               INTER[employer]OL[employer]IC SPORTS   Yes   Print Name  Matthew Grey MD         Signature                                                                       Date: 6/29/2023   Address:  [facility] [address] [facility] [address] [id number] [phone]         (Complete Both Sides)     Approved [facility] 3/2016   No

## 2023-11-06 NOTE — PATIENT PROFILE ADULT - HARM RISK FACTORS
"Attestation:   Dr. Bhakta - Medical Director - Provides oversight and supervision of care.    Individual Session Summary (MICD)   DATE:  11/02/2023  START TIME: 1:00 PM   END TIME: 2:00 PM   Duration: 1 Hour    ROBERT Diagnosis: (F10.20) Alcohol Use Disorder  Mental Health Diagnosis: (F43.10) PTSD     Data:  Individual session to address co-occurring mental health and substance use disorders.  The session focused on client's thoughts and feelings regarding the treatment program, group, maintaining abstinence, and managing mental health symptoms.     Client denied suicidal, homicidal, self-harm, or violent thoughts, plans, or intent at this appointment.     Intervention: Utilized motivational interviewing to assess for stage of change and overall wellbeing. Provided verbal interventions including validation, support, and psycho-education. Provider used various therapeutic techniques including Narrative CBT and AA facilitationTwelve-Step Facilitation with Motivational Enhancement Therapy. Identified PTSD symptoms with the PCL-5.  Writer utilized a strengths-based approach and trauma informed care (TIC). PCL 5 completed. Explored meaningful activity and interests. Discussed health and safety, as well as near future plans and impact on CADI services    Assessment:  Jerad reported \"pretty good\" mood and presented with congruent affect. He noted writing a poem which was playful but had a dark/hideous side, noting that his core belief is \"I am defective\" with the belief \"I'm not contributing to society\".  He noted a desire to (continue to) contribute by \"how I treat others\". When feeling at his best he described meaningful activity as potentially getting an MFA in Poetry or Public Health. He expressed an interest in Vlogging or podcasting and interviewing others. Strengths include: Skeptical curiosity, educate, build community. Things he has done already:  Friend who has a studio. Already signed up for [employer] courses " "(Building electronic circuit boards, Calc 1, Writing Life With Lindaia articles, Wynn Rules of Order, Podcasting, Selling on [employer] and [employer]. 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   Jerad identified deflection as form of avoidance. He agreed to make an effort to tap into his own feelings and experiences when responding to another's check in while in group.  He noted enjoying group and feeling happier and noticing that  \"affirmations come easier, now\".  Jerad shared that he [place] a ticket to [place] for [place]. He noted he also reported he purchased  cancellation insurance. He is planning to return in 4 months, after winter, largely due to fear of falling in winter. He would be staying with his mother and his sister.  He reported that he becca home on Tuesday with no CADI services and noted they would start in 2 weeks. Encouraged request for PT from medical preofessional due to concern of weakness in legs, as well as fall sensor technology through CADI.   Identified not talking about SA may have been keeping him from disclosing self to group and he was encouraged to bring his whole self to group. Sleep is good. PCL-5 score is 29.         9/12/2023     1:00 PM 9/28/2023     2:37 PM 10/26/2023     3:00 PM   PHQ-9 SCORE   PHQ-9 Total Score MyChart  4 (Minimal depression)    PHQ-9 Total Score 15 4 4         9/12/2023     1:00 PM 10/10/2023     9:16 AM 10/26/2023     3:00 PM   KIM-7 SCORE   Total Score  8 (mild anxiety)    Total Score 9 8 5   11/7/2023 PCL score: 29     Plan. Jerad will continue attending Phase 1 of [facility] and meet with writer for individual sessions weekly, Thursdays at 2pm.     Jennifer Holliday, ARSENIOC, LADC   " yes

## 2023-11-08 NOTE — PROVIDER CONTACT NOTE (CRITICAL VALUE NOTIFICATION) - TEST AND RESULT REPORTED:
Gastroesophageal reflux disease 10/04/2019    Hydronephrosis of left kidney 2023    Hyperlipidemia     meds > 22 yrs    Low back pain 2018    ARNAUD (obstructive sleep apnea) 2018    Other emphysema ([address]) 10/04/2019    Other intervertebral disc degeneration, lumbar region 2018    Radiculopathy, lumbar region 2018    Restless leg syndrome     Seasonal affective disorder ([address]) 10/04/2019    Substance abuse ([address])     alcholic, quit 20 yrs      Past Surgical History:   Procedure Laterality Date    BLADDER SURGERY Left 2023    CYSTOSCOPY RETROGRADE PYELOGRAM AND LEFT DOUBLE J STENT INSERTION performed by Nichole Fox MD at [address] [place] [address] Road  2016    DALIA Ly MD    DENTAL SURGERY  10 yrs ago    ENDOSCOPY, COLON, DIAGNOSTIC      EYE SURGERY      Lasik OU    FINGER TRIGGER RELEASE Left 2022    LEFT HAND TRIGGER FINGER RING FINGER RELEASE OF A1 PANFILO performed by Ramesh Romero MD at [facility] ARTHROSCOPY Right     KNEE SURGERY Right 2016    Ray procedure    KNEE SURGERY      [place] NASAL/SINUS ENDOSCOPY W/MAXILLARY ANTROSTOMY N/A 2018    SEPTOPLASTY MICRODEBRIDER ASSISTED TURBINOPLASTY AND OUT-FRACTURING BILATERAL NASAL ENDOSCOPY performed by Ester Lu MD at [address] [place] [address] N/A 2023    EGD BIOPSY performed by Daniel Benitez MD at [facility]     Family History  Family History   Problem Relation Age of Onset    Cancer Mother         stomach    Arthritis Mother     Heart Disease Father 48    Cancer Father         bone    No Known Problems Sister     Cancer Brother     Heart Disease Brother         hole in heart in 1959 at 1 months age    Cancer Maternal Grandmother         lung    Cancer Paternal Grandmother     Heart Disease Paternal Grandfather     Colon Cancer Neg Hx      [] Unable to obtain due to ventilated and/ or neurologic status  Social History     Socioeconomic History    Marital status: Blood Culture Growth Anaerobic Gram +cocci and clusters 11/07/23  1315   PROT 5.9*   INR 0.9     CT Head W/O Contrast    Result Date: 11/7/2023  EXAMINATION: CT OF THE HEAD WITHOUT CONTRAST  11/7/2023 2:12 pm TECHNIQUE: CT of the head was performed without the administration of intravenous contrast. Automated exposure control, iterative reconstruction, and/or weight based adjustment of the mA/kV was utilized to reduce the radiation dose to as low as reasonably achievable. COMPARISON: September 15 HISTORY: ORDERING SYSTEM PROVIDED HISTORY: ams TECHNOLOGIST PROVIDED HISTORY: Reason for exam:->ams Has a \"code stroke\" or \"stroke alert\" been called? ->No Decision Support Exception - unselect if not a suspected or confirmed emergency medical condition->Emergency Medical Condition (MA) What reading provider will be dictating this exam?->CRC FINDINGS: BRAIN/VENTRICLES: There is no acute intracranial hemorrhage, mass effect or midline shift. No abnormal extra-axial fluid collection. The gray-white differentiation is maintained without evidence of an acute infarct. There is prominence of the ventricles and sulci due to global parenchymal volume loss. There are nonspecific areas of hypoattenuation within the periventricular and subcortical white matter, which likely represent chronic microvascular ischemic change. ORBITS: The visualized portion of the orbits demonstrate no acute abnormality. SINUSES: Mucosal thickening SOFT TISSUES/SKULL: No acute abnormality of the visualized skull or soft tissues. No acute intracranial abnormality. Moderate chronic senescent change     XR CHEST PORTABLE    Result Date: 11/2/2023  EXAMINATION: ONE XRAY VIEW OF THE CHEST 11/2/2023 3:15 pm COMPARISON: October 24, 2023 0048 hours HISTORY: ORDERING SYSTEM PROVIDED HISTORY: sob TECHNOLOGIST PROVIDED HISTORY: Reason for exam:->sob What reading provider will be dictating this exam?->CRC FINDINGS: Single AP erect portable none the cardiac silhouette is of normal size configuration.   Pulmonary vascular

## 2024-01-01 NOTE — BEHAVIORAL HEALTH ASSESSMENT NOTE - FAMILY HISTORY OF PSYCHIATRIC ILLNESS / SUICIDALITY
ADMIT NOTE    Patient ID  Terese Aguillon 7 hour-old Gestational Age: 40w5d AGA male born via , Low Transverse on 2024 at 5:57 AM weighing 3.69 kg    Additional Information  Baby is breast feeding.     Maternal Information  35 y.o.  Information for the patient's mother:  Denia Aguillon [[id number]]     Lab Results   Component Value Date    ABO A 2024    LABRH POS 2024    ABSCRN NEG 2024    RUBIG Negative 2024    GRPBSTREP No Group B Streptococcus (GBS) isolated 2024    HIV1X2 Nonreactive 2024    HEPBSAG Nonreactive 2024    SYPHT Nonreactive 2024       Prenatal US   Normal    Pregnancy Complications  Seizures during pregnancy , Hx of HSV with tx  Maternal Problem List:  Pregnancy Problems (from 24 to present)       Problem Noted Resolved    Anemia, antepartum, third trimester ([facility]) 2024 by Lizzy Watkins MD No    40 weeks gestation of pregnancy ([facility]) 2024 by ANCA Marie-CNP No    Overview Addendum 2024 12:43 PM by MARGARITA Marie     -Shared care with Dr. Jaeger   -Weekly NST given seizures this pregnancy  -Growth - AGA - see report for full details   -Delivery planning- discussed give seizures as recently as early/late second trimester and currently not taking any anti-seizure medications would recommend delivery at 37-38 wks at [facility]- reviewed with attending on service (Dr. Smith). Pt verbalized that she does not feel she has a seizure disorder and since she has not had one in 2 months believes these are well-controlled. Did discuss concern for increased stress being a trigger in which labor is a high stress situation and seizures are more likely to occur. Stressed concern for maternal and fetal well-being and ultimately goal is for delivery without complications. Would recommend continued weekly fetal monitoring until delivery.                Other Medical Problems (from 24 to  None known present)       Problem Noted Resolved    HSV (herpes simplex virus) anogenital infection 2024 by Lizzy Watkins MD No    Focal epilepsy ([facility]) 2024 by MARGARITA Marie No    Overview Addendum 2024 12:44 PM by MARGARITA Marie     -Managed by Neurology at [facility] -no follow up on chart review   -Current meds:  was on Keppra 500mg bid and on chart review there is lamictal 25mg tabs ordered    -Last Keppra level-   <2--> has discontinued use of Keppra and not on any medications currently; reports stopped about 3-4 wks ago (likely around the time she saw MFM initially)  -Counseled on risk and concern for maternal and fetal well-being   -advised to restart medication   -Recommend delivery 37-38 wks at [facility]. Pt prefers delivery at [facility] . Advised and counseled re: recommendations. She plans to discuss with her primary OB provider.                  Maternal home medications:   Prior to Admission medications    Medication Sig Start Date End Date Taking? Authorizing Provider   acyclovir (Zovirax) 400 mg tablet Take 1 tablet (400 mg) by mouth 3 times a day. 24 Yes JESSI Beal, ND   prenatal no115/iron/folic acid (PRENATAL 19 ORAL) Take by mouth.    Historical Provider, MD     Social history: She reports that she has never smoked. She has never used smokeless tobacco. No history on file for alcohol use and drug use.    Delivery Information  Route of delivery:  , Low Transverse  /Labor complications: None   Apgar scores:   8 at 1 minute     9 at 5 minutes      at 10 minutes  Resuscitation: Tactile stimulation  Cord gases: N/A    Sepsis Risk Calculator Information  [email or web address]  Early Onset Sepsis Risk (CDC National Average): 0.1000 Live Births   Gestational Age: Gestational Age: 40w5d   Maternal Max Temperature during labor and within 1h post-partum: 37.6 C   Rupture of Membranes Duration 71h 57m    Maternal GBS Status: Lab Results   Component Value Date    GRPBSTREP No Group B Streptococcus (GBS) isolated 2024      Intrapartum Antibiotics: Antibiotics: No antibiotics or any antibiotics less than 2h prior to birth   EOS Calculator Score  Risk at Birth: 1.02 per 1000 live births  Risk - Well Appearin.42 per 1000 live births  Risk - Equivocal: 5.08 per 1000 live births  Risk - Clinical Illness: 21.17 per 1000 live births  Action point (clinical condition and associated action): Well Appearing - Blood culture, empiric antibiotics if equivocal.      Measurements  Birth Weight: 3.69 kg 47 %ile (Z=-0.07) based on Bradley (Boys, 0-2 years) weight-for-age data using data from 2024.  Length: 47 cm 1.5 %ile (Z= --2.17) based on Bradley (Boys, 0-2 years) Length-for-age data based on Length recorded on 2024.  Head circumference: 34 cm  16 %ile (Z= -1.00) based on Bradley (Boys, 0-2 years) head circumference-for-age using data recorded on 2024.    Intake/Output    Stool within 24 hours: pending  Void within 24 hours: yes    Vital Signs (last 24 hours):   Temp:  [36.7 °C (98.1 °F)-37.3 °C (99.1 °F)] 36.7 °C (98.1 °F)  Heart Rate:  [128-152] 130  Resp:  [52-66] 60  Physical Exam:   General: sleeping comfortably, awakens and cries appropriately with exam, easily consolable, NAD  HEENT: head NCAT, AFOSF, neck supple, no clavicle step offs, positive red reflex bilaterally, no eye drainage, anicteric sclera, Ears normally set with no ear pits or tags, normally formed pinnae. MMM, palate intact  CV: RRR, normal S1 and S2, no murmurs, cap refill <3 seconds, no pallor or cyanosis, femoral pulses 2+ and equal b/l  RESP: no increased WOB, lungs clear bilaterally with symmetric breath sounds  ABD: soft, NT, ND, BS normoactive, no HSM or masses appreciated, umbilical stump c/d/i  MSK: No deformities, moving all extremities normally.  Back: no sacral dimple appreciated,   Hips: Symmetric gluteal folds, no  clicks or clunks.  : Normal male genitalia, testicles descended b/l, anus patent,    NEURO: Normal tone, Symmetric courtney, normal grasp, rooting and suck reflexes.  SKIN: no clinically significant rashes or lesions appreciated, no jaundice. Ugandan spots to left arm vs bruising     Labs:   Results for orders placed or performed during the hospital encounter of 09/10/24   POCT Transcutaneous Bilirubin   Result Value Ref Range    Bilirubinometry Index 2.0 (A) 0.0 - 1.2 mg/dl        Assessment/Plan:  Assessment/Plan    Day of life 0 Gestational Age: 40w5d AGA Well , born by  for arrest of descent.  Baby is doing well, feeding by breast with normal output.  Principal Problem:    [place] infant, unspecified gestational age ([facility])     Anticipate routine care.   Jaundice: Neurotoxicity risk factors for jaundice are None  Sepsis Risk: Clinical exam is currently Well appearing. Will reevaluate if any abnormalities in vitals signs or clinical exam and follow recommendations per [place] EOS calculation as noted above.  Lactation support as needed. Will monitor weight loss and bili per protocol.    Routine care/screens   Hearing/CCHD/OH  screen prior to discharge  Vitamin K given: Yes  Hep B vaccine given: No-deferred to 24 hrs  Circumcision desired Yes Orders Placed Yes    Follow-up: Physician:      ANCA Hannah-CNP   Pediatric Hospitalist

## 2024-01-04 NOTE — PROGRESS NOTE ADULT - PROBLEM SELECTOR PROBLEM 5
Onset: yesterday  Location/description: Mom states the pt tested positive for strep throat yesterday. Taking azithromycin. Calling to see if pedialyte is safe to use because he has had diarrhea and also vomited after feeding this morning.  Associated Symptoms: as above  What improves/worsens symptoms: not assessed  Symptom specific medications: not assessed  Intake and Output: normal  Activity level: normal  Temperature (route and time): no  Weight:   Wt Readings from Last 1 Encounters:   12/20/23 9.455 kg (20 lb 13.5 oz) (56 %, Z= 0.16)*     * Growth percentiles are based on WHO (Boys, 0-2 years) data.        Recent visits (last 3-4 weeks) for same reason or recent surgery:  yes    PLAN:  Home Care Advice provided    Patient/Caller agrees to follow recommendations.    Reason for Disposition   Normal antibiotic diarrhea    Protocols used: Diarrhea On Antibiotics-P-AH     Spastic quadriplegia

## 2024-05-28 NOTE — ED ADULT NURSE NOTE - NSHOSCREENINGQ1_ED_ALL_ED
Medication: venlafaxine XR (EFFEXOR XR) 37.5 MG 24 hr capsule passed protocol.   Last office visit date: 2/28/24  Next appointment scheduled?: Yes   Number of refills given: 1   No

## 2024-08-28 NOTE — ED ADULT NURSE NOTE - NSFALLRSKASSESSTYPE_ED_ALL_ED
Spoke with Aldair at [facility] and GHULAM for Marleni at [facility] to verify OPAT orders as follows:    Name and dose of Antimicrobial: IV ertapenem 1 g every 24 hours   Antimicrobial start date: calculated from 8/23/2024   Antimicrobial completion date planned: 9/6/2024   Lab monitoring: CBC, Chem 12, ESR, CRP once a week     Spoke with patient who states he is waiting on his [facility] nurse to come for IV abx administration education. Verified f/u appt and office contact info.  
Initial (On Arrival)

## 2024-11-29 ENCOUNTER — EMERGENCY (EMERGENCY)
Facility: HOSPITAL | Age: 49
LOS: 0 days | Discharge: HOME | End: 2024-11-29
Attending: STUDENT IN AN ORGANIZED HEALTH CARE EDUCATION/TRAINING PROGRAM
Payer: MEDICAID

## 2024-11-29 VITALS
RESPIRATION RATE: 18 BRPM | HEART RATE: 71 BPM | OXYGEN SATURATION: 100 % | DIASTOLIC BLOOD PRESSURE: 87 MMHG | SYSTOLIC BLOOD PRESSURE: 126 MMHG

## 2024-11-29 VITALS
RESPIRATION RATE: 15 BRPM | TEMPERATURE: 98 F | HEIGHT: 73 IN | HEART RATE: 67 BPM | SYSTOLIC BLOOD PRESSURE: 104 MMHG | OXYGEN SATURATION: 99 % | WEIGHT: 169.98 LBS | DIASTOLIC BLOOD PRESSURE: 75 MMHG

## 2024-11-29 DIAGNOSIS — N20.0 CALCULUS OF KIDNEY: Chronic | ICD-10-CM
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
## 2024-11-29 DIAGNOSIS — Z88.0 ALLERGY STATUS TO PENICILLIN: ICD-10-CM

## 2024-11-29 DIAGNOSIS — G82.50 QUADRIPLEGIA, UNSPECIFIED: ICD-10-CM
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
## 2024-11-29 DIAGNOSIS — Z90.5 ACQUIRED ABSENCE OF KIDNEY: Chronic | ICD-10-CM

## 2024-11-29 DIAGNOSIS — M25.521 PAIN IN RIGHT ELBOW: ICD-10-CM

## 2024-11-29 DIAGNOSIS — S42.401A UNSPECIFIED FRACTURE OF LOWER END OF RIGHT HUMERUS, INITIAL ENCOUNTER FOR CLOSED FRACTURE: ICD-10-CM

## 2024-11-29 DIAGNOSIS — S11.90XS UNSPECIFIED OPEN WOUND OF UNSPECIFIED PART OF NECK, SEQUELA: Chronic | ICD-10-CM

## 2024-11-29 DIAGNOSIS — Z95.0 PRESENCE OF CARDIAC PACEMAKER: Chronic | ICD-10-CM

## 2024-11-29 LAB
APPEARANCE UR: ABNORMAL
BACTERIA # UR AUTO: ABNORMAL /HPF
BILIRUB UR-MCNC: NEGATIVE — SIGNIFICANT CHANGE UP
COLOR SPEC: ABNORMAL
DIFF PNL FLD: ABNORMAL
EPI CELLS # UR: PRESENT
GLUCOSE UR QL: NEGATIVE MG/DL — SIGNIFICANT CHANGE UP
KETONES UR-MCNC: NEGATIVE MG/DL — SIGNIFICANT CHANGE UP
LEUKOCYTE ESTERASE UR-ACNC: ABNORMAL
NITRITE UR-MCNC: NEGATIVE — SIGNIFICANT CHANGE UP
PH UR: 8 — SIGNIFICANT CHANGE UP (ref 5–8)
PROT UR-MCNC: 300 MG/DL
RBC CASTS # UR COMP ASSIST: ABNORMAL /HPF
SP GR SPEC: 1.02 — SIGNIFICANT CHANGE UP (ref 1–1.03)
TRI-PHOS CRY UR QL COMP ASSIST: PRESENT
UROBILINOGEN FLD QL: 1 MG/DL — SIGNIFICANT CHANGE UP (ref 0.2–1)
WBC UR QL: ABNORMAL /HPF (ref 0–5)

## 2024-11-29 PROCEDURE — 73060 X-RAY EXAM OF HUMERUS: CPT | Mod: 26,RT

## 2024-11-29 PROCEDURE — 73070 X-RAY EXAM OF ELBOW: CPT | Mod: 26,RT

## 2024-11-29 PROCEDURE — 99285 EMERGENCY DEPT VISIT HI MDM: CPT

## 2024-11-29 PROCEDURE — 99283 EMERGENCY DEPT VISIT LOW MDM: CPT

## 2024-11-29 PROCEDURE — 73090 X-RAY EXAM OF FOREARM: CPT | Mod: 26,RT

## 2024-11-29 RX ORDER — OXYCODONE HYDROCHLORIDE 30 MG/1
5 TABLET ORAL ONCE
Refills: 0 | Status: DISCONTINUED | OUTPATIENT
Start: 2024-11-29 | End: 2024-11-29

## 2024-11-29 RX ORDER — OXYCODONE HYDROCHLORIDE 30 MG/1
1 TABLET ORAL
Qty: 10 | Refills: 0
Start: 2024-11-29

## 2024-11-29 RX ORDER — ACETAMINOPHEN 500 MG
650 TABLET ORAL ONCE
Refills: 0 | Status: COMPLETED | OUTPATIENT
Start: 2024-11-29 | End: 2024-11-29

## 2024-11-29 RX ORDER — CEFDINIR 250 MG/5ML
1 POWDER, FOR SUSPENSION ORAL
Qty: 20 | Refills: 0
Start: 2024-11-29 | End: 2024-12-08

## 2024-11-29 RX ORDER — CEFPODOXIME PROXETIL 200 MG/1
200 TABLET, FILM COATED ORAL ONCE
Refills: 0 | Status: COMPLETED | OUTPATIENT
Start: 2024-11-29 | End: 2024-11-29

## 2024-11-29 RX ADMIN — CEFPODOXIME PROXETIL 200 MILLIGRAM(S): 200 TABLET, FILM COATED ORAL at 05:54

## 2024-11-29 RX ADMIN — OXYCODONE HYDROCHLORIDE 5 MILLIGRAM(S): 30 TABLET ORAL at 11:15

## 2024-11-29 RX ADMIN — Medication 650 MILLIGRAM(S): at 11:15
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
## 2024-11-29 NOTE — ED PROVIDER NOTE - PROGRESS NOTE DETAILS
Ortho attempted reduction again with some improvement but plan for outpatient follow up.  Discussed results and answered questions.  Will dc

## 2024-11-29 NOTE — ED PROVIDER NOTE - PHYSICAL EXAMINATION
General: No acute distress, mentation at baseline,  well nourished, well developed  HEENT: NCAT, Neck supple without meningismus, PERRL, no conjunctival injection  Lungs: CTAB, No wheeze or crackles, No retractions, No increased work of breathing  Heart: S1S2 RRR, No M/R/G, Pules equal Bilaterally in upper and lower extremities distally  Abd: soft, NT/ND, No guarding, No rebound.  No hernias, no palpable masses.  Extrem: pt has no motor function in any extremtiies at baseline  due to paraplegia, hematoma over R elbow with tenderness   Skin: No rash noted, warm dry.  Neuro:  at baseline  Psychiatric: Appropriate mood and affect.

## 2024-11-29 NOTE — ED PROVIDER NOTE - PATIENT PORTAL LINK FT
You can access the FollowMyHealth Patient Portal offered by  by registering at the following website: [email or web address]. By joining [employer]’s FollowMyHealth portal, you will also be able to view your health information using other applications (apps) compatible with our system.

## 2024-11-29 NOTE — ED PROVIDER NOTE - CARE PROVIDER_API CALL
Gustavo Burrows  Orthopaedic Sports Medicine  [address] [place] [address], [address] [address]  Phone: [phone]  Fax: [phone]  Follow Up Time:

## 2024-11-29 NOTE — ED ADULT NURSE NOTE - NSFALLRISKINTERV_ED_ALL_ED
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
## 2024-11-29 NOTE — ED ADULT TRIAGE NOTE - CHIEF COMPLAINT QUOTE
Pt complains of right elbow pain after his wheelchair titled and falling out x 2230. Pt also complains of intermittent  generalized back pain and pain across the waist line x "weeks" Pt complains of right elbow pain after his wheelchair titled and falling out x 2230. Pt also complains of intermittent  generalized back pain and pain across the waist line x "weeks". Pt is a quadriplegic.

## 2024-11-29 NOTE — ED ADULT NURSE NOTE - NSICDXPASTSURGICALHX_GEN_ALL_CORE_FT
PAST SURGICAL HISTORY:  Calculus of kidney bilateral nephrostomy tubes placed 1/2019 @[facility]    H/O right nephrectomy 3/2019    Open wound of neck, sequela     Pacemaker Micra 4/17/19
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
## 2024-11-29 NOTE — ED ADULT NURSE NOTE - CHIEF COMPLAINT QUOTE
Pt complains of right elbow pain after his wheelchair titled and falling out x 2230. Pt also complains of intermittent  generalized back pain and pain across the waist line x "weeks". Pt is a quadriplegic.
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
## 2024-11-29 NOTE — ED PROVIDER NOTE - NSICDXPASTSURGICALHX_GEN_ALL_CORE_FT
PAST SURGICAL HISTORY:  Calculus of kidney bilateral nephrostomy tubes placed 1/2019 @[facility]    H/O right nephrectomy 3/2019    Open wound of neck, sequela     Pacemaker Micra 4/17/19

## 2024-12-01 LAB
-  AMPICILLIN/SULBACTAM: SIGNIFICANT CHANGE UP
-  AMPICILLIN: SIGNIFICANT CHANGE UP
-  AZTREONAM: SIGNIFICANT CHANGE UP
-  CEFAZOLIN: SIGNIFICANT CHANGE UP
-  CEFEPIME: SIGNIFICANT CHANGE UP
-  CEFTRIAXONE: SIGNIFICANT CHANGE UP
-  CEFUROXIME: SIGNIFICANT CHANGE UP
-  CIPROFLOXACIN: SIGNIFICANT CHANGE UP
-  ERTAPENEM: SIGNIFICANT CHANGE UP
-  GENTAMICIN: SIGNIFICANT CHANGE UP
-  IMIPENEM: SIGNIFICANT CHANGE UP
-  LEVOFLOXACIN: SIGNIFICANT CHANGE UP
-  MEROPENEM: SIGNIFICANT CHANGE UP
-  NITROFURANTOIN: SIGNIFICANT CHANGE UP
-  PIPERACILLIN/TAZOBACTAM: SIGNIFICANT CHANGE UP
-  TOBRAMYCIN: SIGNIFICANT CHANGE UP
-  TRIMETHOPRIM/SULFAMETHOXAZOLE: SIGNIFICANT CHANGE UP
CULTURE RESULTS: ABNORMAL
METHOD TYPE: SIGNIFICANT CHANGE UP
ORGANISM # SPEC MICROSCOPIC CNT: ABNORMAL
ORGANISM # SPEC MICROSCOPIC CNT: SIGNIFICANT CHANGE UP
SPECIMEN SOURCE: SIGNIFICANT CHANGE UP

## 2024-12-02 RX ORDER — SULFAMETHOXAZOLE/TRIMETHOPRIM 800-160 MG
1 TABLET ORAL
Qty: 20 | Refills: 0
Start: 2024-12-02 | End: 2024-12-11
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
## 2024-12-10 ENCOUNTER — EMERGENCY (EMERGENCY)
Facility: HOSPITAL | Age: 49
LOS: 0 days | Discharge: HOME | End: 2024-12-11
Attending: STUDENT IN AN ORGANIZED HEALTH CARE EDUCATION/TRAINING PROGRAM
Payer: MEDICAID

## 2024-12-10 VITALS
DIASTOLIC BLOOD PRESSURE: 106 MMHG | RESPIRATION RATE: 18 BRPM | HEIGHT: 73 IN | WEIGHT: 169.98 LBS | TEMPERATURE: 98 F | OXYGEN SATURATION: 98 % | HEART RATE: 88 BPM | SYSTOLIC BLOOD PRESSURE: 160 MMHG

## 2024-12-10 VITALS
DIASTOLIC BLOOD PRESSURE: 81 MMHG | TEMPERATURE: 98 F | RESPIRATION RATE: 19 BRPM | SYSTOLIC BLOOD PRESSURE: 136 MMHG | HEART RATE: 63 BPM | OXYGEN SATURATION: 98 %

## 2024-12-10 DIAGNOSIS — N20.0 CALCULUS OF KIDNEY: ICD-10-CM

## 2024-12-10 DIAGNOSIS — M54.9 DORSALGIA, UNSPECIFIED: ICD-10-CM

## 2024-12-10 DIAGNOSIS — K59.00 CONSTIPATION, UNSPECIFIED: ICD-10-CM

## 2024-12-10 DIAGNOSIS — Z88.0 ALLERGY STATUS TO PENICILLIN: ICD-10-CM

## 2024-12-10 DIAGNOSIS — S11.90XS UNSPECIFIED OPEN WOUND OF UNSPECIFIED PART OF NECK, SEQUELA: Chronic | ICD-10-CM

## 2024-12-10 DIAGNOSIS — Z95.0 PRESENCE OF CARDIAC PACEMAKER: Chronic | ICD-10-CM

## 2024-12-10 DIAGNOSIS — K52.89 OTHER SPECIFIED NONINFECTIVE GASTROENTERITIS AND COLITIS: ICD-10-CM

## 2024-12-10 DIAGNOSIS — G89.29 OTHER CHRONIC PAIN: ICD-10-CM

## 2024-12-10 DIAGNOSIS — N20.0 CALCULUS OF KIDNEY: Chronic | ICD-10-CM

## 2024-12-10 DIAGNOSIS — G82.50 QUADRIPLEGIA, UNSPECIFIED: ICD-10-CM

## 2024-12-10 DIAGNOSIS — Z90.5 ACQUIRED ABSENCE OF KIDNEY: Chronic | ICD-10-CM

## 2024-12-10 LAB
ALBUMIN SERPL ELPH-MCNC: 3.6 G/DL — SIGNIFICANT CHANGE UP (ref 3.3–5)
ALP SERPL-CCNC: 111 U/L — SIGNIFICANT CHANGE UP (ref 40–120)
ALT FLD-CCNC: 15 U/L — SIGNIFICANT CHANGE UP (ref 12–78)
ANION GAP SERPL CALC-SCNC: 6 MMOL/L — SIGNIFICANT CHANGE UP (ref 5–17)
APPEARANCE UR: ABNORMAL
AST SERPL-CCNC: 13 U/L — LOW (ref 15–37)
BASOPHILS # BLD AUTO: 0.05 K/UL — SIGNIFICANT CHANGE UP (ref 0–0.2)
BASOPHILS NFR BLD AUTO: 0.6 % — SIGNIFICANT CHANGE UP (ref 0–2)
BILIRUB SERPL-MCNC: 0.4 MG/DL — SIGNIFICANT CHANGE UP (ref 0.2–1.2)
BILIRUB UR-MCNC: NEGATIVE — SIGNIFICANT CHANGE UP
BUN SERPL-MCNC: 17 MG/DL — SIGNIFICANT CHANGE UP (ref 7–23)
CALCIUM SERPL-MCNC: 9.7 MG/DL — SIGNIFICANT CHANGE UP (ref 8.5–10.1)
CHLORIDE SERPL-SCNC: 101 MMOL/L — SIGNIFICANT CHANGE UP (ref 96–108)
CO2 SERPL-SCNC: 27 MMOL/L — SIGNIFICANT CHANGE UP (ref 22–31)
COLOR SPEC: ABNORMAL
CREAT SERPL-MCNC: 0.86 MG/DL — SIGNIFICANT CHANGE UP (ref 0.5–1.3)
DIFF PNL FLD: ABNORMAL
EGFR: 106 ML/MIN/1.73M2 — SIGNIFICANT CHANGE UP
EOSINOPHIL # BLD AUTO: 0.16 K/UL — SIGNIFICANT CHANGE UP (ref 0–0.5)
EOSINOPHIL NFR BLD AUTO: 2 % — SIGNIFICANT CHANGE UP (ref 0–6)
FLUAV AG NPH QL: SIGNIFICANT CHANGE UP
FLUBV AG NPH QL: SIGNIFICANT CHANGE UP
GLUCOSE SERPL-MCNC: 109 MG/DL — HIGH (ref 70–99)
GLUCOSE UR QL: NEGATIVE MG/DL — SIGNIFICANT CHANGE UP
HCT VFR BLD CALC: 38.2 % — LOW (ref 39–50)
HGB BLD-MCNC: 12.8 G/DL — LOW (ref 13–17)
IMM GRANULOCYTES NFR BLD AUTO: 0.2 % — SIGNIFICANT CHANGE UP (ref 0–0.9)
KETONES UR-MCNC: NEGATIVE MG/DL — SIGNIFICANT CHANGE UP
LEUKOCYTE ESTERASE UR-ACNC: ABNORMAL
LIDOCAIN IGE QN: 37 U/L — SIGNIFICANT CHANGE UP (ref 13–75)
LYMPHOCYTES # BLD AUTO: 2.3 K/UL — SIGNIFICANT CHANGE UP (ref 1–3.3)
LYMPHOCYTES # BLD AUTO: 28.1 % — SIGNIFICANT CHANGE UP (ref 13–44)
MAGNESIUM SERPL-MCNC: 2 MG/DL — SIGNIFICANT CHANGE UP (ref 1.6–2.6)
MCHC RBC-ENTMCNC: 28.5 PG — SIGNIFICANT CHANGE UP (ref 27–34)
MCHC RBC-ENTMCNC: 33.5 G/DL — SIGNIFICANT CHANGE UP (ref 32–36)
MCV RBC AUTO: 85.1 FL — SIGNIFICANT CHANGE UP (ref 80–100)
MONOCYTES # BLD AUTO: 0.6 K/UL — SIGNIFICANT CHANGE UP (ref 0–0.9)
MONOCYTES NFR BLD AUTO: 7.3 % — SIGNIFICANT CHANGE UP (ref 2–14)
NEUTROPHILS # BLD AUTO: 5.06 K/UL — SIGNIFICANT CHANGE UP (ref 1.8–7.4)
NEUTROPHILS NFR BLD AUTO: 61.8 % — SIGNIFICANT CHANGE UP (ref 43–77)
NITRITE UR-MCNC: NEGATIVE — SIGNIFICANT CHANGE UP
NRBC # BLD: 0 /100 WBCS — SIGNIFICANT CHANGE UP (ref 0–0)
PH UR: 7.5 — SIGNIFICANT CHANGE UP (ref 5–8)
PLATELET # BLD AUTO: 301 K/UL — SIGNIFICANT CHANGE UP (ref 150–400)
POTASSIUM SERPL-MCNC: 3.9 MMOL/L — SIGNIFICANT CHANGE UP (ref 3.5–5.3)
POTASSIUM SERPL-SCNC: 3.9 MMOL/L — SIGNIFICANT CHANGE UP (ref 3.5–5.3)
PROT SERPL-MCNC: 8.9 GM/DL — HIGH (ref 6–8.3)
PROT UR-MCNC: 300 MG/DL
RBC # BLD: 4.49 M/UL — SIGNIFICANT CHANGE UP (ref 4.2–5.8)
RBC # FLD: 16.7 % — HIGH (ref 10.3–14.5)
RSV RNA NPH QL NAA+NON-PROBE: SIGNIFICANT CHANGE UP
SARS-COV-2 RNA SPEC QL NAA+PROBE: SIGNIFICANT CHANGE UP
SODIUM SERPL-SCNC: 134 MMOL/L — LOW (ref 135–145)
SP GR SPEC: 1.01 — SIGNIFICANT CHANGE UP (ref 1–1.03)
UROBILINOGEN FLD QL: 1 MG/DL — SIGNIFICANT CHANGE UP (ref 0.2–1)
WBC # BLD: 8.19 K/UL — SIGNIFICANT CHANGE UP (ref 3.8–10.5)
WBC # FLD AUTO: 8.19 K/UL — SIGNIFICANT CHANGE UP (ref 3.8–10.5)

## 2024-12-10 PROCEDURE — 99285 EMERGENCY DEPT VISIT HI MDM: CPT

## 2024-12-10 PROCEDURE — 74177 CT ABD & PELVIS W/CONTRAST: CPT | Mod: 26,MC

## 2024-12-10 RX ORDER — ACETAMINOPHEN 500MG 500 MG/1
975 TABLET, COATED ORAL ONCE
Refills: 0 | Status: COMPLETED | OUTPATIENT
Start: 2024-12-10 | End: 2024-12-10

## 2024-12-10 RX ORDER — CIPROFLOXACIN HCL 750 MG
1 TABLET ORAL
Qty: 28 | Refills: 0
Start: 2024-12-10 | End: 2024-12-23

## 2024-12-10 RX ORDER — LIDOCAINE 40 MG/G
1 CREAM TOPICAL ONCE
Refills: 0 | Status: COMPLETED | OUTPATIENT
Start: 2024-12-10 | End: 2024-12-10

## 2024-12-10 RX ORDER — METRONIDAZOLE 500 MG/1
500 TABLET ORAL ONCE
Refills: 0 | Status: COMPLETED | OUTPATIENT
Start: 2024-12-10 | End: 2024-12-10

## 2024-12-10 RX ORDER — POLYETHYLENE GLYCOL 3350 17 G/17G
17 POWDER, FOR SOLUTION ORAL ONCE
Refills: 0 | Status: COMPLETED | OUTPATIENT
Start: 2024-12-10 | End: 2024-12-10

## 2024-12-10 RX ORDER — METRONIDAZOLE 500 MG/1
1 TABLET ORAL
Qty: 42 | Refills: 0
Start: 2024-12-10 | End: 2024-12-23

## 2024-12-10 RX ORDER — ACETAMINOPHEN 500MG 500 MG/1
1000 TABLET, COATED ORAL ONCE
Refills: 0 | Status: COMPLETED | OUTPATIENT
Start: 2024-12-10 | End: 2024-12-10

## 2024-12-10 RX ORDER — SODIUM CHLORIDE 9 MG/ML
1000 INJECTION, SOLUTION INTRAMUSCULAR; INTRAVENOUS; SUBCUTANEOUS ONCE
Refills: 0 | Status: COMPLETED | OUTPATIENT
Start: 2024-12-10 | End: 2024-12-10

## 2024-12-10 RX ORDER — CIPROFLOXACIN HCL 750 MG
500 TABLET ORAL ONCE
Refills: 0 | Status: COMPLETED | OUTPATIENT
Start: 2024-12-10 | End: 2024-12-10

## 2024-12-10 RX ADMIN — ACETAMINOPHEN 500MG 1000 MILLIGRAM(S): 500 TABLET, COATED ORAL at 09:00

## 2024-12-10 RX ADMIN — Medication 500 MILLIGRAM(S): at 12:54

## 2024-12-10 RX ADMIN — Medication 4 MILLIGRAM(S): at 09:01

## 2024-12-10 RX ADMIN — POLYETHYLENE GLYCOL 3350 17 GRAM(S): 17 POWDER, FOR SOLUTION ORAL at 12:55

## 2024-12-10 RX ADMIN — ACETAMINOPHEN 500MG 400 MILLIGRAM(S): 500 TABLET, COATED ORAL at 08:03

## 2024-12-10 RX ADMIN — LIDOCAINE 1 PATCH: 40 CREAM TOPICAL at 09:01

## 2024-12-10 RX ADMIN — Medication 4 MILLIGRAM(S): at 10:00

## 2024-12-10 RX ADMIN — SODIUM CHLORIDE 1000 MILLILITER(S): 9 INJECTION, SOLUTION INTRAMUSCULAR; INTRAVENOUS; SUBCUTANEOUS at 12:55

## 2024-12-10 RX ADMIN — METRONIDAZOLE 500 MILLIGRAM(S): 500 TABLET ORAL at 12:54

## 2024-12-10 RX ADMIN — ACETAMINOPHEN 500MG 975 MILLIGRAM(S): 500 TABLET, COATED ORAL at 17:06

## 2024-12-10 NOTE — ED PROVIDER NOTE - CARE PROVIDER_API CALL
Dinesh Moya  Urology  [address]  [place] [address]  Phone: [phone]  Fax: [phone]  Established Patient  Follow Up Time: 4-6 Days

## 2024-12-10 NOTE — ED PROVIDER NOTE - PROGRESS NOTE DETAILS
Urology recommended follow-up with Dr. Moya outpatient within 1 week for staghorn calculus and mild UA results today.  Surgery recommended follow-up with GI in 1 week for stercoral colitis and constipation.  Spoke to hospitalist Dr. Cunningham about possible admission for pain control and monitoring of patient with Staghorn calculus with improving UA, and stercoral colitis.  Due to improving UA and recommended nonsurgical course from urology and surgery.  Dr. Cunningham recommended patient should follow-up outpatient.  Performed disimpaction with moderate improvement on symptoms.  Performed shared decision making with patient, discussed with patient options to be admitted for ongoing pain control vs discharge with follow-up outpatient.  Patient preferred to follow-up outpatient.    Patient’s prescription sent to their pharmacy indicated during interview. Improvement on symptoms. Passed PO challenge. Spoke to patient/family about results including incidental findings. Plan to discharge patient. Patient given urology, GI, and PCP follow up and return precautions. Patient/family agrees with plan.

## 2024-12-10 NOTE — ED PROVIDER NOTE - CARE PLAN
1 Principal Discharge DX:	Staghorn calculus  Secondary Diagnosis:	Back pain  Secondary Diagnosis:	Constipation

## 2024-12-10 NOTE — ED ADULT NURSE NOTE - PAIN: BODY LOCATION
PAST MEDICAL & SURGICAL HISTORY:  History of colonic polyps      Hyperthyroidism      Addisons disease      S/P colonoscopic polypectomy       back pain, midline pain, shoulder pain
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
## 2024-12-10 NOTE — PHARMACOTHERAPY INTERVENTION NOTE - COMMENTS
Modified penicillin allergy to state that patient tolerated ceftriaxone (02/2021 & 03/2021) and cefpodoxime (03/2021 & 11/2024) with no documented reaction.
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
## 2024-12-10 NOTE — ED PROVIDER NOTE - NSFOLLOWUPINSTRUCTIONS_ED_ALL_ED_FT
You were seen in the Emergency Department for back pain and you were found to have staghorn calculus and  constipation with stercoral colitis as well as.  You received a disimpaction.    1) Advance activity as tolerated.   2) New prescription sent to pharmacy indicated in interview take prescription as prescribed  for the stercoral colitis. Complete your antibiotic course for your UTI. Continue all previously prescribed medications as directed.    3) Follow up with urology, surgery, your primary care physician in 2-3 days - take copies of your results.    4) Return to the Emergency Department for worsening or persistent symptoms, and/or ANY NEW OR CONCERNING SYMPTOMS.     Kidney Stones    Kidney stones (urolithiasis) are crystal deposits that form inside your kidneys. Pain is caused by the stone moving through the urinary tract, causing spasms of the ureter. Drink enough water and fluids to keep your urine clear or pale yellow. This will help you to pass the stone or stone fragments. If provided a strainer, strain all urine and keep all particulate matter and stones for a follow up appointment with a urologist.    SEEK IMMEDIATE MEDICAL CARE IF YOU HAVE ANY OF THE FOLLOWING SYMPTOMS: pain not controlled with medication, fever/chills, worsening vomiting, inability to urinate, or dizziness/lightheadedness.

## 2024-12-10 NOTE — ED ADULT NURSE NOTE - NS ED NURSE LEVEL OF CONSCIOUSNESS MENTAL STATUS
I have reviewed and confirmed nurses' notes for patient's medications, allergies, medical history, and surgical history. Awake/Alert/Cooperative

## 2024-12-10 NOTE — ED ADULT NURSE REASSESSMENT NOTE - NS ED NURSE REASSESS COMMENT FT1
Patient resting comfortably in stretcher, condom catheter patent and draining into bag, Continuous SPO2 and cardiac monitoring in place, awaiting ambulance  to be transported home.
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
## 2024-12-10 NOTE — CONSULT NOTE ADULT - PROBLEM SELECTOR RECOMMENDATION 9
- at this time, as Cr is normalized and no gross signs of infection, no indication for acute urologic intervention at this time  - Patient may follow up with Dr. Moya for outpatient evaluation and operative planning   - discussed with Dr. Moya

## 2024-12-10 NOTE — ED ADULT NURSE REASSESSMENT NOTE - NS ED NURSE REASSESS COMMENT FT1
report received from night shift RN,  pt stable and resting comfortably in bed; demonstrating no s/s of acute distress at this time. provider orders reviewed and addressed appropriately. RN inquired about pt current needs and addressed accordingly. pt on cardiac monitor and pulse ox. IV placed. pt refused EKG and rectal temperature. pt educated on importance and still refuses.

## 2024-12-10 NOTE — ED PROVIDER NOTE - NSFOLLOWUPCLINICS_GEN_ALL_ED_FT
Gastroenterology at [facility]  Gastroenterology  [address] [place] [address]  Phone: [phone]  Fax:     [facility]  Gastroenterology  [address] [place] [address]  Phone: [phone]  Fax:     [facility]  Gastroenterology  [address]  [place] [address]  Phone: [phone]  Fax:     [facility]  Gastroenterology  [address] [place] [address]  Phone: [phone]  Fax: [phone]
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
## 2024-12-10 NOTE — ED ADULT NURSE REASSESSMENT NOTE - NS ED NURSE REASSESS COMMENT FT1
pt had large DM, pt cleaned with fresh linens and diaper. pt states improvement. pt to be discharged pending ambulance .
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
## 2024-12-10 NOTE — ED PROVIDER NOTE - PATIENT PORTAL LINK FT
You can access the FollowMyHealth Patient Portal offered by [facility] by registering at the following website: [email or web address]. By joining [employer]’s FollowMyHealth portal, you will also be able to view your health information using other applications (apps) compatible with our system.
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
## 2024-12-10 NOTE — ED PROVIDER NOTE - WET READ LAUNCH FT
Previous Accession (Optional): [id number] Previous Accession (Optional): [id number] There are no Wet Read(s) to document.

## 2024-12-10 NOTE — ED ADULT NURSE NOTE - NSFALLHARMRISKINTERV_ED_ALL_ED

## 2024-12-11 NOTE — H&P ADULT. - GUM GEN PE MLT EXAM PC
PT. C/O PALPITATIONS OFF AND ON FOR PAST 3-4 DAYS WITH SOME HEAVINESS TO CHEST AND SOME SOB. PT. STATES WAS TOLD TO STOP THYROID MEDICATION 2-3 WEEKS AGO DUE TO HIGH LEVELS. PT. STATES HX A-FIB IN PAST AFTER A SURGERY.  
patient refused

## 2024-12-13 LAB
-  AMPICILLIN/SULBACTAM: SIGNIFICANT CHANGE UP
-  AMPICILLIN: SIGNIFICANT CHANGE UP
-  AZTREONAM: SIGNIFICANT CHANGE UP
-  CEFAZOLIN: SIGNIFICANT CHANGE UP
-  CEFEPIME: SIGNIFICANT CHANGE UP
-  CEFTRIAXONE: SIGNIFICANT CHANGE UP
-  CEFUROXIME: SIGNIFICANT CHANGE UP
-  CIPROFLOXACIN: SIGNIFICANT CHANGE UP
-  ERTAPENEM: SIGNIFICANT CHANGE UP
-  GENTAMICIN: SIGNIFICANT CHANGE UP
-  LEVOFLOXACIN: SIGNIFICANT CHANGE UP
-  MEROPENEM: SIGNIFICANT CHANGE UP
-  NITROFURANTOIN: SIGNIFICANT CHANGE UP
-  PIPERACILLIN/TAZOBACTAM: SIGNIFICANT CHANGE UP
-  TOBRAMYCIN: SIGNIFICANT CHANGE UP
-  TRIMETHOPRIM/SULFAMETHOXAZOLE: SIGNIFICANT CHANGE UP
METHOD TYPE: SIGNIFICANT CHANGE UP

## 2024-12-15 ENCOUNTER — INPATIENT (INPATIENT)
Facility: HOSPITAL | Age: 49
LOS: 14 days | Discharge: HOME HEALTH | End: 2024-12-30
Attending: INTERNAL MEDICINE | Admitting: INTERNAL MEDICINE
Payer: MEDICAID

## 2024-12-15 VITALS
TEMPERATURE: 98 F | WEIGHT: 169.98 LBS | HEIGHT: 73 IN | OXYGEN SATURATION: 100 % | HEART RATE: 64 BPM | RESPIRATION RATE: 17 BRPM | DIASTOLIC BLOOD PRESSURE: 104 MMHG | SYSTOLIC BLOOD PRESSURE: 155 MMHG

## 2024-12-15 DIAGNOSIS — Z95.0 PRESENCE OF CARDIAC PACEMAKER: Chronic | ICD-10-CM

## 2024-12-15 DIAGNOSIS — S11.90XS UNSPECIFIED OPEN WOUND OF UNSPECIFIED PART OF NECK, SEQUELA: Chronic | ICD-10-CM

## 2024-12-15 DIAGNOSIS — N20.0 CALCULUS OF KIDNEY: Chronic | ICD-10-CM

## 2024-12-15 DIAGNOSIS — Z90.5 ACQUIRED ABSENCE OF KIDNEY: Chronic | ICD-10-CM

## 2024-12-15 LAB
ALBUMIN SERPL ELPH-MCNC: 3.2 G/DL — LOW (ref 3.3–5)
ALP SERPL-CCNC: 230 U/L — HIGH (ref 40–120)
ALT FLD-CCNC: 12 U/L — SIGNIFICANT CHANGE UP (ref 12–78)
ANION GAP SERPL CALC-SCNC: 4 MMOL/L — LOW (ref 5–17)
ANISOCYTOSIS BLD QL: SIGNIFICANT CHANGE UP
APPEARANCE UR: ABNORMAL
AST SERPL-CCNC: 15 U/L — SIGNIFICANT CHANGE UP (ref 15–37)
BACTERIA # UR AUTO: ABNORMAL /HPF
BASOPHILS # BLD AUTO: 0.05 K/UL — SIGNIFICANT CHANGE UP (ref 0–0.2)
BASOPHILS NFR BLD AUTO: 0.9 % — SIGNIFICANT CHANGE UP (ref 0–2)
BILIRUB SERPL-MCNC: 0.2 MG/DL — SIGNIFICANT CHANGE UP (ref 0.2–1.2)
BILIRUB UR-MCNC: NEGATIVE — SIGNIFICANT CHANGE UP
BUN SERPL-MCNC: 16 MG/DL — SIGNIFICANT CHANGE UP (ref 7–23)
CALCIUM SERPL-MCNC: 9.2 MG/DL — SIGNIFICANT CHANGE UP (ref 8.5–10.1)
CHLORIDE SERPL-SCNC: 111 MMOL/L — HIGH (ref 96–108)
CO2 SERPL-SCNC: 27 MMOL/L — SIGNIFICANT CHANGE UP (ref 22–31)
COLOR SPEC: YELLOW — SIGNIFICANT CHANGE UP
CREAT SERPL-MCNC: 0.59 MG/DL — SIGNIFICANT CHANGE UP (ref 0.5–1.3)
CULTURE RESULTS: ABNORMAL
DACRYOCYTES BLD QL SMEAR: SLIGHT — SIGNIFICANT CHANGE UP
DIFF PNL FLD: ABNORMAL
EGFR: 119 ML/MIN/1.73M2 — SIGNIFICANT CHANGE UP
EOSINOPHIL # BLD AUTO: 0.37 K/UL — SIGNIFICANT CHANGE UP (ref 0–0.5)
EOSINOPHIL NFR BLD AUTO: 6.5 % — HIGH (ref 0–6)
EPI CELLS # UR: PRESENT
GLUCOSE SERPL-MCNC: 122 MG/DL — HIGH (ref 70–99)
GLUCOSE UR QL: NEGATIVE MG/DL — SIGNIFICANT CHANGE UP
HCT VFR BLD CALC: 37.6 % — LOW (ref 39–50)
HGB BLD-MCNC: 12.4 G/DL — LOW (ref 13–17)
IMM GRANULOCYTES NFR BLD AUTO: 0.2 % — SIGNIFICANT CHANGE UP (ref 0–0.9)
KETONES UR-MCNC: NEGATIVE MG/DL — SIGNIFICANT CHANGE UP
LEUKOCYTE ESTERASE UR-ACNC: ABNORMAL
LYMPHOCYTES # BLD AUTO: 2.83 K/UL — SIGNIFICANT CHANGE UP (ref 1–3.3)
LYMPHOCYTES # BLD AUTO: 49.6 % — HIGH (ref 13–44)
MANUAL SMEAR VERIFICATION: SIGNIFICANT CHANGE UP
MCHC RBC-ENTMCNC: 29 PG — SIGNIFICANT CHANGE UP (ref 27–34)
MCHC RBC-ENTMCNC: 33 G/DL — SIGNIFICANT CHANGE UP (ref 32–36)
MCV RBC AUTO: 88.1 FL — SIGNIFICANT CHANGE UP (ref 80–100)
MONOCYTES # BLD AUTO: 0.45 K/UL — SIGNIFICANT CHANGE UP (ref 0–0.9)
MONOCYTES NFR BLD AUTO: 7.9 % — SIGNIFICANT CHANGE UP (ref 2–14)
NEUTROPHILS # BLD AUTO: 1.99 K/UL — SIGNIFICANT CHANGE UP (ref 1.8–7.4)
NEUTROPHILS NFR BLD AUTO: 34.9 % — LOW (ref 43–77)
NITRITE UR-MCNC: POSITIVE
NRBC # BLD: 0 /100 WBCS — SIGNIFICANT CHANGE UP (ref 0–0)
ORGANISM # SPEC MICROSCOPIC CNT: ABNORMAL
ORGANISM # SPEC MICROSCOPIC CNT: SIGNIFICANT CHANGE UP
OVALOCYTES BLD QL SMEAR: SLIGHT — SIGNIFICANT CHANGE UP
PH UR: 7 — SIGNIFICANT CHANGE UP (ref 5–8)
PLAT MORPH BLD: NORMAL — SIGNIFICANT CHANGE UP
PLATELET # BLD AUTO: 260 K/UL — SIGNIFICANT CHANGE UP (ref 150–400)
POTASSIUM SERPL-MCNC: 3.7 MMOL/L — SIGNIFICANT CHANGE UP (ref 3.5–5.3)
POTASSIUM SERPL-SCNC: 3.7 MMOL/L — SIGNIFICANT CHANGE UP (ref 3.5–5.3)
PROT SERPL-MCNC: 7.7 GM/DL — SIGNIFICANT CHANGE UP (ref 6–8.3)
PROT UR-MCNC: 100 MG/DL
RBC # BLD: 4.27 M/UL — SIGNIFICANT CHANGE UP (ref 4.2–5.8)
RBC # FLD: 17.3 % — HIGH (ref 10.3–14.5)
RBC BLD AUTO: NORMAL — SIGNIFICANT CHANGE UP
RBC CASTS # UR COMP ASSIST: 20 /HPF — HIGH (ref 0–4)
SODIUM SERPL-SCNC: 142 MMOL/L — SIGNIFICANT CHANGE UP (ref 135–145)
SP GR SPEC: 1.01 — SIGNIFICANT CHANGE UP (ref 1–1.03)
SPECIMEN SOURCE: SIGNIFICANT CHANGE UP
UROBILINOGEN FLD QL: 1 MG/DL — SIGNIFICANT CHANGE UP (ref 0.2–1)
WBC # BLD: 5.7 K/UL — SIGNIFICANT CHANGE UP (ref 3.8–10.5)
WBC # FLD AUTO: 5.7 K/UL — SIGNIFICANT CHANGE UP (ref 3.8–10.5)
WBC UR QL: 10 /HPF — HIGH (ref 0–5)

## 2024-12-15 PROCEDURE — 99285 EMERGENCY DEPT VISIT HI MDM: CPT

## 2024-12-15 RX ORDER — OXYCODONE HCL 15 MG
5 TABLET ORAL ONCE
Refills: 0 | Status: DISCONTINUED | OUTPATIENT
Start: 2024-12-15 | End: 2024-12-15

## 2024-12-15 RX ORDER — SODIUM CHLORIDE 9 MG/ML
1000 INJECTION, SOLUTION INTRAMUSCULAR; INTRAVENOUS; SUBCUTANEOUS ONCE
Refills: 0 | Status: COMPLETED | OUTPATIENT
Start: 2024-12-15 | End: 2024-12-15

## 2024-12-15 RX ORDER — ACETAMINOPHEN 80 MG/.8ML
975 SOLUTION/ DROPS ORAL ONCE
Refills: 0 | Status: COMPLETED | OUTPATIENT
Start: 2024-12-15 | End: 2024-12-15

## 2024-12-15 RX ADMIN — ACETAMINOPHEN 975 MILLIGRAM(S): 80 SOLUTION/ DROPS ORAL at 22:50

## 2024-12-15 RX ADMIN — Medication 5 MILLIGRAM(S): at 22:50

## 2024-12-15 RX ADMIN — Medication 5 MILLIGRAM(S): at 23:50

## 2024-12-15 RX ADMIN — ACETAMINOPHEN 975 MILLIGRAM(S): 80 SOLUTION/ DROPS ORAL at 23:50

## 2024-12-15 NOTE — ED ADULT TRIAGE NOTE - CHIEF COMPLAINT QUOTE
general malaise , has [place] catheter ,  right flank pain lower abdominal pain  x 5 days on and off ,paraplegic Hx kidney stone

## 2024-12-15 NOTE — ED ADULT NURSE NOTE - NSICDXPASTSURGICALHX_GEN_ALL_CORE_FT
PAST SURGICAL HISTORY:  Calculus of kidney bilateral nephrostomy tubes placed 1/2019 @[facility]    H/O right nephrectomy 3/2019    Open wound of neck, sequela     Pacemaker Micra 4/17/19

## 2024-12-15 NOTE — ED ADULT NURSE NOTE - NSFALLRISKINTERV_ED_ALL_ED
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
## 2024-12-16 DIAGNOSIS — N13.30 UNSPECIFIED HYDRONEPHROSIS: ICD-10-CM

## 2024-12-16 DIAGNOSIS — N20.0 CALCULUS OF KIDNEY: ICD-10-CM

## 2024-12-16 DIAGNOSIS — N39.0 URINARY TRACT INFECTION, SITE NOT SPECIFIED: ICD-10-CM

## 2024-12-16 PROCEDURE — 99223 1ST HOSP IP/OBS HIGH 75: CPT

## 2024-12-16 PROCEDURE — 74176 CT ABD & PELVIS W/O CONTRAST: CPT | Mod: 26,MC

## 2024-12-16 PROCEDURE — 99222 1ST HOSP IP/OBS MODERATE 55: CPT

## 2024-12-16 RX ORDER — POLYETHYLENE GLYCOL 3350 17 G/DOSE
17 POWDER (GRAM) ORAL ONCE
Refills: 0 | Status: DISCONTINUED | OUTPATIENT
Start: 2024-12-16 | End: 2024-12-20

## 2024-12-16 RX ORDER — BISACODYL 5 MG
10 TABLET, DELAYED RELEASE (ENTERIC COATED) ORAL ONCE
Refills: 0 | Status: COMPLETED | OUTPATIENT
Start: 2024-12-16 | End: 2024-12-16

## 2024-12-16 RX ORDER — ONDANSETRON 4 MG/1
4 TABLET ORAL EVERY 8 HOURS
Refills: 0 | Status: DISCONTINUED | OUTPATIENT
Start: 2024-12-16 | End: 2024-12-16

## 2024-12-16 RX ORDER — SODIUM CHLORIDE 9 MG/ML
1000 INJECTION, SOLUTION INTRAMUSCULAR; INTRAVENOUS; SUBCUTANEOUS
Refills: 0 | Status: DISCONTINUED | OUTPATIENT
Start: 2024-12-16 | End: 2024-12-17

## 2024-12-16 RX ORDER — MEROPENEM 1 G/20ML
1000 INJECTION, POWDER, FOR SOLUTION INTRAVENOUS ONCE
Refills: 0 | Status: COMPLETED | OUTPATIENT
Start: 2024-12-16 | End: 2024-12-16

## 2024-12-16 RX ORDER — IBUPROFEN 200 MG
600 TABLET ORAL ONCE
Refills: 0 | Status: DISCONTINUED | OUTPATIENT
Start: 2024-12-16 | End: 2024-12-16

## 2024-12-16 RX ORDER — SODIUM PHOSPHATE, DIBASIC, UNSPECIFIED FORM AND SODIUM PHOSPHATE, MONOBASIC, UNSPECIFIED FORM 7; 19 G/133ML; G/133ML
1 ENEMA RECTAL ONCE
Refills: 0 | Status: COMPLETED | OUTPATIENT
Start: 2024-12-16 | End: 2024-12-16

## 2024-12-16 RX ORDER — KETOROLAC TROMETHAMINE 30 MG/ML
30 INJECTION INTRAMUSCULAR; INTRAVENOUS ONCE
Refills: 0 | Status: DISCONTINUED | OUTPATIENT
Start: 2024-12-16 | End: 2024-12-16

## 2024-12-16 RX ORDER — SENNOSIDES 8.6 MG/1
2 TABLET, FILM COATED ORAL AT BEDTIME
Refills: 0 | Status: DISCONTINUED | OUTPATIENT
Start: 2024-12-16 | End: 2024-12-30

## 2024-12-16 RX ORDER — HEPARIN SODIUM 1000 [USP'U]/ML
5000 INJECTION, SOLUTION INTRAVENOUS; SUBCUTANEOUS EVERY 12 HOURS
Refills: 0 | Status: DISCONTINUED | OUTPATIENT
Start: 2024-12-16 | End: 2024-12-30

## 2024-12-16 RX ORDER — DIPHENHYDRAMINE HCL 25 MG
25 TABLET ORAL ONCE
Refills: 0 | Status: DISCONTINUED | OUTPATIENT
Start: 2024-12-16 | End: 2024-12-16

## 2024-12-16 RX ORDER — MEROPENEM 1 G/20ML
1000 INJECTION, POWDER, FOR SOLUTION INTRAVENOUS EVERY 8 HOURS
Refills: 0 | Status: DISCONTINUED | OUTPATIENT
Start: 2024-12-16 | End: 2024-12-18

## 2024-12-16 RX ORDER — ACETAMINOPHEN 80 MG/.8ML
1000 SOLUTION/ DROPS ORAL ONCE
Refills: 0 | Status: COMPLETED | OUTPATIENT
Start: 2024-12-16 | End: 2024-12-16

## 2024-12-16 RX ORDER — AMOXICILLIN 500 MG
250 CAPSULE ORAL ONCE
Refills: 0 | Status: COMPLETED | OUTPATIENT
Start: 2024-12-16 | End: 2024-12-17

## 2024-12-16 RX ORDER — MAG HYDROX/ALUMINUM HYD/SIMETH 200-200-20
30 SUSPENSION, ORAL (FINAL DOSE FORM) ORAL EVERY 4 HOURS
Refills: 0 | Status: DISCONTINUED | OUTPATIENT
Start: 2024-12-16 | End: 2024-12-16

## 2024-12-16 RX ORDER — BISACODYL 5 MG
5 TABLET, DELAYED RELEASE (ENTERIC COATED) ORAL EVERY 12 HOURS
Refills: 0 | Status: DISCONTINUED | OUTPATIENT
Start: 2024-12-16 | End: 2024-12-30

## 2024-12-16 RX ORDER — ACETAMINOPHEN 80 MG/.8ML
650 SOLUTION/ DROPS ORAL EVERY 6 HOURS
Refills: 0 | Status: DISCONTINUED | OUTPATIENT
Start: 2024-12-16 | End: 2024-12-30

## 2024-12-16 RX ORDER — HYDRALAZINE HYDROCHLORIDE 10 MG/1
10 TABLET ORAL ONCE
Refills: 0 | Status: COMPLETED | OUTPATIENT
Start: 2024-12-16 | End: 2024-12-16

## 2024-12-16 RX ORDER — GINKGO BILOBA 40 MG
3 CAPSULE ORAL AT BEDTIME
Refills: 0 | Status: DISCONTINUED | OUTPATIENT
Start: 2024-12-16 | End: 2024-12-30

## 2024-12-16 RX ORDER — DIPHENHYDRAMINE HCL 25 MG
25 TABLET ORAL ONCE
Refills: 0 | Status: DISCONTINUED | OUTPATIENT
Start: 2024-12-16 | End: 2024-12-18

## 2024-12-16 RX ORDER — TAMSULOSIN HYDROCHLORIDE 0.4 MG/1
0.4 CAPSULE ORAL AT BEDTIME
Refills: 0 | Status: DISCONTINUED | OUTPATIENT
Start: 2024-12-16 | End: 2024-12-30

## 2024-12-16 RX ADMIN — SODIUM CHLORIDE 1000 MILLILITER(S): 9 INJECTION, SOLUTION INTRAMUSCULAR; INTRAVENOUS; SUBCUTANEOUS at 01:10

## 2024-12-16 RX ADMIN — SENNOSIDES 2 TABLET(S): 8.6 TABLET, FILM COATED ORAL at 23:12

## 2024-12-16 RX ADMIN — SODIUM CHLORIDE 1000 MILLILITER(S): 9 INJECTION, SOLUTION INTRAMUSCULAR; INTRAVENOUS; SUBCUTANEOUS at 00:10

## 2024-12-16 RX ADMIN — Medication 10 MILLIGRAM(S): at 10:17

## 2024-12-16 RX ADMIN — ACETAMINOPHEN 650 MILLIGRAM(S): 80 SOLUTION/ DROPS ORAL at 18:03

## 2024-12-16 RX ADMIN — ACETAMINOPHEN 650 MILLIGRAM(S): 80 SOLUTION/ DROPS ORAL at 17:14

## 2024-12-16 RX ADMIN — MEROPENEM 100 MILLIGRAM(S): 1 INJECTION, POWDER, FOR SOLUTION INTRAVENOUS at 17:52

## 2024-12-16 RX ADMIN — ACETAMINOPHEN 1000 MILLIGRAM(S): 80 SOLUTION/ DROPS ORAL at 21:38

## 2024-12-16 RX ADMIN — SODIUM CHLORIDE 75 MILLILITER(S): 9 INJECTION, SOLUTION INTRAMUSCULAR; INTRAVENOUS; SUBCUTANEOUS at 10:17

## 2024-12-16 RX ADMIN — TAMSULOSIN HYDROCHLORIDE 0.4 MILLIGRAM(S): 0.4 CAPSULE ORAL at 23:13

## 2024-12-16 RX ADMIN — HEPARIN SODIUM 5000 UNIT(S): 1000 INJECTION, SOLUTION INTRAVENOUS; SUBCUTANEOUS at 17:53

## 2024-12-16 RX ADMIN — Medication 5 MILLIGRAM(S): at 17:53

## 2024-12-16 RX ADMIN — MEROPENEM 100 MILLIGRAM(S): 1 INJECTION, POWDER, FOR SOLUTION INTRAVENOUS at 08:30

## 2024-12-16 RX ADMIN — KETOROLAC TROMETHAMINE 30 MILLIGRAM(S): 30 INJECTION INTRAMUSCULAR; INTRAVENOUS at 06:20

## 2024-12-16 RX ADMIN — KETOROLAC TROMETHAMINE 30 MILLIGRAM(S): 30 INJECTION INTRAMUSCULAR; INTRAVENOUS at 05:20

## 2024-12-16 RX ADMIN — SODIUM PHOSPHATE, DIBASIC, UNSPECIFIED FORM AND SODIUM PHOSPHATE, MONOBASIC, UNSPECIFIED FORM 1 ENEMA: 7; 19 ENEMA RECTAL at 17:14

## 2024-12-16 RX ADMIN — Medication 10 MILLIGRAM(S): at 20:38

## 2024-12-16 RX ADMIN — ACETAMINOPHEN 650 MILLIGRAM(S): 80 SOLUTION/ DROPS ORAL at 10:16

## 2024-12-16 RX ADMIN — ACETAMINOPHEN 400 MILLIGRAM(S): 80 SOLUTION/ DROPS ORAL at 20:38

## 2024-12-16 NOTE — CONSULT NOTE ADULT - SUBJECTIVE AND OBJECTIVE BOX
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 thickening, compatible   with stercoral proctitis.    --- End of Report ---    < end of copied text >    < from: CT Abdomen and Pelvis w/ IV Cont (12.10.24 @ 09:55) >  IMPRESSION:  Constipated colon. Rectosigmoid fecal impaction with possible stercoral   colitis in the sigmoid colon.    Solitary left kidney with interval development of large staghorn calculus   with moderate hydronephrosis. Urothelial thickening and pericolonic   calyceal and periureteral fat stranding suggestive of infection.      --- End of Report ---    < end of copied text >    I have personally reviewed the above imaging

## 2024-12-16 NOTE — H&P ADULT - PROBLEM SELECTOR PLAN 2
Patient has findings of staghorn calculus on CT scan with moderate hydronephrosis. Similar findings seen during ED visit on 12/10. Urology and surgery consulted at the time and states no need for intervention.

## 2024-12-16 NOTE — H&P ADULT - NSHPLABSRESULTS_GEN_ALL_CORE
.  LABS:                         12.4   5.70  )-----------( 260      ( 15 Dec 2024 22:50 )             37.6     12-15    142  |  111[H]  |  16  ----------------------------<  122[H]  3.7   |  27  |  0.59    Ca    9.2      15 Dec 2024 22:50    TPro  7.7  /  Alb  3.2[L]  /  TBili  0.2  /  DBili  x   /  AST  15  /  ALT  12  /  AlkPhos  230[H]  12-15      Urinalysis Basic - ( 15 Dec 2024 22:50 )    Color: Yellow / Appearance: Cloudy / S.013 / pH: x  Gluc: 122 mg/dL / Ketone: Negative mg/dL  / Bili: Negative / Urobili: 1.0 mg/dL   Blood: x / Protein: 100 mg/dL / Nitrite: Positive   Leuk Esterase: Large / RBC: 20 /HPF / WBC 10 /HPF   Sq Epi: x / Non Sq Epi: x / Bacteria: Many /HPF            RADIOLOGY, EKG & ADDITIONAL TESTS: Reviewed.

## 2024-12-16 NOTE — H&P ADULT - NSHPPHYSICALEXAM_GEN_ALL_CORE
Vital Signs Last 24 Hrs  T(C): 36.5 (16 Dec 2024 07:33), Max: 36.9 (16 Dec 2024 06:24)  T(F): 97.7 (16 Dec 2024 07:33), Max: 98.4 (16 Dec 2024 06:24)  HR: 54 (16 Dec 2024 07:33) (54 - 75)  BP: 176/101 (16 Dec 2024 07:33) (124/84 - 176/101)  BP(mean): --  RR: 17 (16 Dec 2024 07:33) (17 - 20)  SpO2: 99% (16 Dec 2024 07:33) (99% - 100%)    Parameters below as of 16 Dec 2024 07:33  Patient On (Oxygen Delivery Method): room air    GENERAL: NAD, lying in bed comfortably, quadriplegic  HEAD:  Atraumatic, normocephalic  EYES: EOMI, PERRL  NECK: Supple, trachea midline, no JVD  HEART: Regular rate and rhythm  LUNGS: Unlabored respirations.  Clear to auscultation bilaterally, no crackles, wheezing, or rhonchi  ABDOMEN: Soft, generalized tenderness, distended, +BS  EXTREMITIES: 2+ peripheral pulses bilaterally. No clubbing, cyanosis, or edema  NERVOUS SYSTEM:  A&Ox3, moving all extremities, no focal deficits

## 2024-12-16 NOTE — H&P ADULT - PROBLEM SELECTOR PLAN 1
Patient presents today after having positive urine culture results. Culture results demonstrate sensitivity only to IV antibiotics. Patient started on zosyn  - Continue zosyn
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
## 2024-12-16 NOTE — ED PROVIDER NOTE - NSICDXPASTSURGICALHX_GEN_ALL_CORE_FT
PAST SURGICAL HISTORY:  Calculus of kidney bilateral nephrostomy tubes placed 1/2019 @[facility]    H/O right nephrectomy 3/2019    Open wound of neck, sequela     Pacemaker Micra 4/17/19

## 2024-12-16 NOTE — ED PROVIDER NOTE - PHYSICAL EXAMINATION
General appearance: Nontoxic appearing, conversant, afebrile    Eyes: anicteric sclerae, JAYLIN, EOMI   HENT: Atraumatic; oropharynx clear, MMM and no ulcerations, no pharyngeal erythema or exudate   Neck: Trachea midline; Full range of motion, supple   Pulm: CTA bl, normal respiratory effort and no intercostal retractions, normal work of breathing   CV: RRR, No murmurs, rubs, or gallops   Abdomen: Soft, non-tender, non-distended; no guarding or rebound   Extremities: No peripheral edema, no gross deformities   Skin: Dry, normal temperature, turgor and texture; no rash   Psych: Appropriate affect, cooperative
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
## 2024-12-16 NOTE — ED ADULT NURSE REASSESSMENT NOTE - NS ED NURSE REASSESS COMMENT FT1
Pt resting on stretcher. Safety maintained. Updates given. Pt in NAD, asleep in stretcher. Condom cath draining well. Pending MD dispo. Will continue to monitor.

## 2024-12-16 NOTE — ED CLERICAL - DIVISION
Session Type:  Group Therapy ([id number])     Others Present: Group members    Group Start: 1400    Group End: 1500     INTERVENTION: Skill building: mindfulness     DATA: Client was present for group session. Client was attentive, engaged, and discussed use of mindfulness skills over the past week.     ASSESSMENT: The patient completed the session of mindfulness group. Group rules were discussed and agreed upon including being open to mindfulness concepts and activities. Reviewed core mindfulness skills (describe, observe, participate) and discussed how they were used in mindfulness activity. Discussed how starting mind fullness practice can be very effortful, then become more effort lists, and eventually spontaneous.  Reviewed barriers to completing mindfulness such as low energy, feeling anxious, and having high standards for performance of mindfulness activity.    Activity:  Completed progressive muscle relaxation mindfulness activity and discussed reflections of experience afterwards. Discussed plan to implement mindful activity throughout the week and will discuss next session.     RESPONSE: Client appeared open and receptive to mindfulness activity and discussion. CT discussed feeling relaxed during the mind fullness activity.  Client reported minimal distractions during the activity.  Client provided limited verbal participation during the group but did discuss when called upon.  Client appeared tired at the conclusion of the activity as evidence by heavy eyes, slower speech, and slower motor movements.     PLAN: Continue group      Need for Community Resources Assessed: Yes     Resources Needed: No     If Yes, what resources:       Treatment Plan: See treatment plan     Discharge Plan: Complete 12 sessions of mindfulness group      Primary Diagnosis:   PTSD (post-traumatic stress disorder)  (primary encounter diagnosis)    Next Appointment: 1 week   [facility]...
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
## 2024-12-16 NOTE — H&P ADULT - PROBLEM/PLAN-1
"-- Message is from the [facility]--    Referral Request  Name of Specialist: Dr. Carmen De La Paz  Provider's specialty: Other: n/a  Reason for referral: Patient requesting referral due to needing Cortizone injection on left shoulder. Patient has appt on 6/12    Is this a NEW request?: yes      Referral ordered by: n/a      Insurance type: HMO      Payor: [address] [place] [address] / Plan: [address] [place] [address],  [address] / Product Type: SHARAD Risk      Preferred Delivery Method   Call when ready for pickup - phone number to notify: [phone]    Caller Information       Type Contact Phone    05/14/2019 04:07 PM Phone (Incoming) Vic Kumar (Self) [phone] (H)          Alternative phone number: n/a    Turnaround time given to caller: ""This message will be sent to [facility] Provider's full name]. The clinical team will return your call as soon as they review your message. Typically, it takes 3 business days to process referral requests. \""  " DISPLAY PLAN FREE TEXT
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
## 2024-12-16 NOTE — H&P ADULT - NSICDXPASTSURGICALHX_GEN_ALL_CORE_FT
PAST SURGICAL HISTORY:  Calculus of kidney bilateral nephrostomy tubes placed 1/2019 @[facility]    H/O right nephrectomy 3/2019    Open wound of neck, sequela     Pacemaker Micra 4/17/19
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
## 2024-12-16 NOTE — ED PROVIDER NOTE - NS_BEDUNITTYPES_ED_ALL_ED
80 Ventricular Rate BPM  80 Atrial Rate BPM  180 P-R Interval ms  106 QRS Duration ms  400 Q-T Interval ms  461 QTC Calculation(Bazett) ms  71 P Axis degrees  -1 R Axis degrees  21 T Axis degrees  Normal sinus rhythm  T wave abnormality, consider lateral ischemia  Prolonged QT  Abnormal ECG  No previous ECGs available  Confirmed by NADINE AGUIAR IMRAN ([id number]) on 9/4/2019 4:43:15 PM   MED/SURG

## 2024-12-16 NOTE — H&P ADULT - NSHPADDITIONALINFOADULT_GEN_ALL_CORE
Patient unsure of home medication list and states he does not take medications in the chart (antihypertensives & blood thinner). Please call home pharmacy in AM to verify home medication list.

## 2024-12-17 LAB
ALBUMIN SERPL ELPH-MCNC: 2.9 G/DL — LOW (ref 3.3–5)
ALP SERPL-CCNC: 235 U/L — HIGH (ref 40–120)
ALT FLD-CCNC: 14 U/L — SIGNIFICANT CHANGE UP (ref 12–78)
ANION GAP SERPL CALC-SCNC: 0 MMOL/L — LOW (ref 5–17)
AST SERPL-CCNC: 15 U/L — SIGNIFICANT CHANGE UP (ref 15–37)
BASOPHILS # BLD AUTO: 0.04 K/UL — SIGNIFICANT CHANGE UP (ref 0–0.2)
BASOPHILS NFR BLD AUTO: 0.7 % — SIGNIFICANT CHANGE UP (ref 0–2)
BILIRUB SERPL-MCNC: 0.3 MG/DL — SIGNIFICANT CHANGE UP (ref 0.2–1.2)
BLANDM BLD POS QL PROBE: SIGNIFICANT CHANGE UP
BUN SERPL-MCNC: 15 MG/DL — SIGNIFICANT CHANGE UP (ref 7–23)
CALCIUM SERPL-MCNC: 8.6 MG/DL — SIGNIFICANT CHANGE UP (ref 8.5–10.1)
CHLORIDE SERPL-SCNC: 112 MMOL/L — HIGH (ref 96–108)
CO2 SERPL-SCNC: 25 MMOL/L — SIGNIFICANT CHANGE UP (ref 22–31)
CREAT SERPL-MCNC: 0.48 MG/DL — LOW (ref 0.5–1.3)
CULTURE RESULTS: SIGNIFICANT CHANGE UP
EGFR: 127 ML/MIN/1.73M2 — SIGNIFICANT CHANGE UP
EOSINOPHIL # BLD AUTO: 0.33 K/UL — SIGNIFICANT CHANGE UP (ref 0–0.5)
EOSINOPHIL NFR BLD AUTO: 5.4 % — SIGNIFICANT CHANGE UP (ref 0–6)
GLUCOSE SERPL-MCNC: 76 MG/DL — SIGNIFICANT CHANGE UP (ref 70–99)
HCT VFR BLD CALC: 35.9 % — LOW (ref 39–50)
HGB BLD-MCNC: 11.8 G/DL — LOW (ref 13–17)
IMM GRANULOCYTES NFR BLD AUTO: 0.2 % — SIGNIFICANT CHANGE UP (ref 0–0.9)
LYMPHOCYTES # BLD AUTO: 2.11 K/UL — SIGNIFICANT CHANGE UP (ref 1–3.3)
LYMPHOCYTES # BLD AUTO: 34.5 % — SIGNIFICANT CHANGE UP (ref 13–44)
MCHC RBC-ENTMCNC: 28.6 PG — SIGNIFICANT CHANGE UP (ref 27–34)
MCHC RBC-ENTMCNC: 32.9 G/DL — SIGNIFICANT CHANGE UP (ref 32–36)
MCV RBC AUTO: 86.9 FL — SIGNIFICANT CHANGE UP (ref 80–100)
METHOD TYPE: SIGNIFICANT CHANGE UP
MONOCYTES # BLD AUTO: 0.45 K/UL — SIGNIFICANT CHANGE UP (ref 0–0.9)
MONOCYTES NFR BLD AUTO: 7.4 % — SIGNIFICANT CHANGE UP (ref 2–14)
NEUTROPHILS # BLD AUTO: 3.18 K/UL — SIGNIFICANT CHANGE UP (ref 1.8–7.4)
NEUTROPHILS NFR BLD AUTO: 51.8 % — SIGNIFICANT CHANGE UP (ref 43–77)
NRBC # BLD: 0 /100 WBCS — SIGNIFICANT CHANGE UP (ref 0–0)
ORGANISM # SPEC MICROSCOPIC CNT: ABNORMAL
ORGANISM # SPEC MICROSCOPIC CNT: ABNORMAL
PLATELET # BLD AUTO: 281 K/UL — SIGNIFICANT CHANGE UP (ref 150–400)
POTASSIUM SERPL-MCNC: 3.5 MMOL/L — SIGNIFICANT CHANGE UP (ref 3.5–5.3)
POTASSIUM SERPL-SCNC: 3.5 MMOL/L — SIGNIFICANT CHANGE UP (ref 3.5–5.3)
PROT SERPL-MCNC: 6.8 GM/DL — SIGNIFICANT CHANGE UP (ref 6–8.3)
RBC # BLD: 4.13 M/UL — LOW (ref 4.2–5.8)
RBC # FLD: 17.2 % — HIGH (ref 10.3–14.5)
SODIUM SERPL-SCNC: 137 MMOL/L — SIGNIFICANT CHANGE UP (ref 135–145)
SPECIMEN SOURCE: SIGNIFICANT CHANGE UP
WBC # BLD: 6.12 K/UL — SIGNIFICANT CHANGE UP (ref 3.8–10.5)
WBC # FLD AUTO: 6.12 K/UL — SIGNIFICANT CHANGE UP (ref 3.8–10.5)

## 2024-12-17 PROCEDURE — 99238 HOSP IP/OBS DSCHRG MGMT 30/<: CPT

## 2024-12-17 PROCEDURE — 99232 SBSQ HOSP IP/OBS MODERATE 35: CPT

## 2024-12-17 PROCEDURE — 99223 1ST HOSP IP/OBS HIGH 75: CPT

## 2024-12-17 RX ORDER — INFLUENZA A VIRUS A/WISCONSIN/588/2019 (H1N1) RECOMBINANT HEMAGGLUTININ ANTIGEN, INFLUENZA A VIRUS A/DARWIN/6/2021 (H3N2) RECOMBINANT HEMAGGLUTININ ANTIGEN, INFLUENZA B VIRUS B/AUSTRIA/1359417/2021 RECOMBINANT HEMAGGLUTININ ANTIGEN, AND INFLUENZA B VIRUS B/PHUKET/3073/2013 RECOMBINANT HEMAGGLUTININ ANTIGEN 45; 45; 45; 45 UG/.5ML; UG/.5ML; UG/.5ML; UG/.5ML
0.5 INJECTION INTRAMUSCULAR ONCE
Refills: 0 | Status: DISCONTINUED | OUTPATIENT
Start: 2024-12-17 | End: 2024-12-30

## 2024-12-17 RX ORDER — KETOROLAC TROMETHAMINE 30 MG/ML
15 INJECTION INTRAMUSCULAR; INTRAVENOUS ONCE
Refills: 0 | Status: DISCONTINUED | OUTPATIENT
Start: 2024-12-17 | End: 2024-12-17

## 2024-12-17 RX ADMIN — MEROPENEM 100 MILLIGRAM(S): 1 INJECTION, POWDER, FOR SOLUTION INTRAVENOUS at 08:54

## 2024-12-17 RX ADMIN — HEPARIN SODIUM 5000 UNIT(S): 1000 INJECTION, SOLUTION INTRAVENOUS; SUBCUTANEOUS at 05:30

## 2024-12-17 RX ADMIN — KETOROLAC TROMETHAMINE 15 MILLIGRAM(S): 30 INJECTION INTRAMUSCULAR; INTRAVENOUS at 11:51

## 2024-12-17 RX ADMIN — HEPARIN SODIUM 5000 UNIT(S): 1000 INJECTION, SOLUTION INTRAVENOUS; SUBCUTANEOUS at 17:19

## 2024-12-17 RX ADMIN — ACETAMINOPHEN 650 MILLIGRAM(S): 80 SOLUTION/ DROPS ORAL at 07:26

## 2024-12-17 RX ADMIN — SODIUM CHLORIDE 75 MILLILITER(S): 9 INJECTION, SOLUTION INTRAMUSCULAR; INTRAVENOUS; SUBCUTANEOUS at 06:29

## 2024-12-17 RX ADMIN — HYDRALAZINE HYDROCHLORIDE 10 MILLIGRAM(S): 10 TABLET ORAL at 00:00

## 2024-12-17 RX ADMIN — Medication 250 MILLIGRAM(S): at 15:21

## 2024-12-17 RX ADMIN — MEROPENEM 100 MILLIGRAM(S): 1 INJECTION, POWDER, FOR SOLUTION INTRAVENOUS at 17:19

## 2024-12-17 RX ADMIN — SENNOSIDES 2 TABLET(S): 8.6 TABLET, FILM COATED ORAL at 22:14

## 2024-12-17 RX ADMIN — ACETAMINOPHEN 650 MILLIGRAM(S): 80 SOLUTION/ DROPS ORAL at 14:23

## 2024-12-17 RX ADMIN — TAMSULOSIN HYDROCHLORIDE 0.4 MILLIGRAM(S): 0.4 CAPSULE ORAL at 22:14

## 2024-12-17 RX ADMIN — ACETAMINOPHEN 650 MILLIGRAM(S): 80 SOLUTION/ DROPS ORAL at 06:26

## 2024-12-17 RX ADMIN — KETOROLAC TROMETHAMINE 15 MILLIGRAM(S): 30 INJECTION INTRAMUSCULAR; INTRAVENOUS at 10:51

## 2024-12-17 RX ADMIN — ACETAMINOPHEN 650 MILLIGRAM(S): 80 SOLUTION/ DROPS ORAL at 17:23

## 2024-12-17 RX ADMIN — MEROPENEM 100 MILLIGRAM(S): 1 INJECTION, POWDER, FOR SOLUTION INTRAVENOUS at 00:01
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
## 2024-12-17 NOTE — PATIENT PROFILE ADULT - NSPROGENOTHERPROVIDER_GEN_A_NUR

## 2024-12-17 NOTE — CONSULT NOTE ADULT - NS ATTEND AMEND GEN_ALL_CORE FT
Agree with above.  Pt should have drainage w PCN and lindsey.  Refusing lindsey.  Explained findings and that it will be hard to get rid of infection with stone present and poor drainage.  Not agreeable at this time.

## 2024-12-17 NOTE — PATIENT PROFILE ADULT - FALL HARM RISK - HARM RISK INTERVENTIONS
Assistance with ambulation/Assistance OOB with selected safe patient handling equipment/Communicate Risk of Fall with Harm to all staff/Discuss with provider need for PT consult/Monitor gait and stability/Reinforce activity limits and safety measures with patient and family/Tailored Fall Risk Interventions/Visual Cue: Yellow wristband and red socks/Bed in lowest position, wheels locked, appropriate side rails in place/Call bell, personal items and telephone in reach/Instruct patient to call for assistance before getting out of bed or chair/Non-slip footwear when patient is out of bed/[place] to call system/Physically safe environment - no spills, clutter or unnecessary equipment/Purposeful Proactive Rounding/Room/bathroom lighting operational, light cord in reach

## 2024-12-17 NOTE — PATIENT PROFILE ADULT - FUNCTIONAL ASSESSMENT - BASIC MOBILITY SCORE.
From: Samantha Feldman  To: Saman Chacko MD  Sent: 4/12/2017 8:23 AM CDT  Subject: Non-Urgent Medical Question    Good morning. I started taking 240mg of Tecfidera on Friday and have been nauseous and throwing up since Monday.  Is there anything at all that 6

## 2024-12-17 NOTE — PATIENT PROFILE ADULT - NSPROHMSYMPCOND_GEN_A_NUR
The hemoglobin remains on the low side.  Slightly worse than 2 months ago.  We will see what the EGD result with Dr. Garcia shows.
musculoskeletal

## 2024-12-17 NOTE — PROGRESS NOTE ADULT - SUBJECTIVE AND OBJECTIVE BOX
date of service: 12-17-24 @ 11:25  [facility]  REVIEW OF SYSTEMS:  CONSTITUTIONAL: No fever,  no  weight loss  ENT:  No  tinnitus,   no   vertigo  NECK: No pain or stiffness  RESPIRATORY: No cough, wheezing, chills or hemoptysis;    No Shortness of Breath  CARDIOVASCULAR: No chest pain, palpitations, dizziness  GASTROINTESTINAL: No abdominal or epigastric pain. No nausea, vomiting, or hematemesis; No diarrhea  No melena or hematochezia.  GENITOURINARY: No dysuria, frequency, hematuria, or incontinence  NEUROLOGICAL: No headaches  SKIN: No itching,  no   rash  LYMPH Nodes: No enlarged glands  ENDOCRINE: No heat or cold intolerance  MUSCULOSKELETAL: No joint pain or swelling  PSYCHIATRIC: No depression, anxiety  HEME/LYMPH: No easy bruising, or bleeding gums  ALLERGY AND IMMUNOLOGIC: No hives or eczema	    MEDICATIONS  (STANDING):  amLODIPine   Tablet 10 milliGRAM(s) Oral daily  amoxicillin 250 milliGRAM(s) Oral once  bisacodyl 5 milliGRAM(s) Oral every 12 hours  heparin   Injectable 5000 Unit(s) SubCutaneous every 12 hours  influenza   Vaccine 0.5 milliLiter(s) IntraMuscular once  meropenem  IVPB 1000 milliGRAM(s) IV Intermittent every 8 hours  polyethylene glycol 3350 17 Gram(s) Oral once  senna 2 Tablet(s) Oral at bedtime  sodium chloride 0.9%. 1000 milliLiter(s) (75 mL/Hr) IV Continuous <Continuous>  tamsulosin 0.4 milliGRAM(s) Oral at bedtime    MEDICATIONS  (PRN):  acetaminophen     Tablet .. 650 milliGRAM(s) Oral every 6 hours PRN Temp greater or equal to 38C (100.4F), Mild Pain (1 - 3)  diphenhydrAMINE Injectable 25 milliGRAM(s) IV Push once PRN Allergy symptoms  melatonin 3 milliGRAM(s) Oral at bedtime PRN Insomnia      Vital Signs Last 24 Hrs  T(C): 36.9 (17 Dec 2024 10:47), Max: 36.9 (17 Dec 2024 10:47)  T(F): 98.4 (17 Dec 2024 10:47), Max: 98.4 (17 Dec 2024 10:47)  HR: 79 (17 Dec 2024 10:47) (63 - 98)  BP: 142/92 (17 Dec 2024 10:47) (128/84 - 182/118)  BP(mean): --  RR: 18 (17 Dec 2024 10:47) (16 - 19)  SpO2: 98% (17 Dec 2024 10:47) (98% - 100%)    Parameters below as of 17 Dec 2024 10:47  Patient On (Oxygen Delivery Method): room air      CAPILLARY BLOOD GLUCOSE        I&O's Summary    16 Dec 2024 07:01  -  17 Dec 2024 07:00  --------------------------------------------------------  IN: 0 mL / OUT: 1450 mL / NET: -1450 mL          Appearance: Normal	  HEENT:   Normal oral mucosa, PERRL, EOMI	  Lymphatic: No lymphadenopathy  Cardiovascular: Normal S1 S2, No JVD  Respiratory: Lungs clear to auscultation	  Gastrointestinal:  Soft, Non-tender, + BS	  Skin: No rash, No ecchymoses	  Extremities:     LABS:                        11.8   6.12  )-----------( 281      ( 17 Dec 2024 07:20 )             35.9     12-17    137  |  112[H]  |  15  ----------------------------<  76  3.5   |  25  |  0.48[L]    Ca    8.6      17 Dec 2024 07:20    TPro  6.8  /  Alb  2.9[L]  /  TBili  0.3  /  DBili  x   /  AST  15  /  ALT  14  /  AlkPhos  235[H]  12-17          Urinalysis Basic - ( 17 Dec 2024 07:20 )    Color: x / Appearance: x / SG: x / pH: x  Gluc: 76 mg/dL / Ketone: x  / Bili: x / Urobili: x   Blood: x / Protein: x / Nitrite: x   Leuk Esterase: x / RBC: x / WBC x   Sq Epi: x / Non Sq Epi: x / Bacteria: x                      Consultant(s) Notes Reviewed:      Care Discussed with Consultants/Other Providers:
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
## 2024-12-18 PROCEDURE — 99232 SBSQ HOSP IP/OBS MODERATE 35: CPT

## 2024-12-18 RX ORDER — MEROPENEM 1 G/20ML
1000 INJECTION, POWDER, FOR SOLUTION INTRAVENOUS EVERY 8 HOURS
Refills: 0 | Status: COMPLETED | OUTPATIENT
Start: 2024-12-18 | End: 2024-12-30

## 2024-12-18 RX ORDER — MEROPENEM 1 G/20ML
1000 INJECTION, POWDER, FOR SOLUTION INTRAVENOUS EVERY 8 HOURS
Refills: 0 | Status: DISCONTINUED | OUTPATIENT
Start: 2024-12-18 | End: 2024-12-18

## 2024-12-18 RX ADMIN — ACETAMINOPHEN 650 MILLIGRAM(S): 80 SOLUTION/ DROPS ORAL at 17:41

## 2024-12-18 RX ADMIN — ACETAMINOPHEN 650 MILLIGRAM(S): 80 SOLUTION/ DROPS ORAL at 00:45

## 2024-12-18 RX ADMIN — MEROPENEM 100 MILLIGRAM(S): 1 INJECTION, POWDER, FOR SOLUTION INTRAVENOUS at 00:00

## 2024-12-18 RX ADMIN — Medication 5 MILLIGRAM(S): at 17:34

## 2024-12-18 RX ADMIN — HEPARIN SODIUM 5000 UNIT(S): 1000 INJECTION, SOLUTION INTRAVENOUS; SUBCUTANEOUS at 17:34

## 2024-12-18 RX ADMIN — ACETAMINOPHEN 650 MILLIGRAM(S): 80 SOLUTION/ DROPS ORAL at 01:45

## 2024-12-18 RX ADMIN — HEPARIN SODIUM 5000 UNIT(S): 1000 INJECTION, SOLUTION INTRAVENOUS; SUBCUTANEOUS at 06:11

## 2024-12-18 RX ADMIN — MEROPENEM 100 MILLIGRAM(S): 1 INJECTION, POWDER, FOR SOLUTION INTRAVENOUS at 08:29

## 2024-12-18 RX ADMIN — MEROPENEM 100 MILLIGRAM(S): 1 INJECTION, POWDER, FOR SOLUTION INTRAVENOUS at 17:33

## 2024-12-18 RX ADMIN — TAMSULOSIN HYDROCHLORIDE 0.4 MILLIGRAM(S): 0.4 CAPSULE ORAL at 22:28

## 2024-12-18 RX ADMIN — SENNOSIDES 2 TABLET(S): 8.6 TABLET, FILM COATED ORAL at 22:27

## 2024-12-18 RX ADMIN — ACETAMINOPHEN 650 MILLIGRAM(S): 80 SOLUTION/ DROPS ORAL at 18:15

## 2024-12-18 RX ADMIN — Medication 5 MILLIGRAM(S): at 06:10

## 2024-12-18 RX ADMIN — MEROPENEM 100 MILLIGRAM(S): 1 INJECTION, POWDER, FOR SOLUTION INTRAVENOUS at 22:28

## 2024-12-18 NOTE — CHART NOTE - NSCHARTNOTEFT_GEN_A_CORE
Discussed with nurse on 1-B, pt currently refusing lindsey. Has acceptable urine output, PVR acceptable, monitor PVR every shift.

## 2024-12-18 NOTE — PROGRESS NOTE ADULT - SUBJECTIVE AND OBJECTIVE BOX
Patient seen and examined bedside with Dr. Alexander resting comfortably. Patient is adamantly refusing lindsey catheter regardless of risks and benefits explained to him.   Denies N/V, chest pain, dyspnea, cough.    T(F): 97.9 (12-18-24 @ 17:00), Max: 97.9 (12-18-24 @ 05:09)  HR: 61 (12-18-24 @ 17:00) (61 - 70)  BP: 145/86 (12-18-24 @ 17:00) (132/86 - 145/86)  RR: 18 (12-18-24 @ 17:00) (18 - 18)  SpO2: 99% (12-18-24 @ 17:00) (99% - 100%)  Wt(kg): --  CAPILLARY BLOOD GLUCOSE          PHYSICAL EXAM:  General: NAD, alert and awake  HEENT: NCAT, EOMI, conjunctiva clear  Chest: Nonlabored respirations, good inspiratory effort  Abdomen: Soft, distended  Extremities: No pedal edema or calf tenderness noted   : Left CVA TTP; condom catheter in place, attempted to straight catheterize with distal     LABS:                        11.8   6.12  )-----------( 281      ( 17 Dec 2024 07:20 )             35.9   12-17    137  |  112[H]  |  15  ----------------------------<  76  3.5   |  25  |  0.48[L]    Ca    8.6      17 Dec 2024 07:20    TPro  6.8  /  Alb  2.9[L]  /  TBili  0.3  /  DBili  x   /  AST  15  /  ALT  14  /  AlkPhos  235[H]  12-17    I&O's Detail    17 Dec 2024 07:01  -  18 Dec 2024 07:00  --------------------------------------------------------  IN:  Total IN: 0 mL    OUT:    Voided (mL): 2500 mL  Total OUT: 2500 mL    Total NET: -2500 mL          RADIOLOGY & ADDITIONAL STUDIES:       Patient seen and examined bedside with Dr. Alexander resting comfortably. Patient is adamantly refusing lindsey catheter regardless of risks and benefits explained to him. Patient refusing all care despite risks and benefits explained.   Denies N/V, chest pain, dyspnea, cough.    T(F): 97.9 (12-18-24 @ 17:00), Max: 97.9 (12-18-24 @ 05:09)  HR: 61 (12-18-24 @ 17:00) (61 - 70)  BP: 145/86 (12-18-24 @ 17:00) (132/86 - 145/86)  RR: 18 (12-18-24 @ 17:00) (18 - 18)  SpO2: 99% (12-18-24 @ 17:00) (99% - 100%)  Wt(kg): --  CAPILLARY BLOOD GLUCOSE          PHYSICAL EXAM:  General: NAD, alert and awake  HEENT: NCAT, EOMI, conjunctiva clear  Chest: Nonlabored respirations, good inspiratory effort  Abdomen: Soft, distended  Extremities: No pedal edema or calf tenderness noted   : Left CVA TTP; condom catheter in place, attempted to straight catheterize with distal resistance condom catheter replaced over uncircumcised phallus with hypospadias      LABS:                        11.8   6.12  )-----------( 281      ( 17 Dec 2024 07:20 )             35.9   12-17    137  |  112[H]  |  15  ----------------------------<  76  3.5   |  25  |  0.48[L]    Ca    8.6      17 Dec 2024 07:20    TPro  6.8  /  Alb  2.9[L]  /  TBili  0.3  /  DBili  x   /  AST  15  /  ALT  14  /  AlkPhos  235[H]  12-17    I&O's Detail    17 Dec 2024 07:01  -  18 Dec 2024 07:00  --------------------------------------------------------  IN:  Total IN: 0 mL    OUT:    Voided (mL): 2500 mL  Total OUT: 2500 mL    Total NET: -2500 mL

## 2024-12-18 NOTE — PROGRESS NOTE ADULT - SUBJECTIVE AND OBJECTIVE BOX
date of service: 12-18-24 @ 11:09  [facility]  REVIEW OF SYSTEMS:  CONSTITUTIONAL: No fever,  no  weight loss  ENT:  No  tinnitus,   no   vertigo  NECK: No pain or stiffness  RESPIRATORY: No cough, wheezing, chills or hemoptysis;    No Shortness of Breath  CARDIOVASCULAR: No chest pain, palpitations, dizziness  GASTROINTESTINAL: No abdominal or epigastric pain. No nausea, vomiting, or hematemesis; No diarrhea  No melena or hematochezia.  GENITOURINARY: No dysuria, frequency, hematuria, or incontinence  NEUROLOGICAL: No headaches  SKIN: No itching,  no   rash  LYMPH Nodes: No enlarged glands  ENDOCRINE: No heat or cold intolerance  MUSCULOSKELETAL: No joint pain or swelling  PSYCHIATRIC: No depression, anxiety  HEME/LYMPH: No easy bruising, or bleeding gums  ALLERGY AND IMMUNOLOGIC: No hives or eczema	    MEDICATIONS  (STANDING):  amLODIPine   Tablet 10 milliGRAM(s) Oral daily  bisacodyl 5 milliGRAM(s) Oral every 12 hours  heparin   Injectable 5000 Unit(s) SubCutaneous every 12 hours  influenza   Vaccine 0.5 milliLiter(s) IntraMuscular once  meropenem  IVPB 1000 milliGRAM(s) IV Intermittent every 8 hours  polyethylene glycol 3350 17 Gram(s) Oral once  senna 2 Tablet(s) Oral at bedtime  tamsulosin 0.4 milliGRAM(s) Oral at bedtime    MEDICATIONS  (PRN):  acetaminophen     Tablet .. 650 milliGRAM(s) Oral every 6 hours PRN Temp greater or equal to 38C (100.4F), Mild Pain (1 - 3)  diphenhydrAMINE Injectable 25 milliGRAM(s) IV Push once PRN Allergy symptoms  melatonin 3 milliGRAM(s) Oral at bedtime PRN Insomnia      Vital Signs Last 24 Hrs  T(C): 36.6 (18 Dec 2024 05:09), Max: 36.9 (17 Dec 2024 15:50)  T(F): 97.9 (18 Dec 2024 05:09), Max: 98.4 (17 Dec 2024 15:50)  HR: 65 (18 Dec 2024 05:09) (65 - 83)  BP: 132/86 (18 Dec 2024 05:09) (132/86 - 144/94)  BP(mean): --  RR: 18 (18 Dec 2024 05:09) (17 - 18)  SpO2: 100% (18 Dec 2024 05:09) (98% - 100%)    Parameters below as of 18 Dec 2024 05:09  Patient On (Oxygen Delivery Method): room air      CAPILLARY BLOOD GLUCOSE        I&O's Summary    17 Dec 2024 07:01  -  18 Dec 2024 07:00  --------------------------------------------------------  IN: 0 mL / OUT: 2500 mL / NET: -2500 mL          Appearance: Normal	  HEENT:   Normal oral mucosa, PERRL, EOMI	  Lymphatic: No lymphadenopathy  Cardiovascular: Normal S1 S2, No JVD  Respiratory: Lungs clear to auscultation	  Gastrointestinal:  Soft, Non-tender, + BS	  Skin: No rash, No ecchymoses	  Extremities:     LABS:                        11.8   6.12  )-----------( 281      ( 17 Dec 2024 07:20 )             35.9     12-17    137  |  112[H]  |  15  ----------------------------<  76  3.5   |  25  |  0.48[L]    Ca    8.6      17 Dec 2024 07:20    TPro  6.8  /  Alb  2.9[L]  /  TBili  0.3  /  DBili  x   /  AST  15  /  ALT  14  /  AlkPhos  235[H]  12-17          Urinalysis Basic - ( 17 Dec 2024 07:20 )    Color: x / Appearance: x / SG: x / pH: x  Gluc: 76 mg/dL / Ketone: x  / Bili: x / Urobili: x   Blood: x / Protein: x / Nitrite: x   Leuk Esterase: x / RBC: x / WBC x   Sq Epi: x / Non Sq Epi: x / Bacteria: x                      Consultant(s) Notes Reviewed:      Care Discussed with Consultants/Other Providers:
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
## 2024-12-18 NOTE — PROGRESS NOTE ADULT - SUBJECTIVE AND OBJECTIVE BOX
Chart Note    Discussed case w IR, Dr. Wells who saw pt in consult.    We reviewed indications for PCN placement of solitary kidney w hydronephrosis and dilated calyces, w multiple admissions for staghorn calculus w persistent symptomatic ESBL Klebsiella infections.  Dr. Wells has opted to hold off on nephrostomy tube placement until time of PCNL, despite above concerns.

## 2024-12-18 NOTE — CONSULT NOTE ADULT - TIME BILLING
As above, there is no indication for nephrostomy tube in this solitary kidney at this point in time.
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
## 2024-12-19 PROCEDURE — 99232 SBSQ HOSP IP/OBS MODERATE 35: CPT

## 2024-12-19 RX ORDER — KETOROLAC TROMETHAMINE 30 MG/ML
15 INJECTION INTRAMUSCULAR; INTRAVENOUS ONCE
Refills: 0 | Status: DISCONTINUED | OUTPATIENT
Start: 2024-12-19 | End: 2024-12-19

## 2024-12-19 RX ADMIN — SENNOSIDES 2 TABLET(S): 8.6 TABLET, FILM COATED ORAL at 21:36

## 2024-12-19 RX ADMIN — Medication 10 MILLIGRAM(S): at 05:58

## 2024-12-19 RX ADMIN — HEPARIN SODIUM 5000 UNIT(S): 1000 INJECTION, SOLUTION INTRAVENOUS; SUBCUTANEOUS at 18:44

## 2024-12-19 RX ADMIN — ACETAMINOPHEN 650 MILLIGRAM(S): 80 SOLUTION/ DROPS ORAL at 00:45

## 2024-12-19 RX ADMIN — Medication 5 MILLIGRAM(S): at 05:59

## 2024-12-19 RX ADMIN — MEROPENEM 100 MILLIGRAM(S): 1 INJECTION, POWDER, FOR SOLUTION INTRAVENOUS at 06:00

## 2024-12-19 RX ADMIN — TAMSULOSIN HYDROCHLORIDE 0.4 MILLIGRAM(S): 0.4 CAPSULE ORAL at 21:36

## 2024-12-19 RX ADMIN — ACETAMINOPHEN 650 MILLIGRAM(S): 80 SOLUTION/ DROPS ORAL at 01:45

## 2024-12-19 RX ADMIN — KETOROLAC TROMETHAMINE 15 MILLIGRAM(S): 30 INJECTION INTRAMUSCULAR; INTRAVENOUS at 02:27

## 2024-12-19 RX ADMIN — Medication 5 MILLIGRAM(S): at 18:44

## 2024-12-19 RX ADMIN — MEROPENEM 100 MILLIGRAM(S): 1 INJECTION, POWDER, FOR SOLUTION INTRAVENOUS at 14:41

## 2024-12-19 RX ADMIN — HEPARIN SODIUM 5000 UNIT(S): 1000 INJECTION, SOLUTION INTRAVENOUS; SUBCUTANEOUS at 05:59

## 2024-12-19 RX ADMIN — KETOROLAC TROMETHAMINE 15 MILLIGRAM(S): 30 INJECTION INTRAMUSCULAR; INTRAVENOUS at 01:47

## 2024-12-19 RX ADMIN — MEROPENEM 100 MILLIGRAM(S): 1 INJECTION, POWDER, FOR SOLUTION INTRAVENOUS at 21:36
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
## 2024-12-19 NOTE — PROGRESS NOTE ADULT - NS ATTEND AMEND GEN_ALL_CORE FT
Agree with above.  Rounded with PA's Xavi    Explained that with multiple admissions for chills and UTI sx's from persistent ESBL Klebsiella, he needs maximal drainage.  I had suggested and requested PCN from IR but they do not feel necessary at this point, though I am convinced he will be back. Tells me he sees an outside urologist, but doesn't know who and tells me  told him that he should not have surgery as he has a solitary kidney.    I also explained that his bladder is distended on CT scan and despite UO into condom cath, he is not empty. In setting of infection and to "protect" his kidney, as he already has hydro, he should allow passage of lindsey.    He tells me that we are all trying to "mess with his head" and "he heard enough from me".  Does not want any procedures of tubes. Said he will discuss with his urologist when "he gets out of here" as we are all trying to confuse him and telling him contradictory things.    Explained that we simply want to protect his solitary kidney/kidney function and prevent sepsis and/or loss of function from obstruction.    Please re consult if needed.    Had been told at last hospital stay to follow up with Dr. Moya as out pt, but has not.  Urged again to follow up with him or Dr. Charlene BRAUN upon discharge.
I have reviewed all pertinent clinical information and agree with the NP's note.  Any new labs, recent cultures, new imaging (if applicable) and vitals have been reviewed today.  All necessary adjustments to management have been made.  Agree with the above assessment and plan.    Will continue meropenem based on 12/10 urine culture results  New urine culture reported contaminated  Amoxicillin PO challenge was not done yesterday  Message sent to primary Dr. aSl Vera MD  Attending Physician  Division of Infectious Diseases   Available via [employer] Teams
I have reviewed all pertinent clinical information and agree with the NP's note.  Any new labs, recent cultures, new imaging (if applicable) and vitals have been reviewed today.  All necessary adjustments to management have been made.  Agree with the above assessment and plan.    48 y/o man admitted with L staghorn calculus and pyelonephritis  No urological intervention done  On meropenem per culture data  Would continue and plan for a total 14 days course given no source control  Needs midline if being discharged prior to end date 12/29/24    Discussed with primary team    Chanda Vera MD  Attending Physician  Division of Infectious Diseases   Available via [employer] Teams

## 2024-12-19 NOTE — PROGRESS NOTE ADULT - SUBJECTIVE AND OBJECTIVE BOX
date of service: 12-19-24 @ 10:41  [facility]  REVIEW OF SYSTEMS:  CONSTITUTIONAL: No fever,  no  weight loss  ENT:  No  tinnitus,   no   vertigo  NECK: No pain or stiffness  RESPIRATORY: No cough, wheezing, chills or hemoptysis;    No Shortness of Breath  CARDIOVASCULAR: No chest pain, palpitations, dizziness  GASTROINTESTINAL: No abdominal or epigastric pain. No nausea, vomiting, or hematemesis; No diarrhea  No melena or hematochezia.  GENITOURINARY: No dysuria, frequency, hematuria, or incontinence  NEUROLOGICAL: No headaches  SKIN: No itching,  no   rash  LYMPH Nodes: No enlarged glands  ENDOCRINE: No heat or cold intolerance  MUSCULOSKELETAL: No joint pain or swelling  PSYCHIATRIC: No depression, anxiety  HEME/LYMPH: No easy bruising, or bleeding gums  ALLERGY AND IMMUNOLOGIC: No hives or eczema	    MEDICATIONS  (STANDING):  amLODIPine   Tablet 10 milliGRAM(s) Oral daily  bisacodyl 5 milliGRAM(s) Oral every 12 hours  heparin   Injectable 5000 Unit(s) SubCutaneous every 12 hours  influenza   Vaccine 0.5 milliLiter(s) IntraMuscular once  meropenem  IVPB 1000 milliGRAM(s) IV Intermittent every 8 hours  polyethylene glycol 3350 17 Gram(s) Oral once  senna 2 Tablet(s) Oral at bedtime  tamsulosin 0.4 milliGRAM(s) Oral at bedtime    MEDICATIONS  (PRN):  acetaminophen     Tablet .. 650 milliGRAM(s) Oral every 6 hours PRN Temp greater or equal to 38C (100.4F), Mild Pain (1 - 3)  melatonin 3 milliGRAM(s) Oral at bedtime PRN Insomnia      Vital Signs Last 24 Hrs  T(C): 36.8 (19 Dec 2024 06:09), Max: 36.8 (19 Dec 2024 06:09)  T(F): 98.3 (19 Dec 2024 06:09), Max: 98.3 (19 Dec 2024 06:09)  HR: 74 (19 Dec 2024 06:09) (53 - 74)  BP: 119/85 (19 Dec 2024 06:09) (119/85 - 145/86)  BP(mean): --  RR: 18 (19 Dec 2024 06:09) (18 - 18)  SpO2: 98% (19 Dec 2024 06:09) (98% - 100%)    Parameters below as of 19 Dec 2024 06:09  Patient On (Oxygen Delivery Method): room air      CAPILLARY BLOOD GLUCOSE        I&O's Summary    18 Dec 2024 07:01  -  19 Dec 2024 07:00  --------------------------------------------------------  IN: 200 mL / OUT: 2400 mL / NET: -2200 mL    19 Dec 2024 07:01  -  19 Dec 2024 10:41  --------------------------------------------------------  IN: 220 mL / OUT: 0 mL / NET: 220 mL          Appearance: Normal	  HEENT:   Normal oral mucosa, PERRL, EOMI	  Lymphatic: No lymphadenopathy  Cardiovascular: Normal S1 S2, No JVD  Respiratory: Lungs clear to auscultation	  Gastrointestinal:  Soft, Non-tender, + BS	  Skin: No rash, No ecchymoses	  Extremities:     LABS:                                  Consultant(s) Notes Reviewed:      Care Discussed with Consultants/Other Providers:
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
## 2024-12-20 PROCEDURE — 99233 SBSQ HOSP IP/OBS HIGH 50: CPT

## 2024-12-20 RX ORDER — POLYETHYLENE GLYCOL 3350 17 G/DOSE
17 POWDER (GRAM) ORAL DAILY
Refills: 0 | Status: DISCONTINUED | OUTPATIENT
Start: 2024-12-20 | End: 2024-12-30

## 2024-12-20 RX ADMIN — MEROPENEM 100 MILLIGRAM(S): 1 INJECTION, POWDER, FOR SOLUTION INTRAVENOUS at 21:08

## 2024-12-20 RX ADMIN — TAMSULOSIN HYDROCHLORIDE 0.4 MILLIGRAM(S): 0.4 CAPSULE ORAL at 21:08

## 2024-12-20 RX ADMIN — Medication 3 MILLIGRAM(S): at 21:08

## 2024-12-20 RX ADMIN — HEPARIN SODIUM 5000 UNIT(S): 1000 INJECTION, SOLUTION INTRAVENOUS; SUBCUTANEOUS at 06:56

## 2024-12-20 RX ADMIN — MEROPENEM 100 MILLIGRAM(S): 1 INJECTION, POWDER, FOR SOLUTION INTRAVENOUS at 06:56

## 2024-12-20 RX ADMIN — HEPARIN SODIUM 5000 UNIT(S): 1000 INJECTION, SOLUTION INTRAVENOUS; SUBCUTANEOUS at 17:41

## 2024-12-20 RX ADMIN — MEROPENEM 100 MILLIGRAM(S): 1 INJECTION, POWDER, FOR SOLUTION INTRAVENOUS at 13:27

## 2024-12-20 NOTE — PROGRESS NOTE ADULT - SUBJECTIVE AND OBJECTIVE BOX
Patient is a 49y old  Male who presents with a chief complaint of Urinary tract infection (20 Dec 2024 08:59)      SUBJECTIVE / OVERNIGHT EVENTS: Patient seen and examined at bedside. No acute events overnight. No pain and having bowel movements. Aware his treatment is until 12/29. He doesn't want a midline or home antibiotics. States he's in no rush to go home, but he is willing to receive treatment here. He states he will follow up outpatient with his urologist.    MEDICATIONS  (STANDING):  amLODIPine   Tablet 10 milliGRAM(s) Oral daily  bisacodyl 5 milliGRAM(s) Oral every 12 hours  heparin   Injectable 5000 Unit(s) SubCutaneous every 12 hours  influenza   Vaccine 0.5 milliLiter(s) IntraMuscular once  meropenem  IVPB 1000 milliGRAM(s) IV Intermittent every 8 hours  polyethylene glycol 3350 17 Gram(s) Oral once  senna 2 Tablet(s) Oral at bedtime  tamsulosin 0.4 milliGRAM(s) Oral at bedtime    MEDICATIONS  (PRN):  acetaminophen     Tablet .. 650 milliGRAM(s) Oral every 6 hours PRN Temp greater or equal to 38C (100.4F), Mild Pain (1 - 3)  melatonin 3 milliGRAM(s) Oral at bedtime PRN Insomnia      CAPILLARY BLOOD GLUCOSE        I&O's Summary    19 Dec 2024 07:01  -  20 Dec 2024 07:00  --------------------------------------------------------  IN: 220 mL / OUT: 1550 mL / NET: -1330 mL    20 Dec 2024 07:01  -  20 Dec 2024 10:08  --------------------------------------------------------  IN: 360 mL / OUT: 0 mL / NET: 360 mL        PHYSICAL EXAM:  Vital Signs Last 24 Hrs  T(C): 36.3 (20 Dec 2024 05:21), Max: 36.4 (19 Dec 2024 17:05)  T(F): 97.4 (20 Dec 2024 05:21), Max: 97.6 (19 Dec 2024 17:05)  HR: 59 (20 Dec 2024 05:21) (52 - 59)  BP: 124/77 (20 Dec 2024 05:21) (124/77 - 132/90)  BP(mean): --  RR: 18 (20 Dec 2024 05:21) (18 - 18)  SpO2: 100% (20 Dec 2024 05:21) (100% - 100%)    Parameters below as of 20 Dec 2024 05:21  Patient On (Oxygen Delivery Method): room air        GEN: male in NAD, appears comfortable, no diaphoresis  EYES: No scleral injection, EOMI  ENTM: neck supple & symmetric without tracheal deviation, moist membranes, no gross hearing impairment, thyroid gland not enlarged  CV: +S1/S2, no m/r/g, no abdominal bruit, no LE edema  RESP: breathing comfortably, no respiratory accessory muscle use, CTAB, no w/r/r  GI: normoactive BS, soft, NTND, no rebounding/guarding, no palpable masses    LABS:                      RADIOLOGY & ADDITIONAL TESTS:  Results Reviewed:   Imaging Personally Reviewed:  Electrocardiogram Personally Reviewed:    COORDINATION OF CARE:  Care Discussed with Consultants/Other Providers [Y/N]:  Prior or Outpatient Records Reviewed [Y/N]:

## 2024-12-20 NOTE — PROGRESS NOTE ADULT - ASSESSMENT
49M with PMHx of quadriplegia from [facility] 20 years prior, right nephroectomy (solitary left kidney) with known large left staghorn calculus with hydronephrosis being admitted for possibly infected stone and/or pyelonephritis. UCx with ESBL klebsiella and proteus with no good oral option (not carbapenem resistant).    #Left Sided Pyelonephritis or infected nephrolithiasis  - Meropenem until 12/29, patient refusing home antibiotics or midline  - ID following  - Urology following --> patient refusing Olvera cath to maximize drainage  - IR consulted for possible left PCN given hydronephrosis, they state not indicated given stable kidney function and patient appears to be draining  - PVR remains elevated, but mostly acceptable, but he's refusing Olvera   - Continue with Flomax  - Encouraged close outpatient follow up (Charlene or his own urologist)    #Constipation - slow transit  - Continue with Senna & Miralax    #Prior Right Humeral Fracture  - ACE wrap    Misc: patient received amoxicillin as PCN challenge and tolerated. It was removed as an allergy

## 2024-12-21 PROCEDURE — 99232 SBSQ HOSP IP/OBS MODERATE 35: CPT

## 2024-12-21 RX ORDER — MAG HYDROX/ALUMINUM HYD/SIMETH 200-200-20
30 SUSPENSION, ORAL (FINAL DOSE FORM) ORAL ONCE
Refills: 0 | Status: COMPLETED | OUTPATIENT
Start: 2024-12-21 | End: 2024-12-21

## 2024-12-21 RX ORDER — KETOROLAC TROMETHAMINE 30 MG/ML
15 INJECTION INTRAMUSCULAR; INTRAVENOUS ONCE
Refills: 0 | Status: DISCONTINUED | OUTPATIENT
Start: 2024-12-21 | End: 2024-12-21

## 2024-12-21 RX ADMIN — HEPARIN SODIUM 5000 UNIT(S): 1000 INJECTION, SOLUTION INTRAVENOUS; SUBCUTANEOUS at 18:06

## 2024-12-21 RX ADMIN — MEROPENEM 100 MILLIGRAM(S): 1 INJECTION, POWDER, FOR SOLUTION INTRAVENOUS at 21:46

## 2024-12-21 RX ADMIN — TAMSULOSIN HYDROCHLORIDE 0.4 MILLIGRAM(S): 0.4 CAPSULE ORAL at 21:06

## 2024-12-21 RX ADMIN — SENNOSIDES 2 TABLET(S): 8.6 TABLET, FILM COATED ORAL at 21:06

## 2024-12-21 RX ADMIN — KETOROLAC TROMETHAMINE 15 MILLIGRAM(S): 30 INJECTION INTRAMUSCULAR; INTRAVENOUS at 21:03

## 2024-12-21 RX ADMIN — Medication 30 MILLILITER(S): at 13:53

## 2024-12-21 RX ADMIN — MEROPENEM 100 MILLIGRAM(S): 1 INJECTION, POWDER, FOR SOLUTION INTRAVENOUS at 05:33

## 2024-12-21 RX ADMIN — Medication 5 MILLIGRAM(S): at 05:32

## 2024-12-21 RX ADMIN — HEPARIN SODIUM 5000 UNIT(S): 1000 INJECTION, SOLUTION INTRAVENOUS; SUBCUTANEOUS at 05:33

## 2024-12-21 RX ADMIN — MEROPENEM 100 MILLIGRAM(S): 1 INJECTION, POWDER, FOR SOLUTION INTRAVENOUS at 13:25

## 2024-12-21 RX ADMIN — KETOROLAC TROMETHAMINE 15 MILLIGRAM(S): 30 INJECTION INTRAMUSCULAR; INTRAVENOUS at 21:33

## 2024-12-21 RX ADMIN — Medication 5 MILLIGRAM(S): at 18:05

## 2024-12-21 NOTE — PROGRESS NOTE ADULT - SUBJECTIVE AND OBJECTIVE BOX
Patient is a 49y old  Male who presents with a chief complaint of Urinary tract infection (20 Dec 2024 08:59)      SUBJECTIVE / OVERNIGHT EVENTS:  Patient seen and examined bedside.   no overnight events     REVIEW OF SYSTEMS: remaining ROS negative     MEDICATIONS  (STANDING):  amLODIPine   Tablet 10 milliGRAM(s) Oral daily  bisacodyl 5 milliGRAM(s) Oral every 12 hours  heparin   Injectable 5000 Unit(s) SubCutaneous every 12 hours  influenza   Vaccine 0.5 milliLiter(s) IntraMuscular once  meropenem  IVPB 1000 milliGRAM(s) IV Intermittent every 8 hours  polyethylene glycol 3350 17 Gram(s) Oral once  senna 2 Tablet(s) Oral at bedtime  tamsulosin 0.4 milliGRAM(s) Oral at bedtime    MEDICATIONS  (PRN):  acetaminophen     Tablet .. 650 milliGRAM(s) Oral every 6 hours PRN Temp greater or equal to 38C (100.4F), Mild Pain (1 - 3)  melatonin 3 milliGRAM(s) Oral at bedtime PRN Insomnia      CAPILLARY BLOOD GLUCOSE    Vital Signs Last 24 Hrs  T(C): 36.7 (21 Dec 2024 10:46), Max: 36.7 (21 Dec 2024 10:46)  T(F): 98 (21 Dec 2024 10:46), Max: 98 (21 Dec 2024 10:46)  HR: 63 (21 Dec 2024 10:46) (56 - 63)  BP: 118/81 (21 Dec 2024 10:46) (110/81 - 120/81)  BP(mean): --  RR: 18 (21 Dec 2024 10:46) (18 - 18)  SpO2: 100% (21 Dec 2024 10:46) (100% - 100%)    Parameters below as of 21 Dec 2024 10:46  Patient On (Oxygen Delivery Method): room air          GENERAL: NAD , no increased WOB  HEAD:  Atraumatic, Normocephalic  EYES: EOMI, PERRLA, conjunctiva and sclera clear  ENMT: No tonsillar erythema, exudates, or enlargement;   NECK: Supple, No JVD  NERVOUS SYSTEM:  Alert & Oriented X3, Good concentration; nonfocal   CHEST/LUNG: CTAB;  No rales, rhonchi, wheezing, or rubs  HEART: Regular rate and rhythm; No murmurs, rubs, or gallops  ABDOMEN: Soft, Nontender, Nondistended; Bowel sounds present  EXTREMITIES:  2+ Peripheral Pulses b/l, No clubbing, cyanosis, calf tenderness or edema b/l       LABS:    no new labs       Consultant(s) Notes Reveiwed [ x] Yes     Care Discussed with [ x] Consultants  [x ] Patient  [ ] Family  [x ] /   [x ] Other; RN  Care plan and all findings were discussed in detail with patient.  All questions and concerns addressed

## 2024-12-21 NOTE — PROGRESS NOTE ADULT - ASSESSMENT
49M with PMHx of quadriplegia from [facility] 20 years prior, right nephrectomy (solitary left kidney) with known large left staghorn calculus with hydronephrosis being admitted for possibly infected stone and/or pyelonephritis. UCx with ESBL klebsiella and proteus with no good oral option (not carbapenem resistant).    #Left Sided Pyelonephritis or infected nephrolithiasis  left sided staghorn calculus   moderate left hydronephrosis   solitary left kidney   - ID following  - Urology following --> patient refusing Olvera cath to maximize drainage  - IR consulted for possible left PCN given hydronephrosis, they state not indicated given stable kidney function and patient appears to be draining  - PVR remains elevated, but mostly acceptable, but he's refusing Olvera   - Continue with Flomax  - patient received amoxicillin as PCN challenge and tolerated. It was removed as an allergy  - Encouraged close outpatient follow up (Charlene or his own urologist)  - Meropenem until 12/29, patient refusing home antibiotics or midline    #Constipation - slow transit  - Continue with Senna & Miralax    #Prior Right Humeral Fracture  - ACE wrap    quadriplegia   old well heeled decubiti   skin intact   supportive care   frequent repositioning to prevent pressure wounds     comminuted distal right humeral fracture from 11/2024   seen by ortho 11/29/24 , was splinted , was told to follow-up with ortho as outpatient without any acute surgical intervention     Preventative Measures   heparin sq-dvt ppx  fall precautions

## 2024-12-22 LAB
24R-OH-CALCIDIOL SERPL-MCNC: 55.2 NG/ML — SIGNIFICANT CHANGE UP (ref 30–80)
FOLATE SERPL-MCNC: 5.9 NG/ML — SIGNIFICANT CHANGE UP
HCT VFR BLD CALC: 37.3 % — LOW (ref 39–50)
HGB BLD-MCNC: 11.9 G/DL — LOW (ref 13–17)
MCHC RBC-ENTMCNC: 28.6 PG — SIGNIFICANT CHANGE UP (ref 27–34)
MCHC RBC-ENTMCNC: 31.9 G/DL — LOW (ref 32–36)
MCV RBC AUTO: 89.7 FL — SIGNIFICANT CHANGE UP (ref 80–100)
NRBC # BLD: 0 /100 WBCS — SIGNIFICANT CHANGE UP (ref 0–0)
PLATELET # BLD AUTO: 231 K/UL — SIGNIFICANT CHANGE UP (ref 150–400)
RBC # BLD: 4.16 M/UL — LOW (ref 4.2–5.8)
RBC # FLD: 17.8 % — HIGH (ref 10.3–14.5)
VIT B12 SERPL-MCNC: 212 PG/ML — LOW (ref 232–1245)
WBC # BLD: 4.72 K/UL — SIGNIFICANT CHANGE UP (ref 3.8–10.5)
WBC # FLD AUTO: 4.72 K/UL — SIGNIFICANT CHANGE UP (ref 3.8–10.5)

## 2024-12-22 PROCEDURE — 99232 SBSQ HOSP IP/OBS MODERATE 35: CPT

## 2024-12-22 RX ADMIN — HEPARIN SODIUM 5000 UNIT(S): 1000 INJECTION, SOLUTION INTRAVENOUS; SUBCUTANEOUS at 17:43

## 2024-12-22 RX ADMIN — SENNOSIDES 2 TABLET(S): 8.6 TABLET, FILM COATED ORAL at 22:01

## 2024-12-22 RX ADMIN — MEROPENEM 100 MILLIGRAM(S): 1 INJECTION, POWDER, FOR SOLUTION INTRAVENOUS at 14:14

## 2024-12-22 RX ADMIN — Medication 5 MILLIGRAM(S): at 17:43

## 2024-12-22 RX ADMIN — TAMSULOSIN HYDROCHLORIDE 0.4 MILLIGRAM(S): 0.4 CAPSULE ORAL at 21:58

## 2024-12-22 RX ADMIN — MEROPENEM 100 MILLIGRAM(S): 1 INJECTION, POWDER, FOR SOLUTION INTRAVENOUS at 21:55

## 2024-12-22 NOTE — PROGRESS NOTE ADULT - SUBJECTIVE AND OBJECTIVE BOX
Patient is a 49y old  Male who presents with a chief complaint of Urinary tract infection (20 Dec 2024 08:59)      SUBJECTIVE / OVERNIGHT EVENTS:  Patient seen and examined bedside.   no overnight events     REVIEW OF SYSTEMS: remaining ROS negative     MEDICATIONS  (STANDING):  amLODIPine   Tablet 10 milliGRAM(s) Oral daily  bisacodyl 5 milliGRAM(s) Oral every 12 hours  heparin   Injectable 5000 Unit(s) SubCutaneous every 12 hours  influenza   Vaccine 0.5 milliLiter(s) IntraMuscular once  meropenem  IVPB 1000 milliGRAM(s) IV Intermittent every 8 hours  polyethylene glycol 3350 17 Gram(s) Oral once  senna 2 Tablet(s) Oral at bedtime  tamsulosin 0.4 milliGRAM(s) Oral at bedtime    MEDICATIONS  (PRN):  acetaminophen     Tablet .. 650 milliGRAM(s) Oral every 6 hours PRN Temp greater or equal to 38C (100.4F), Mild Pain (1 - 3)  melatonin 3 milliGRAM(s) Oral at bedtime PRN Insomnia      CAPILLARY BLOOD GLUCOSE    Vital Signs Last 24 Hrs  T(C): 36.3 (22 Dec 2024 05:00), Max: 37.1 (21 Dec 2024 16:38)  T(F): 97.3 (22 Dec 2024 05:00), Max: 98.8 (21 Dec 2024 16:38)  HR: 52 (22 Dec 2024 05:00) (52 - 72)  BP: 148/96 (22 Dec 2024 05:00) (116/73 - 148/96)  BP(mean): --  RR: 18 (22 Dec 2024 05:00) (18 - 18)  SpO2: 100% (22 Dec 2024 05:00) (98% - 100%)    Parameters below as of 22 Dec 2024 05:00  Patient On (Oxygen Delivery Method): room air        GENERAL: NAD , no increased WOB  HEAD:  Atraumatic, Normocephalic  EYES: EOMI, PERRLA, conjunctiva and sclera clear  ENMT: No tonsillar erythema, exudates, or enlargement;   NECK: Supple, No JVD  NERVOUS SYSTEM:  Alert & Oriented X3, Good concentration; nonfocal   CHEST/LUNG: CTAB;  No rales, rhonchi, wheezing, or rubs  HEART: Regular rate and rhythm; No murmurs, rubs, or gallops  ABDOMEN: Soft, Nontender, Nondistended; Bowel sounds present  EXTREMITIES:  2+ Peripheral Pulses b/l, No clubbing, cyanosis, calf tenderness or edema b/l       LABS:                          11.9   4.72  )-----------( 231      ( 22 Dec 2024 07:15 )             37.3       Consultant(s) Notes Reviewed [ x] Yes     Care Discussed with [ x] Consultants  [x ] Patient  [ ] Family  [x ] /   [x ] Other; RN  Care plan and all findings were discussed in detail with patient.  All questions and concerns addressed

## 2024-12-22 NOTE — PROGRESS NOTE ADULT - ASSESSMENT
49M with PMHx of quadriplegia from [facility] 20 years prior, right nephrectomy (solitary left kidney) with known large left staghorn calculus with hydronephrosis being admitted for possibly infected stone and/or pyelonephritis. UCx with ESBL klebsiella and proteus with no good oral option (not carbapenem resistant).    #Left Sided Pyelonephritis or infected nephrolithiasis  left sided staghorn calculus   moderate left hydronephrosis   solitary left kidney   - ID following  - Urology following --> patient refusing Olvera cath to maximize drainage  - IR consulted for possible left PCN given hydronephrosis, they state not indicated given stable kidney function and patient appears to be draining  - PVR remains elevated, but mostly acceptable, but he's refusing Olvera   - Continue with Flomax  - patient received amoxicillin as PCN challenge and tolerated. It was removed as an allergy  - Encouraged close outpatient follow up (Charlene or his own urologist)  - Meropenem until 12/29, patient refusing home antibiotics or midline    #Constipation - slow transit  - Continue with Senna & Miralax    #Prior Right Humeral Fracture  - ACE wrap    quadriplegia   old well heeled decubiti   skin intact   supportive care   frequent repositioning to prevent pressure wounds     comminuted distal right humeral fracture from 11/2024   seen by ortho 11/29/24 , was splinted , was told to follow-up with ortho as outpatient without any acute surgical intervention     Preventative Measures   heparin sq-dvt ppx  fall precautions

## 2024-12-22 NOTE — PROGRESS NOTE ADULT - TIME BILLING
min spent reviewing patient's chart,  discussing in IDR, examining patient, discussing plan with patient and family and staff, reviewing consultant recommendations/communicating with consultants, writing progress note and placing orders.
coordinating care, discussing with consultants, counseling patient
min spent reviewing patient's chart,  discussing in IDR, examining patient, discussing plan with patient and family and staff, reviewing consultant recommendations/communicating with consultants, writing progress note and placing orders.

## 2024-12-23 PROCEDURE — 99232 SBSQ HOSP IP/OBS MODERATE 35: CPT

## 2024-12-23 RX ORDER — KETOROLAC TROMETHAMINE 30 MG/ML
15 INJECTION INTRAMUSCULAR; INTRAVENOUS ONCE
Refills: 0 | Status: DISCONTINUED | OUTPATIENT
Start: 2024-12-23 | End: 2024-12-23

## 2024-12-23 RX ADMIN — Medication 5 MILLIGRAM(S): at 18:05

## 2024-12-23 RX ADMIN — KETOROLAC TROMETHAMINE 15 MILLIGRAM(S): 30 INJECTION INTRAMUSCULAR; INTRAVENOUS at 01:30

## 2024-12-23 RX ADMIN — TAMSULOSIN HYDROCHLORIDE 0.4 MILLIGRAM(S): 0.4 CAPSULE ORAL at 21:47

## 2024-12-23 RX ADMIN — MEROPENEM 100 MILLIGRAM(S): 1 INJECTION, POWDER, FOR SOLUTION INTRAVENOUS at 21:46

## 2024-12-23 RX ADMIN — HEPARIN SODIUM 5000 UNIT(S): 1000 INJECTION, SOLUTION INTRAVENOUS; SUBCUTANEOUS at 18:06

## 2024-12-23 RX ADMIN — Medication 17 GRAM(S): at 13:52

## 2024-12-23 RX ADMIN — SENNOSIDES 2 TABLET(S): 8.6 TABLET, FILM COATED ORAL at 21:46

## 2024-12-23 RX ADMIN — KETOROLAC TROMETHAMINE 15 MILLIGRAM(S): 30 INJECTION INTRAMUSCULAR; INTRAVENOUS at 02:00

## 2024-12-23 RX ADMIN — MEROPENEM 100 MILLIGRAM(S): 1 INJECTION, POWDER, FOR SOLUTION INTRAVENOUS at 13:53

## 2024-12-23 NOTE — PROGRESS NOTE ADULT - ASSESSMENT
49M with PMHx of quadriplegia from [facility] 20 years prior, right nephrectomy (solitary left kidney) with known large left staghorn calculus with hydronephrosis being admitted for possibly infected stone and/or pyelonephritis. UCx with ESBL klebsiella and proteus with no good oral option (not carbapenem resistant).    #Left Sided Pyelonephritis or infected nephrolithiasis  #left sided staghorn calculus   #moderate left hydronephrosis   #solitary left kidney   - ID following  - Urology following --> patient refusing Olvera cath to maximize drainage. - PVR remains elevated, but mostly acceptable, but he's refusing Olvera . C/w condom catheter   - IR consulted for possible left PCN given hydronephrosis, they state not indicated given stable kidney function and patient appears to be draining  - Continue with Flomax  - patient received amoxicillin as PCN challenge and tolerated. It was removed as an allergy  - Encouraged close outpatient follow up (Charlene or his own urologist)  - Meropenem until 12/29, patient refusing home antibiotics or midline.     #Constipation - slow transit  - Continue with Senna & Miralax    #Prior Right Humeral Fracture  - ACE wrap    #quadriplegia   #old well heeled decubiti   skin intact   supportive care   frequent repositioning to prevent pressure wounds     #comminuted distal right humeral fracture from 11/2024   seen by ortho 11/29/24 , was splinted , was told to follow-up with ortho as outpatient without any acute surgical intervention     #Preventative Measures   heparin sq-dvt ppx  fall precautions

## 2024-12-23 NOTE — PROGRESS NOTE ADULT - SUBJECTIVE AND OBJECTIVE BOX
Patient is a 49y old  Male who presents with a chief complaint of Urinary tract infection (23 Dec 2024 11:04)      INTERVAL HPI/OVERNIGHT EVENTS: no events noted overnight. Patient was evaluated, had no new complaints     MEDICATIONS  (STANDING):  amLODIPine   Tablet 10 milliGRAM(s) Oral daily  bisacodyl 5 milliGRAM(s) Oral every 12 hours  heparin   Injectable 5000 Unit(s) SubCutaneous every 12 hours  influenza   Vaccine 0.5 milliLiter(s) IntraMuscular once  meropenem  IVPB 1000 milliGRAM(s) IV Intermittent every 8 hours  polyethylene glycol 3350 17 Gram(s) Oral daily  senna 2 Tablet(s) Oral at bedtime  tamsulosin 0.4 milliGRAM(s) Oral at bedtime    MEDICATIONS  (PRN):  acetaminophen     Tablet .. 650 milliGRAM(s) Oral every 6 hours PRN Temp greater or equal to 38C (100.4F), Mild Pain (1 - 3)  melatonin 3 milliGRAM(s) Oral at bedtime PRN Insomnia      __________________________________________________  REVIEW OF SYSTEMS    CONSTITUTIONAL: No weakness, fevers or chills  EYES/ENT: No visual changes;  No vertigo or throat pain   NECK: No pain or stiffness  RESPIRATORY: No cough, wheezing, hemoptysis; No shortness of breath  CARDIOVASCULAR: No chest pain or palpitations  GASTROINTESTINAL: No abdominal or epigastric pain. No nausea, vomiting, or hematemesis; No diarrhea or constipation. No melena or hematochezia.  GENITOURINARY: No dysuria, frequency or hematuria  NEUROLOGICAL: No numbness or weakness  SKIN: No itching, burning, rashes, or lesions   All other review of systems is negative unless indicated above.      Vital Signs Last 24 Hrs  T(C): 36.8 (23 Dec 2024 11:05), Max: 36.8 (22 Dec 2024 23:38)  T(F): 98.2 (23 Dec 2024 11:05), Max: 98.2 (22 Dec 2024 23:38)  HR: 62 (23 Dec 2024 11:05) (59 - 64)  BP: 126/87 (23 Dec 2024 11:05) (126/87 - 163/79)  BP(mean): --  RR: 18 (23 Dec 2024 11:05) (18 - 18)  SpO2: 100% (23 Dec 2024 11:05) (98% - 100%)    Parameters below as of 23 Dec 2024 11:05  Patient On (Oxygen Delivery Method): room air        ________________________________________________  PHYSICAL EXAM:  GENERAL: NAD , no increased WOB  HEAD:  Atraumatic, Normocephalic  EYES: EOMI, PERRLA, conjunctiva and sclera clear  ENMT: No tonsillar erythema, exudates, or enlargement;   NECK: Supple, No JVD  NERVOUS SYSTEM:  Alert & Oriented X3, Good concentration; paraplegia    CHEST/LUNG: CTAB;  No rales, rhonchi, wheezing, or rubs  HEART: Regular rate and rhythm; No murmurs, rubs, or gallops  ABDOMEN: Soft, Nontender, Nondistended; Bowel sounds present  EXTREMITIES:  2+ Peripheral Pulses b/l, No clubbing, cyanosis, calf tenderness or edema b/l   - condom catheter in place   _________________________________________________  LABS:                        11.9   4.72  )-----------( 231      ( 22 Dec 2024 07:15 )             37.3               CAPILLARY BLOOD GLUCOSE            RADIOLOGY & ADDITIONAL TESTS:    Imaging Personally Reviewed:  YES    Consultant(s) Notes Reviewed:   YES    Care Discussed with Consultants : YES     Plan of care was discussed with patient and /or primary care giver; all questions and concerns were addressed and care was aligned with patient's wishes.
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
## 2024-12-23 NOTE — PROGRESS NOTE ADULT - SUBJECTIVE AND OBJECTIVE BOX
BLACK RAMSEY  MRN-[id number]  49y (1975)    Follow Up:  pyelonephritis, uti, stercoral proctitis     Interval History: The pt was seen and examined this morning, not in acute distress, no new complaints, states that he is feeling better. Pt is afebrile, RA, no new cbc.     ROS:    [ ] Unobtainable because:  [x ] All other systems negative    Constitutional: no fever, no chills  Head: no trauma  Eyes: no vision changes, no eye pain  ENT:  no sore throat, no rhinorrhea  Cardiovascular:  no chest pain, no palpitation  Respiratory:  no SOB, no cough  GI:  no abd pain, no vomiting, no diarrhea  urinary: no dysuria, no hematuria, no flank pain  musculoskeletal:  no joint pain, no joint swelling  skin:  no rash  neurology:  no headache, no seizure, no change in mental status  psych: no anxiety, no depression       Allergies  No Known Allergies      ANTIMICROBIALS:  meropenem  IVPB 1000 every 8 hours    OTHER MEDS:  acetaminophen     Tablet .. 650 milliGRAM(s) Oral every 6 hours PRN  amLODIPine   Tablet 10 milliGRAM(s) Oral daily  bisacodyl 5 milliGRAM(s) Oral every 12 hours  heparin   Injectable 5000 Unit(s) SubCutaneous every 12 hours  influenza   Vaccine 0.5 milliLiter(s) IntraMuscular once  melatonin 3 milliGRAM(s) Oral at bedtime PRN  polyethylene glycol 3350 17 Gram(s) Oral once  senna 2 Tablet(s) Oral at bedtime  tamsulosin 0.4 milliGRAM(s) Oral at bedtime      Physical Exam:  Vital Signs Last 24 Hrs  T(C): 36.8 (22 Dec 2024 23:38), Max: 36.8 (22 Dec 2024 23:38)  T(F): 98.2 (22 Dec 2024 23:38), Max: 98.2 (22 Dec 2024 23:38)  HR: 64 (22 Dec 2024 23:38) (59 - 64)  BP: 153/97 (22 Dec 2024 23:38) (153/97 - 163/79)  BP(mean): --  RR: 18 (22 Dec 2024 23:38) (18 - 18)  SpO2: 100% (22 Dec 2024 23:38) (98% - 100%)    Parameters below as of 22 Dec 2024 23:38  Patient On (Oxygen Delivery Method): room air    Constitutional: Older appearing, non-toxic, no distress  HEAD/EYES: anicteric, no conjunctival injection  ENT:  supple, no thrush  Cardiovascular:   normal S1, S2, no murmur, no edema  Respiratory:  clear BS bilaterally, no wheezes, no rales  GI:  soft, non-tender, mildly distended, normal bowel sounds  : No Olvera, no CVA tenderness  Musculoskeletal:  Contracted limbs, foot drop b/l, right arm wrapped in ace bandage (+fracture), wasted   Neurologic: awake and alert, paraplegic, answers questions appropriately    Skin:  no rash, no erythema, no phlebitis  Heme/Onc: no lymphadenopathy   Psychiatric:  awake, alert, appropriate                             11.9   4.72  )-----------( 231      ( 22 Dec 2024 07:15 )             37.3       Creatinine Trend: 0.48<--, 0.59<--, 0.86<--      MICROBIOLOGY:  Clean Catch  12-15-24   >=3 organisms. Probable collection contamination.  --  --      Clean Catch Clean Catch (Midstream)  12-10-24   >100,000 CFU/ml Klebsiella pneumoniae  >100,000 CFU/ml Proteus mirabilis ESBL  Unable to evaluate further due to Proteus overgrowth  --  Proteus mirabilis ESBL      Clean Catch  11-29-24   >100,000 CFU/ml Klebsiella pneumoniae ESBL  --  Klebsiella pneumoniae ESBL    RADIOLOGY:     BLACK RAMSEY  MRN-[id number]  49y (1975)    Follow Up:  pyelonephritis, uti, stercoral proctitis     Interval History: Seen and examined this morning, not in acute distress, no new complaints, states that he is feeling better. He likes to complete antibiotics therapy inpatient     ROS:    [ ] Unobtainable because:  [x ] All other systems negative    Constitutional: no fever, no chills  Head: no trauma  Eyes: no vision changes, no eye pain  ENT:  no sore throat, no rhinorrhea  Cardiovascular:  no chest pain, no palpitation  Respiratory:  no SOB, no cough  GI:  no abd pain, no vomiting, no diarrhea  urinary: no dysuria, no hematuria, no flank pain  musculoskeletal:  no joint pain, no joint swelling  skin:  no rash  neurology:  no headache, no seizure, no change in mental status  psych: no anxiety, no depression       Allergies  No Known Allergies      ANTIMICROBIALS:  meropenem  IVPB 1000 every 8 hours    OTHER MEDS:  acetaminophen     Tablet .. 650 milliGRAM(s) Oral every 6 hours PRN  amLODIPine   Tablet 10 milliGRAM(s) Oral daily  bisacodyl 5 milliGRAM(s) Oral every 12 hours  heparin   Injectable 5000 Unit(s) SubCutaneous every 12 hours  influenza   Vaccine 0.5 milliLiter(s) IntraMuscular once  melatonin 3 milliGRAM(s) Oral at bedtime PRN  polyethylene glycol 3350 17 Gram(s) Oral once  senna 2 Tablet(s) Oral at bedtime  tamsulosin 0.4 milliGRAM(s) Oral at bedtime      Physical Exam:  Vital Signs Last 24 Hrs  T(C): 36.8 (22 Dec 2024 23:38), Max: 36.8 (22 Dec 2024 23:38)  T(F): 98.2 (22 Dec 2024 23:38), Max: 98.2 (22 Dec 2024 23:38)  HR: 64 (22 Dec 2024 23:38) (59 - 64)  BP: 153/97 (22 Dec 2024 23:38) (153/97 - 163/79)  BP(mean): --  RR: 18 (22 Dec 2024 23:38) (18 - 18)  SpO2: 100% (22 Dec 2024 23:38) (98% - 100%)    Parameters below as of 22 Dec 2024 23:38  Patient On (Oxygen Delivery Method): room air    Constitutional: Older appearing, non-toxic, no distress  HEAD/EYES: anicteric, no conjunctival injection  ENT:  supple, no thrush  Cardiovascular:   normal S1, S2, no murmur, no edema  Respiratory:  clear BS bilaterally, no wheezes, no rales  GI:  soft, non-tender, mildly distended, normal bowel sounds  : No Olvera, no CVA tenderness  Musculoskeletal:  Contracted limbs, foot drop b/l, right arm wrapped in ace bandage (+fracture), wasted   Neurologic: awake and alert, paraplegic, answers questions appropriately    Skin:  no rash, no erythema, no phlebitis  Heme/Onc: no lymphadenopathy   Psychiatric:  awake, alert, appropriate                           11.9   4.72  )-----------( 231      ( 22 Dec 2024 07:15 )             37.3       Creatinine Trend: 0.48<--, 0.59<--, 0.86<--      MICROBIOLOGY:  Clean Catch  12-15-24   >=3 organisms. Probable collection contamination.  --  --      Clean Catch Clean Catch (Midstream)  12-10-24   >100,000 CFU/ml Klebsiella pneumoniae  >100,000 CFU/ml Proteus mirabilis ESBL  Unable to evaluate further due to Proteus overgrowth  --  Proteus mirabilis ESBL      Clean Catch  11-29-24   >100,000 CFU/ml Klebsiella pneumoniae ESBL  --  Klebsiella pneumoniae ESBL    RADIOLOGY:

## 2024-12-24 PROCEDURE — 99232 SBSQ HOSP IP/OBS MODERATE 35: CPT

## 2024-12-24 RX ORDER — KETOROLAC TROMETHAMINE 30 MG/ML
15 INJECTION INTRAMUSCULAR; INTRAVENOUS EVERY 6 HOURS
Refills: 0 | Status: DISCONTINUED | OUTPATIENT
Start: 2024-12-24 | End: 2024-12-25

## 2024-12-24 RX ORDER — KETOROLAC TROMETHAMINE 30 MG/ML
15 INJECTION INTRAMUSCULAR; INTRAVENOUS ONCE
Refills: 0 | Status: DISCONTINUED | OUTPATIENT
Start: 2024-12-24 | End: 2024-12-24

## 2024-12-24 RX ADMIN — MEROPENEM 100 MILLIGRAM(S): 1 INJECTION, POWDER, FOR SOLUTION INTRAVENOUS at 13:46

## 2024-12-24 RX ADMIN — Medication 5 MILLIGRAM(S): at 05:36

## 2024-12-24 RX ADMIN — KETOROLAC TROMETHAMINE 15 MILLIGRAM(S): 30 INJECTION INTRAMUSCULAR; INTRAVENOUS at 05:56

## 2024-12-24 RX ADMIN — HEPARIN SODIUM 5000 UNIT(S): 1000 INJECTION, SOLUTION INTRAVENOUS; SUBCUTANEOUS at 17:28

## 2024-12-24 RX ADMIN — HEPARIN SODIUM 5000 UNIT(S): 1000 INJECTION, SOLUTION INTRAVENOUS; SUBCUTANEOUS at 05:42

## 2024-12-24 RX ADMIN — KETOROLAC TROMETHAMINE 15 MILLIGRAM(S): 30 INJECTION INTRAMUSCULAR; INTRAVENOUS at 22:28

## 2024-12-24 RX ADMIN — Medication 17 GRAM(S): at 17:29

## 2024-12-24 RX ADMIN — MEROPENEM 100 MILLIGRAM(S): 1 INJECTION, POWDER, FOR SOLUTION INTRAVENOUS at 21:27

## 2024-12-24 RX ADMIN — SENNOSIDES 2 TABLET(S): 8.6 TABLET, FILM COATED ORAL at 21:27

## 2024-12-24 RX ADMIN — KETOROLAC TROMETHAMINE 15 MILLIGRAM(S): 30 INJECTION INTRAMUSCULAR; INTRAVENOUS at 06:26

## 2024-12-24 RX ADMIN — KETOROLAC TROMETHAMINE 15 MILLIGRAM(S): 30 INJECTION INTRAMUSCULAR; INTRAVENOUS at 22:58

## 2024-12-24 RX ADMIN — TAMSULOSIN HYDROCHLORIDE 0.4 MILLIGRAM(S): 0.4 CAPSULE ORAL at 21:28

## 2024-12-24 RX ADMIN — MEROPENEM 100 MILLIGRAM(S): 1 INJECTION, POWDER, FOR SOLUTION INTRAVENOUS at 05:36

## 2024-12-24 NOTE — PROGRESS NOTE ADULT - SUBJECTIVE AND OBJECTIVE BOX
Hospitalist Daily Progress Note     *SUBJECTIVE*    Interval Events:  NAEON, Resting comfortably. HD Stable.      *OBJECTIVE*    PHYSICAL EXAM:  GENERAL: NAD , no increased WOB  HEAD:  Atraumatic, Normocephalic  EYES: EOMI, PERRLA, conjunctiva and sclera clear  ENMT: No tonsillar erythema, exudates, or enlargement;   NECK: Supple, No JVD  NERVOUS SYSTEM:  Alert & Oriented X3, Good concentration; paraplegia    CHEST/LUNG: CTAB;  No rales, rhonchi, wheezing, or rubs  HEART: Regular rate and rhythm; No murmurs, rubs, or gallops  ABDOMEN: Soft, Nontender, Nondistended; Bowel sounds present  EXTREMITIES:  2+ Peripheral Pulses b/l, No clubbing, cyanosis, calf tenderness or edema b/l   - condom catheter in place     OBJECTIVE DATA:   Vital Signs Last 24 Hrs  T(C): 36.6 (24 Dec 2024 05:35), Max: 36.8 (23 Dec 2024 11:05)  T(F): 97.9 (24 Dec 2024 05:35), Max: 98.2 (23 Dec 2024 11:05)  HR: 74 (24 Dec 2024 05:35) (60 - 74)  BP: 125/89 (24 Dec 2024 05:35) (125/89 - 154/102)  BP(mean): --  RR: 18 (24 Dec 2024 05:35) (18 - 18)  SpO2: 100% (24 Dec 2024 05:35) (100% - 100%)    Parameters below as of 24 Dec 2024 05:35  Patient On (Oxygen Delivery Method): room air               Daily     Daily     Labs, Interval Radiology studies, Medications reviewed by me         Hospitalist Daily Progress Note     *SUBJECTIVE*    Interval Events:  NAEON, Resting comfortably. HD Stable.      *OBJECTIVE*    PHYSICAL EXAM:  GENERAL: NAD , no increased WOB  CHEST/LUNG: CTAB;  No rales, rhonchi, wheezing, or rubs  HEART: Regular rate and rhythm; No murmurs, rubs, or gallops  ABDOMEN: Soft, Nontender, Nondistended; Bowel sounds present  EXTREMITIES:  2+ Peripheral Pulses b/l, No clubbing, cyanosis, calf tenderness or edema b/l   - condom catheter in place     OBJECTIVE DATA:   Vital Signs Last 24 Hrs  T(C): 36.6 (24 Dec 2024 05:35), Max: 36.8 (23 Dec 2024 11:05)  T(F): 97.9 (24 Dec 2024 05:35), Max: 98.2 (23 Dec 2024 11:05)  HR: 74 (24 Dec 2024 05:35) (60 - 74)  BP: 125/89 (24 Dec 2024 05:35) (125/89 - 154/102)  BP(mean): --  RR: 18 (24 Dec 2024 05:35) (18 - 18)  SpO2: 100% (24 Dec 2024 05:35) (100% - 100%)    Parameters below as of 24 Dec 2024 05:35  Patient On (Oxygen Delivery Method): room air               Daily     Daily     Labs, Interval Radiology studies, Medications reviewed by me
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
## 2024-12-25 PROCEDURE — 99232 SBSQ HOSP IP/OBS MODERATE 35: CPT

## 2024-12-25 RX ADMIN — Medication 5 MILLIGRAM(S): at 17:21

## 2024-12-25 RX ADMIN — Medication 17 GRAM(S): at 11:34

## 2024-12-25 RX ADMIN — MEROPENEM 100 MILLIGRAM(S): 1 INJECTION, POWDER, FOR SOLUTION INTRAVENOUS at 06:49

## 2024-12-25 RX ADMIN — MEROPENEM 100 MILLIGRAM(S): 1 INJECTION, POWDER, FOR SOLUTION INTRAVENOUS at 21:31

## 2024-12-25 RX ADMIN — TAMSULOSIN HYDROCHLORIDE 0.4 MILLIGRAM(S): 0.4 CAPSULE ORAL at 21:32

## 2024-12-25 RX ADMIN — MEROPENEM 100 MILLIGRAM(S): 1 INJECTION, POWDER, FOR SOLUTION INTRAVENOUS at 14:33

## 2024-12-25 RX ADMIN — HEPARIN SODIUM 5000 UNIT(S): 1000 INJECTION, SOLUTION INTRAVENOUS; SUBCUTANEOUS at 17:21

## 2024-12-25 RX ADMIN — SENNOSIDES 2 TABLET(S): 8.6 TABLET, FILM COATED ORAL at 21:31

## 2024-12-25 NOTE — PROGRESS NOTE ADULT - ASSESSMENT
49M with PMHx of quadriplegia from [facility] 20 years prior, right nephrectomy (solitary left kidney) with known large left staghorn calculus with hydronephrosis being admitted for possibly infected stone and/or pyelonephritis. UCx with ESBL klebsiella and proteus with no good oral option (not carbapenem resistant).    #Left Sided Pyelonephritis or infected nephrolithiasis  #left sided staghorn calculus   #moderate left hydronephrosis   #solitary left kidney   presents today after being called in for positive urine culture results. Patient was in the ED on 12/10 for exacerbation of chronic back pain. Workup done at the time revealed urinary tract infection and staghorn calculus with moderate hydronephrosis, along with urothelial thickening and pericolonic calyceal and periureteral fat stranding suggestive of infection. Patient was seen by urology and surgery who believed that no intervention was necessary at the time and patient was discharged home. Patient was called back today for positive urine culture results. On presentation, patient is hemodynamically stable and afebrile. Labs significant for elevated Alk Ph of 230. UA shows cloudy urine, +WBC, +RBC, +bacteria, LE+, nitrite+. CT abdomen/pelvis shows no significant interval change compared to 12/10/2024; solitary left kidney with a large staghorn calculus and moderate hydronephrosis, urothelial thickening, and adjacent fat stranding; Urology following --> patient refusing Olvera cath to maximize drainage. - PVR remains elevated, but mostly acceptable, but he's refusing Olvera .  IR consulted for possible left PCN given hydronephrosis, they state not indicated given stable kidney function and patient appears to be draining. patient received amoxicillin as PCN challenge and tolerated. It was removed as an allergy  - C/w Meropenem until 12/29, patient refusing home antibiotics or midline.   - C/w Flomax   - ID following  - Encouraged close outpatient follow up (Charlene or his own urologist)    #Constipation - slow transit  - Continue with Senna & Miralax    #Prior Right Humeral Fracture  - ACE wrap    #quadriplegia   #old well heeled decubiti   skin intact   supportive care   frequent repositioning to prevent pressure wounds     #HTN  - C/w Amlodipine 10/d    #comminuted distal right humeral fracture from 11/2024   seen by ortho 11/29/24 , was splinted , was told to follow-up with ortho as outpatient without any acute surgical intervention     Diet: regular low sodium   DVT prophylaxis: heparin sq  Dispo: admit   Code Status: FC     I have spent a total of *** minutes to prepare to see the patient, obtaining and reviewing history, physical examination, explaining the diagnosis, prognosis and treatment plan with the patient/family/caregiver. I also have spent the time ordering studies and testing, interpreting results, medicine reconciliation, IDRs, subspecialty consultation and documentation as above.       49M with PMHx of quadriplegia from [facility] 20 years prior, right nephrectomy (solitary left kidney) with known large left staghorn calculus with hydronephrosis being admitted for possibly infected stone and/or pyelonephritis. UCx with ESBL klebsiella and proteus with no good oral option (not carbapenem resistant).    #Left Sided Pyelonephritis or infected nephrolithiasis  #left sided staghorn calculus   #moderate left hydronephrosis   #solitary left kidney   presents today after being called in for positive urine culture results. Patient was in the ED on 12/10 for exacerbation of chronic back pain. Workup done at the time revealed urinary tract infection and staghorn calculus with moderate hydronephrosis, along with urothelial thickening and pericolonic calyceal and periureteral fat stranding suggestive of infection. Patient was seen by urology and surgery who believed that no intervention was necessary at the time and patient was discharged home. Patient was called back today for positive urine culture results. On presentation, patient is hemodynamically stable and afebrile. Labs significant for elevated Alk Ph of 230. UA shows cloudy urine, +WBC, +RBC, +bacteria, LE+, nitrite+. CT abdomen/pelvis shows no significant interval change compared to 12/10/2024; solitary left kidney with a large staghorn calculus and moderate hydronephrosis, urothelial thickening, and adjacent fat stranding; Urology following --> patient refusing Olvera cath to maximize drainage. - PVR remains elevated, but mostly acceptable, but he's refusing Olvera .  IR consulted for possible left PCN given hydronephrosis, they state not indicated given stable kidney function and patient appears to be draining. patient received amoxicillin as PCN challenge and tolerated. It was removed as an allergy  - C/w Meropenem until 12/29, patient refusing home antibiotics or midline.   - C/w Flomax   - ID following  - Encouraged close outpatient follow up (Charlene or his own urologist)    #Constipation - slow transit  - Continue with Senna & Miralax    #Prior Right Humeral Fracture  - ACE wrap    #quadriplegia   #old well heeled decubiti   skin intact   supportive care   frequent repositioning to prevent pressure wounds     #HTN  - C/w Amlodipine 10/d    #comminuted distal right humeral fracture from 11/2024   seen by ortho 11/29/24 , was splinted , was told to follow-up with ortho as outpatient without any acute surgical intervention     Diet: regular low sodium   DVT prophylaxis: heparin sq  Dispo: admit   Code Status: FC     I have spent a total of 35 minutes to prepare to see the patient, obtaining and reviewing history, physical examination, explaining the diagnosis, prognosis and treatment plan with the patient/family/caregiver. I also have spent the time ordering studies and testing, interpreting results, medicine reconciliation, IDRs, subspecialty consultation and documentation as above.

## 2024-12-25 NOTE — PROGRESS NOTE ADULT - SUBJECTIVE AND OBJECTIVE BOX
Hospitalist Daily Progress Note     *SUBJECTIVE*    Interval Events:  NAEON, Resting comfortably. HD Stable.      *OBJECTIVE*    PHYSICAL EXAM:  GENERAL: NAD , no increased WOB  CHEST/LUNG: CTAB;  No rales, rhonchi, wheezing, or rubs  HEART: Regular rate and rhythm; No murmurs, rubs, or gallops  ABDOMEN: Soft, Nontender, Nondistended; Bowel sounds present  EXTREMITIES:  2+ Peripheral Pulses b/l, No clubbing, cyanosis, calf tenderness or edema b/l   - condom catheter in place       OBJECTIVE DATA:   Vital Signs Last 24 Hrs  T(C): 36.6 (25 Dec 2024 06:34), Max: 36.8 (24 Dec 2024 11:05)  T(F): 97.8 (25 Dec 2024 06:34), Max: 98.3 (24 Dec 2024 11:05)  HR: 78 (25 Dec 2024 06:34) (66 - 78)  BP: 135/90 (25 Dec 2024 06:34) (130/91 - 147/93)  BP(mean): --  RR: 18 (25 Dec 2024 06:34) (18 - 18)  SpO2: 99% (25 Dec 2024 06:34) (99% - 100%)    Parameters below as of 25 Dec 2024 06:34  Patient On (Oxygen Delivery Method): room air               Daily     Daily     Labs, Interval Radiology studies, Medications reviewed by me

## 2024-12-26 ENCOUNTER — TRANSCRIPTION ENCOUNTER (OUTPATIENT)
Age: 49
End: 2024-12-26

## 2024-12-26 LAB
ANION GAP SERPL CALC-SCNC: 4 MMOL/L — LOW (ref 5–17)
BASOPHILS # BLD AUTO: 0.03 K/UL — SIGNIFICANT CHANGE UP (ref 0–0.2)
BASOPHILS NFR BLD AUTO: 0.5 % — SIGNIFICANT CHANGE UP (ref 0–2)
BUN SERPL-MCNC: 23 MG/DL — SIGNIFICANT CHANGE UP (ref 7–23)
CALCIUM SERPL-MCNC: 9 MG/DL — SIGNIFICANT CHANGE UP (ref 8.5–10.1)
CHLORIDE SERPL-SCNC: 105 MMOL/L — SIGNIFICANT CHANGE UP (ref 96–108)
CO2 SERPL-SCNC: 27 MMOL/L — SIGNIFICANT CHANGE UP (ref 22–31)
CREAT SERPL-MCNC: 0.48 MG/DL — LOW (ref 0.5–1.3)
EGFR: 127 ML/MIN/1.73M2 — SIGNIFICANT CHANGE UP
EOSINOPHIL # BLD AUTO: 0.47 K/UL — SIGNIFICANT CHANGE UP (ref 0–0.5)
EOSINOPHIL NFR BLD AUTO: 8.2 % — HIGH (ref 0–6)
GLUCOSE SERPL-MCNC: 91 MG/DL — SIGNIFICANT CHANGE UP (ref 70–99)
HCT VFR BLD CALC: 35.9 % — LOW (ref 39–50)
HGB BLD-MCNC: 11.4 G/DL — LOW (ref 13–17)
IMM GRANULOCYTES NFR BLD AUTO: 0.3 % — SIGNIFICANT CHANGE UP (ref 0–0.9)
LYMPHOCYTES # BLD AUTO: 2.3 K/UL — SIGNIFICANT CHANGE UP (ref 1–3.3)
LYMPHOCYTES # BLD AUTO: 40.1 % — SIGNIFICANT CHANGE UP (ref 13–44)
MAGNESIUM SERPL-MCNC: 2 MG/DL — SIGNIFICANT CHANGE UP (ref 1.6–2.6)
MCHC RBC-ENTMCNC: 28.3 PG — SIGNIFICANT CHANGE UP (ref 27–34)
MCHC RBC-ENTMCNC: 31.8 G/DL — LOW (ref 32–36)
MCV RBC AUTO: 89.1 FL — SIGNIFICANT CHANGE UP (ref 80–100)
MONOCYTES # BLD AUTO: 0.54 K/UL — SIGNIFICANT CHANGE UP (ref 0–0.9)
MONOCYTES NFR BLD AUTO: 9.4 % — SIGNIFICANT CHANGE UP (ref 2–14)
NEUTROPHILS # BLD AUTO: 2.37 K/UL — SIGNIFICANT CHANGE UP (ref 1.8–7.4)
NEUTROPHILS NFR BLD AUTO: 41.5 % — LOW (ref 43–77)
NRBC # BLD: 0 /100 WBCS — SIGNIFICANT CHANGE UP (ref 0–0)
PLATELET # BLD AUTO: 227 K/UL — SIGNIFICANT CHANGE UP (ref 150–400)
POTASSIUM SERPL-MCNC: 4.2 MMOL/L — SIGNIFICANT CHANGE UP (ref 3.5–5.3)
POTASSIUM SERPL-SCNC: 4.2 MMOL/L — SIGNIFICANT CHANGE UP (ref 3.5–5.3)
RBC # BLD: 4.03 M/UL — LOW (ref 4.2–5.8)
RBC # FLD: 17.3 % — HIGH (ref 10.3–14.5)
SODIUM SERPL-SCNC: 136 MMOL/L — SIGNIFICANT CHANGE UP (ref 135–145)
WBC # BLD: 5.73 K/UL — SIGNIFICANT CHANGE UP (ref 3.8–10.5)
WBC # FLD AUTO: 5.73 K/UL — SIGNIFICANT CHANGE UP (ref 3.8–10.5)

## 2024-12-26 PROCEDURE — 99232 SBSQ HOSP IP/OBS MODERATE 35: CPT

## 2024-12-26 RX ORDER — MAG HYDROX/ALUMINUM HYD/SIMETH 200-200-20
30 SUSPENSION, ORAL (FINAL DOSE FORM) ORAL EVERY 6 HOURS
Refills: 0 | Status: DISCONTINUED | OUTPATIENT
Start: 2024-12-26 | End: 2024-12-30

## 2024-12-26 RX ORDER — MINERAL OIL
133 OIL (ML) MISCELLANEOUS ONCE
Refills: 0 | Status: COMPLETED | OUTPATIENT
Start: 2024-12-26 | End: 2024-12-26

## 2024-12-26 RX ADMIN — Medication 30 MILLILITER(S): at 09:41

## 2024-12-26 RX ADMIN — MEROPENEM 100 MILLIGRAM(S): 1 INJECTION, POWDER, FOR SOLUTION INTRAVENOUS at 22:06

## 2024-12-26 RX ADMIN — HEPARIN SODIUM 5000 UNIT(S): 1000 INJECTION, SOLUTION INTRAVENOUS; SUBCUTANEOUS at 17:22

## 2024-12-26 RX ADMIN — HEPARIN SODIUM 5000 UNIT(S): 1000 INJECTION, SOLUTION INTRAVENOUS; SUBCUTANEOUS at 06:31

## 2024-12-26 RX ADMIN — Medication 3 MILLIGRAM(S): at 22:06

## 2024-12-26 RX ADMIN — SENNOSIDES 2 TABLET(S): 8.6 TABLET, FILM COATED ORAL at 22:06

## 2024-12-26 RX ADMIN — Medication 133 MILLILITER(S): at 22:05

## 2024-12-26 RX ADMIN — MEROPENEM 100 MILLIGRAM(S): 1 INJECTION, POWDER, FOR SOLUTION INTRAVENOUS at 13:40

## 2024-12-26 RX ADMIN — TAMSULOSIN HYDROCHLORIDE 0.4 MILLIGRAM(S): 0.4 CAPSULE ORAL at 22:06

## 2024-12-26 RX ADMIN — Medication 5 MILLIGRAM(S): at 17:22

## 2024-12-26 RX ADMIN — Medication 5 MILLIGRAM(S): at 06:31

## 2024-12-26 RX ADMIN — MEROPENEM 100 MILLIGRAM(S): 1 INJECTION, POWDER, FOR SOLUTION INTRAVENOUS at 06:31

## 2024-12-26 NOTE — DISCHARGE NOTE PROVIDER - CARE PROVIDER_API CALL
Shiva Chang  Urology  [address] [place] [address], [address] [address]  Phone: [phone]  Fax: [phone]  Follow Up Time: 1 week    Your primary care provider,   Phone: (   )    -  Fax: (   )    -  Follow Up Time: 1 week    Your urologist,   Phone: (   )    -  Fax: (   )    -  Follow Up Time: 1 week

## 2024-12-26 NOTE — DISCHARGE NOTE PROVIDER - NSDCQMSTROKE_NEU_ALL_CORE
Called pharmacy. They are asking for a refill on the Allopurinol. They blister pack the patients meds and have only 22 pills left. They will send the meds in the mail to the patient who is currently in [place]. LOV 6/25/18. Allopurinol 90 day supply sent with a reminder that patient is due for follow up with PCP. No

## 2024-12-26 NOTE — DISCHARGE NOTE PROVIDER - CARE PROVIDERS DIRECT ADDRESSES
[email or web address],DirectAddress_Unknown,DirectAddress_Unknown

## 2024-12-26 NOTE — DISCHARGE NOTE PROVIDER - NSDCCPCAREPLAN_GEN_ALL_CORE_FT
PRINCIPAL DISCHARGE DIAGNOSIS  Diagnosis: Acute UTI  Assessment and Plan of Treatment:      PRINCIPAL DISCHARGE DIAGNOSIS  Diagnosis: Acute UTI  Assessment and Plan of Treatment:       SECONDARY DISCHARGE DIAGNOSES  Diagnosis: Hydronephrosis  Assessment and Plan of Treatment:     Diagnosis: Staghorn calculus  Assessment and Plan of Treatment:     Diagnosis: Quadriplegia  Assessment and Plan of Treatment:      PRINCIPAL DISCHARGE DIAGNOSIS  Diagnosis: Acute UTI  Assessment and Plan of Treatment: Please follow up with Urologist      SECONDARY DISCHARGE DIAGNOSES  Diagnosis: Staghorn calculus  Assessment and Plan of Treatment:     Diagnosis: Hydronephrosis  Assessment and Plan of Treatment:     Diagnosis: Quadriplegia  Assessment and Plan of Treatment:

## 2024-12-26 NOTE — PROGRESS NOTE ADULT - SUBJECTIVE AND OBJECTIVE BOX
Hospitalist Daily Progress Note     *SUBJECTIVE*    Interval Events:  NAEON, Resting comfortably. HD Stable.      *OBJECTIVE*    PHYSICAL EXAM:  GENERAL: NAD , no increased WOB  CHEST/LUNG: CTAB;  No rales, rhonchi, wheezing, or rubs  HEART: Regular rate and rhythm; No murmurs, rubs, or gallops  ABDOMEN: Soft, Nontender, Nondistended; Bowel sounds present  EXTREMITIES:  2+ Peripheral Pulses b/l, No clubbing, cyanosis, calf tenderness or edema b/l   - condom catheter in place     OBJECTIVE DATA:   Vital Signs Last 24 Hrs  T(C): 36.9 (26 Dec 2024 06:30), Max: 36.9 (26 Dec 2024 06:30)  T(F): 98.4 (26 Dec 2024 06:30), Max: 98.4 (26 Dec 2024 06:30)  HR: 82 (26 Dec 2024 06:30) (64 - 82)  BP: 119/81 (26 Dec 2024 06:30) (116/78 - 129/87)  BP(mean): --  RR: 18 (26 Dec 2024 06:30) (18 - 18)  SpO2: 100% (26 Dec 2024 06:30) (100% - 100%)    Parameters below as of 26 Dec 2024 06:30  Patient On (Oxygen Delivery Method): room air               Daily     Daily     Labs, Interval Radiology studies, Medications reviewed by me

## 2024-12-26 NOTE — PROGRESS NOTE ADULT - ASSESSMENT
49M with PMHx of quadriplegia from [facility] 20 years prior, right nephrectomy (solitary left kidney) with known large left staghorn calculus with hydronephrosis being admitted for possibly infected stone and/or pyelonephritis. UCx with ESBL klebsiella and proteus with no good oral option (not carbapenem resistant).    #Left Sided Pyelonephritis or infected nephrolithiasis  #left sided staghorn calculus   #moderate left hydronephrosis   #solitary left kidney   presents today after being called in for positive urine culture results. Patient was in the ED on 12/10 for exacerbation of chronic back pain. Workup done at the time revealed urinary tract infection and staghorn calculus with moderate hydronephrosis, along with urothelial thickening and pericolonic calyceal and periureteral fat stranding suggestive of infection. Patient was seen by urology and surgery who believed that no intervention was necessary at the time and patient was discharged home. Patient was called back today for positive urine culture results. On presentation, patient is hemodynamically stable and afebrile. Labs significant for elevated Alk Ph of 230. UA shows cloudy urine, +WBC, +RBC, +bacteria, LE+, nitrite+. CT abdomen/pelvis shows no significant interval change compared to 12/10/2024; solitary left kidney with a large staghorn calculus and moderate hydronephrosis, urothelial thickening, and adjacent fat stranding; Urology following --> patient refusing Olvera cath to maximize drainage. - PVR remains elevated, but mostly acceptable, but he's refusing Olvera .  IR consulted for possible left PCN given hydronephrosis, they state not indicated given stable kidney function and patient appears to be draining. patient received amoxicillin as PCN challenge and tolerated. It was removed as an allergy  - C/w Meropenem until 12/29, patient refusing home antibiotics or midline.   - C/w Flomax   - ID following  - Encouraged close outpatient follow up (Charlene or his own urologist)    #Constipation - slow transit  - Continue with Senna & Miralax    #Prior Right Humeral Fracture  - ACE wrap    #quadriplegia   #old well heeled decubiti   skin intact   supportive care   frequent repositioning to prevent pressure wounds     #HTN  - C/w Amlodipine 10/d    #comminuted distal right humeral fracture from 11/2024   seen by ortho 11/29/24 , was splinted , was told to follow-up with ortho as outpatient without any acute surgical intervention     Diet: regular low sodium   DVT prophylaxis: heparin sq  Dispo: admit   Code Status: FC     I have spent a total of *** minutes to prepare to see the patient, obtaining and reviewing history, physical examination, explaining the diagnosis, prognosis and treatment plan with the patient/family/caregiver. I also have spent the time ordering studies and testing, interpreting results, medicine reconciliation, IDRs, subspecialty consultation and documentation as above.     49M with PMHx of quadriplegia from [facility] 20 years prior, right nephrectomy (solitary left kidney) with known large left staghorn calculus with hydronephrosis being admitted for possibly infected stone and/or pyelonephritis. UCx with ESBL klebsiella and proteus with no good oral option (not carbapenem resistant).    #Left Sided Pyelonephritis or infected nephrolithiasis  #left sided staghorn calculus   #moderate left hydronephrosis   #solitary left kidney   presents today after being called in for positive urine culture results. Patient was in the ED on 12/10 for exacerbation of chronic back pain. Workup done at the time revealed urinary tract infection and staghorn calculus with moderate hydronephrosis, along with urothelial thickening and pericolonic calyceal and periureteral fat stranding suggestive of infection. Patient was seen by urology and surgery who believed that no intervention was necessary at the time and patient was discharged home. Patient was called back today for positive urine culture results. On presentation, patient is hemodynamically stable and afebrile. Labs significant for elevated Alk Ph of 230. UA shows cloudy urine, +WBC, +RBC, +bacteria, LE+, nitrite+. CT abdomen/pelvis shows no significant interval change compared to 12/10/2024; solitary left kidney with a large staghorn calculus and moderate hydronephrosis, urothelial thickening, and adjacent fat stranding; Urology following --> patient refusing Olvera cath to maximize drainage. - PVR remains elevated, but mostly acceptable, but he's refusing Olvera .  IR consulted for possible left PCN given hydronephrosis, they state not indicated given stable kidney function and patient appears to be draining. patient received amoxicillin as PCN challenge and tolerated. It was removed as an allergy  - C/w Meropenem until 12/29, patient refusing home antibiotics or midline.   - C/w Flomax   - ID following  - Encouraged close outpatient follow up (Charlene or his own urologist)    #Constipation - slow transit  - Continue with Senna & Miralax    #Prior Right Humeral Fracture  - ACE wrap    #quadriplegia   #old well heeled decubiti   skin intact   supportive care   frequent repositioning to prevent pressure wounds     #HTN  - C/w Amlodipine 10/d    #comminuted distal right humeral fracture from 11/2024   seen by ortho 11/29/24 , was splinted , was told to follow-up with ortho as outpatient without any acute surgical intervention     Diet: regular low sodium   DVT prophylaxis: heparin sq  Dispo: admit   Code Status: FC     I have spent a total of 40 minutes to prepare to see the patient, obtaining and reviewing history, physical examination, explaining the diagnosis, prognosis and treatment plan with the patient/family/caregiver. I also have spent the time ordering studies and testing, interpreting results, medicine reconciliation, IDRs, subspecialty consultation and documentation as above.

## 2024-12-26 NOTE — DISCHARGE NOTE PROVIDER - ATTENDING DISCHARGE PHYSICAL EXAMINATION:
GENERAL: NAD , no increased WOB  CHEST/LUNG: CTAB;  No rales, rhonchi, wheezing, or rubs  HEART: Regular rate and rhythm; No murmurs, rubs, or gallops  ABDOMEN: Soft, Nontender, Nondistended; Bowel sounds present  EXTREMITIES:  2+ Peripheral Pulses b/l, No clubbing, cyanosis, calf tenderness or edema b/l   - condom catheter in place

## 2024-12-26 NOTE — DISCHARGE NOTE PROVIDER - PROVIDER TOKENS
PROVIDER:[TOKEN:[[id number]],FOLLOWUP:[1 week]],FREE:[LAST:[Your primary care provider],PHONE:[(   )    -],FAX:[(   )    -],FOLLOWUP:[1 week]],FREE:[LAST:[Your urologist],PHONE:[(   )    -],FAX:[(   )    -],FOLLOWUP:[1 week]]

## 2024-12-26 NOTE — DISCHARGE NOTE PROVIDER - HOSPITAL COURSE
Pt is a 50 yo M with PMHx of quadriplegia 2/2 traumatic spinal injury from [facility], s/p R nephrectomy for atrophic kidney (3/19, Dr. Pelayo), and recurrent nephrolithiasis presents today after being called in for positive urine culture results. Patient was in the ED on 12/10 for exacerbation of chronic back pain. Workup done at the time revealed urinary tract infection and staghorn calculus with moderate hydronephrosis, along with urothelial thickening and pericolonic calyceal and periureteral fat stranding suggestive of infection. Patient was seen by urology and surgery who believed that no intervention was necessary at the time and patient was discharged home. Patient was called back today for positive urine culture results. UCx with ESBL klebsiella and proteus with no good oral option (not carbapenem resistant). At this time, patient reports back pain however states this is generalized back pain. He denies abdominal pain, nausea, vomiting, fever, chills, diarrhea.     On presentation, patient is hemodynamically stable and afebrile. Labs significant for elevated Alk Ph of 230. UA shows cloudy urine, +WBC, +RBC, +bacteria, LE+, nitrite+. CT abdomen/pelvis shows no significant interval change compared to 12/10/2024; solitary left kidney with a large staghorn calculus and moderate hydronephrosis, urothelial thickening, and adjacent fat stranding; large rectal stool burden with associated mural thickening, compatible with stercoral proctitis.    #Left Sided Pyelonephritis or infected nephrolithiasis  #Left sided staghorn calculus   #Moderate left hydronephrosis   #Solitary left kidney   - Presents today after being called in for positive urine culture results. Patient was in the ED on 12/10 for exacerbation of chronic back pain. Workup done at the time revealed urinary tract infection and staghorn calculus with moderate hydronephrosis, along with urothelial thickening and pericolonic calyceal and periureteral fat stranding suggestive of infection. Patient was seen by urology and surgery who believed that no intervention was necessary at the time and patient was discharged home. Patient was called back today for positive urine culture results.  - Urology consulted --> patient refusing Olvera cath to maximize drainage. PVR remains elevated, mostly acceptable, but refusing Olvera.  - IR consulted for possible left PCN given hydronephrosis, they state not indicated given stable kidney function and patient appears to be draining.   - Pt received amoxicillin as PCN challenge and tolerated. It was removed as an allergy.  - C/w Meropenem until 12/29, patient refusing home antibiotics or midline.   - C/w Flomax   - Encouraged close outpatient follow up (Charlene or his own urologist)    #Constipation - slow transit  - Continue with Senna & Miralax    #Prior Right Humeral Fracture  - ACE wrap    #Quadriplegia   #Old well heeled decubiti   - Skin intact   - Supportive care   - Frequent repositioning to prevent pressure wounds     #HTN  - C/w Amlodipine 10/d    #Comminuted distal right humeral fracture from 11/2024   - Seen by ortho 11/29/24, was splinted, was told to follow-up with ortho as outpatient without any acute surgical intervention.     Pt is a 48 yo M with PMHx of quadriplegia 2/2 traumatic spinal injury from [facility], s/p R nephrectomy for atrophic kidney (3/19, Dr. Pelayo), and recurrent nephrolithiasis presents today after being called in for positive urine culture results. Patient was in the ED on 12/10 for exacerbation of chronic back pain. Workup done at the time revealed urinary tract infection and staghorn calculus with moderate hydronephrosis, along with urothelial thickening and pericolonic calyceal and periureteral fat stranding suggestive of infection. Patient was seen by urology and surgery who believed that no intervention was necessary at the time and patient was discharged home. Patient was called back today for positive urine culture results. UCx with ESBL klebsiella and proteus with no good oral option (not carbapenem resistant). At this time, patient reports back pain however states this is generalized back pain. He denies abdominal pain, nausea, vomiting, fever, chills, diarrhea.     On presentation, patient is hemodynamically stable and afebrile. Labs significant for elevated Alk Ph of 230. UA shows cloudy urine, +WBC, +RBC, +bacteria, LE+, nitrite+. CT abdomen/pelvis shows no significant interval change compared to 12/10/2024; solitary left kidney with a large staghorn calculus and moderate hydronephrosis, urothelial thickening, and adjacent fat stranding; large rectal stool burden with associated mural thickening, compatible with stercoral proctitis.    #Left Sided Pyelonephritis or infected nephrolithiasis  #Left sided staghorn calculus   #Moderate left hydronephrosis   #Solitary left kidney   - Presents today after being called in for positive urine culture results. Patient was in the ED on 12/10 for exacerbation of chronic back pain. Workup done at the time revealed urinary tract infection and staghorn calculus with moderate hydronephrosis, along with urothelial thickening and pericolonic calyceal and periureteral fat stranding suggestive of infection. Patient was seen by urology and surgery who believed that no intervention was necessary at the time and patient was discharged home. Patient was called back today for positive urine culture results. Urology consulted --> patient refusing Olvera cath to maximize drainage. PVR remains elevated, mostly acceptable, but refusing Olvera.  - IR consulted for possible left PCN given hydronephrosis, they state not indicated given stable kidney function and patient appears to be draining.   - Pt received amoxicillin as PCN challenge and tolerated. It was removed as an allergy.  - Treated with Meropenem until 12/29, patient refusing home antibiotics or midline thus Abx were completed in the hospital.   - C/w Flomax   - Encouraged close outpatient follow up (Charlene or his own urologist)    #Constipation - slow transit  - Continue with Senna & Miralax    #Quadriplegia   #Old well heeled decubiti   - Skin intact   - Supportive care   - Frequent repositioning to prevent pressure wounds     #HTN  - C/w Amlodipine 10/d    #Comminuted distal right humeral fracture from 11/2024   - Seen by ortho 11/29/24, was splinted, was told to follow-up with ortho as outpatient without any acute surgical intervention.    Stable for discharge home with home services

## 2024-12-26 NOTE — DISCHARGE NOTE PROVIDER - NSDCMRMEDTOKEN_GEN_ALL_CORE_FT
amLODIPine 10 mg oral tablet: 1 tab(s) orally once a day  apixaban 5 mg oral tablet: 1 tab(s) orally every 12 hours  ascorbic acid 500 mg oral tablet: 1 tab(s) orally 2 times a day  Bactrim  mg-160 mg oral tablet: 1 tab(s) orally 2 times a day  cefdinir 300 mg oral capsule: 1 cap(s) orally 2 times a day  cefpodoxime 100 mg oral tablet: 1 tab(s) orally every 12 hours  ciprofloxacin 500 mg oral tablet: 1 tab(s) orally every 12 hours colitis concern  famotidine 20 mg oral tablet: 1 tab(s) orally once a day (at bedtime)  ibuprofen 200 mg oral tablet: 1 tab(s) orally every 6 hours, As Needed  metroNIDAZOLE 500 mg oral tablet: 1 tab(s) orally every 8 hours diverticulitis/colitis concern  oxyCODONE 5 mg oral tablet: 1 tab(s) orally every 8 hours as needed for  severe pain MDD: 3  tamsulosin 0.4 mg oral capsule: 1 cap(s) orally once a day (at bedtime)  vitamin A &amp; D topical ointment: 1 application topically 3 times a day   amLODIPine 10 mg oral tablet: 1 tab(s) orally once a day  apixaban 5 mg oral tablet: 1 tab(s) orally every 12 hours  bisacodyl 5 mg oral delayed release tablet: 1 tab(s) orally every 12 hours  polyethylene glycol 3350 oral powder for reconstitution: 17 gram(s) orally once a day  senna leaf extract oral tablet: 2 tab(s) orally once a day (at bedtime)  tamsulosin 0.4 mg oral capsule: 1 cap(s) orally once a day (at bedtime)  vitamin A &amp; D topical ointment: 1 application topically 3 times a day

## 2024-12-27 PROCEDURE — 99232 SBSQ HOSP IP/OBS MODERATE 35: CPT

## 2024-12-27 RX ADMIN — Medication 3 MILLIGRAM(S): at 21:18

## 2024-12-27 RX ADMIN — HEPARIN SODIUM 5000 UNIT(S): 1000 INJECTION, SOLUTION INTRAVENOUS; SUBCUTANEOUS at 05:34

## 2024-12-27 RX ADMIN — MEROPENEM 100 MILLIGRAM(S): 1 INJECTION, POWDER, FOR SOLUTION INTRAVENOUS at 05:35

## 2024-12-27 RX ADMIN — TAMSULOSIN HYDROCHLORIDE 0.4 MILLIGRAM(S): 0.4 CAPSULE ORAL at 21:18

## 2024-12-27 RX ADMIN — Medication 5 MILLIGRAM(S): at 17:33

## 2024-12-27 RX ADMIN — MEROPENEM 100 MILLIGRAM(S): 1 INJECTION, POWDER, FOR SOLUTION INTRAVENOUS at 13:34

## 2024-12-27 RX ADMIN — SENNOSIDES 2 TABLET(S): 8.6 TABLET, FILM COATED ORAL at 21:18

## 2024-12-27 RX ADMIN — MEROPENEM 100 MILLIGRAM(S): 1 INJECTION, POWDER, FOR SOLUTION INTRAVENOUS at 21:18

## 2024-12-27 RX ADMIN — HEPARIN SODIUM 5000 UNIT(S): 1000 INJECTION, SOLUTION INTRAVENOUS; SUBCUTANEOUS at 17:24

## 2024-12-27 NOTE — PROGRESS NOTE ADULT - SUBJECTIVE AND OBJECTIVE BOX
Hospitalist Daily Progress Note     *SUBJECTIVE*    Interval Events:  NAEON, Resting comfortably. HD Stable.      *OBJECTIVE*    PHYSICAL EXAM:  GENERAL: NAD , no increased WOB  CHEST/LUNG: CTAB;  No rales, rhonchi, wheezing, or rubs  HEART: Regular rate and rhythm; No murmurs, rubs, or gallops  ABDOMEN: Soft, Nontender, Nondistended; Bowel sounds present  EXTREMITIES:  2+ Peripheral Pulses b/l, No clubbing, cyanosis, calf tenderness or edema b/l   - condom catheter in place     OBJECTIVE DATA:   Vital Signs Last 24 Hrs  T(C): 36.7 (27 Dec 2024 05:39), Max: 36.9 (26 Dec 2024 17:05)  T(F): 98 (27 Dec 2024 05:39), Max: 98.5 (26 Dec 2024 17:05)  HR: 67 (27 Dec 2024 05:39) (64 - 68)  BP: 167/100 (27 Dec 2024 05:39) (149/90 - 211/108)  BP(mean): --  RR: 18 (27 Dec 2024 05:39) (18 - 18)  SpO2: 97% (27 Dec 2024 05:39) (96% - 100%)    Parameters below as of 27 Dec 2024 05:39  Patient On (Oxygen Delivery Method): room air               Daily     Daily     Labs, Interval Radiology studies, Medications reviewed by me

## 2024-12-27 NOTE — PROGRESS NOTE ADULT - ASSESSMENT
49M with PMHx of quadriplegia from [facility] 20 years prior, right nephrectomy (solitary left kidney) with known large left staghorn calculus with hydronephrosis being admitted for possibly infected stone and/or pyelonephritis. UCx with ESBL klebsiella and proteus with no good oral option (not carbapenem resistant).    #Left Sided Pyelonephritis or infected nephrolithiasis  #left sided staghorn calculus   #moderate left hydronephrosis   #solitary left kidney   presents today after being called in for positive urine culture results. Patient was in the ED on 12/10 for exacerbation of chronic back pain. Workup done at the time revealed urinary tract infection and staghorn calculus with moderate hydronephrosis, along with urothelial thickening and pericolonic calyceal and periureteral fat stranding suggestive of infection. Patient was seen by urology and surgery who believed that no intervention was necessary at the time and patient was discharged home. Patient was called back today for positive urine culture results. On presentation, patient is hemodynamically stable and afebrile. Labs significant for elevated Alk Ph of 230. UA shows cloudy urine, +WBC, +RBC, +bacteria, LE+, nitrite+. CT abdomen/pelvis shows no significant interval change compared to 12/10/2024; solitary left kidney with a large staghorn calculus and moderate hydronephrosis, urothelial thickening, and adjacent fat stranding; Urology following --> patient refusing Olvera cath to maximize drainage. - PVR remains elevated, but mostly acceptable, but he's refusing Olvera .  IR consulted for possible left PCN given hydronephrosis, they state not indicated given stable kidney function and patient appears to be draining. patient received amoxicillin as PCN challenge and tolerated. It was removed as an allergy  - C/w Meropenem until 12/29, patient refusing home antibiotics, ROSA, or midline.   - C/w Flomax   - ID following  - Encouraged close outpatient follow up (Charlene or his own urologist)    #Constipation - slow transit  - Continue with Senna & Miralax    #Prior Right Humeral Fracture  - ACE wrap    #quadriplegia   #old well heeled decubiti   skin intact   supportive care   frequent repositioning to prevent pressure wounds     #HTN  - C/w Amlodipine 10/d    #comminuted distal right humeral fracture from 11/2024   seen by ortho 11/29/24 , was splinted , was told to follow-up with ortho as outpatient without any acute surgical intervention     Diet: regular low sodium   DVT prophylaxis: heparin sq  Dispo: admit   Code Status: FC     I have spent a total of *** minutes to prepare to see the patient, obtaining and reviewing history, physical examination, explaining the diagnosis, prognosis and treatment plan with the patient/family/caregiver. I also have spent the time ordering studies and testing, interpreting results, medicine reconciliation, IDRs, subspecialty consultation and documentation as above.         49M with PMHx of quadriplegia from [facility] years prior, right nephrectomy (solitary left kidney) with known large left staghorn calculus with hydronephrosis being admitted for possibly infected stone and/or pyelonephritis. UCx with ESBL klebsiella and proteus with no good oral option (not carbapenem resistant).    #Left Sided Pyelonephritis or infected nephrolithiasis  #left sided staghorn calculus   #moderate left hydronephrosis   #solitary left kidney   presents today after being called in for positive urine culture results. Patient was in the ED on 12/10 for exacerbation of chronic back pain. Workup done at the time revealed urinary tract infection and staghorn calculus with moderate hydronephrosis, along with urothelial thickening and pericolonic calyceal and periureteral fat stranding suggestive of infection. Patient was seen by urology and surgery who believed that no intervention was necessary at the time and patient was discharged home. Patient was called back today for positive urine culture results. On presentation, patient is hemodynamically stable and afebrile. Labs significant for elevated Alk Ph of 230. UA shows cloudy urine, +WBC, +RBC, +bacteria, LE+, nitrite+. CT abdomen/pelvis shows no significant interval change compared to 12/10/2024; solitary left kidney with a large staghorn calculus and moderate hydronephrosis, urothelial thickening, and adjacent fat stranding; Urology following --> patient refusing Olvera cath to maximize drainage. - PVR remains elevated, but mostly acceptable, but he's refusing Olvera .  IR consulted for possible left PCN given hydronephrosis, they state not indicated given stable kidney function and patient appears to be draining. patient received amoxicillin as PCN challenge and tolerated. It was removed as an allergy  - C/w Meropenem until 12/29, patient refusing home antibiotics, ROSA, or midline.   - C/w Flomax   - ID following  - Encouraged close outpatient follow up (Charlene or his own urologist)    #Constipation - slow transit  - Continue with Senna & Miralax    #Prior Right Humeral Fracture  - ACE wrap    #quadriplegia   #old well heeled decubiti   skin intact   supportive care   frequent repositioning to prevent pressure wounds     #HTN  - C/w Amlodipine 10/d    #comminuted distal right humeral fracture from 11/2024   seen by ortho 11/29/24 , was splinted , was told to follow-up with ortho as outpatient without any acute surgical intervention     Diet: regular low sodium   DVT prophylaxis: heparin sq  Dispo: admit   Code Status: FC     I have spent a total of 35 minutes to prepare to see the patient, obtaining and reviewing history, physical examination, explaining the diagnosis, prognosis and treatment plan with the patient/family/caregiver. I also have spent the time ordering studies and testing, interpreting results, medicine reconciliation, IDRs, subspecialty consultation and documentation as above.

## 2024-12-28 PROCEDURE — 99232 SBSQ HOSP IP/OBS MODERATE 35: CPT

## 2024-12-28 RX ORDER — MAG HYDROX/ALUMINUM HYD/SIMETH 200-200-20
30 SUSPENSION, ORAL (FINAL DOSE FORM) ORAL ONCE
Refills: 0 | Status: COMPLETED | OUTPATIENT
Start: 2024-12-28 | End: 2024-12-28

## 2024-12-28 RX ORDER — OXYCODONE HCL 15 MG
5 TABLET ORAL ONCE
Refills: 0 | Status: DISCONTINUED | OUTPATIENT
Start: 2024-12-28 | End: 2024-12-29

## 2024-12-28 RX ORDER — KETOROLAC TROMETHAMINE 30 MG/ML
30 INJECTION INTRAMUSCULAR; INTRAVENOUS ONCE
Refills: 0 | Status: DISCONTINUED | OUTPATIENT
Start: 2024-12-28 | End: 2024-12-28

## 2024-12-28 RX ADMIN — MEROPENEM 100 MILLIGRAM(S): 1 INJECTION, POWDER, FOR SOLUTION INTRAVENOUS at 13:43

## 2024-12-28 RX ADMIN — Medication 5 MILLIGRAM(S): at 17:34

## 2024-12-28 RX ADMIN — SENNOSIDES 2 TABLET(S): 8.6 TABLET, FILM COATED ORAL at 21:15

## 2024-12-28 RX ADMIN — Medication 5 MILLIGRAM(S): at 05:23

## 2024-12-28 RX ADMIN — HEPARIN SODIUM 5000 UNIT(S): 1000 INJECTION, SOLUTION INTRAVENOUS; SUBCUTANEOUS at 17:33

## 2024-12-28 RX ADMIN — HEPARIN SODIUM 5000 UNIT(S): 1000 INJECTION, SOLUTION INTRAVENOUS; SUBCUTANEOUS at 05:22

## 2024-12-28 RX ADMIN — KETOROLAC TROMETHAMINE 30 MILLIGRAM(S): 30 INJECTION INTRAMUSCULAR; INTRAVENOUS at 02:57

## 2024-12-28 RX ADMIN — KETOROLAC TROMETHAMINE 30 MILLIGRAM(S): 30 INJECTION INTRAMUSCULAR; INTRAVENOUS at 02:07

## 2024-12-28 RX ADMIN — MEROPENEM 100 MILLIGRAM(S): 1 INJECTION, POWDER, FOR SOLUTION INTRAVENOUS at 05:24

## 2024-12-28 RX ADMIN — TAMSULOSIN HYDROCHLORIDE 0.4 MILLIGRAM(S): 0.4 CAPSULE ORAL at 21:15

## 2024-12-28 RX ADMIN — Medication 30 MILLILITER(S): at 16:03

## 2024-12-28 RX ADMIN — MEROPENEM 100 MILLIGRAM(S): 1 INJECTION, POWDER, FOR SOLUTION INTRAVENOUS at 21:27

## 2024-12-28 NOTE — PROGRESS NOTE ADULT - SUBJECTIVE AND OBJECTIVE BOX
Hospitalist Daily Progress Note     *SUBJECTIVE*    Interval Events:  NAEON, Resting comfortably. HD Stable.      *OBJECTIVE*    PHYSICAL EXAM:  GENERAL: NAD , no increased WOB  CHEST/LUNG: CTAB;  No rales, rhonchi, wheezing, or rubs  HEART: Regular rate and rhythm; No murmurs, rubs, or gallops  ABDOMEN: Soft, Nontender, Nondistended; Bowel sounds present  EXTREMITIES:  2+ Peripheral Pulses b/l, No clubbing, cyanosis, calf tenderness or edema b/l   - condom catheter in place     OBJECTIVE DATA:   Vital Signs Last 24 Hrs  T(C): 36.2 (28 Dec 2024 05:17), Max: 36.5 (27 Dec 2024 10:16)  T(F): 97.1 (28 Dec 2024 05:17), Max: 97.7 (27 Dec 2024 10:16)  HR: 60 (28 Dec 2024 05:17) (60 - 74)  BP: 152/80 (28 Dec 2024 05:17) (142/89 - 161/102)  BP(mean): --  RR: 19 (28 Dec 2024 05:17) (18 - 19)  SpO2: 100% (28 Dec 2024 05:17) (99% - 100%)    Parameters below as of 28 Dec 2024 05:17  Patient On (Oxygen Delivery Method): room air               Daily     Daily     Labs, Interval Radiology studies, Medications reviewed by me

## 2024-12-28 NOTE — PROGRESS NOTE ADULT - ASSESSMENT
49M with PMHx of quadriplegia from [facility] 20 years prior, right nephrectomy (solitary left kidney) with known large left staghorn calculus with hydronephrosis being admitted for possibly infected stone and/or pyelonephritis. UCx with ESBL klebsiella and proteus with no good oral option (not carbapenem resistant).    #Left Sided Pyelonephritis or infected nephrolithiasis  #left sided staghorn calculus   #moderate left hydronephrosis   #solitary left kidney   presents today after being called in for positive urine culture results. Patient was in the ED on 12/10 for exacerbation of chronic back pain. Workup done at the time revealed urinary tract infection and staghorn calculus with moderate hydronephrosis, along with urothelial thickening and pericolonic calyceal and periureteral fat stranding suggestive of infection. Patient was seen by urology and surgery who believed that no intervention was necessary at the time and patient was discharged home. Patient was called back today for positive urine culture results. On presentation, patient is hemodynamically stable and afebrile. Labs significant for elevated Alk Ph of 230. UA shows cloudy urine, +WBC, +RBC, +bacteria, LE+, nitrite+. CT abdomen/pelvis shows no significant interval change compared to 12/10/2024; solitary left kidney with a large staghorn calculus and moderate hydronephrosis, urothelial thickening, and adjacent fat stranding; Urology following --> patient refusing Olvera cath to maximize drainage. - PVR remains elevated, but mostly acceptable, but he's refusing Olvera .  IR consulted for possible left PCN given hydronephrosis, they state not indicated given stable kidney function and patient appears to be draining. patient received amoxicillin as PCN challenge and tolerated. It was removed as an allergy  - C/w Meropenem until 12/29, patient refusing home antibiotics, ROSA, or midline.   - C/w Flomax   - ID following  - Encouraged close outpatient follow up (Charlene or his own urologist)    #Constipation - slow transit  - Continue with Senna & Miralax    #Prior Right Humeral Fracture  - ACE wrap    #quadriplegia   #old well heeled decubiti   skin intact   supportive care   frequent repositioning to prevent pressure wounds     #HTN  - C/w Amlodipine 10/d    #comminuted distal right humeral fracture from 11/2024   seen by ortho 11/29/24 , was splinted , was told to follow-up with ortho as outpatient without any acute surgical intervention     Diet: regular low sodium   DVT prophylaxis: heparin sq  Dispo: admit   Code Status: FC     I have spent a total of *** minutes to prepare to see the patient, obtaining and reviewing history, physical examination, explaining the diagnosis, prognosis and treatment plan with the patient/family/caregiver. I also have spent the time ordering studies and testing, interpreting results, medicine reconciliation, IDRs, subspecialty consultation and documentation as above.     49M with PMHx of quadriplegia from [facility] 20 years prior, right nephrectomy (solitary left kidney) with known large left staghorn calculus with hydronephrosis being admitted for possibly infected stone and/or pyelonephritis. UCx with ESBL klebsiella and proteus with no good oral option (not carbapenem resistant).    #Left Sided Pyelonephritis or infected nephrolithiasis  #left sided staghorn calculus   #moderate left hydronephrosis   #solitary left kidney   presents today after being called in for positive urine culture results. Patient was in the ED on 12/10 for exacerbation of chronic back pain. Workup done at the time revealed urinary tract infection and staghorn calculus with moderate hydronephrosis, along with urothelial thickening and pericolonic calyceal and periureteral fat stranding suggestive of infection. Patient was seen by urology and surgery who believed that no intervention was necessary at the time and patient was discharged home. Patient was called back today for positive urine culture results. On presentation, patient is hemodynamically stable and afebrile. Labs significant for elevated Alk Ph of 230. UA shows cloudy urine, +WBC, +RBC, +bacteria, LE+, nitrite+. CT abdomen/pelvis shows no significant interval change compared to 12/10/2024; solitary left kidney with a large staghorn calculus and moderate hydronephrosis, urothelial thickening, and adjacent fat stranding; Urology following --> patient refusing Olvera cath to maximize drainage. - PVR remains elevated, but mostly acceptable, but he's refusing Olvera .  IR consulted for possible left PCN given hydronephrosis, they state not indicated given stable kidney function and patient appears to be draining. patient received amoxicillin as PCN challenge and tolerated. It was removed as an allergy  - C/w Meropenem until 12/29, patient refusing home antibiotics, ROSA, or midline.   - C/w Flomax   - ID following  - Encouraged close outpatient follow up (Charlene or his own urologist)    #Constipation - slow transit  - Continue with Senna & Miralax    #Prior Right Humeral Fracture  - Immobilized,  ACE wrap    #quadriplegia   #old well heeled decubiti   skin intact   supportive care   frequent repositioning to prevent pressure wounds     #HTN  - C/w Amlodipine 10/d    #comminuted distal right humeral fracture from 11/2024   seen by ortho 11/29/24 , was splinted , was told to follow-up with ortho as outpatient without any acute surgical intervention     Diet: regular low sodium   DVT prophylaxis: heparin sq  Dispo: admit   Code Status: FC     I have spent a total of 36 minutes to prepare to see the patient, obtaining and reviewing history, physical examination, explaining the diagnosis, prognosis and treatment plan with the patient/family/caregiver. I also have spent the time ordering studies and testing, interpreting results, medicine reconciliation, IDRs, subspecialty consultation and documentation as above.

## 2024-12-29 PROCEDURE — 99232 SBSQ HOSP IP/OBS MODERATE 35: CPT

## 2024-12-29 RX ADMIN — MEROPENEM 100 MILLIGRAM(S): 1 INJECTION, POWDER, FOR SOLUTION INTRAVENOUS at 06:51

## 2024-12-29 RX ADMIN — SENNOSIDES 2 TABLET(S): 8.6 TABLET, FILM COATED ORAL at 21:19

## 2024-12-29 RX ADMIN — Medication 5 MILLIGRAM(S): at 06:43

## 2024-12-29 RX ADMIN — HEPARIN SODIUM 5000 UNIT(S): 1000 INJECTION, SOLUTION INTRAVENOUS; SUBCUTANEOUS at 17:24

## 2024-12-29 RX ADMIN — HEPARIN SODIUM 5000 UNIT(S): 1000 INJECTION, SOLUTION INTRAVENOUS; SUBCUTANEOUS at 06:51

## 2024-12-29 RX ADMIN — Medication 5 MILLIGRAM(S): at 00:47

## 2024-12-29 RX ADMIN — MEROPENEM 100 MILLIGRAM(S): 1 INJECTION, POWDER, FOR SOLUTION INTRAVENOUS at 13:54

## 2024-12-29 RX ADMIN — Medication 5 MILLIGRAM(S): at 17:24

## 2024-12-29 RX ADMIN — MEROPENEM 100 MILLIGRAM(S): 1 INJECTION, POWDER, FOR SOLUTION INTRAVENOUS at 21:20

## 2024-12-29 RX ADMIN — Medication 5 MILLIGRAM(S): at 00:00

## 2024-12-29 RX ADMIN — TAMSULOSIN HYDROCHLORIDE 0.4 MILLIGRAM(S): 0.4 CAPSULE ORAL at 21:19

## 2024-12-29 NOTE — PROGRESS NOTE ADULT - PROVIDER SPECIALTY LIST ADULT
Internal Medicine
Internal Medicine
Urology
Urology
Infectious Disease
Hospitalist
Infectious Disease
Internal Medicine
Internal Medicine
Hospitalist
Infectious Disease
Internal Medicine

## 2024-12-29 NOTE — PROGRESS NOTE ADULT - REASON FOR ADMISSION
Urinary tract infection
Urinary tract infection, solitary kidney with PCNL
Urinary tract infection

## 2024-12-29 NOTE — PROGRESS NOTE ADULT - SUBJECTIVE AND OBJECTIVE BOX
Hospitalist Daily Progress Note     *SUBJECTIVE*    Interval Events:  NAEON, Resting comfortably. HD Stable.      *OBJECTIVE*    PHYSICAL EXAM:  GENERAL: NAD , no increased WOB  CHEST/LUNG: CTAB;  No rales, rhonchi, wheezing, or rubs  HEART: Regular rate and rhythm; No murmurs, rubs, or gallops  ABDOMEN: Soft, Nontender, Nondistended; Bowel sounds present  EXTREMITIES:  2+ Peripheral Pulses b/l, No clubbing, cyanosis, calf tenderness or edema b/l   - condom catheter in place     OBJECTIVE DATA:   Vital Signs Last 24 Hrs  T(C): 36.3 (29 Dec 2024 06:36), Max: 37.2 (28 Dec 2024 23:39)  T(F): 97.3 (29 Dec 2024 06:36), Max: 98.9 (28 Dec 2024 23:39)  HR: 61 (29 Dec 2024 06:36) (61 - 90)  BP: 130/89 (29 Dec 2024 06:36) (130/89 - 160/108)  BP(mean): --  RR: 18 (29 Dec 2024 06:36) (18 - 18)  SpO2: 100% (29 Dec 2024 06:36) (97% - 100%)    Parameters below as of 29 Dec 2024 06:36  Patient On (Oxygen Delivery Method): room air               Daily     Daily     Labs, Interval Radiology studies, Medications reviewed by me

## 2024-12-29 NOTE — PROGRESS NOTE ADULT - ASSESSMENT
49M with PMHx of quadriplegia from [facility] 20 years prior, right nephrectomy (solitary left kidney) with known large left staghorn calculus with hydronephrosis being admitted for possibly infected stone and/or pyelonephritis. UCx with ESBL klebsiella and proteus with no good oral option (not carbapenem resistant).    #Left Sided Pyelonephritis or infected nephrolithiasis  #left sided staghorn calculus   #moderate left hydronephrosis   #solitary left kidney   presents today after being called in for positive urine culture results. Patient was in the ED on 12/10 for exacerbation of chronic back pain. Workup done at the time revealed urinary tract infection and staghorn calculus with moderate hydronephrosis, along with urothelial thickening and pericolonic calyceal and periureteral fat stranding suggestive of infection. Patient was seen by urology and surgery who believed that no intervention was necessary at the time and patient was discharged home. Patient was called back today for positive urine culture results. On presentation, patient is hemodynamically stable and afebrile. Labs significant for elevated Alk Ph of 230. UA shows cloudy urine, +WBC, +RBC, +bacteria, LE+, nitrite+. CT abdomen/pelvis shows no significant interval change compared to 12/10/2024; solitary left kidney with a large staghorn calculus and moderate hydronephrosis, urothelial thickening, and adjacent fat stranding; Urology following --> patient refusing Olvera cath to maximize drainage. - PVR remains elevated, but mostly acceptable, but he's refusing Olvera .  IR consulted for possible left PCN given hydronephrosis, they state not indicated given stable kidney function and patient appears to be draining. patient received amoxicillin as PCN challenge and tolerated. It was removed as an allergy  - C/w Meropenem until 12/29, patient refusing home antibiotics, ROSA, or midline.   - C/w Flomax   - ID following  - Encouraged close outpatient follow up (Charlene or his own urologist)    #Constipation - slow transit  - Continue with Senna & Miralax    #Prior Right Humeral Fracture  - Immobilized,  ACE wrap    #quadriplegia   #old well heeled decubiti   skin intact   supportive care   frequent repositioning to prevent pressure wounds     #HTN  - C/w Amlodipine 10/d    #comminuted distal right humeral fracture from 11/2024   seen by ortho 11/29/24 , was splinted , was told to follow-up with ortho as outpatient without any acute surgical intervention     Diet: regular low sodium   DVT prophylaxis: heparin sq  Dispo: admit   Code Status: FC     I have spent a total of *** minutes to prepare to see the patient, obtaining and reviewing history, physical examination, explaining the diagnosis, prognosis and treatment plan with the patient/family/caregiver. I also have spent the time ordering studies and testing, interpreting results, medicine reconciliation, IDRs, subspecialty consultation and documentation as above.       49M with PMHx of quadriplegia from [facility] 20 years prior, right nephrectomy (solitary left kidney) with known large left staghorn calculus with hydronephrosis being admitted for possibly infected stone and/or pyelonephritis. UCx with ESBL klebsiella and proteus with no good oral option (not carbapenem resistant).    #Left Sided Pyelonephritis or infected nephrolithiasis  #left sided staghorn calculus   #moderate left hydronephrosis   #solitary left kidney   presents today after being called in for positive urine culture results. Patient was in the ED on 12/10 for exacerbation of chronic back pain. Workup done at the time revealed urinary tract infection and staghorn calculus with moderate hydronephrosis, along with urothelial thickening and pericolonic calyceal and periureteral fat stranding suggestive of infection. Patient was seen by urology and surgery who believed that no intervention was necessary at the time and patient was discharged home. Patient was called back today for positive urine culture results. On presentation, patient is hemodynamically stable and afebrile. Labs significant for elevated Alk Ph of 230. UA shows cloudy urine, +WBC, +RBC, +bacteria, LE+, nitrite+. CT abdomen/pelvis shows no significant interval change compared to 12/10/2024; solitary left kidney with a large staghorn calculus and moderate hydronephrosis, urothelial thickening, and adjacent fat stranding; Urology following --> patient refusing Olvera cath to maximize drainage. - PVR remains elevated, but mostly acceptable, but he's refusing Olvera .  IR consulted for possible left PCN given hydronephrosis, they state not indicated given stable kidney function and patient appears to be draining. patient received amoxicillin as PCN challenge and tolerated. It was removed as an allergy  - C/w Meropenem until 12/29, patient refusing home antibiotics, ROSA, or midline.   - C/w Flomax   - ID following  - Encouraged close outpatient follow up (Charlene or his own urologist)    #Constipation - slow transit  - Continue with Senna & Miralax    #Prior Right Humeral Fracture  - Immobilized,  ACE wrap. F/u with o/p ortho     #quadriplegia   #old well heeled decubiti   skin intact   supportive care   frequent repositioning to prevent pressure wounds     #HTN  - C/w Amlodipine 10/d    #comminuted distal right humeral fracture from 11/2024   seen by ortho 11/29/24 , was splinted , was told to follow-up with ortho as outpatient without any acute surgical intervention     Diet: regular low sodium   DVT prophylaxis: heparin sq  Dispo: admit   Code Status: FC     I have spent a total of 37 minutes to prepare to see the patient, obtaining and reviewing history, physical examination, explaining the diagnosis, prognosis and treatment plan with the patient/family/caregiver. I also have spent the time ordering studies and testing, interpreting results, medicine reconciliation, IDRs, subspecialty consultation and documentation as above.

## 2024-12-30 ENCOUNTER — TRANSCRIPTION ENCOUNTER (OUTPATIENT)
Age: 49
End: 2024-12-30

## 2024-12-30 VITALS
RESPIRATION RATE: 18 BRPM | DIASTOLIC BLOOD PRESSURE: 112 MMHG | TEMPERATURE: 98 F | OXYGEN SATURATION: 98 % | SYSTOLIC BLOOD PRESSURE: 159 MMHG | HEART RATE: 81 BPM

## 2024-12-30 PROCEDURE — 99239 HOSP IP/OBS DSCHRG MGMT >30: CPT

## 2024-12-30 RX ORDER — POLYETHYLENE GLYCOL 3350 17 G/DOSE
17 POWDER (GRAM) ORAL
Qty: 0 | Refills: 0 | DISCHARGE
Start: 2024-12-30

## 2024-12-30 RX ORDER — MAG HYDROX/ALUMINUM HYD/SIMETH 200-200-20
30 SUSPENSION, ORAL (FINAL DOSE FORM) ORAL EVERY 6 HOURS
Refills: 0 | Status: DISCONTINUED | OUTPATIENT
Start: 2024-12-30 | End: 2024-12-30

## 2024-12-30 RX ORDER — SENNOSIDES 8.6 MG/1
2 TABLET, FILM COATED ORAL
Qty: 0 | Refills: 0 | DISCHARGE
Start: 2024-12-30

## 2024-12-30 RX ORDER — BISACODYL 5 MG
1 TABLET, DELAYED RELEASE (ENTERIC COATED) ORAL
Qty: 0 | Refills: 0 | DISCHARGE
Start: 2024-12-30

## 2024-12-30 RX ADMIN — HEPARIN SODIUM 5000 UNIT(S): 1000 INJECTION, SOLUTION INTRAVENOUS; SUBCUTANEOUS at 05:21

## 2024-12-30 RX ADMIN — MEROPENEM 100 MILLIGRAM(S): 1 INJECTION, POWDER, FOR SOLUTION INTRAVENOUS at 05:22

## 2024-12-30 RX ADMIN — Medication 30 MILLILITER(S): at 04:45

## 2024-12-30 NOTE — DISCHARGE NOTE NURSING/CASE MANAGEMENT/SOCIAL WORK - FINANCIAL ASSISTANCE
[facility] provides services at a reduced cost to those who are determined to be eligible through [facility]’s financial assistance program. Information regarding [facility]’s financial assistance program can be found by going to [email or web address] or by calling [phone].

## 2024-12-30 NOTE — DISCHARGE NOTE NURSING/CASE MANAGEMENT/SOCIAL WORK - PATIENT PORTAL LINK FT
You can access the FollowMyHealth Patient Portal offered by [facility] by registering at the following website: [email or web address]. By joining [facility]’s FollowMyHealth portal, you will also be able to view your health information using other applications (apps) compatible with our system.

## 2025-01-05 DIAGNOSIS — B96.1 KLEBSIELLA PNEUMONIAE [K. PNEUMONIAE] AS THE CAUSE OF DISEASES CLASSIFIED ELSEWHERE: ICD-10-CM

## 2025-01-05 DIAGNOSIS — G82.50 QUADRIPLEGIA, UNSPECIFIED: ICD-10-CM

## 2025-01-05 DIAGNOSIS — N20.0 CALCULUS OF KIDNEY: ICD-10-CM

## 2025-01-05 DIAGNOSIS — N30.80 OTHER CYSTITIS WITHOUT HEMATURIA: ICD-10-CM

## 2025-01-05 DIAGNOSIS — K21.9 GASTRO-ESOPHAGEAL REFLUX DISEASE WITHOUT ESOPHAGITIS: ICD-10-CM

## 2025-01-05 DIAGNOSIS — K62.89 OTHER SPECIFIED DISEASES OF ANUS AND RECTUM: ICD-10-CM
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
## 2025-01-05 DIAGNOSIS — R10.9 UNSPECIFIED ABDOMINAL PAIN: ICD-10-CM

## 2025-01-05 DIAGNOSIS — S42.401S UNSPECIFIED FRACTURE OF LOWER END OF RIGHT HUMERUS, SEQUELA: ICD-10-CM

## 2025-01-05 DIAGNOSIS — I10 ESSENTIAL (PRIMARY) HYPERTENSION: ICD-10-CM
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
## 2025-01-05 DIAGNOSIS — N13.6 PYONEPHROSIS: ICD-10-CM

## 2025-01-05 DIAGNOSIS — N40.0 BENIGN PROSTATIC HYPERPLASIA WITHOUT LOWER URINARY TRACT SYMPTOMS: ICD-10-CM

## 2025-01-05 DIAGNOSIS — K59.00 CONSTIPATION, UNSPECIFIED: ICD-10-CM

## 2025-01-05 DIAGNOSIS — Z16.12 EXTENDED SPECTRUM BETA LACTAMASE (ESBL) RESISTANCE: ICD-10-CM

## 2025-01-05 DIAGNOSIS — Z90.5 ACQUIRED ABSENCE OF KIDNEY: ICD-10-CM

## 2025-01-05 DIAGNOSIS — N10 ACUTE PYELONEPHRITIS: ICD-10-CM

## 2025-02-11 NOTE — PRE-ANESTHESIA EVALUATION ADULT - NS MD [facility] INPLANTS MED DEV
[de-identified] : Follow up 2/11/25: s/p thoracolumbar fusion with laminectomy, using stimulator and reactiv8. Has vague mild mid back pain, denies low back pain. 
None
None
